# Patient Record
Sex: MALE | Race: WHITE | Employment: OTHER | URBAN - METROPOLITAN AREA
[De-identification: names, ages, dates, MRNs, and addresses within clinical notes are randomized per-mention and may not be internally consistent; named-entity substitution may affect disease eponyms.]

---

## 2017-01-04 ENCOUNTER — OFFICE VISIT (OUTPATIENT)
Dept: FAMILY MEDICINE | Facility: CLINIC | Age: 82
End: 2017-01-04
Payer: COMMERCIAL

## 2017-01-04 ENCOUNTER — TELEPHONE (OUTPATIENT)
Dept: FAMILY MEDICINE | Facility: CLINIC | Age: 82
End: 2017-01-04

## 2017-01-04 ENCOUNTER — TRANSFERRED RECORDS (OUTPATIENT)
Dept: HEALTH INFORMATION MANAGEMENT | Facility: CLINIC | Age: 82
End: 2017-01-04

## 2017-01-04 VITALS
WEIGHT: 182 LBS | HEART RATE: 37 BPM | OXYGEN SATURATION: 97 % | DIASTOLIC BLOOD PRESSURE: 60 MMHG | TEMPERATURE: 97 F | SYSTOLIC BLOOD PRESSURE: 168 MMHG | BODY MASS INDEX: 31.5 KG/M2

## 2017-01-04 DIAGNOSIS — R41.3 MEMORY LOSS: ICD-10-CM

## 2017-01-04 DIAGNOSIS — I10 HYPERTENSION GOAL BP (BLOOD PRESSURE) < 140/90: ICD-10-CM

## 2017-01-04 DIAGNOSIS — R10.12 ABDOMINAL PAIN, LEFT UPPER QUADRANT: Primary | ICD-10-CM

## 2017-01-04 DIAGNOSIS — Z86.711 HISTORY OF PULMONARY EMBOLISM: ICD-10-CM

## 2017-01-04 DIAGNOSIS — D69.6 THROMBOCYTOPENIA (H): ICD-10-CM

## 2017-01-04 PROCEDURE — 99214 OFFICE O/P EST MOD 30 MIN: CPT | Performed by: INTERNAL MEDICINE

## 2017-01-04 NOTE — TELEPHONE ENCOUNTER
Pt has been having left stomach side pain all night. Pt would like to see you if possible. If not, pt has been scheduled with Dr Dotson at 2:00pm today. Please call pt at the above # to advise.

## 2017-01-04 NOTE — MR AVS SNAPSHOT
After Visit Summary   1/4/2017    Brock Abarca    MRN: [id number]           Patient Information     Date Of Birth          3/1/1932        Visit Information        Provider Department      1/4/2017 1:00 PM Sadaf Fox MD [facility] Instructions    [facility]    If you have any questions regarding to your visit please contact your care team:     Team Pink:   Clinic Hours Telephone Number   Internal Medicine:  Dr. Sadaf Feliciano NP       7am-7pm  Monday - Thursday   7am-5pm  Fridays  [phone]  (Appointment scheduling available 24/7)    Questions about your visit?  Team Line  [phone]   Urgent Care - [place] [facility] - 11am-9pm Monday-Friday Saturday-Sunday- 9am-5pm   [place] - 5pm-9pm Monday-Friday Saturday-Sunday- 9am-5pm  [phone] - [place]   [phone] - [place]       What options do I have for visits at the clinic other than the traditional office visit?  To expand how we care for you, many of our providers are utilizing electronic visits (e-visits) and telephone visits, when medically appropriate, for interactions with their patients rather than a visit in the clinic.   We also offer nurse visits for many medical concerns. Just like any other service, we will bill your insurance company for this type of visit based on time spent on the phone with your provider. Not all insurance companies cover these visits. Please check with your medical insurance if this type of visit is covered. You will be responsible for any charges that are not paid by your insurance.      E-visits via Kiko:  generally incur a $35.00 fee.  Telephone visits:  Time spent on the phone: *charged based on time that is spent on the phone in increments of 10 minutes. Estimated cost:   5-10 mins $30.00   11-20 mins. $59.00   21-30 mins. $85.00   Use Kiko (secure email communication and access to  your chart) to send your primary care provider a message or make an appointment. Ask someone on your Team how to sign up for [employer].    For a Price Quote for your services, please call our Hupu Line at [phone].    As always, Thank you for trusting us with your health care needs!    BASSAM          Follow-ups after your visit        Who to contact     If you have questions or need follow up information about today's clinic visit or your schedule please contact [facility] directly at [phone].  Normal or non-critical lab and imaging results will be communicated to you by [employer] The[employer]hart, letter or phone within 4 business days after the clinic has received the results. If you do not hear from us within 7 days, please contact the clinic through Uniquedut or phone. If you have a critical or abnormal lab result, we will notify you by phone as soon as possible.  Submit refill requests through [employer] or call your pharmacy and they will forward the refill request to us. Please allow 3 business days for your refill to be completed.          Additional Information About Your Visit        Io [employer] Information     S[employer] gives you secure access to your electronic health record. If you see a primary care provider, you can also send messages to your care team and make appointments. If you have questions, please call your primary care clinic.  If you do not have a primary care provider, please call [phone] and they will assist you.        Care EveryWhere ID     This is your Care EveryWhere ID. This could be used by other organizations to access your [place] medical records  [id number]        Your Vitals Were     Pulse Temperature Pulse Oximetry             37 97  F (36.1  C) (Oral) 97%          Blood Pressure from Last 3 Encounters:   01/04/17 168/60   10/07/16 108/64   01/04/16 126/72    Weight from Last 3 Encounters:   01/04/17 182 lb (82.555 kg)   10/07/16 181 lb (82.101 kg)   02/16/16  188 lb (85.276 kg)              Today, you had the following     No orders found for display         Today's Medication Changes          These changes are accurate as of: 1/4/17  1:14 PM.  If you have any questions, ask your nurse or doctor.               These medicines have changed or have updated prescriptions.        Dose/Directions    triamcinolone 0.1 % cream   Commonly known as:  KENALOG   This may have changed:    - when to take this  - reasons to take this  - additional instructions   Used for:  Dermatitis        Apply sparingly to affected area three times daily for 10 days.   Quantity:  45 g   Refills:  0                Primary Care Provider Office Phone # Fax #    Sadaf Fox [phone]       [address] [address]        Thank you!     Thank you for choosing [facility]  for your care. Our goal is always to provide you with excellent care. Hearing back from our patients is one way we can continue to improve our services. Please take a few minutes to complete the written survey that you may receive in the mail after your visit with us. Thank you!             Your Updated Medication List - Protect others around you: Learn how to safely use, store and throw away your medicines at www.disposemymeds.org.          This list is accurate as of: 1/4/17  1:14 PM.  Always use your most recent med list.                   Brand Name Dispense Instructions for use    alendronate 70 MG tablet    FOSAMAX    12 tablet    Take 1 tablet (70 mg) by mouth every 7 days Take 60 minutes before am meal with 8 oz. water. Remain upright for 30 minutes.       amoxicillin 875 MG tablet    AMOXIL    20 tablet    Take 1 tablet (875 mg) by mouth 2 times daily       aspirin 81 MG tablet     90 tablet    Take 1 tablet (81 mg) by mouth daily       CENTRUM SILVER ADULT 50+ PO      Take 1 tablet by mouth daily       cholecalciferol 5000 UNITS Caps capsule     vitamin D3     Take 1 capsule by mouth daily       CLARITIN-D 24 HOUR PO      Take 1 tablet by mouth as needed       clopidogrel 75 MG tablet    PLAVIX    90 tablet    Take 1 tablet (75 mg) by mouth daily       levothyroxine 112 MCG tablet    SYNTHROID/LEVOTHROID    90 tablet    Take 1 tablet (112 mcg) by mouth daily       metoprolol 50 MG 24 hr tablet    TOPROL-XL    90 tablet    Take 1 tablet (50 mg) by mouth daily       order for DME     1 Device    Equipment being ordered: bilateral wrist splints       simvastatin 40 MG tablet    ZOCOR    90 tablet    Take 1 tablet (40 mg) by mouth daily       tamsulosin 0.4 MG capsule    FLOMAX    90 capsule    Take 1 capsule (0.4 mg) by mouth daily       triamcinolone 0.1 % cream    KENALOG    45 g    Apply sparingly to affected area three times daily for 10 days.       trospium 60 MG Cp24 24 hr capsule    SANCTURA XR     60 mg       VITAMIN B 12 PO      Take 2,500 mcg by mouth daily.

## 2017-01-04 NOTE — PROGRESS NOTES
SUBJECTIVE:                                                    Brock Abarca is a 84 year old male who presents to clinic today for the following health issues:        ABDOMINAL PAIN     Onset: x2 days     Description:   Character: Stabbing and Cramping  Location: left upper quadrant left lower quadrant left flank  Radiation: Back    Intensity: moderate    Progression of Symptoms:  same    Accompanying Signs & Symptoms:  Fever/Chills?: no   Gas/Bloating: YES  Nausea: no   Vomitting: no   Diarrhea?: no   Constipation:no   Dysuria or Hematuria: no    History:   Trauma: no   Previous similar pain: no    Previous tests done: none    Precipitating factors:   Does the pain change with:     Food: no      BM: no     Urination: no     Alleviating factors:  none    Therapies Tried and outcome: tylenol, advil, tums, peptobismol    LMP:  not applicable   Monday night he had an upset stomach, took peptobismol, and felt fine on Tuesday. Tuesday night the pain returned in the left side of his abdomen. Today he has started having dry heaves. He had not had a BM in 2 days, but this is not unusual for him. He denies any increases in chills nor any fever. He would rate the pain currently as 10/10. He describes the pain as throbbing. It is worse when he lays down.    Patient had memory testing last week - stable.     Problem list and histories reviewed & adjusted, as indicated.  Additional history: as documented    BP Readings from Last 3 Encounters:   01/04/17 168/60   10/07/16 108/64   01/04/16 126/72    Wt Readings from Last 3 Encounters:   01/04/17 182 lb (82.555 kg)   10/07/16 181 lb (82.101 kg)   02/16/16 188 lb (85.276 kg)         ROS:  C: NEGATIVE for fever, chills, change in weight  GI: POSITIVE for abdominal pain   All other systems were reviewed and were negative.      OBJECTIVE:                                                    /60 mmHg  Pulse 37  Temp(Src) 97  F (36.1  C) (Oral)  Wt 182 lb (82.555 kg)   SpO2 97%  Body mass index is 31.5 kg/(m^2).  GENERAL:un healthy, alert and moderate distress  RESP: lungs clear to auscultation - no rales, rhonchi or wheezes  CV: regular rate and rhythm, normal S1 S2, no S3 or S4, no murmur, click or rub, no peripheral edema and peripheral pulses strong  ABDOMEN: soft,  without hepatosplenomegaly or masses, significant tenderness   in LUQ. Mild tenderness in RUQ  MS: Ecchymosis on right temple and right upper abdomen.       ASSESSMENT/PLAN:                                                    Brock was seen today for abdominal pain.    Diagnoses and all orders for this visit:    Abdominal pain, left upper quadrant    History of pulmonary embolism    Thrombocytopenia (H)    Memory loss    Suspect retroperitoneal bleed given low platelet history or splenic infarct given history of pulmonary embolism.  Could also be colonic or renal in etiology.  Needs stat labs and CT.  Patient declined transfer via ambulance.  ER physician notified of transfer.  Patient was sent to [facility] by car for further assessment.    Briefly informed patient of stable memory test  Blood pressure is elevated.  Likely due to pain    This document serves as a record of the services and decisions personally performed and made by Sadaf Fox MD. It was created on her behalf by Marissa Rehman, a trained medical scribe. The creation of this document is based the provider's statements to the medical scribe.  Marissa Rehman 1:13 PM 1/4/2017    Provider:   The information in this document, created by the medical scribe for me, accurately reflects the services I personally performed and the decisions made by me. I have reviewed and approved this document for accuracy prior to leaving the patient care area.  Sadaf Fox MD 1:13 PM 1/4/2017    Sadaf Fox MD  [facility]

## 2017-01-04 NOTE — NURSING NOTE
"Chief Complaint   Patient presents with     Abdominal Pain       Initial /60 mmHg  Pulse 37  Temp(Src) 97  F (36.1  C) (Oral)  Wt 182 lb (82.555 kg)  SpO2 97% Estimated body mass index is 31.5 kg/(m^2) as calculated from the following:    Height as of 10/7/16: 5' 3.75\" (1.619 m).    Weight as of this encounter: 182 lb (82.555 kg).  BP completed using cuff size: rajiv KING CMA ([facility])      "

## 2017-01-04 NOTE — PATIENT INSTRUCTIONS
[facility]    If you have any questions regarding to your visit please contact your care team:     Team Pink:   Clinic Hours Telephone Number   Internal Medicine:  Dr. Sadaf Feliciano NP       7am-7pm  Monday - Thursday   7am-5pm  Fridays  [phone]  (Appointment scheduling available 24/7)    Questions about your visit?  Team Line  [phone]   Urgent Care - [place] and [facility] - 11am-9pm Monday-Friday Saturday-Sunday- 9am-5pm   [place] - 5pm-9pm Monday-Friday Saturday-Sunday- 9am-5pm  [phone] - [place]   [phone] - [place]       What options do I have for visits at the clinic other than the traditional office visit?  To expand how we care for you, many of our providers are utilizing electronic visits (e-visits) and telephone visits, when medically appropriate, for interactions with their patients rather than a visit in the clinic.   We also offer nurse visits for many medical concerns. Just like any other service, we will bill your insurance company for this type of visit based on time spent on the phone with your provider. Not all insurance companies cover these visits. Please check with your medical insurance if this type of visit is covered. You will be responsible for any charges that are not paid by your insurance.      E-visits via Kiosked:  generally incur a $35.00 fee.  Telephone visits:  Time spent on the phone: *charged based on time that is spent on the phone in increments of 10 minutes. Estimated cost:   5-10 mins $30.00   11-20 mins. $59.00   21-30 mins. $85.00   Use [employer] (secure email communication and access to your chart) to send your primary care provider a message or make an appointment. Ask someone on your Team how to sign up for Kiosked.    For a Price Quote for your services, please call our Consumer Price Line at [phone].    As always, Thank you for trusting us with your health care  needs!    BASSAM

## 2017-01-06 ENCOUNTER — TELEPHONE (OUTPATIENT)
Dept: FAMILY MEDICINE | Facility: CLINIC | Age: 82
End: 2017-01-06

## 2017-01-06 NOTE — TELEPHONE ENCOUNTER
Called and spoke with Linda.  Appointment scheduled with Dr. Fox for Wednesday, January 11th at 2:30 p.m. Carri Rojo,

## 2017-01-06 NOTE — TELEPHONE ENCOUNTER
Reason for Call:  Other appointment    Detailed comments: Linda the patients wife states the patient was in the hospital last night after finding a kidney stone and had stents placed at [facility]. She states the doctor there advised to see primary care provider within 5 days before having to go back into [facility] to have stents placed. They would like an earlier appt within 5 days.     Phone Number Patient can be reached at: Home number on file [phone] (home) or Cell number on file:  [phone]    Best Time: any    Can we leave a detailed message on this number? YES    Call taken on 1/6/2017 at 11:32 AM by Ronal Walker

## 2017-01-09 ENCOUNTER — TELEPHONE (OUTPATIENT)
Dept: FAMILY MEDICINE | Facility: CLINIC | Age: 82
End: 2017-01-09

## 2017-01-09 NOTE — TELEPHONE ENCOUNTER
This patient was discharged from [facility] on 1-6-17.    Discharge Diagnosis: ureteral colic    A follow-up visit has been scheduled.  Patient is scheduled to see Dr. Fox on 1-11-17.    Number of ED/ER visits in the last 12 months:       Please follow-up with patient.    Carri Rojo,

## 2017-01-10 NOTE — TELEPHONE ENCOUNTER
"ED/Discharge Protocol    \"Hi, my name is Edwin Weiner, a registered nurse, and I am calling on behalf of Dr. Fox's office at [place].  I am calling to follow up and see how things are going for you after your recent visit.\"    \"I see that you were in the (ER/UC/IP) on 1/6/17.    How are you doing now that you are home?\" better but still in pain. Has pain meds from the hospital    Is patient experiencing symptoms that may require a hospital visit?  no    Discharge Instructions    \"Let's review your discharge instructions.  What is/are the follow-up recommendations?  Pt. Response: f/u with PCP    \"Were you instructed to make a follow-up appointment?\"  Pt. Response: Yes.  Has appointment been made?   Yes      \"When you see the provider, I would recommend that you bring your discharge instructions with you.    Medications    \"How many new medications are you on since your hospitalization/ED visit?\"    0-1  \"How many of your current medicines changed (dose, timing, name, etc.) while you were in the hospital/ED visit?\"   0-1  \"Do you have questions about your medications?\"   No  \"Were you newly diagnosed with heart failure, COPD, diabetes or did you have a heart attack?\"   No  For patients on insulin: \"Did you start on insulin in the hospital or did you have your insulin dose changed?\"   No    Medication reconciliation completed? No    Was MTM referral placed (*Make sure to put transitions as reason for referral)?   No    Call Summary    \"Do you have any questions or concerns about your condition or care plan at the moment?\"    No  Triage nurse advice given: call with questions/concerns      \"If you have questions or things don't continue to improve, we encourage you contact us through the main clinic number,  [phone].  Even if the clinic is not open, triage nurses are available 24/7 to help you.     We would like you to know that our clinic has extended hours (provide information).  We also have urgent care " "(provide details on closest location and hours/contact info)\"      \"Thank you for your time and take care!\"      Edwin Weiner RN    "

## 2017-01-11 ENCOUNTER — OFFICE VISIT (OUTPATIENT)
Dept: FAMILY MEDICINE | Facility: CLINIC | Age: 82
End: 2017-01-11
Payer: COMMERCIAL

## 2017-01-11 VITALS
SYSTOLIC BLOOD PRESSURE: 136 MMHG | TEMPERATURE: 97 F | BODY MASS INDEX: 31.18 KG/M2 | HEART RATE: 87 BPM | OXYGEN SATURATION: 97 % | WEIGHT: 180.2 LBS | DIASTOLIC BLOOD PRESSURE: 60 MMHG

## 2017-01-11 DIAGNOSIS — N20.0 CALCULUS OF KIDNEY: ICD-10-CM

## 2017-01-11 DIAGNOSIS — N17.9 ACUTE RENAL FAILURE, UNSPECIFIED ACUTE RENAL FAILURE TYPE (H): ICD-10-CM

## 2017-01-11 DIAGNOSIS — R79.89 ELEVATED TSH: ICD-10-CM

## 2017-01-11 DIAGNOSIS — E83.52 HYPERCALCEMIA: Primary | ICD-10-CM

## 2017-01-11 DIAGNOSIS — K59.03 DRUG-INDUCED CONSTIPATION: ICD-10-CM

## 2017-01-11 DIAGNOSIS — N13.30 HYDRONEPHROSIS, LEFT: ICD-10-CM

## 2017-01-11 LAB
ANION GAP SERPL CALCULATED.3IONS-SCNC: 10 MMOL/L (ref 3–14)
BUN SERPL-MCNC: 13 MG/DL (ref 7–30)
CALCIUM SERPL-MCNC: 10.6 MG/DL (ref 8.5–10.1)
CHLORIDE SERPL-SCNC: 108 MMOL/L (ref 94–109)
CO2 SERPL-SCNC: 23 MMOL/L (ref 20–32)
CREAT SERPL-MCNC: 1.16 MG/DL (ref 0.66–1.25)
GFR SERPL CREATININE-BSD FRML MDRD: 60 ML/MIN/1.7M2
GLUCOSE SERPL-MCNC: 89 MG/DL (ref 70–99)
POTASSIUM SERPL-SCNC: 4.7 MMOL/L (ref 3.4–5.3)
SODIUM SERPL-SCNC: 141 MMOL/L (ref 133–144)

## 2017-01-11 PROCEDURE — 99495 TRANSJ CARE MGMT MOD F2F 14D: CPT | Performed by: INTERNAL MEDICINE

## 2017-01-11 PROCEDURE — 36415 COLL VENOUS BLD VENIPUNCTURE: CPT | Performed by: INTERNAL MEDICINE

## 2017-01-11 PROCEDURE — 82306 VITAMIN D 25 HYDROXY: CPT | Performed by: INTERNAL MEDICINE

## 2017-01-11 PROCEDURE — 80048 BASIC METABOLIC PNL TOTAL CA: CPT | Performed by: INTERNAL MEDICINE

## 2017-01-11 RX ORDER — POLYETHYLENE GLYCOL 3350 17 G/17G
1 POWDER, FOR SOLUTION ORAL DAILY
Qty: 510 G | Refills: 0 | Status: SHIPPED | OUTPATIENT
Start: 2017-01-11 | End: 2017-06-26

## 2017-01-11 RX ORDER — SENNOSIDES 8.6 MG
1 TABLET ORAL 2 TIMES DAILY
Qty: 60 TABLET | Refills: 0 | Status: SHIPPED | OUTPATIENT
Start: 2017-01-11 | End: 2019-07-09

## 2017-01-11 RX ORDER — HYDROCODONE BITARTRATE AND ACETAMINOPHEN 5; 325 MG/1; MG/1
TABLET ORAL PRN
COMMUNITY
Start: 2017-01-07 | End: 2017-02-06

## 2017-01-11 ASSESSMENT — PAIN SCALES - GENERAL: PAINLEVEL: MILD PAIN (2)

## 2017-01-11 NOTE — PATIENT INSTRUCTIONS
Consider using walker while you are on your pain medications.     Buy MiraLax and Senokot for constipation at the pharmacy downstairs.   MiraLax 1x daily with water.  Senokot 2x daily.      [facility]    If you have any questions regarding to your visit please contact your care team:     Team Pink:   Clinic Hours Telephone Number   Internal Medicine:  Dr. Sadaf Feliciano, NP       7am-7pm  Monday - Thursday   7am-5pm  Fridays  [phone]  (Appointment scheduling available 24/7)    Questions about your visit?  Team Line  [phone]   [facility] [place] [facility] 11am-9pm Monday-Friday Saturday-Sunday- 9am-5pm   [place] - 5pm-9pm Monday-Friday Saturday-Sunday- 9am-5pm  [phone] - [place]   [phone] - [place]       What options do I have for visits at the clinic other than the traditional office visit?  To expand how we care for you, many of our providers are utilizing electronic visits (e-visits) and telephone visits, when medically appropriate, for interactions with their patients rather than a visit in the clinic.   We also offer nurse visits for many medical concerns. Just like any other service, we will bill your insurance company for this type of visit based on time spent on the phone with your provider. Not all insurance companies cover these visits. Please check with your medical insurance if this type of visit is covered. You will be responsible for any charges that are not paid by your insurance.      E-visits via Hipui:  generally incur a $35.00 fee.  Telephone visits:  Time spent on the phone: *charged based on time that is spent on the phone in increments of 10 minutes. Estimated cost:   5-10 mins $30.00   11-20 mins. $59.00   21-30 mins. $85.00   Use Hipui (secure email communication and access to your chart) to send your primary care provider a message or make an appointment. Ask someone on your Team how to  sign up for [employer]x.    For a Price Quote for your services, please call our Consumer Price Line at [phone].    As always, Thank you for trusting us with your health care needs!    Discharged by June KING CMA ([facility])

## 2017-01-11 NOTE — Clinical Note
This was a hospital follow up patient but the wrong smart set note was used.  Can you delmer that up for me and fill out as much as you can?  You can just add it to the note that is already in there. Thanks, Dr Fox

## 2017-01-11 NOTE — PROGRESS NOTES
"  SUBJECTIVE:                                                    Brock Abarca is a 84 year old male who presents to clinic today for the following health issues:      Chief Complaint   Patient presents with     Back Pain     Hurts when urinating, issues with not being able to hold it. Patient states this is due to stent that was put in for kidney stones.   Patient currently has daughter at home helping him out as wife is in hospital after knee surgery.  Was hospitalized for hydronephrosis and renal stones on the left.  Had stent placed and will get removal in a week.    Amadeo says that when he urinates, he feels a sharp pain in his back. When he stands, he has a constant pain that he rates as a 2-3/10 and goes away when he sits.His current medications are making him a little off-balance, but he says it is manageable. He feels the narcotics are helpful and that he will have enough.    He reports that he is currently urinating every hour and has to go as soon as he feels it. He has \" no holding power\".   Labs in the hospital revealed hypercalcemia and renal failure, both of which resolved with hydration.  He was discontinued on his vitamin D as well.  He doesn't take calcium.  He is on fosamax.    He has been constipated since being home from the hospital.  He is not using any treatment.      Amadeo gets the stent removed on January 23 (he thinks).    Problem list and histories reviewed & adjusted, as indicated.  Additional history: as documented    BP Readings from Last 3 Encounters:   01/11/17 136/60   01/04/17 168/60   10/07/16 108/64    Wt Readings from Last 3 Encounters:   01/11/17 180 lb 3.2 oz (81.738 kg)   01/04/17 182 lb (82.555 kg)   10/07/16 181 lb (82.101 kg)          ROS:  C: NEGATIVE for fever, chills, change in weight  CV: NEGATIVE for chest pain, palpitations   GI: NEGATIVE for nausea, abdominal pain, heartburn, POSITIVE for constipation  : negative for hematuria, decreased urinary stream, " positive for dysuria and frequency   ROS: 10 point ROS neg other than the symptoms noted above in the HPI.     OBJECTIVE:                                                    /60 mmHg  Pulse 87  Temp(Src) 97  F (36.1  C) (Oral)  Wt 180 lb 3.2 oz (81.738 kg)  SpO2 97%  Body mass index is 31.18 kg/(m^2).  GENERAL: healthy, alert and no distress  RESP: lungs clear to auscultation - no rales, rhonchi or wheezes  CV: regular rate and rhythm, normal S1 S2, no S3 or S4, no murmur, click or rub,  trace peripheral edema bilaterally  ABDOMEN: soft, nontender, no hepatosplenomegaly, no masses and hyperactive bowel sounds.  No edema   Normal gait.  Psych: Alert and oriented times 3; coherent speech, normal   rate and volume, able to articulate logical thoughts, able   to abstract reason,     ASSESSMENT/PLAN:                                                        ICD-10-CM    1. Hypercalcemia E83.52 Basic metabolic panel     Vitamin D Deficiency     Parathyroid Hormone Intact   2. Calculus of kidney N20.0 Basic metabolic panel     Vitamin D Deficiency   3. Hydronephrosis, left N13.30    4. Acute renal failure, unspecified acute renal failure type (H) N17.9 Basic metabolic panel   5. Drug-induced constipation K59.03 sennosides (SENOKOT) 8.6 MG tablet     polyethylene glycol (MIRALAX) powder   Is improved.  Pain is likely from the stent.  Controlled with current medications.  Renal failure is likely from the hydronephrosis but I am concerned about the hypercalcemia.    Constipation is from the narcotics.    Patient Instructions     Consider using walker while you are on your pain medications.     Buy MiraLax and Senokot for constipation at the pharmacy downstairs.   MiraLax 1x daily with water.  Senokot 2x daily.      [facility]    If you have any questions regarding to your visit please contact your care team:     Team Pink:   Clinic Hours Telephone Number   Internal Medicine:  Dr. Sadaf Sanchez  Cirilo Feliciano NP       7am-7pm  Monday - Thursday   7am-5pm  Fridays  [phone]  (Appointment scheduling available 24/7)    Questions about your visit?  Team Line  [phone]   Urgent [facility] - [place] [facility] [place] [facility] - 11am-9pm Monday-Friday Saturday-Sunday- 9am-5pm   [place] - 5pm-9pm Monday-Friday Saturday-Sunday- 9am-5pm  [phone] - [place]   [phone] - [place]       What options do I have for visits at the clinic other than the traditional office visit?  To expand how we care for you, many of our providers are utilizing electronic visits (e-visits) and telephone visits, when medically appropriate, for interactions with their patients rather than a visit in the clinic.   We also offer nurse visits for many medical concerns. Just like any other service, we will bill your insurance company for this type of visit based on time spent on the phone with your provider. Not all insurance companies cover these visits. Please check with your medical insurance if this type of visit is covered. You will be responsible for any charges that are not paid by your insurance.      E-visits via "[employer]:  generally incur a $35.00 fee.  Telephone visits:  Time spent on the phone: *charged based on time that is spent on the phone in increments of 10 minutes. Estimated cost:   5-10 mins $30.00   11-20 mins. $59.00   21-30 mins. $85.00   Use [employer] (secure email communication and access to your chart) to send your primary care provider a message or make an appointment. Ask someone on your Team how to sign up for [employer].    For a Price Quote for your services, please call our Consumer Price Line at [phone].    As always, Thank you for trusting us with your health care needs!    Discharged by June KING CMA ([facility])        This document serves as a record of the services and decisions personally performed and made by Sadaf Fox MD. It was created on her behalf by robbie Rogers  trained medical scribe. The creation of this document is based the provider's statements to the medical scribe.  Marissa Ori 3:11 PM 1/11/2017    Provider:   The information in this document, created by the medical scribe for me, accurately reflects the services I personally performed and the decisions made by me. I have reviewed and approved this document for accuracy prior to leaving the patient care area.  Sadaf Fox MD 3:11 PM 1/11/2017    Sadaf Fox MD  [facility]    Start: 3:11 PM  End: 3:21 PM

## 2017-01-11 NOTE — MR AVS SNAPSHOT
After Visit Summary   1/11/2017    Brock Abarca    MRN: [id number]           Patient Information     Date Of Birth          3/1/1932        Visit Information        Provider Department      1/11/2017 2:30 PM Sadaf Fox MD [facility]        Today's Diagnoses     Hypercalcemia    -  1     Calculus of kidney         Hydronephrosis, left         Acute renal failure, unspecified acute renal failure type (H)         Drug-induced constipation           Care Instructions    Consider using walker while you are on your pain medications.     [facility]   MiraLax 1x daily with water.  Senokot 2x daily.      [facility]    If you have any questions regarding to your visit please contact your care team:     Team Pink:   Clinic Hours Telephone Number   Internal Medicine:  Dr. Sadaf Feliciano, NP       7am-7pm  Monday - Thursday   7am-5pm  Fridays  [phone]  (Appointment scheduling available 24/7)    Questions about your visit?  Team Line  [phone]   Urgent [facility] - [place] [facility] - 11am-9pm Monday-Friday Saturday-Sunday- 9am-5pm   [place] - 5pm-9pm Monday-Friday Saturday-Sunday- 9am-5pm  [phone] - [place]   [phone] - [place]       What options do I have for visits at the clinic other than the traditional office visit?  To expand how we care for you, many of our providers are utilizing electronic visits (e-visits) and telephone visits, when medically appropriate, for interactions with their patients rather than a visit in the clinic.   We also offer nurse visits for many medical concerns. Just like any other service, we will bill your insurance company for this type of visit based on time spent on the phone with your provider. Not all insurance companies cover these visits. Please check with your medical insurance if this type of visit is  covered. You will be responsible for any charges that are not paid by your insurance.      E-visits via [employer]:  generally incur a $35.00 fee.  Telephone visits:  Time spent on the phone: *charged based on time that is spent on the phone in increments of 10 minutes. Estimated cost:   5-10 mins $30.00   11-20 mins. $59.00   21-30 mins. $85.00   Use [employer] (secure email communication and access to your chart) to send your primary care provider a message or make an appointment. Ask someone on your Team how to sign up for [employer].    For a Price Quote for your services, please call our [employer] Line at [phone].    As always, Thank you for trusting us with your health care needs!    Discharged by June KING CMA ([facility])          Follow-ups after your visit        Who to contact     If you have questions or need follow up information about today's clinic visit or your schedule please contact [facility] directly at [phone].  Normal or non-critical lab and imaging results will be communicated to you by MyChart, letter or phone within 4 business days after the clinic has received the results. If you do not hear from us within 7 days, please contact the clinic through [employer] or phone. If you have a critical or abnormal lab result, we will notify you by phone as soon as possible.  Submit refill requests through [employer] or call your pharmacy and they will forward the refill request to us. Please allow 3 business days for your refill to be completed.          Additional Information About Your Visit        [employer] Information     [employer] gives you secure access to your electronic health record. If you see a primary care provider, you can also send messages to your care team and make appointments. If you have questions, please call your primary care clinic.  If you do not have a primary care provider, please call [phone] and they will assist you.        Care EveryWhere ID     This is your Care  EveryWhere ID. This could be used by other organizations to access your [facility] medical records  [id number]        Your Vitals Were     Pulse Temperature Pulse Oximetry             87 97  F (36.1  C) (Oral) 97%          Blood Pressure from Last 3 Encounters:   01/11/17 136/60   01/04/17 168/60   10/07/16 108/64    Weight from Last 3 Encounters:   01/11/17 180 lb 3.2 oz (81.738 kg)   01/04/17 182 lb (82.555 kg)   10/07/16 181 lb (82.101 kg)              We Performed the Following     Basic metabolic panel     Vitamin D Deficiency          Today's Medication Changes          These changes are accurate as of: 1/11/17  3:27 PM.  If you have any questions, ask your nurse or doctor.               Start taking these medicines.        Dose/Directions    polyethylene glycol powder   Commonly known as:  MIRALAX   Used for:  Drug-induced constipation   Started by:  Sadaf Fox MD        Dose:  1 capful   Take 17 g (1 capful) by mouth daily   Quantity:  510 g   Refills:  0       sennosides 8.6 MG tablet   Commonly known as:  SENOKOT   Used for:  Drug-induced constipation   Started by:  Sadaf Fox MD        Dose:  1 tablet   Take 1 tablet by mouth 2 times daily   Quantity:  60 tablet   Refills:  0            Where to get your medicines      These medications were sent to [facility] [place] - [address] [place] [address]  [address] [place] [address] [address], [facility] [address]     Phone:  [phone]    - polyethylene glycol powder  - sennosides 8.6 MG tablet             Primary Care Provider Office Phone # Fax #    Sadaf Fox [phone]       [address] [address]        Thank you!     Thank you for choosing [facility]  for your care. Our goal is always to provide you with excellent care. Hearing back from our patients is one way we can continue to improve our services. Please take a few minutes to complete the written  survey that you may receive in the mail after your visit with us. Thank you!             Your Updated Medication List - Protect others around you: Learn how to safely use, store and throw away your medicines at www.disposemymeds.org.          This list is accurate as of: 1/11/17  3:27 PM.  Always use your most recent med list.                   Brand Name Dispense Instructions for use    alendronate 70 MG tablet    FOSAMAX    12 tablet    Take 1 tablet (70 mg) by mouth every 7 days Take 60 minutes before am meal with 8 oz. water. Remain upright for 30 minutes.       aspirin 81 MG tablet     90 tablet    Take 1 tablet (81 mg) by mouth daily       CENTRUM SILVER ADULT 50+ PO      Take 1 tablet by mouth daily       cholecalciferol 5000 UNITS Caps capsule    vitamin D3     Take 1 capsule by mouth daily       CLARITIN-D 24 HOUR PO      Take 1 tablet by mouth as needed       clopidogrel 75 MG tablet    PLAVIX    90 tablet    Take 1 tablet (75 mg) by mouth daily       HYDROcodone-acetaminophen 5-325 MG per tablet    NORCO     Take by mouth as needed       levothyroxine 112 MCG tablet    SYNTHROID/LEVOTHROID    90 tablet    Take 1 tablet (112 mcg) by mouth daily       metoprolol 50 MG 24 hr tablet    TOPROL-XL    90 tablet    Take 1 tablet (50 mg) by mouth daily       order for DME     1 Device    Equipment being ordered: bilateral wrist splints       polyethylene glycol powder    MIRALAX    510 g    Take 17 g (1 capful) by mouth daily       sennosides 8.6 MG tablet    SENOKOT    60 tablet    Take 1 tablet by mouth 2 times daily       simvastatin 40 MG tablet    ZOCOR    90 tablet    Take 1 tablet (40 mg) by mouth daily       tamsulosin 0.4 MG capsule    FLOMAX    90 capsule    Take 1 capsule (0.4 mg) by mouth daily       trospium 60 MG Cp24 24 hr capsule    SANCTURA XR     60 mg       VITAMIN B 12 PO      Take 2,500 mcg by mouth daily.

## 2017-01-11 NOTE — NURSING NOTE
"Chief Complaint   Patient presents with     Back Pain     Hurts when urinating, issues with not being able to hold it. Patient states this is due to stent that was put in for kidney stones.       Initial /60 mmHg  Pulse 87  Temp(Src) 97  F (36.1  C) (Oral)  Wt 180 lb 3.2 oz (81.738 kg)  SpO2 97% Estimated body mass index is 31.18 kg/(m^2) as calculated from the following:    Height as of 10/7/16: 5' 3.75\" (1.619 m).    Weight as of this encounter: 180 lb 3.2 oz (81.738 kg).  BP completed using cuff size: rajiv Urias CMA      "

## 2017-01-12 LAB — DEPRECATED CALCIDIOL+CALCIFEROL SERPL-MC: 21 UG/L (ref 20–75)

## 2017-01-13 NOTE — PROGRESS NOTES
SUBJECTIVE:                                                    Brock Abarca is a 84 year old male who presents to clinic today for the following health issues:          Hospital Follow-up Visit:    Hospital/Nursing Home/ Rehab Facility: [facility]  Date of Admission: 1/4/2017  Date of Discharge: 1/6/2017  Reason(s) for Admission: Abdominal pain, back pain            Problems taking medications regularly:  None       Medication changes since discharge: None       Problems adhering to non-medication therapy:  None    Summary of hospitalization:  [facility] discharge summary reviewed       Coding guidelines for this visit:  Type of Medical   Decision Making Face-to-Face Visit       within 7 Days of discharge Face-to-Face Visit        within 14 days of discharge   Moderate Complexity 74203 [id number]   High Complexity 69303 [id number]          Specialist follow up - urology  Medications were reviewed with patient and updated on AVS  Summary of hospitalization below

## 2017-01-13 NOTE — PROGRESS NOTES
Quick Note:    Please call Amadeo-  His calcium level is high again. I checked his vitamin D level and it is low, meaning the high calcium has nothing to do with his vitamin D. I need him to come in for more blood and urine test to find out why this is happening. I have placed the orders.  ______

## 2017-01-17 ENCOUNTER — TRANSFERRED RECORDS (OUTPATIENT)
Dept: HEALTH INFORMATION MANAGEMENT | Facility: CLINIC | Age: 82
End: 2017-01-17

## 2017-01-17 DIAGNOSIS — E83.52 HYPERCALCEMIA: ICD-10-CM

## 2017-01-17 DIAGNOSIS — R79.89 ELEVATED TSH: ICD-10-CM

## 2017-01-17 LAB
PTH-INTACT SERPL-MCNC: 236 PG/ML (ref 12–72)
T4 FREE SERPL-MCNC: 1 NG/DL (ref 0.76–1.46)
TSH SERPL DL<=0.005 MIU/L-ACNC: 5.92 MU/L (ref 0.4–4)

## 2017-01-17 PROCEDURE — 82232 ASSAY OF BETA-2 PROTEIN: CPT | Performed by: INTERNAL MEDICINE

## 2017-01-17 PROCEDURE — 82784 ASSAY IGA/IGD/IGG/IGM EACH: CPT | Performed by: INTERNAL MEDICINE

## 2017-01-17 PROCEDURE — 83883 ASSAY NEPHELOMETRY NOT SPEC: CPT | Performed by: INTERNAL MEDICINE

## 2017-01-17 PROCEDURE — 83883 ASSAY NEPHELOMETRY NOT SPEC: CPT | Mod: 59 | Performed by: INTERNAL MEDICINE

## 2017-01-17 PROCEDURE — 36415 COLL VENOUS BLD VENIPUNCTURE: CPT | Performed by: INTERNAL MEDICINE

## 2017-01-17 PROCEDURE — 83970 ASSAY OF PARATHORMONE: CPT | Performed by: INTERNAL MEDICINE

## 2017-01-17 PROCEDURE — 86334 IMMUNOFIX E-PHORESIS SERUM: CPT | Performed by: INTERNAL MEDICINE

## 2017-01-17 PROCEDURE — 84439 ASSAY OF FREE THYROXINE: CPT | Performed by: INTERNAL MEDICINE

## 2017-01-17 PROCEDURE — 84166 PROTEIN E-PHORESIS/URINE/CSF: CPT | Performed by: INTERNAL MEDICINE

## 2017-01-17 PROCEDURE — 84443 ASSAY THYROID STIM HORMONE: CPT | Performed by: INTERNAL MEDICINE

## 2017-01-17 PROCEDURE — 86335 IMMUNFIX E-PHORSIS/URINE/CSF: CPT | Performed by: INTERNAL MEDICINE

## 2017-01-17 PROCEDURE — 84165 PROTEIN E-PHORESIS SERUM: CPT | Performed by: INTERNAL MEDICINE

## 2017-01-17 PROCEDURE — 00000402 ZZHCL STATISTIC TOTAL PROTEIN: Performed by: INTERNAL MEDICINE

## 2017-01-18 ENCOUNTER — TELEPHONE (OUTPATIENT)
Dept: FAMILY MEDICINE | Facility: CLINIC | Age: 82
End: 2017-01-18

## 2017-01-18 DIAGNOSIS — E03.9 HYPOTHYROIDISM, UNSPECIFIED TYPE: Primary | ICD-10-CM

## 2017-01-18 LAB
ALBUMIN SERPL ELPH-MCNC: 3.9 G/DL (ref 3.7–5.1)
ALPHA1 GLOB SERPL ELPH-MCNC: 0.3 G/DL (ref 0.2–0.4)
ALPHA2 GLOB SERPL ELPH-MCNC: 0.9 G/DL (ref 0.5–0.9)
B-GLOBULIN SERPL ELPH-MCNC: 0.6 G/DL (ref 0.6–1)
B2 MICROGLOB SERPL-MCNC: 2.4 MG/L
GAMMA GLOB SERPL ELPH-MCNC: 0.8 G/DL (ref 0.7–1.6)
IGA SERPL-MCNC: 84 MG/DL (ref 70–380)
IGG SERPL-MCNC: 897 MG/DL (ref 695–1620)
IGM SERPL-MCNC: 30 MG/DL (ref 60–265)
KAPPA LC UR-MCNC: 0.95 MG/DL (ref 0.33–1.94)
KAPPA LC/LAMBDA SER: 0.81 {RATIO} (ref 0.26–1.65)
LAMBDA LC SERPL-MCNC: 1.18 MG/DL (ref 0.57–2.63)
M PROTEIN SERPL ELPH-MCNC: 0 G/DL
PROT PATTERN SERPL ELPH-IMP: NORMAL
PROT PATTERN SERPL IFE-IMP: ABNORMAL

## 2017-01-18 RX ORDER — LEVOTHYROXINE SODIUM 125 UG/1
125 TABLET ORAL DAILY
Qty: 90 TABLET | Refills: 1 | Status: SHIPPED | OUTPATIENT
Start: 2017-01-18 | End: 2017-09-25

## 2017-01-18 NOTE — PROGRESS NOTES
Quick Note:    I called Linda (pt's wife). Amadeo has significant worsening of his primary hyperparathyroidism which has caused the high calcium, kidney stones and worsening bone density. He needs to see Dr. Crane (his endocrinologist) ASAP to discuss surgical/medical treatment options. Please fax his lab work from [facility] and the last month of labs from  over to Dr. Crane. I would like Amadeo seen within 1 week.     Dr. Fox    ______

## 2017-01-19 LAB
ALBUMIN MFR UR ELPH: 59.6 %
ALPHA1 GLOB MFR UR ELPH: 3.9 %
ALPHA2 GLOB MFR UR ELPH: 11.1 %
B-GLOBULIN MFR UR ELPH: 6.8 %
GAMMA GLOB MFR UR ELPH: 18.6 %
M PROTEIN MFR UR ELPH: 0 %
PROT ELPH PNL UR ELPH: NORMAL
PROT PATTERN UR ELPH-IMP: ABNORMAL

## 2017-01-24 DIAGNOSIS — E21.0 PRIMARY HYPERPARATHYROIDISM (H): Primary | ICD-10-CM

## 2017-01-25 ENCOUNTER — TELEPHONE (OUTPATIENT)
Dept: ENDOCRINOLOGY | Facility: CLINIC | Age: 82
End: 2017-01-25

## 2017-01-25 NOTE — TELEPHONE ENCOUNTER
----- Message from Analisa Sepulveda sent at 1/25/2017 11:16 AM CST -----  Regarding: RE: hyperparathyroidism urgent   He is scheduled for Friday. His wife just had knee surgery so they have to find a ride here. If they can't she will let me know so we can find a different day.   ----- Message -----     From: Clementina Palacio RN     Sent: 1/25/2017   8:41 AM       To: Clinic [id number]  Subject: FW: hyperparathyroidism urgent                   Can you call and offer him Friday with Tasma  2:20 PM  NEW Hyperparathyroid. ? Schedule if he can otherwise send back  So I can find another  Spot.   ----- Message -----     From: Yanique Whatley MD     Sent: 1/24/2017   7:11 PM       To: Grace Clements RN, #  Subject: FW: hyperparathyroidism                          Please see message below.  This patient needs to be seen in the clinic at available spot in 1-2 weeks.  Thank you.   ----- Message -----     From: Sadaf Fox MD     Sent: 1/24/2017   4:43 PM       To: Yanique Whatley MD  Subject: hyperparathyroidism                              Thank you, Dr. Whatley.  Here is the patient we spoke about that has primary hyperparathyroidism/kidney stones/osteoporosis and renal insufficiency.  I have placed the orders for endo, surgery, and the 24 hour urine.  The patient is aware you will be calling.    Please note his records are a little messy.  He was recently in [facility] for the kidney stones where he had the calcium over 11) - see care everywhere.  The records from his old endocrinologist (Dr Crane) are filled in the media section of Epic.    Let me know if you have any questions.  Dr. Fox

## 2017-01-27 ENCOUNTER — OFFICE VISIT (OUTPATIENT)
Dept: ENDOCRINOLOGY | Facility: CLINIC | Age: 82
End: 2017-01-27

## 2017-01-27 VITALS
DIASTOLIC BLOOD PRESSURE: 71 MMHG | SYSTOLIC BLOOD PRESSURE: 112 MMHG | HEIGHT: 64 IN | WEIGHT: 177.2 LBS | BODY MASS INDEX: 30.25 KG/M2 | HEART RATE: 78 BPM

## 2017-01-27 DIAGNOSIS — E21.0 PRIMARY HYPERPARATHYROIDISM (H): ICD-10-CM

## 2017-01-27 DIAGNOSIS — N20.0 KIDNEY STONE: ICD-10-CM

## 2017-01-27 LAB
ALBUMIN SERPL-MCNC: 3.6 G/DL (ref 3.4–5)
CALCIUM SERPL-MCNC: 9.6 MG/DL (ref 8.5–10.1)
CREAT SERPL-MCNC: 1.03 MG/DL (ref 0.66–1.25)
GFR SERPL CREATININE-BSD FRML MDRD: 69 ML/MIN/1.7M2
PHOSPHATE SERPL-MCNC: 1.9 MG/DL (ref 2.5–4.5)
PTH-INTACT SERPL-MCNC: 177 PG/ML (ref 12–72)

## 2017-01-27 ASSESSMENT — PAIN SCALES - GENERAL: PAINLEVEL: NO PAIN (0)

## 2017-01-27 NOTE — PROGRESS NOTES
Endocrinology Note         Brock is a 84 year old male presents today for hyperparathyroidism.    HPI  Brock Abarca is a pleasant 84 years old male with hx of stroke, hypertension, osteoporosis, hypothyroidism who is here for hypercalcemia and hyperparathyroidism.    He was noted to have large UVJ stone causing hydronephrosis and hydroureter 3 weeks ago after experiencing sharp pain in the back. He had stent placement and had it removal this week. He said this was his first episode of kidney stone but he was told that he has multiple stones. Labs in the hospital revealed hypercalcemia, Ca 11.4 and renal failure, Cr 1.4 which resolved with hydration. He was discontinued vitamin D.     He was noted to have mild hypercalcemia since 2012. His calcium ranged 10-10.7. Upon asked, he does not drink a lot of water. He has 1 glass of water, 1-2 cups of coffee, 1 glass of cranberry juice and 1 glass of milk a day. He does not drink a lot because of frequent urination. He has not had fracture since he was in adult life. He doesn't take calcium and stopped vitamin D since discharge from the hospital.  He is on Fosamax 70 mg weekly for a year for osteoporosis.  DXA in 11/2016 revealed left femoral neck T-score:  -3.0, forearm T-score:  -2.7 with significant decrease in bone density of the forearm and significant increase in bone density of the hip(s). SPEP, UPEP were normal.     He has been constipated chronically. He denied chronic abdominal pain, muscle ache/pain. He denied depression.      Past Medical History  Past Medical History   Diagnosis Date     Arthritis      Hypertension      Unspecified cerebral artery occlusion with cerebral infarction      Thyroid disease      Pulmonary embolism (H) 8/24/2012     Hyperlipidemia LDL goal <70 8/24/2012     Osteoporosis 8/24/2012     Hypothyroidism 8/24/2012     Memory loss 8/26/2012     Nonsenile cataract    1. Hypertension.   2. Hypothyroidism.   3. History of  pulmonary embolism after surgery in 2008.   4. History of cerebrovascular accident, on aspirin and Plavix.   5. Hypothyroidism.       Allergies  Allergies   Allergen Reactions     Losartan      cough     Zolpidem Other (See Comments)     Pt was found wandering halls during last hospitalization and was confused.   Pt and wife request that pt not receive Ambien.     Medications  Current Outpatient Prescriptions   Medication Sig Dispense Refill     levothyroxine (SYNTHROID/LEVOTHROID) 125 MCG tablet Take 1 tablet (125 mcg) by mouth daily New dose 90 tablet 1     HYDROcodone-acetaminophen (NORCO) 5-325 MG per tablet Take by mouth as needed       sennosides (SENOKOT) 8.6 MG tablet Take 1 tablet by mouth 2 times daily 60 tablet 0     polyethylene glycol (MIRALAX) powder Take 17 g (1 capful) by mouth daily 510 g 0     alendronate (FOSAMAX) 70 MG tablet Take 1 tablet (70 mg) by mouth every 7 days Take 60 minutes before am meal with 8 oz. water. Remain upright for 30 minutes. 12 tablet 1     simvastatin (ZOCOR) 40 MG tablet Take 1 tablet (40 mg) by mouth daily 90 tablet 3     clopidogrel (PLAVIX) 75 MG tablet Take 1 tablet (75 mg) by mouth daily 90 tablet 3     metoprolol (TOPROL-XL) 50 MG 24 hr tablet Take 1 tablet (50 mg) by mouth daily 90 tablet 3     trospium (SANCTURA XR) 60 MG CP24 60 mg       aspirin 81 MG tablet Take 1 tablet (81 mg) by mouth daily 90 tablet 3     ORDER FOR DME Equipment being ordered: bilateral wrist splints 1 Device 0     tamsulosin (FLOMAX) 0.4 MG 24 hr capsule Take 1 capsule (0.4 mg) by mouth daily 90 capsule 4     Multiple Vitamins-Minerals (CENTRUM SILVER ADULT 50+ PO) Take 1 tablet by mouth daily       cholecalciferol (VITAMIN D3) 5000 UNITS CAPS capsule Take 1 capsule by mouth daily       Loratadine-Pseudoephedrine (CLARITIN-D 24 HOUR PO) Take 1 tablet by mouth as needed       Cyanocobalamin (VITAMIN B 12 PO) Take 2,500 mcg by mouth daily.       Family History  family history includes  "CEREBROVASCULAR DISEASE in his father; HEART DISEASE in his sister.   No family hx of kidney stone    Social History  Social History   Substance Use Topics     Smoking status: Never Smoker      Smokeless tobacco: Never Used     Alcohol Use: Yes   lives with his wife    ROS  Constitutional: no weight change, good energy  Eyes: no vision change, diplopia or red eyes   Neck: no difficulty swallowing, no choking, no neck pain, no neck swelling  Cardiovascular: no chest pain, palpitations  Respiratory: no dyspnea, cough, shortness of breath or wheezing   GI: no nausea, vomiting, diarrhea , + constipation, no abdominal pain   : recent episode of kidney stone and s/p ureteral stent  Musculoskeletal: no joint or muscle pain or swelling   Integumentary: no concerning lesions   Neuro: no loss of strength or sensation, no numbness or tingling, no tremor, no dizziness, no headache   Endo: no polyuria or polydipsia, +cold intolerance   Heme/Lymph: no concerning bumps, no bleeding problems   Allergy: no environmental allergies   Psych: no depression or anxiety,does not sleep well.    Physical Exam  /71 mmHg  Pulse 78  Ht 1.626 m (5' 4\")  Wt 80.377 kg (177 lb 3.2 oz)  BMI 30.40 kg/m2  Body mass index is 30.4 kg/(m^2).  Constitutional: no distress, comfortable, pleasant   Eyes: anicteric, normal extra-ocular movements, no lid lag or retraction  Neck: no thyromegaly, no discrete nodule  Cardiovascular: regular rate and rhythm, normal S1 and S2, no murmurs  Respiratory: clear to auscultation, no wheezes or crackles, normal breath sounds   Gastrointestinal:  nontender, no hepatomegaly, no masses   Musculoskeletal: no edema   Skin: no concerning lesions, no jaundice   Neurological: cranial nerves intact, 1+ reflexes at patella, normal gait using cane, no tremor on outstretched hands bilaterally  Psychological: appropriate mood   Lymphatic: no cervical  lymphadenopathy.      RESULTS           DXA 11/2016               Lumbar " Spine (L1-L2)      T-score:  2.4, marked degenerative changes present, most marked at L3,4, so only L1,2 are evaluated.                Left Femoral Neck            T-score:  -3.0               Forearm (radius 33%)      T-score:  -2.7                 Lumbar (L1-L2) BMD: 1.486 Previous: Not available                            Left Total Hip BMD: 0.804     Previous: 0.750              Forearm (radius 33%) BMD: 0.587 Previous: 0.637    Comparison is made to another DXA performed on the same [employer]  machine on 11/7/2012.      IMPRESSION  Osteoporosis., Degenerative changes of the lumbar spine which may falsely elevate results.  There has been significant decrease in bone density of the forearm. There has been significant increase in bone density of the hip(s). The spine was not included on the previous study so comparison is not possible.         Ref. Range 1/17/2017 10:54   Albumin Fraction Latest Ref Range: 3.7-5.1 g/dL 3.9   Albumin Fraction Urine Latest Ref Range: 0 % 59.6 (H)   Alpha 1 Fraction Latest Ref Range: 0.2-0.4 g/dL 0.3   Alpha 1 Fraction Urine Latest Ref Range: 0 % 3.9 (H)   Alpha 2 Fraction Latest Ref Range: 0.5-0.9 g/dL 0.9   Alpha 2 Fraction Urine Latest Ref Range: 0 % 11.1 (H)   Beta Fraction Latest Ref Range: 0.6-1.0 g/dL 0.6   Beta Fraction Urine Latest Ref Range: 0 % 6.8 (H)   ELP Interpretation: Unknown Essentially richard...   ELP Interpretation Urine Unknown Both albumin and ...   Gamma Fraction Latest Ref Range: 0.7-1.6 g/dL 0.8   Gamma Fraction Urine Latest Ref Range: 0 % 18.6 (H)   IGA Latest Ref Range:  mg/dL 84   IGG Latest Ref Range: 695-1620 mg/dL 897   IGM Latest Ref Range:  mg/dL 30 (L)   Immunofix ELP Urine Unknown No monoclonal pro...   Immunofixation ELP Unknown No monoclonal pro...   Kappa Free Lt Chain Latest Ref Range: 0.33-1.94 mg/dL 0.95   Kappa Lambda Ratio Latest Ref Range: 0.26-1.65  0.81   Lambda Free Lt Chain Latest Ref Range: 0.57-2.63 mg/dL 1.18    Monoclonal Peak Latest Ref Range: 0.0 g/dL 0.0   Monoclonal Peak Urine Latest Ref Range: 0% % 0.0     CT Abdomen 1/4/2017  Impression:   1. 9 mm left bladder base/UVJ calculus. Mild to moderate left-sided hydronephrosis and hydroureter.  2. 6 mm probable vascular calcification adjacent to the distal right ureter/UVJ.  3. Bilateral intrarenal calculi.  4. Diverticulosis.  5. Small layering gallstones.  6. Enlarged prostate gland.    ASSESSMENT:    Brock Abarca is a pleasant 84 years old male with hx of stroke, hypertension, osteoporosis, hypothyroidism who is here for hypercalcemia and hyperparathyroidism.    1) hypercalcemia and hyperparathyroidism: likely secondary to primary hyperparathyroidism. He has recent kidney stones causing hydronephrosis and, osteoporosis.   I would check blood calcium, 25OH vitamin D, PTH, phos, Cr, Alb, 24 hours urine calcium/Cr.  Discussed criteria of surgery vs. Medical treatment. I think he should explore option of surgery given history of kidney stones and osteoporosis.   I will order for US parathyroid and refer to   He is encouraged to increase fluid intake to prevent dehydration and episode of kidney stone    2) Recent episode of kidney stone    3) Osteoporosis: DXA in 11/2016 revealed left femoral neck T-score:  -3.0, forearm T-score:  -2.7 with significant decrease in bone density of the forearm and significant increase in bone density of the hip(s)  No fracture in his adult life.  Continue Fosamax 70 mg weekly      PLAN:   - lab today, blood calcium, 25OH vitamin D, PTH, phos, Cr, Alb, 24 hours urine calcium/Cr  - US parathyroid  - Refer     Addendum  ENDO CALCIUM LABS-[facility] Latest Ref Rng 2/2/2017 1/27/2017   CALCIUM 8.5 - 10.1 mg/dL  9.6   CALCIUM IONIZED 4.4 - 5.2 mg/dL     PHOSPHOROUS 2.5 - 4.5 mg/dL  1.9 (L)   ALBUMIN 3.4 - 5.0 g/dL  3.6   BUN 7 - 30 mg/dL     CREATININE 0.66 - 1.25 mg/dL  1.03   PARATHYROID HORMONE INTACT 12 - 72 pg/mL  177  (H)   PTH, INTACT      ALKPHOS 40 - 150 U/L     VITAMIN D DEFICIENCY SCREENING 20 - 75 ug/L  23   PROTEIN, TOTAL 6.8 - 8.8 g/dL     CALCIUM URINE G/24 H 0.10 - 0.30 g/24 h 0.34 (H)    CALCIUM URINE G/G CR  0.30    CALCIUM URINE MG/DL  24.7      Likely he has primary hyperparathyroidism. Also has elevated 24 hours urine calcium  US showed possible left inferior parathyroid nodule. May need parathyroid scan.    Refer   Discussed with patient's wife by phone who will relay message to Amadeo. Amadeo has difficulty time hearing.    Sabine Miller MD     Division of Diabetes and Endocrinology  Department of Medicine  [phone]

## 2017-01-27 NOTE — NURSING NOTE
"Chief Complaint   Patient presents with     Consult     Hyperparathyroid        Initial /71 mmHg  Pulse 78  Ht 1.626 m (5' 4\")  Wt 80.377 kg (177 lb 3.2 oz)  BMI 30.40 kg/m2 Estimated body mass index is 30.4 kg/(m^2) as calculated from the following:    Height as of this encounter: 1.626 m (5' 4\").    Weight as of this encounter: 80.377 kg (177 lb 3.2 oz).  BP completed using cuff size: porfirio Koch CMA     "

## 2017-01-27 NOTE — Clinical Note
1/27/2017       RE: Brock Abarca  [address] [place] [address]     Dear Colleague,    Thank you for referring your patient, Brock Abarca, to the [facility] ENDOCRINOLOGY at [facility]. Please see a copy of my visit note below.         Endocrinology Note         Brock is a 84 year old male presents today for hyperparathyroidism.    HPI  Brock Abarca is a pleasant 84 years old male with hx of stroke, hypertension, osteoporosis, hypothyroidism who is here for hypercalcemia and hyperparathyroidism.    He was noted to have large UVJ stone causing hydronephrosis and hydroureter 3 weeks ago after experiencing sharp pain in the back. He had stent placement and had it removal this week. He said this was his first episode of kidney stone but he was told that he has multiple stones. Labs in the hospital revealed hypercalcemia, Ca 11.4 and renal failure, Cr 1.4 which resolved with hydration. He was discontinued vitamin D.     He was noted to have mild hypercalcemia since 2012. His calcium ranged 10-10.7. Upon asked, he does not drink a lot of water. He has 1 glass of water, 1-2 cups of coffee, 1 glass of cranberry juice and 1 glass of milk a day. He does not drink a lot because of frequent urination. He has not had fracture since he was in adult life. He doesn't take calcium and stopped vitamin D since discharge from the hospital.  He is on Fosamax 70 mg weekly for a year for osteoporosis.  DXA in 11/2016 revealed left femoral neck T-score:  -3.0, forearm T-score:  -2.7 with significant decrease in bone density of the forearm and significant increase in bone density of the hip(s). SPEP, UPEP were normal.     He has been constipated chronically. He denied chronic abdominal pain, muscle ache/pain. He denied depression.      Past Medical History  Past Medical History   Diagnosis Date     Arthritis      Hypertension      Unspecified cerebral artery occlusion  with cerebral infarction      Thyroid disease      Pulmonary embolism (H) 8/24/2012     Hyperlipidemia LDL goal <70 8/24/2012     Osteoporosis 8/24/2012     Hypothyroidism 8/24/2012     Memory loss 8/26/2012     Nonsenile cataract    1. Hypertension.   2. Hypothyroidism.   3. History of pulmonary embolism after surgery in 2008.   4. History of cerebrovascular accident, on aspirin and Plavix.   5. Hypothyroidism.       Allergies  Allergies   Allergen Reactions     Losartan      cough     Zolpidem Other (See Comments)     Pt was found wandering halls during last hospitalization and was confused.   Pt and wife request that pt not receive Ambien.     Medications  Current Outpatient Prescriptions   Medication Sig Dispense Refill     levothyroxine (SYNTHROID/LEVOTHROID) 125 MCG tablet Take 1 tablet (125 mcg) by mouth daily New dose 90 tablet 1     HYDROcodone-acetaminophen (NORCO) 5-325 MG per tablet Take by mouth as needed       sennosides (SENOKOT) 8.6 MG tablet Take 1 tablet by mouth 2 times daily 60 tablet 0     polyethylene glycol (MIRALAX) powder Take 17 g (1 capful) by mouth daily 510 g 0     alendronate (FOSAMAX) 70 MG tablet Take 1 tablet (70 mg) by mouth every 7 days Take 60 minutes before am meal with 8 oz. water. Remain upright for 30 minutes. 12 tablet 1     simvastatin (ZOCOR) 40 MG tablet Take 1 tablet (40 mg) by mouth daily 90 tablet 3     clopidogrel (PLAVIX) 75 MG tablet Take 1 tablet (75 mg) by mouth daily 90 tablet 3     metoprolol (TOPROL-XL) 50 MG 24 hr tablet Take 1 tablet (50 mg) by mouth daily 90 tablet 3     trospium (SANCTURA XR) 60 MG CP24 60 mg       aspirin 81 MG tablet Take 1 tablet (81 mg) by mouth daily 90 tablet 3     ORDER FOR DME Equipment being ordered: bilateral wrist splints 1 Device 0     tamsulosin (FLOMAX) 0.4 MG 24 hr capsule Take 1 capsule (0.4 mg) by mouth daily 90 capsule 4     Multiple Vitamins-Minerals (CENTRUM SILVER ADULT 50+ PO) Take 1 tablet by mouth daily        "cholecalciferol (VITAMIN D3) 5000 UNITS CAPS capsule Take 1 capsule by mouth daily       Loratadine-Pseudoephedrine (CLARITIN-D 24 HOUR PO) Take 1 tablet by mouth as needed       Cyanocobalamin (VITAMIN B 12 PO) Take 2,500 mcg by mouth daily.       Family History  family history includes CEREBROVASCULAR DISEASE in his father; HEART DISEASE in his sister.   No family hx of kidney stone    Social History  Social History   Substance Use Topics     Smoking status: Never Smoker      Smokeless tobacco: Never Used     Alcohol Use: Yes   lives with his wife    ROS  Constitutional: no weight change, good energy  Eyes: no vision change, diplopia or red eyes   Neck: no difficulty swallowing, no choking, no neck pain, no neck swelling  Cardiovascular: no chest pain, palpitations  Respiratory: no dyspnea, cough, shortness of breath or wheezing   GI: no nausea, vomiting, diarrhea , + constipation, no abdominal pain   : recent episode of kidney stone and s/p ureteral stent  Musculoskeletal: no joint or muscle pain or swelling   Integumentary: no concerning lesions   Neuro: no loss of strength or sensation, no numbness or tingling, no tremor, no dizziness, no headache   Endo: no polyuria or polydipsia, +cold intolerance   Heme/Lymph: no concerning bumps, no bleeding problems   Allergy: no environmental allergies   Psych: no depression or anxiety,does not sleep well.    Physical Exam  /71 mmHg  Pulse 78  Ht 1.626 m (5' 4\")  Wt 80.377 kg (177 lb 3.2 oz)  BMI 30.40 kg/m2  Body mass index is 30.4 kg/(m^2).  Constitutional: no distress, comfortable, pleasant   Eyes: anicteric, normal extra-ocular movements, no lid lag or retraction  Neck: no thyromegaly, no discrete nodule  Cardiovascular: regular rate and rhythm, normal S1 and S2, no murmurs  Respiratory: clear to auscultation, no wheezes or crackles, normal breath sounds   Gastrointestinal:  nontender, no hepatomegaly, no masses   Musculoskeletal: no edema   Skin: no " concerning lesions, no jaundice   Neurological: cranial nerves intact, 1+ reflexes at patella, normal gait using cane, no tremor on outstretched hands bilaterally  Psychological: appropriate mood   Lymphatic: no cervical  lymphadenopathy.      RESULTS           DXA 11/2016               Lumbar Spine (L1-L2)      T-score:  2.4, marked degenerative changes present, most marked at L3,4, so only L1,2 are evaluated.                Left Femoral Neck            T-score:  -3.0               Forearm (radius 33%)      T-score:  -2.7                 Lumbar (L1-L2) BMD: 1.486 Previous: Not available                            Left Total Hip BMD: 0.804     Previous: 0.750              Forearm (radius 33%) BMD: 0.587 Previous: 0.637    Comparison is made to another DXA performed on the same [employer]  machine on 11/7/2012.      IMPRESSION  Osteoporosis., Degenerative changes of the lumbar spine which may falsely elevate results.  There has been significant decrease in bone density of the forearm. There has been significant increase in bone density of the hip(s). The spine was not included on the previous study so comparison is not possible.         Ref. Range 1/17/2017 10:54   Albumin Fraction Latest Ref Range: 3.7-5.1 g/dL 3.9   Albumin Fraction Urine Latest Ref Range: 0 % 59.6 (H)   Alpha 1 Fraction Latest Ref Range: 0.2-0.4 g/dL 0.3   Alpha 1 Fraction Urine Latest Ref Range: 0 % 3.9 (H)   Alpha 2 Fraction Latest Ref Range: 0.5-0.9 g/dL 0.9   Alpha 2 Fraction Urine Latest Ref Range: 0 % 11.1 (H)   Beta Fraction Latest Ref Range: 0.6-1.0 g/dL 0.6   Beta Fraction Urine Latest Ref Range: 0 % 6.8 (H)   ELP Interpretation: Unknown Essentially richard...   ELP Interpretation Urine Unknown Both albumin and ...   Gamma Fraction Latest Ref Range: 0.7-1.6 g/dL 0.8   Gamma Fraction Urine Latest Ref Range: 0 % 18.6 (H)   IGA Latest Ref Range:  mg/dL 84   IGG Latest Ref Range: 695-1620 mg/dL 897   IGM Latest Ref Range:  mg/dL  30 (L)   Immunofix ELP Urine Unknown No monoclonal pro...   Immunofixation ELP Unknown No monoclonal pro...   Kappa Free Lt Chain Latest Ref Range: 0.33-1.94 mg/dL 0.95   Kappa Lambda Ratio Latest Ref Range: 0.26-1.65  0.81   Lambda Free Lt Chain Latest Ref Range: 0.57-2.63 mg/dL 1.18   Monoclonal Peak Latest Ref Range: 0.0 g/dL 0.0   Monoclonal Peak Urine Latest Ref Range: 0% % 0.0     CT Abdomen 1/4/2017  Impression:   1. 9 mm left bladder base/UVJ calculus. Mild to moderate left-sided hydronephrosis and hydroureter.  2. 6 mm probable vascular calcification adjacent to the distal right ureter/UVJ.  3. Bilateral intrarenal calculi.  4. Diverticulosis.  5. Small layering gallstones.  6. Enlarged prostate gland.    ASSESSMENT:    Brock Abarca is a pleasant 84 years old male with hx of stroke, hypertension, osteoporosis, hypothyroidism who is here for hypercalcemia and hyperparathyroidism.    1) hypercalcemia and hyperparathyroidism: likely secondary to primary hyperparathyroidism. He has recent kidney stones causing hydronephrosis and, osteoporosis.   I would check blood calcium, 25OH vitamin D, PTH, phos, Cr, Alb, 24 hours urine calcium/Cr.  Discussed criteria of surgery vs. Medical treatment. I think he should explore option of surgery given history of kidney stones and osteoporosis.   I will order for US parathyroid and refer to   He is encouraged to increase fluid intake to prevent dehydration and episode of kidney stone    2) Recent episode of kidney stone    3) Osteoporosis: DXA in 11/2016 revealed left femoral neck T-score:  -3.0, forearm T-score:  -2.7 with significant decrease in bone density of the forearm and significant increase in bone density of the hip(s)  No fracture in his adult life.  Continue Fosamax 70 mg weekly      PLAN:   - lab today, blood calcium, 25OH vitamin D, PTH, phos, Cr, Alb, 24 hours urine calcium/Cr  - US parathyroid  - Refer     Addendum  ENDO CALCIUM  LABS-[facility] Latest Ref Rng 2/2/2017 1/27/2017   CALCIUM 8.5 - 10.1 mg/dL  9.6   CALCIUM IONIZED 4.4 - 5.2 mg/dL     PHOSPHOROUS 2.5 - 4.5 mg/dL  1.9 (L)   ALBUMIN 3.4 - 5.0 g/dL  3.6   BUN 7 - 30 mg/dL     CREATININE 0.66 - 1.25 mg/dL  1.03   PARATHYROID HORMONE INTACT 12 - 72 pg/mL  177 (H)   PTH, INTACT      ALKPHOS 40 - 150 U/L     VITAMIN D DEFICIENCY SCREENING 20 - 75 ug/L  23   PROTEIN, TOTAL 6.8 - 8.8 g/dL     CALCIUM URINE G/24 H 0.10 - 0.30 g/24 h 0.34 (H)    CALCIUM URINE G/G CR  0.30    CALCIUM URINE MG/DL  24.7      Likely he has primary hyperparathyroidism. Also has elevated 24 hours urine calcium  US showed possible left inferior parathyroid nodule. May need parathyroid scan.    Refer   Discussed with patient's wife by phone who will relay message to Amadeo. Amadeo has difficulty time hearing.    Sabine Miller MD     Division of Diabetes and Endocrinology  Department of Medicine  [phone]

## 2017-01-27 NOTE — MR AVS SNAPSHOT
After Visit Summary   1/27/2017    Brock Abarca    MRN: [id number]           Patient Information     Date Of Birth          3/1/1932        Visit Information        Provider Department      1/27/2017 2:20 PM Sabine Miller MD [facility] Endocrinology        Today's Diagnoses     Primary hyperparathyroidism (H)         Kidney stone            Follow-ups after your visit        Additional Services     GENERAL SURG ADULT REFERRAL       Your provider has referred you to: Dr.Maria Dixon    Please be aware that coverage of these services is subject to the terms and limitations of your health insurance plan.  Call member services at your health plan with any benefit or coverage questions.      Please bring the following with you to your appointment:    (1) Any X-Rays, CTs or MRIs which have been performed.  Contact the facility where they were done to arrange for  prior to your scheduled appointment.   (2) List of current medications   (3) This referral request   (4) Any documents/labs given to you for this referral                  Your next 10 appointments already scheduled     Jan 27, 2017  3:30 PM   LAB with [facility] ([facility])    [address] [place] [address] [phone]           Patient must bring picture ID.  Patient should be prepared to give a urine specimen  Please do not eat 10-12 hours before your appointment if you are coming in fasting for labs on lipids, cholesterol, or glucose (sugar).  Pregnant women should follow their Care Team instructions. Water with medications is okay. Do not drink coffee or other fluids.   If you have concerns about taking  your medications, please ask at office or if scheduling via Get[employer] InfoServ[employer], send a message by clicking on Secure Messaging, Message Your Care Team.            Jan 27, 2017  4:00 PM   US HEAD NECK SOFT TISSUE with UCUS3   [facility] US ([facility]  [facility])    [address]  [address] [facility] [address] [phone]           Please bring a list of your medicines (including vitamins, minerals and over-the-counter drugs). Also, tell your doctor about any allergies you may have. Wear comfortable clothes and leave your valuables at home.  You do not need to do anything special to prepare for your exam.  Please call the Imaging Department at your exam site with any questions.              Future tests that were ordered for you today     Open Future Orders        Priority Expected Expires Ordered    US Parathyroid Routine  8/25/2017 1/27/2017    Creatinine timed urine Routine 1/28/2017 7/26/2017 1/27/2017    Calcium timed urine Routine 1/28/2017 7/26/2017 1/27/2017    Vitamin D Deficiency (D3 Only) Routine  1/27/2018 1/27/2017    Albumin level Routine  1/27/2018 1/27/2017    Calcium Routine  1/27/2018 1/27/2017    Parathyroid Hormone Intact Routine  1/27/2018 1/27/2017    Phosphorus Routine  1/27/2018 1/27/2017    Creatinine Routine  1/27/2018 1/27/2017            Who to contact     Please call your clinic at [phone] to:    Ask questions about your health    Make or cancel appointments    Discuss your medicines    Learn about your test results    Speak to your doctor   If you have compliments or concerns about an experience at your clinic, or if you wish to file a complaint, please contact [facility] Patient Relations at [phone] or email us at [email or web address]         Additional Information About Your Visit        Syedhart Information     TE2hart gives you secure access to your electronic health record. If you see a primary care provider, you can also send messages to your care team and make appointments. If you have questions, please call your primary care clinic.  If you do not have a primary care provider, please call [phone] and they will assist you.      [employer] is an  "electronic gateway that provides easy, online access to your medical records. With [employer], you can request a clinic appointment, read your test results, renew a prescription or communicate with your care team.     To access your existing account, please contact your [facility] or call [phone] for assistance.        Care EveryWhere ID     This is your Care EveryWhere ID. This could be used by other organizations to access your [place] medical records  [id number]        Your Vitals Were     Pulse Height BMI (Body Mass Index)             78 1.626 m (5' 4\") 30.40 kg/m2          Blood Pressure from Last 3 Encounters:   01/27/17 112/71   01/11/17 136/60   01/04/17 168/60    Weight from Last 3 Encounters:   01/27/17 80.377 kg (177 lb 3.2 oz)   01/11/17 81.738 kg (180 lb 3.2 oz)   01/04/17 82.555 kg (182 lb)              We Performed the Following     GENERAL SURG ADULT REFERRAL        Primary Care Provider Office Phone # Fax #    Sadaf Fox [phone]       [facility] [address] [address]        Thank you!     Thank you for choosing [facility]  for your care. Our goal is always to provide you with excellent care. Hearing back from our patients is one way we can continue to improve our services. Please take a few minutes to complete the written survey that you may receive in the mail after your visit with us. Thank you!             Your Updated Medication List - Protect others around you: Learn how to safely use, store and throw away your medicines at www.disposemymeds.org.          This list is accurate as of: 1/27/17  3:06 PM.  Always use your most recent med list.                   Brand Name Dispense Instructions for use    alendronate 70 MG tablet    FOSAMAX    12 tablet    Take 1 tablet (70 mg) by mouth every 7 days Take 60 minutes before am meal with 8 oz. water. Remain upright for 30 minutes.       aspirin 81 " MG tablet     90 tablet    Take 1 tablet (81 mg) by mouth daily       CENTRUM SILVER ADULT 50+ PO      Take 1 tablet by mouth daily       cholecalciferol 5000 UNITS Caps capsule    vitamin D3     Take 1 capsule by mouth daily       CLARITIN-D 24 HOUR PO      Take 1 tablet by mouth as needed       clopidogrel 75 MG tablet    PLAVIX    90 tablet    Take 1 tablet (75 mg) by mouth daily       HYDROcodone-acetaminophen 5-325 MG per tablet    NORCO     Take by mouth as needed       levothyroxine 125 MCG tablet    SYNTHROID/LEVOTHROID    90 tablet    Take 1 tablet (125 mcg) by mouth daily New dose       metoprolol 50 MG 24 hr tablet    TOPROL-XL    90 tablet    Take 1 tablet (50 mg) by mouth daily       order for DME     1 Device    Equipment being ordered: bilateral wrist splints       polyethylene glycol powder    MIRALAX    510 g    Take 17 g (1 capful) by mouth daily       sennosides 8.6 MG tablet    SENOKOT    60 tablet    Take 1 tablet by mouth 2 times daily       simvastatin 40 MG tablet    ZOCOR    90 tablet    Take 1 tablet (40 mg) by mouth daily       tamsulosin 0.4 MG capsule    FLOMAX    90 capsule    Take 1 capsule (0.4 mg) by mouth daily       trospium 60 MG Cp24 24 hr capsule    SANCTURA XR     60 mg       VITAMIN B 12 PO      Take 2,500 mcg by mouth daily.

## 2017-01-30 ENCOUNTER — TRANSFERRED RECORDS (OUTPATIENT)
Dept: HEALTH INFORMATION MANAGEMENT | Facility: CLINIC | Age: 82
End: 2017-01-30

## 2017-01-31 ENCOUNTER — PRE VISIT (OUTPATIENT)
Dept: SURGERY | Facility: CLINIC | Age: 82
End: 2017-01-31

## 2017-01-31 LAB — DEPRECATED CALCIDIOL+CALCIFEROL SERPL-MC: 23 UG/L (ref 20–75)

## 2017-01-31 NOTE — TELEPHONE ENCOUNTER
1.  Date/reason for appt:2/6/17, Hyperparathyroid  2.  Referring provider: JORDEN BROOKS  3.  Call to patient (Yes / No - short description): No, referred  4.  Previous care at / records requested from:   [facility]- office notes and imaging are in epic.

## 2017-02-01 ENCOUNTER — TELEPHONE (OUTPATIENT)
Dept: FAMILY MEDICINE | Facility: CLINIC | Age: 82
End: 2017-02-01

## 2017-02-02 ENCOUNTER — TELEPHONE (OUTPATIENT)
Dept: ENDOCRINOLOGY | Facility: CLINIC | Age: 82
End: 2017-02-02

## 2017-02-02 DIAGNOSIS — N20.0 KIDNEY STONE: ICD-10-CM

## 2017-02-02 DIAGNOSIS — E21.0 PRIMARY HYPERPARATHYROIDISM (H): ICD-10-CM

## 2017-02-02 LAB
CALCIUM 24H UR-MRATE: 0.34 G/24 H (ref 0.1–0.3)
CALCIUM UR-MCNC: 24.7 MG/DL
CALCIUM/CREAT UR: 0.3 G/G CR
COLLECT DURATION TIME UR: 23.5 H
CREAT 24H UR-MRATE: 1.13 G/(24.H) (ref 1–2)
CREAT UR-MCNC: 83 MG/DL
SPECIMEN VOL UR: 1340 ML

## 2017-02-02 NOTE — TELEPHONE ENCOUNTER
Reviewed lab and imaging    ENDO CALCIUM LABS[facility] Latest Ref Rng 2/2/2017 1/27/2017   CALCIUM 8.5 - 10.1 mg/dL  9.6   CALCIUM IONIZED 4.4 - 5.2 mg/dL     PHOSPHOROUS 2.5 - 4.5 mg/dL  1.9 (L)   ALBUMIN 3.4 - 5.0 g/dL  3.6   BUN 7 - 30 mg/dL     CREATININE 0.66 - 1.25 mg/dL  1.03   PARATHYROID HORMONE INTACT 12 - 72 pg/mL  177 (H)   PTH, INTACT      ALKPHOS 40 - 150 U/L     VITAMIN D DEFICIENCY SCREENING 20 - 75 ug/L  23   PROTEIN, TOTAL 6.8 - 8.8 g/dL     CALCIUM URINE G/24 H 0.10 - 0.30 g/24 h 0.34 (H)    CALCIUM URINE G/G CR  0.30    CALCIUM URINE MG/DL  24.7      US parathyroid    Findings:     Thyroid parenchyma: homogenous  The right lobe of the thyroid measures: 1.1 x 1.6 x 2.8 cm    The thyroid isthmus measures: Not visualized.    The left lobe of the thyroid measures: 0.9 x 1.0 x 3.1 cm    Posterior to the left inferior lobe of the thyroid is a 0.7 x 1.2 x 1.0 cm hypoechoic nodule without significant vascularity on Doppler evaluation.                                                                      Impression:  1.. 1.2 cm hypoechoic nodule adjacent to the posterior inferior left thyroid. , This is in the expected location of one of the parathyroid  glands, though is difficult to differentiate ultrasound from a thyroid nodule. Recommend nuclear medicine parathyroid study.  2. Atrophic thyroid.     He has primary hyperparathyroidism and increased 24 hours urine calcium. 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 At the posterior inferior thyroid gland.    Discussed with his wife. Patient has difficulty hearing. Relayed message  I think surgery is better option  He has already had appointment with .   He may need parathyroid scan.    Sabine Miller MD    Division of Diabetes and Endocrinology  Department of Medicine  [phone]

## 2017-02-06 ENCOUNTER — OFFICE VISIT (OUTPATIENT)
Dept: SURGERY | Facility: CLINIC | Age: 82
End: 2017-02-06

## 2017-02-06 ENCOUNTER — ANESTHESIA EVENT (OUTPATIENT)
Dept: SURGERY | Facility: CLINIC | Age: 82
End: 2017-02-06

## 2017-02-06 ENCOUNTER — ALLIED HEALTH/NURSE VISIT (OUTPATIENT)
Dept: SURGERY | Facility: CLINIC | Age: 82
End: 2017-02-06

## 2017-02-06 VITALS
BODY MASS INDEX: 23.84 KG/M2 | DIASTOLIC BLOOD PRESSURE: 76 MMHG | SYSTOLIC BLOOD PRESSURE: 142 MMHG | OXYGEN SATURATION: 97 % | HEIGHT: 72 IN | RESPIRATION RATE: 16 BRPM | HEART RATE: 58 BPM | TEMPERATURE: 97.5 F | WEIGHT: 176 LBS

## 2017-02-06 VITALS — WEIGHT: 176 LBS | HEIGHT: 74 IN | BODY MASS INDEX: 22.59 KG/M2

## 2017-02-06 DIAGNOSIS — I26.99 OTHER PULMONARY EMBOLISM WITHOUT ACUTE COR PULMONALE (H): ICD-10-CM

## 2017-02-06 DIAGNOSIS — E21.0 PRIMARY HYPERPARATHYROIDISM (H): Primary | ICD-10-CM

## 2017-02-06 DIAGNOSIS — I10 HYPERTENSION GOAL BP (BLOOD PRESSURE) < 140/90: ICD-10-CM

## 2017-02-06 DIAGNOSIS — D69.6 THROMBOCYTOPENIA (H): ICD-10-CM

## 2017-02-06 DIAGNOSIS — Z01.818 PREOP EXAMINATION: Primary | ICD-10-CM

## 2017-02-06 DIAGNOSIS — G93.89 BRAIN DYSFUNCTION: ICD-10-CM

## 2017-02-06 ASSESSMENT — LIFESTYLE VARIABLES: TOBACCO_USE: 0

## 2017-02-06 ASSESSMENT — PAIN SCALES - GENERAL: PAINLEVEL: NO PAIN (0)

## 2017-02-06 NOTE — MR AVS SNAPSHOT
After Visit Summary   2017    Brock Abarca    MRN: [id number]           Patient Information     Date Of Birth          3/1/1932        Visit Information        Provider Department      2017 2:30 PM Rn, [facility]        Care Instructions    Preparing for Your Surgery      Name:  Brock Abarca   MRN:  [id number]   :  3/1/1932   Today's Date:  2017     Arriving for surgery:  Surgery date:  17  Surgery time:  10:20 am  Arrival time:  8:50 am  Please come to:      [facility].  parking is available for $6 including tip outside the front of the building.  [address] [place] [id number]    -  Proceed to the 5th floor to check into the Ambulatory Surgery Center.              >> There will be patient concierges on the 1st and 5th floor, for assistance or an escort, if you would like.              >> Please call [phone] with any questions.    What can I eat or drink?  -  You may have solid food or milk products until 8 hours prior to your surgery-2:20 am  -  You may have water, or 7up/Sprite until 2 hours prior to your surgery-8:50 am    Which medicines can I take? No vitamins, or supplements for 7 days before surgery. Follow Health  providers instructions regarding Aspirin and Plavix before surgery.  -  Do NOT take these medications the day of surgery: Miralax,       -  It is OKAY to take these medications (If regularly scheduled morning medications): Senokot if needed, Hydrocodone-Acetaminophen if needed, Claritin D if needed.      How do I prepare myself?  -  Take two showers: one the night before surgery; and one the morning of surgery.         Use Scrubcare or Hibiclens to wash from neck down.  You may use your own shampoo and conditioner. No other hair products.   -  Do NOT use lotion, powder, deodorant, or antiperspirant the day of your surgery.  -  Do NOT wear any jewelry.  -Do not  bring your own medications to the hospital, except for inhalers.  -  Bring your ID and insurance card.    Questions or Concerns:  If you have questions or concerns, please call the  Preoperative Assessment Center, Monday-Friday 7AM-7PM:  [phone]    AFTER YOUR SURGERY  Breathing exercises   Breathing exercises help you recover faster. Take deep breaths and let the air out slowly. This will:     Help you wake up after surgery.    Help prevent complications like pneumonia.  Preventing complications will help you go home sooner.   We may give you a breathing device (incentive spirometer) to encourage you to breathe deeply.   Nausea and vomiting   You may feel sick to your stomach after surgery; if so, let your nurse know.    Pain control:  After surgery, you may have pain. Our goal is to help you manage your pain. Pain medicine will help you feel comfortable enough to do activities that will help you heal.  These activities may include breathing exercises, walking and physical therapy.   To help your health care team treat your pain we will ask: 1) If you have pain  2) where it is located 3) describe your pain in your words  Methods of pain control include medications given by mouth, vein or by nerve block for some surgeries.  We may give you a pain control pump that will:  1) Deliver the medicine through a tube placed in your vein  2) Control the amount of medicine you receive  3) Allow you to push a button to deliver a dose of pain medicine  Sequential Compression Device (SCD) or Pneumo Boots:  You may need to wear SCD S on your legs or feet. These are wraps connected to a machine that pumps in air and releases it. The repeated pumping helps prevent blood clots from forming.                   Follow-ups after your visit        Your next 10 appointments already scheduled     Feb 06, 2017  3:50 PM   (Arrive by 3:35 PM)   PAC Anesthesia Consult with  Pac Anesthesiologist   [facility]  ([facility])    [address]  [address] [facility] [id number] [phone]            Feb 14, 2017   Procedure with Genesis Dixon MD   [facility] ([facility])    [address]  [address] [facility] [id number] [phone]           Located in the Clinics and Surgery Center at [address] [address].   parking is very convenient and highly recommended.  is a $6 flat rate fee; no tips accepted.  Both  and self parkers should enter the main arrival plaza from [facility]; parking attendants will direct you based on your parking preference.            Mar 06, 2017  1:30 PM   (Arrive by 1:15 PM)   Return Visit with Genesis Dixon MD   [facility] ([facility])    [address] [place] [address]  4th [facility] [id number] [phone]              Who to contact     Please call your clinic at [phone] to:    Ask questions about your health    Make or cancel appointments    Discuss your medicines    Learn about your test results    Speak to your doctor   If you have compliments or concerns about an experience at your clinic, or if you wish to file a complaint, please contact [facility] Patient Relations at [phone] or email us at [email or web address]         Additional Information About Your Visit        Al Detal Information     Al [employer]al gives you secure access to your electronic health record. If you see a primary care provider, you can also send messages to your care team and make appointments. If you have questions, please call your primary care clinic.  If you do not have a primary care provider, please call [phone] and they will assist you.      Al Detal is an electronic gateway that provides easy, online access to your medical records. With [employer], you can request a clinic  appointment, read your test results, renew a prescription or communicate with your care team.     To access your existing account, please contact your [facility] or call [phone] for assistance.        Care EveryWhere ID     This is your Care EveryWhere ID. This could be used by other organizations to access your [place] medical records  [id number]         Blood Pressure from Last 3 Encounters:   02/06/17 142/76   01/27/17 112/71   01/11/17 136/60    Weight from Last 3 Encounters:   02/06/17 79.833 kg (176 lb)   02/06/17 79.833 kg (176 lb)   01/27/17 80.377 kg (177 lb 3.2 oz)              Today, you had the following     No orders found for display         Today's Medication Changes          These changes are accurate as of: 2/6/17  3:15 PM.  If you have any questions, ask your nurse or doctor.               These medicines have changed or have updated prescriptions.        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)        Dose:  75 mg   Take 1 tablet (75 mg) by mouth daily   Quantity:  90 tablet   Refills:  3       levothyroxine 125 MCG tablet   Commonly known as:  SYNTHROID/LEVOTHROID   This may have changed:    - when to take this  - additional instructions   Used for:  Hypothyroidism, unspecified type        Dose:  125 mcg   Take 1 tablet (125 mcg) by mouth daily New dose   Quantity:  90 tablet   Refills:  1       metoprolol 50 MG 24 hr tablet   Commonly known as:  TOPROL-XL   This may have changed:  when to take this   Used for:  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)        Dose:  50 mg   Take 1 tablet (50 mg) by mouth daily   Quantity:  90 tablet   Refills:  3       polyethylene glycol powder   Commonly known as:  MIRALAX   This may have changed:    - when to take this  - reasons to take this   Used for:  Drug-induced  constipation        Dose:  1 capful   Take 17 g (1 capful) by mouth daily   Quantity:  510 g   Refills:  0       sennosides 8.6 MG tablet   Commonly known as:  SENOKOT   This may have changed:    - when to take this  - reasons to take this   Used for:  Drug-induced constipation        Dose:  1 tablet   Take 1 tablet by mouth 2 times daily   Quantity:  60 tablet   Refills:  0       simvastatin 40 MG tablet   Commonly known as:  ZOCOR   This may have changed:  when to take this   Used for:  Hyperlipidemia LDL goal <70        Dose:  40 mg   Take 1 tablet (40 mg) by mouth daily   Quantity:  90 tablet   Refills:  3       tamsulosin 0.4 MG capsule   Commonly known as:  FLOMAX   This may have changed:  when to take this   Used for:  Urinary frequency        Dose:  0.4 mg   Take 1 capsule (0.4 mg) by mouth daily   Quantity:  90 capsule   Refills:  4                Primary Care Provider Office Phone # Fax #    Sadaf Fox [phone]       [address] [address]        Thank you!     Thank you for choosing [facility]  for your care. Our goal is always to provide you with excellent care. Hearing back from our patients is one way we can continue to improve our services. Please take a few minutes to complete the written survey that you may receive in the mail after your visit with us. Thank you!             Your Updated Medication List - Protect others around you: Learn how to safely use, store and throw away your medicines at www.disposemymeds.org.          This list is accurate as of: 2/6/17  3:15 PM.  Always use your most recent med list.                   Brand Name Dispense Instructions for use    alendronate 70 MG tablet    FOSAMAX    12 tablet    Take 1 tablet (70 mg) by mouth every 7 days Take 60 minutes before am meal with 8 oz. water. Remain upright for 30 minutes.       aspirin 81 MG tablet     90 tablet    Take 81 mg by  mouth every evening       CENTRUM SILVER ADULT 50+ PO      Take 1 tablet by mouth daily       cholecalciferol 5000 UNITS Caps capsule    vitamin D3     Take 1 capsule by mouth daily       CLARITIN-D 24 HOUR PO      Take 1 tablet by mouth as needed       clopidogrel 75 MG tablet    PLAVIX    90 tablet    Take 1 tablet (75 mg) by mouth daily       levothyroxine 125 MCG tablet    SYNTHROID/LEVOTHROID    90 tablet    Take 1 tablet (125 mcg) by mouth daily New dose       metoprolol 50 MG 24 hr tablet    TOPROL-XL    90 tablet    Take 1 tablet (50 mg) by mouth daily       order for DME     1 Device    Equipment being ordered: bilateral wrist splints       polyethylene glycol powder    MIRALAX    510 g    Take 17 g (1 capful) by mouth daily       sennosides 8.6 MG tablet    SENOKOT    60 tablet    Take 1 tablet by mouth 2 times daily       simvastatin 40 MG tablet    ZOCOR    90 tablet    Take 1 tablet (40 mg) by mouth daily       tamsulosin 0.4 MG capsule    FLOMAX    90 capsule    Take 1 capsule (0.4 mg) by mouth daily       trospium 60 MG Cp24 24 hr capsule    SANCTURA XR     Take 60 mg by mouth every evening       VITAMIN B 12 PO      Take 2,500 mcg by mouth daily.

## 2017-02-06 NOTE — NURSING NOTE
Relevant Diagnosis: hyperparathyroidism   Teaching Topic: parathyroidectomy   Person(s) involved in teaching: Patient and wife     Teaching Concerns Addressed:  Pre op teaching included the need for an H&P, NPO status pre op, hospital routines, expected recovery, activity  restrictions, antimicrobial scrub, s/s of infection, pain control methods and the importance of follow up appointments.  The patient voiced an understanding of all instructions and will call with questions.     Motivation Level:  Asks Questions:   Yes  Eager to Learn:   Yes  Cooperative:   Yes  Receptive (willing/able to accept information):   Yes     Patient  demonstrates understanding of the following:  Reason for the appointment, diagnosis and treatment plan:   Yes  Knowledge of proper use of medications and conditions for which they are ordered (with special attention to potential side effects or drug interactions):   Yes  Which situations necessitate calling provider and whom to contact:   Yes        Proper use and care of  (medical equip, care aids, etc.):   NA  Nutritional needs and diet plan:   Yes  Pain management techniques:   Yes  Patient instructed on hand hygiene:  Yes  How and/when to access community resources:   NA     Infection Prevention:  Patient   demonstrates understanding of the following:  Surgical procedure site care taught   Signs and symptoms of infection taught Yes  Wound care taught Yes     Instructional Materials Used/Given: Pre op booklet.

## 2017-02-06 NOTE — OR NURSING
Pt seen in PAC. Denise Denton CNP, consulted regarding pt's bloodthinners. Pt and his wife advised that the last dose of Plavix and Aspirin before his surgery should be February 9, 2017.

## 2017-02-06 NOTE — PATIENT INSTRUCTIONS
Preparing for Your Surgery      Name:  Brock Abarca   MRN:  [id number]   :  3/1/1932   Today's Date:  2017     Arriving for surgery:  Surgery date:  17  Surgery time:  10:20 am  Arrival time:  8:50 am  Please come to:      [facility].  parking is available for $6 including tip outside the front of the building.  [address] [place] [phone]    -  Proceed to the 5th floor to check into the Ambulatory Surgery Center.              >> There will be patient concierges on the 1st and 5th floor, for assistance or an escort, if you would like.              >> Please call [phone] with any questions.    What can I eat or drink?  -  You may have solid food or milk products until 8 hours prior to your surgery-2:20 am  -  You may have water, or 7up/Sprite until 2 hours prior to your surgery-8:50 am    Which medicines can I take? No vitamins, or supplements for 7 days before surgery. Last dose of Aspirin and Plavix before surgery should be 2017.   -  Do NOT take these medications the day of surgery: Miralax,       -  It is OKAY to take these medications (If regularly scheduled morning medications): Senokot if needed, Hydrocodone-Acetaminophen if needed, Claritin D if needed.      How do I prepare myself?  -  Take two showers: one the night before surgery; and one the morning of surgery.         Use Scrubcare or Hibiclens to wash from neck down.  You may use your own shampoo and conditioner. No other hair products.   -  Do NOT use lotion, powder, deodorant, or antiperspirant the day of your surgery.  -  Do NOT wear any jewelry.  -Do not bring your own medications to the hospital, except for inhalers.  -  Bring your ID and insurance card.    Questions or Concerns:  If you have questions or concerns, please call the  Preoperative Assessment Center, Monday-Friday 7AM-7PM:  [phone]    AFTER YOUR SURGERY  Breathing exercises   Breathing exercises  help you recover faster. Take deep breaths and let the air out slowly. This will:     Help you wake up after surgery.    Help prevent complications like pneumonia.  Preventing complications will help you go home sooner.   We may give you a breathing device (incentive spirometer) to encourage you to breathe deeply.   Nausea and vomiting   You may feel sick to your stomach after surgery; if so, let your nurse know.    Pain control:  After surgery, you may have pain. Our goal is to help you manage your pain. Pain medicine will help you feel comfortable enough to do activities that will help you heal.  These activities may include breathing exercises, walking and physical therapy.   To help your health care team treat your pain we will ask: 1) If you have pain  2) where it is located 3) describe your pain in your words  Methods of pain control include medications given by mouth, vein or by nerve block for some surgeries.  We may give you a pain control pump that will:  1) Deliver the medicine through a tube placed in your vein  2) Control the amount of medicine you receive  3) Allow you to push a button to deliver a dose of pain medicine  Sequential Compression Device (SCD) or Pneumo Boots:  You may need to wear SCD S on your legs or feet. These are wraps connected to a machine that pumps in air and releases it. The repeated pumping helps prevent blood clots from forming.

## 2017-02-06 NOTE — NURSING NOTE
Chief Complaint   Patient presents with     Consult     hyperparathyroid     Estefania Webb Medical Assistant

## 2017-02-06 NOTE — H&P
Pre-Operative H & P     CC:  Preoperative exam to assess for increased cardiopulmonary risk while undergoing surgery and anesthesia.    Date of Encounter: 2/6/2017  Primary Care Physician:  Sadaf Fox  Brock Abarca is a 84 year old male who presents for pre-operative H & P in preparation for parathyroidectomy with Dr. Dixon, on 2/14/17, at [facility]. History is obtained from the patient and wife.    Patient who recently underwent cystoscopies, ureteroscopy, lithotripsy and left stent placement in 1/2017 at OSH for large UVJ stone causing hydronephrosis and hydroureter. He was told at that time that he had multiple stones that he had not been aware of. His labs revealed hypercalcemia, Ca 11.4 and creatinine 1.4 which normalized with hydration. Records indicate that he has been noted to have mild hypercalcemia since 2012.     He was referred to Endocrinology on 2/2/17 and saw Dr. Sabine Miller. He underwent a thorough evaluation with concerns for primary hyperparathyroidism with US showing possible left inferior parathyroid nodule. Consult with Dr. Dixon today with above procedure planned.    Patient's history is otherwise complex with HLD, HYPERTENSION, history of CVA, on Plavix and ASA, and history of pulmonary embolism in 2008 after a surgery and hypothyroidism. He is hard of hearing. Patient is well followed by Dr. Sadaf Fox.    Past Medical History  Past Medical History   Diagnosis Date     Arthritis      Hypertension      Unspecified cerebral artery occlusion with cerebral infarction 2006     Thyroid disease      Pulmonary embolism (H) 2008     after surgery     Hyperlipidemia LDL goal <70 8/24/2012     Osteoporosis 8/24/2012     Hypothyroidism 8/24/2012     Memory loss 8/26/2012     Nonsenile cataract      Nephrolithiasis        Past Surgical History  Past Surgical History   Procedure Laterality Date     Appendectomy       Orthopedic surgery        Back surgery       Head & neck surgery       Cataract iol, rt/lt       s/p cataract surgery both eyes     Dacryocystorhinostomy bilateral  2013     right eye; left eye also?     Extracorporeal shock wave lithotripsy, cystoscopy, insert stent ureter(s), combined       Cystoscopy, left urethral stone removed, stent placement  1/5/17     Cystoscopy, left ureteroscopy, holium laser lithotripsy, stent exchange  1/23/17       Hx of Blood transfusions/reactions:  Denies.     Hx of abnormal bleeding or anti-platelet use: ASA 81 mg and Plavix 75 mg daily.    Menstrual history: No LMP for male patient.    Steroid use in the last year: Denies.     Personal or FH with difficulty with Anesthesia:  Denies.    Prior to Admission Medications  Current Outpatient Prescriptions   Medication Sig Dispense Refill     levothyroxine (SYNTHROID/LEVOTHROID) 125 MCG tablet Take 1 tablet (125 mcg) by mouth daily New dose (Patient taking differently: Take 125 mcg by mouth every evening New dose) 90 tablet 1     sennosides (SENOKOT) 8.6 MG tablet Take 1 tablet by mouth 2 times daily (Patient taking differently: Take 1 tablet by mouth as needed ) 60 tablet 0     polyethylene glycol (MIRALAX) powder Take 17 g (1 capful) by mouth daily (Patient taking differently: Take 1 capful by mouth as needed ) 510 g 0     alendronate (FOSAMAX) 70 MG tablet Take 1 tablet (70 mg) by mouth every 7 days Take 60 minutes before am meal with 8 oz. water. Remain upright for 30 minutes. 12 tablet 1     simvastatin (ZOCOR) 40 MG tablet Take 1 tablet (40 mg) by mouth daily (Patient taking differently: Take 40 mg by mouth At Bedtime ) 90 tablet 3     clopidogrel (PLAVIX) 75 MG tablet Take 1 tablet (75 mg) by mouth daily (Patient taking differently: Take 75 mg by mouth every evening ) 90 tablet 3     metoprolol (TOPROL-XL) 50 MG 24 hr tablet Take 1 tablet (50 mg) by mouth daily (Patient taking differently: Take 50 mg by mouth every evening ) 90 tablet 3     trospium  (SANCTURA XR) 60 MG CP24 Take 60 mg by mouth every evening        aspirin 81 MG tablet Take 81 mg by mouth every evening  90 tablet 3     ORDER FOR DME Equipment being ordered: bilateral wrist splints 1 Device 0     tamsulosin (FLOMAX) 0.4 MG 24 hr capsule Take 1 capsule (0.4 mg) by mouth daily (Patient taking differently: Take 0.4 mg by mouth every evening ) 90 capsule 4     Multiple Vitamins-Minerals (CENTRUM SILVER ADULT 50+ PO) Take 1 tablet by mouth daily       cholecalciferol (VITAMIN D3) 5000 UNITS CAPS capsule Take 1 capsule by mouth daily       Loratadine-Pseudoephedrine (CLARITIN-D 24 HOUR PO) Take 1 tablet by mouth as needed       Cyanocobalamin (VITAMIN B 12 PO) Take 2,500 mcg by mouth daily.         Allergies  Allergies   Allergen Reactions     Losartan      cough     Zolpidem Other (See Comments)     Pt was found wandering halls during last hospitalization and was confused.   Pt and wife request that pt not receive Ambien.       Social History  Social History     Social History     Marital Status:      Spouse Name: N/A     Number of Children: N/A     Years of Education: N/A     Occupational History     Not on file.     Social History Main Topics     Smoking status: Never Smoker      Smokeless tobacco: Never Used     Alcohol Use: Yes      Comment: occ.     Drug Use: No     Sexual Activity: Not Currently     Other Topics Concern     Not on file     Social History Narrative       Family History  Family History   Problem Relation Age of Onset     HEART DISEASE Sister      CEREBROVASCULAR DISEASE Father        Review of Systems  The complete review of systems is negative other than noted in the HPI or here.   Constitutional: Denies fever, chills, weight loss. Always feels cold.   HEENT: Wears glasses for vision. Bilateral hearing aids.   Respiratory: Denies cough or shortness of breath. Denies CHIQUIS. Able to lay flat but prefers not to.  CV: Denies chest pain or irregular HR. BP range 130-140/60s.  "Walks with cane. Unable to walk distance due to instability.  GI: Denies abdominal pain. Some constipation.  : Urinary frequency.  M/S: Pain and limited movement of bilateral hand which he attributes to arthritis.  Neuro: Past history of stroke in 2006 with no residual. History of lumbar surgery with balance issues since.   Heme: History of PE in 2008 after complicated lumbar surgery.       Temp: 97.5  F (36.4  C) Temp src: Oral BP: 142/76 mmHg Pulse: 58   Resp: 16 SpO2: 97 %         176 lbs 0 oz  6' 0\"   Body mass index is 23.86 kg/(m^2).       Physical Exam  Constitutional: Awake, alert, cooperative, no apparent distress, and appears stated age. Accompanied by wife.  Eyes: Pupils equal, round and reactive to light, extra ocular muscles intact, sclera clear, conjunctiva normal. Glasses on.  HENT: Normocephalic, oral pharynx with moist mucus membranes, good dentition. No goiter appreciated. [place].  Respiratory: Clear to auscultation bilaterally, no crackles or wheezing.  Cardiovascular: Regular rate and rhythm, normal S1 and S2, and no murmur noted. Carotids, no bruits. No edema. Palpable pulses to radial  DP and PT arteries.   GI: Normal bowel sounds, soft, non-distended, non-tender, no masses palpated, no hepatosplenomegaly.  Surgical scars: well healed.  Lymph/Hematologic: No cervical lymphadenopathy and no supraclavicular lymphadenopathy.  Genitourinary: Deferred.  Skin: Warm and dry.  No rashes at anticipated surgical site.   Musculoskeletal: Mildly limited neck extension. There is no redness, warmth, or swelling of the joints. Gross motor strength is normal.    Neurologic: Awake, alert, oriented to name, place and time. Cranial nerves II-XII are grossly intact. Gait is cautious. Walks with cane.   Neuropsychiatric: Calm, cooperative. Normal affect.     Labs: (personally reviewed)  WBC      7.0   9/30/2016  RBC     5.04   9/30/2016  HGB     15.9   9/30/2016  HCT     46.8   9/30/2016  MCV       93   " 2016  MCH     31.5   2016  MCHC     34.0   2016  RDW     12.8   2016  PLT       96   2016  PLT      128   2012    Last Basic Metabolic Panel:  NA      141   2017   POTASSIUM      4.7   2017  CHLORIDE      108   2017  JENIFER      9.6   2017  CO2       23   2017  BUN       13   2017  CR     1.03   2017  GLC       89   2017  AST       22   2016  ALT       27   2016  No results found for this basename: BiliConj   BILITOTAL      2.8   2015  ALBUMIN      3.6   2017  PROTTOTAL      6.7   2015   ALKPHOS       70   2015    EK2017 OSH   Sinus rhythm with 1st degree A-V block  Otherwise normal ECG  When compared with ECG of 03-OCT-2013 18:55,  No significant change was found    Parathyroid US 17  Impression:  1.. 1.2 cm hypoechoic nodule adjacent to the posterior inferior left  thyroid. , This is in the expected location of one of the parathyroid  glands, though is difficult to differentiate ultrasound from a thyroid  nodule. Recommend nuclear medicine parathyroid study.  2. Atrophic thyroid.     Outside records reviewed from: Care Everywhere    ASSESSMENT and PLAN  Brock Abarca is a 84 year old male scheduled to undergo parathyroidectomy, on 17, by Dr. Dixon. He has the following specific operative considerations:   - RCRI : 0.9% risk of major adverse cardiac event.   - Anesthesia considerations:  Refer to PAC assessment in anesthesia records  - Risk of PONV score = 1.  If > 2, anti-emetic intervention recommended.     --Primary hyperparathyroidism. Concern for parathyroid adenoma. Last Calcium normal 17. Above procedure planned.   --HLD. Simvastatin. HYPERTENSION. Metoprolol at HS. No other cardiac history or symptoms. Recent EKG above, unchanged from 2013. Activity is somewhat limited by balance issues. Walks with cane.    --History of CVA in . Denies residual. On ASA and Plavix with planned  5 day hold for surgery. This was approved by PCP Dr. Fox today.    --No pulmonary history, symptoms or meds. Nonsmoker. CHIQUIS RF 3/8 Intermediate risk. Able to lie flat.    --History of PE after complex lumbar surgery in 2008. Possible past history of blood transfusion as a child. History of thrombocytopenia. Range .    --Recent concern for renal insufficiency after urology procedures, improved after hydration. Cr range 1.03-1.16.    --Hypothyroidism. Synthroid at HS.   --[place] with bilateral hearing aids.   --Based on evaluation today, patient was deemed appropriate candidate for ASC.    Arrival time, NPO, shower and medication instructions provided by nursing staff today. Preparing For Your Surgery handout given.    Patient was discussed with Dr Perez.    LORI Galvez CNS  Preoperative Assessment Center  [facility]  [facility]  Phone: [phone]  Fax: [phone]

## 2017-02-06 NOTE — PATIENT INSTRUCTIONS
Nurse teaching given on parathyroidectomy and the patient expresses understanding and acceptance of instructions. Lester Eng 2/6/2017 1:57 PM

## 2017-02-06 NOTE — ANESTHESIA PREPROCEDURE EVALUATION
Anesthesia Evaluation     . Pt has had prior anesthetic. Type: General and MAC    No history of anesthetic complications     ROS/MED HX    ENT/Pulmonary:     (+)CHIQUIS risk factors hypertension, obese, , . .   (-) tobacco use   Neurologic:     (+)CVA date: 2006 without deficitsother neuro spinal stenosis, s/p lumbar surgeries, gait instability and balance issues afterward, walks with cane    Cardiovascular:     (+) Dyslipidemia, hypertension-range: 130-140/60s, ---. Taking blood thinners Pt has received instructions: Instructions Given to patient: Will hold ASA and Plavix for 5 days prior to surgery. . . :. . Previous cardiac testing date:results:date: results:ECG reviewed date:1/4/2017 results:Sinus rhythm with 1st degree A-V block  Otherwise normal ECG  When compared with ECG of 03-OCT-2013 18:55,  No significant change was found   date: results:          METS/Exercise Tolerance:  3 - Able to walk 1-2 blocks without stopping   Hematologic: Comments: PE after lumbar surgery in 2008    (+) History of blood clots pt is not anticoagulated, History of Transfusion no previous transfusion reaction Other Hematologic Disorder-thrombocytopenia range        Musculoskeletal:   (+) arthritis, , , -       GI/Hepatic:  - neg GI/hepatic ROS       Renal/Genitourinary: Comment: Cr range 1.03-1.16    (+) Nephrolithiasis , BPH,       Endo:     (+) thyroid problem hypothyroidism, Other Endocrine Disorder primary hyperparathyroidism.      Psychiatric:  - neg psychiatric ROS       Infectious Disease:  - neg infectious disease ROS       Malignancy:      - no malignancy   Other:    (+) No chance of pregnancy C-spine cleared: N/A, no H/O Chronic Pain,no other significant disability              Physical Exam  Normal systems: dental    Airway   Mallampati: II  TM distance: >3 FB  Neck ROM: limited    Dental     Cardiovascular   Rhythm and rate: regular and normal      Pulmonary    breath sounds clear to auscultation    Other findings:  For further details of assessment, testing, and physical exam please see H and P completed on same date.             PAC Discussion and Assessment    ASA Classification: 3  Case is suitable for: ASC  Anesthetic techniques and relevant risks discussed: GA  Invasive monitoring and risk discussed: No  Types:   Possibility and Risk of blood transfusion discussed: No  NPO instructions given:   Additional anesthetic preparation and risks discussed:   Needs early admission to pre-op area:   Other:     PAC Resident/NP Anesthesia Assessment:  Brock Abarca is a 84 year old male scheduled to undergo parathyroidectomy, on 2/14/17, by Dr. Dixon. He has the following specific operative considerations:   - RCRI : 0.9% risk of major adverse cardiac event.   - Risk of PONV score = 1.  If > 2, anti-emetic intervention recommended.    BMI 24. 80 kg. Last GAs for uro procedures OSH 1/2017. No history of problems with anesthesia.     --Primary hyperparathyroidism. Concern for parathyroid adenoma. Last Calcium normal 1/27/17. Above procedure planned.   --HLD. Simvastatin. HYPERTENSION. Metoprolol at HS. No other cardiac history or symptoms. Recent EKG above, unchanged from 2013. Activity is somewhat limited by balance issues. Walks with cane.    --History of CVA in 2006. Denies residual. On ASA and Plavix with planned 5 day hold for surgery. This was approved by PCP Dr. Fox today.    --No pulmonary history, symptoms or meds. Nonsmoker. CHIQUIS RF 3/8 Intermediate risk. Able to lie flat.    --History of PE after complex lumbar surgery in 2008. Possible past history of blood transfusion as a child. History of thrombocytopenia. Range .    --Recent concern for renal insufficiency after urology procedures, improved after hydration. Cr range 1.03-1.16.    --Hypothyroidism. Synthroid at HS.   --[facility] with bilateral hearing aids.              --Based on evaluation today, patient was deemed appropriate candidate for ASC.    Patient  was discussed with Dr Perez.        Reviewed and Signed by PAC Mid-Level Provider/Resident  Mid-Level Provider/Resident: LORI Ott, KRISTINA  Date: 2/6/17  Time: 3:17pm    Attending Anesthesiologist Anesthesia Assessment:  STAFF:  84 y.o. man with hyperparathyroidism disease for [id number] by Dr. Dixon  using general anesthesia.   History summarized above. No real limit to prevent surgery at the ASC.  (Ca++) = 11.5  Instructions given and questions answered.   Final plans by anesthesiology team on DOS.   ---rcp      Reviewed and Signed by PAC Anesthesiologist  Anesthesiologist: rcp  Date: 2/6  Time: 1630  Pass/Fail: Pass  Disposition:     PAC Pharmacist Assessment:        Pharmacist:   Date:   Time:                           .

## 2017-02-06 NOTE — LETTER
2/6/2017       RE: Brock Abarca  [address]  [place] [address]     Dear Colleague,    Thank you for referring your patient, Brock Abarca, to the [facility]AT at [facility]. Please see a copy of my visit note below.    REASON FOR CONSULTATION:  I was asked by Dr. Sabine Miller to see this patient for surgical options for primary hyperparathyroidism.      HISTORY OF PRESENT ILLNESS:  This is an 84-year-old gentleman who is being evaluated by a nephrologist and urologists and noted to have hydronephrosis secondary to a kidney stone.  A stent was placed and subsequently removed.  During the evaluation for the kidney stone, he was noted to be hypercalcemic.  This then led to a workup for hyperparathyroidism.  The laboratory data from the end of January include a parathyroid hormone level 203.  A 24-hour urine calcium was ordered.  Serum calcium corresponding to that elevated PTH is 10.6.  Further workup included an ultrasound that identified an area of concern on the left side.      Regarding symptoms of his hyperparathyroidism, the patient and his wife who is with him both agree it is a little bit difficult due to the longevity of this.  He has been noted to be hypercalcemic since at least 2012 per our laboratory data.  The patient does admit that he feels as if he has more fatigue than he would have expected at this time.  He has a change in memory.  Both he and his wife complain of frequent urination.  He has no history of fractures.  No family history or siblings with hypocalcemia or hyperparathyroidism.      PAST MEDICAL HISTORY, SURGICAL HISTORY, MEDICATIONS:  Have been reviewed and are available in the EMR.  Notably, the patient is hypothyroid, on thyroid hormone replacement.      REVIEW OF SYSTEMS:  A 10-point review of systems is pertinent for that noted in the HPI.      PHYSICAL EXAMINATION:  His neck is soft.  I actually  cannot feel his thyroid in that it appears significantly atrophied.  There is no cervical lymphadenopathy.      LABORATORY DATA:  Discussed above.      IMAGING:  Ultrasound of the thyroid identified a 1.2 cm hypoechoic nodule adjacent to the posteroinferior left thyroid.      ASSESSMENT:  Primary hyperparathyroidism.      PLAN:  Based on the above, I would recommend the patient undergo a neck exploration, resection of parathyroid adenoma or adenomas.  The procedure was discussed with the patient as well as intraoperative parathyroid hormone testing.  We also discussed the risks including but not limited to bleeding, infection, injury to the recurrent laryngeal nerve or nerves and potential permanent hypocalcemia.  The patient is aware of this and agreed to proceed with surgery and we will schedule him accordingly.         KADE RICE MD             D: 2017 14:33   T: 2017 09:42   MT: frank      Name:     KJ MEREDITH   MRN:      [id number]        Account:      [id number]   :      1932           Service Date: 2017      Document: [id number]

## 2017-02-06 NOTE — MR AVS SNAPSHOT
After Visit Summary   2/6/2017    Brock Abarca    MRN: [id number]           Patient Information     Date Of Birth          3/1/1932        Visit Information        Provider Department      2/6/2017 1:00 PM Genesis Dixon MD [facility]        Today's Diagnoses     Primary hyperparathyroidism (H)    -  1    Thrombocytopenia (H)        Hypertension goal BP (blood pressure) < 140/90        Other pulmonary embolism without acute cor pulmonale (H)        Brain dysfunction          Care Instructions    Nurse teaching given on parathyroidectomy and the patient expresses understanding and acceptance of instructions. Lester Eng 2/6/2017 1:57 PM        Follow-ups after your visit        Additional Services     PAC Visit Referral (For [facility] Only)       Does this visit require an Anesthesia consult?  Yes - Evaluate for medical necessity related to one of the following conditions:  Other: HTN, PE, thrombocytopenia, Brain dysfuction     H&P done by:  N/A and Other (Specify): Please do H&P as well      Please be aware that coverage of these services is subject to the terms and limitations of your health insurance plan.  Call member services at your health plan with any benefit or coverage questions.      Please bring the following to your appointment:  >>   Any x-rays, CTs or MRIs which have been performed.  Contact the facility where they were done to arrange for  prior to your scheduled appointment.  Any new CT, MRI or other procedures ordered by your specialist must be performed at a [place] facility or coordinated by your clinic's referral office.    >>   List of current medications  >>   This referral request   >>   Any documents/labs given to you for this referral                  Who to contact     Please call your clinic at [phone] to:    Ask questions about your health    Make or cancel appointments    Discuss your medicines    Learn about your test  "results    Speak to your doctor   If you have compliments or concerns about an experience at your clinic, or if you wish to file a complaint, please contact [facility] Patient Relations at [phone] or email us at [email or web address]         Additional Information About Your Visit        MyChart Information     [employer] gives you secure access to your electronic health record. If you see a primary care provider, you can also send messages to your care team and make appointments. If you have questions, please call your primary care clinic.  If you do not have a primary care provider, please call [phone] and they will assist you.      [employer] is an electronic gateway that provides easy, online access to your medical records. With [employer], you can request a clinic appointment, read your test results, renew a prescription or communicate with your care team.     To access your existing account, please contact your [facility] or call [phone] for assistance.        Care EveryWhere ID     This is your Care EveryWhere ID. This could be used by other organizations to access your [place] medical records  [id number]        Your Vitals Were     Height BMI (Body Mass Index)                1.88 m (6' 2\") 22.6 kg/m2           Blood Pressure from Last 3 Encounters:   02/14/17 120/69   02/06/17 142/76   01/27/17 112/71    Weight from Last 3 Encounters:   02/14/17 79.8 kg (176 lb)   02/06/17 79.8 kg (176 lb)   02/06/17 79.8 kg (176 lb)              We Performed the Following     PAC Visit Referral (For [facility] Only)     Yvette-Operative Worksheet (Head & Neck)          Today's Medication Changes          These changes are accurate as of: 2/6/17 11:59 PM.  If you have any questions, ask your nurse or doctor.               These medicines have changed or have updated prescriptions.        Dose/Directions    clopidogrel 75 MG tablet   Commonly known as:  " PLAVIX   This may have changed:  when to take this   Used for:  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)        Dose:  75 mg   Take 1 tablet (75 mg) by mouth daily   Quantity:  90 tablet   Refills:  3       levothyroxine 125 MCG tablet   Commonly known as:  SYNTHROID/LEVOTHROID   This may have changed:    - when to take this  - additional instructions   Used for:  Hypothyroidism, unspecified type        Dose:  125 mcg   Take 1 tablet (125 mcg) by mouth daily New dose   Quantity:  90 tablet   Refills:  1       metoprolol 50 MG 24 hr tablet   Commonly known as:  TOPROL-XL   This may have changed:  when to take this   Used for:  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)        Dose:  50 mg   Take 1 tablet (50 mg) by mouth daily   Quantity:  90 tablet   Refills:  3       polyethylene glycol powder   Commonly known as:  MIRALAX   This may have changed:    - when to take this  - reasons to take this   Used for:  Drug-induced constipation        Dose:  1 capful   Take 17 g (1 capful) by mouth daily   Quantity:  510 g   Refills:  0       sennosides 8.6 MG tablet   Commonly known as:  SENOKOT   This may have changed:    - when to take this  - reasons to take this   Used for:  Drug-induced constipation        Dose:  1 tablet   Take 1 tablet by mouth 2 times daily   Quantity:  60 tablet   Refills:  0       simvastatin 40 MG tablet   Commonly known as:  ZOCOR   This may have changed:  when to take this   Used for:  Hyperlipidemia LDL goal <70        Dose:  40 mg   Take 1 tablet (40 mg) by mouth daily   Quantity:  90 tablet   Refills:  3       tamsulosin 0.4 MG capsule   Commonly known as:  FLOMAX   This may have changed:  when to take this   Used for:  Urinary frequency        Dose:  0.4 mg   Take 1 capsule (0.4 mg) by mouth daily   Quantity:  90 capsule   Refills:  4                Primary Care Provider Office Phone # Fax #    Sadaf Fox [phone]       [facility] [address]  [facility]  [place] [address]        Thank you!     Thank you for choosing [facility]  for your care. Our goal is always to provide you with excellent care. Hearing back from our patients is one way we can continue to improve our services. Please take a few minutes to complete the written survey that you may receive in the mail after your visit with us. Thank you!             Your Updated Medication List - Protect others around you: Learn how to safely use, store and throw away your medicines at www.disposemymeds.org.          This list is accurate as of: 2/6/17 11:59 PM.  Always use your most recent med list.                   Brand Name Dispense Instructions for use    acetaminophen 325 MG tablet    TYLENOL     Take 325-650 mg by mouth       alendronate 70 MG tablet    FOSAMAX    12 tablet    Take 1 tablet (70 mg) by mouth every 7 days Take 60 minutes before am meal with 8 oz. water. Remain upright for 30 minutes.       aspirin 81 MG tablet     90 tablet    Take 81 mg by mouth every evening       calcitRIOL 0.25 MCG capsule    ROCALTROL    90 capsule    Take 1 capsule (0.25 mcg) by mouth 3 times daily       calcium carbonate 500 MG chewable tablet    TUMS    180 tablet    Take 2 tablets (1,000 mg) by mouth 3 times daily       CENTRUM SILVER ADULT 50+ PO      Take 1 tablet by mouth daily       cholecalciferol 5000 UNITS Caps capsule    vitamin D3     Take 1 capsule by mouth daily       CLARITIN-D 24 HOUR PO      Take 1 tablet by mouth as needed       clopidogrel 75 MG tablet    PLAVIX    90 tablet    Take 1 tablet (75 mg) by mouth daily       HYDROcodone-acetaminophen 5-325 MG per tablet    NORCO     Take 1-2 tablets by mouth       levothyroxine 125 MCG tablet    SYNTHROID/LEVOTHROID    90 tablet    Take 1 tablet (125 mcg) by mouth daily New dose       metoprolol 50 MG 24 hr tablet    TOPROL-XL    90 tablet    Take 1 tablet (50 mg) by mouth daily       order for DME     1 Device    Equipment  being ordered: bilateral wrist splints       oxyCODONE-acetaminophen 5-325 MG per tablet    PERCOCET     Take 1-2 tablets by mouth       polyethylene glycol powder    MIRALAX    510 g    Take 17 g (1 capful) by mouth daily       sennosides 8.6 MG tablet    SENOKOT    60 tablet    Take 1 tablet by mouth 2 times daily       simvastatin 40 MG tablet    ZOCOR    90 tablet    Take 1 tablet (40 mg) by mouth daily       tamsulosin 0.4 MG capsule    FLOMAX    90 capsule    Take 1 capsule (0.4 mg) by mouth daily       trospium 60 MG Cp24 24 hr capsule    SANCTURA XR     Take 60 mg by mouth every evening       VITAMIN B 12 PO      Take 2,500 mcg by mouth daily.

## 2017-02-08 NOTE — PROGRESS NOTES
REASON FOR CONSULTATION:  I was asked by Dr. Sabine Miller to see this patient for surgical options for primary hyperparathyroidism.      HISTORY OF PRESENT ILLNESS:  This is an 84-year-old gentleman who is being evaluated by a nephrologist and urologists and noted to have hydronephrosis secondary to a kidney stone.  A stent was placed and subsequently removed.  During the evaluation for the kidney stone, he was noted to be hypercalcemic.  This then led to a workup for hyperparathyroidism.  The laboratory data from the end of January include a parathyroid hormone level 203.  A 24-hour urine calcium was ordered.  Serum calcium corresponding to that elevated PTH is 10.6.  Further workup included an ultrasound that identified an area of concern on the left side.      Regarding symptoms of his hyperparathyroidism, the patient and his wife who is with him both agree it is a little bit difficult due to the longevity of this.  He has been noted to be hypercalcemic since at least 2012 per our laboratory data.  The patient does admit that he feels as if he has more fatigue than he would have expected at this time.  He has a change in memory.  Both he and his wife complain of frequent urination.  He has no history of fractures.  No family history or siblings with hypocalcemia or hyperparathyroidism.      PAST MEDICAL HISTORY, SURGICAL HISTORY, MEDICATIONS:  Have been reviewed and are available in the EMR.  Notably, the patient is hypothyroid, on thyroid hormone replacement.      REVIEW OF SYSTEMS:  A 10-point review of systems is pertinent for that noted in the HPI.      PHYSICAL EXAMINATION:  His neck is soft.  I actually cannot feel his thyroid in that it appears significantly atrophied.  There is no cervical lymphadenopathy.      LABORATORY DATA:  Discussed above.      IMAGING:  Ultrasound of the thyroid identified a 1.2 cm hypoechoic nodule adjacent to the posteroinferior left thyroid.      ASSESSMENT:  Primary  hyperparathyroidism.      PLAN:  Based on the above, I would recommend the patient undergo a neck exploration, resection of parathyroid adenoma or adenomas.  The procedure was discussed with the patient as well as intraoperative parathyroid hormone testing.  We also discussed the risks including but not limited to bleeding, infection, injury to the recurrent laryngeal nerve or nerves and potential permanent hypocalcemia.  The patient is aware of this and agreed to proceed with surgery and we will schedule him accordingly.         KADE RICE MD             D: 2017 14:33   T: 2017 09:42   MT: frank      Name:     KJ MEREDITH   MRN:      [id number]        Account:      [id number]   :      1932           Service Date: 2017      Document: [id number]

## 2017-02-13 ENCOUNTER — ANESTHESIA EVENT (OUTPATIENT)
Dept: SURGERY | Facility: AMBULATORY SURGERY CENTER | Age: 82
End: 2017-02-13

## 2017-02-14 ENCOUNTER — ANESTHESIA (OUTPATIENT)
Dept: SURGERY | Facility: AMBULATORY SURGERY CENTER | Age: 82
End: 2017-02-14

## 2017-02-14 ENCOUNTER — HOSPITAL ENCOUNTER (OUTPATIENT)
Facility: AMBULATORY SURGERY CENTER | Age: 82
End: 2017-02-14
Attending: SURGERY

## 2017-02-14 VITALS
WEIGHT: 176 LBS | SYSTOLIC BLOOD PRESSURE: 120 MMHG | RESPIRATION RATE: 14 BRPM | TEMPERATURE: 97.5 F | HEIGHT: 72 IN | DIASTOLIC BLOOD PRESSURE: 69 MMHG | OXYGEN SATURATION: 95 % | BODY MASS INDEX: 23.84 KG/M2

## 2017-02-14 DIAGNOSIS — Z98.890 S/P PARATHYROIDECTOMY: Primary | ICD-10-CM

## 2017-02-14 DIAGNOSIS — Z90.89 S/P PARATHYROIDECTOMY: Primary | ICD-10-CM

## 2017-02-14 LAB
CREAT SERPL-MCNC: 0.79 MG/DL (ref 0.66–1.25)
GFR SERPL CREATININE-BSD FRML MDRD: NORMAL ML/MIN/1.7M2
POTASSIUM SERPL-SCNC: 4.1 MMOL/L (ref 3.4–5.3)
PTH-INTACT SERPL-MCNC: 308 PG/ML (ref 12–72)
PTH-INTACT SERPL-MCNC: 59 PG/ML (ref 12–72)
PTH-INTACT SERPL-MCNC: 8 PG/ML (ref 12–72)

## 2017-02-14 RX ORDER — ACETAMINOPHEN 325 MG/1
975 TABLET ORAL ONCE
Status: COMPLETED | OUTPATIENT
Start: 2017-02-14 | End: 2017-02-14

## 2017-02-14 RX ORDER — ONDANSETRON 2 MG/ML
INJECTION INTRAMUSCULAR; INTRAVENOUS PRN
Status: DISCONTINUED | OUTPATIENT
Start: 2017-02-14 | End: 2017-02-14

## 2017-02-14 RX ORDER — FENTANYL CITRATE 50 UG/ML
25-50 INJECTION, SOLUTION INTRAMUSCULAR; INTRAVENOUS
Status: DISCONTINUED | OUTPATIENT
Start: 2017-02-14 | End: 2017-02-15 | Stop reason: HOSPADM

## 2017-02-14 RX ORDER — NALOXONE HYDROCHLORIDE 0.4 MG/ML
.1-.4 INJECTION, SOLUTION INTRAMUSCULAR; INTRAVENOUS; SUBCUTANEOUS
Status: DISCONTINUED | OUTPATIENT
Start: 2017-02-14 | End: 2017-02-15 | Stop reason: HOSPADM

## 2017-02-14 RX ORDER — ACETAMINOPHEN 325 MG/1
325-650 TABLET ORAL
COMMUNITY
End: 2019-02-08

## 2017-02-14 RX ORDER — DEXAMETHASONE SODIUM PHOSPHATE 10 MG/ML
10 INJECTION, SOLUTION INTRAMUSCULAR; INTRAVENOUS ONCE
Status: COMPLETED | OUTPATIENT
Start: 2017-02-14 | End: 2017-02-14

## 2017-02-14 RX ORDER — MEPERIDINE HYDROCHLORIDE 25 MG/ML
12.5 INJECTION INTRAMUSCULAR; INTRAVENOUS; SUBCUTANEOUS
Status: DISCONTINUED | OUTPATIENT
Start: 2017-02-14 | End: 2017-02-15 | Stop reason: HOSPADM

## 2017-02-14 RX ORDER — EPHEDRINE SULFATE 50 MG/ML
INJECTION, SOLUTION INTRAMUSCULAR; INTRAVENOUS; SUBCUTANEOUS PRN
Status: DISCONTINUED | OUTPATIENT
Start: 2017-02-14 | End: 2017-02-14

## 2017-02-14 RX ORDER — GLYCOPYRROLATE 0.2 MG/ML
INJECTION, SOLUTION INTRAMUSCULAR; INTRAVENOUS PRN
Status: DISCONTINUED | OUTPATIENT
Start: 2017-02-14 | End: 2017-02-14

## 2017-02-14 RX ORDER — PROPOFOL 10 MG/ML
INJECTION, EMULSION INTRAVENOUS CONTINUOUS PRN
Status: DISCONTINUED | OUTPATIENT
Start: 2017-02-14 | End: 2017-02-14

## 2017-02-14 RX ORDER — SODIUM CHLORIDE, SODIUM LACTATE, POTASSIUM CHLORIDE, CALCIUM CHLORIDE 600; 310; 30; 20 MG/100ML; MG/100ML; MG/100ML; MG/100ML
INJECTION, SOLUTION INTRAVENOUS CONTINUOUS
Status: DISCONTINUED | OUTPATIENT
Start: 2017-02-14 | End: 2017-02-15 | Stop reason: HOSPADM

## 2017-02-14 RX ORDER — GABAPENTIN 300 MG/1
300 CAPSULE ORAL ONCE
Status: COMPLETED | OUTPATIENT
Start: 2017-02-14 | End: 2017-02-14

## 2017-02-14 RX ORDER — OXYCODONE AND ACETAMINOPHEN 5; 325 MG/1; MG/1
1-2 TABLET ORAL
COMMUNITY
Start: 2017-01-23 | End: 2017-06-26

## 2017-02-14 RX ORDER — CALCIUM CARBONATE 500 MG/1
2 TABLET, CHEWABLE ORAL 3 TIMES DAILY
Qty: 180 TABLET | Refills: 2 | Status: SHIPPED | OUTPATIENT
Start: 2017-02-14 | End: 2017-03-16

## 2017-02-14 RX ORDER — ONDANSETRON 2 MG/ML
4 INJECTION INTRAMUSCULAR; INTRAVENOUS EVERY 30 MIN PRN
Status: DISCONTINUED | OUTPATIENT
Start: 2017-02-14 | End: 2017-02-15 | Stop reason: HOSPADM

## 2017-02-14 RX ORDER — BUPIVACAINE HYDROCHLORIDE 2.5 MG/ML
INJECTION, SOLUTION EPIDURAL; INFILTRATION; INTRACAUDAL PRN
Status: DISCONTINUED | OUTPATIENT
Start: 2017-02-14 | End: 2017-02-14 | Stop reason: HOSPADM

## 2017-02-14 RX ORDER — LIDOCAINE 40 MG/G
CREAM TOPICAL
Status: DISCONTINUED | OUTPATIENT
Start: 2017-02-14 | End: 2017-02-14 | Stop reason: HOSPADM

## 2017-02-14 RX ORDER — FENTANYL CITRATE 50 UG/ML
INJECTION, SOLUTION INTRAMUSCULAR; INTRAVENOUS PRN
Status: DISCONTINUED | OUTPATIENT
Start: 2017-02-14 | End: 2017-02-14

## 2017-02-14 RX ORDER — ONDANSETRON 4 MG/1
4 TABLET, ORALLY DISINTEGRATING ORAL EVERY 30 MIN PRN
Status: DISCONTINUED | OUTPATIENT
Start: 2017-02-14 | End: 2017-02-15 | Stop reason: HOSPADM

## 2017-02-14 RX ORDER — HYDROCODONE BITARTRATE AND ACETAMINOPHEN 5; 325 MG/1; MG/1
1-2 TABLET ORAL
COMMUNITY
Start: 2017-01-05 | End: 2017-06-26

## 2017-02-14 RX ORDER — LIDOCAINE HYDROCHLORIDE 20 MG/ML
INJECTION, SOLUTION INFILTRATION; PERINEURAL PRN
Status: DISCONTINUED | OUTPATIENT
Start: 2017-02-14 | End: 2017-02-14

## 2017-02-14 RX ORDER — PROPOFOL 10 MG/ML
INJECTION, EMULSION INTRAVENOUS PRN
Status: DISCONTINUED | OUTPATIENT
Start: 2017-02-14 | End: 2017-02-14

## 2017-02-14 RX ORDER — CALCITRIOL 0.25 UG/1
0.25 CAPSULE, LIQUID FILLED ORAL 3 TIMES DAILY
Qty: 90 CAPSULE | Refills: 2 | Status: SHIPPED | OUTPATIENT
Start: 2017-02-14 | End: 2018-07-11

## 2017-02-14 RX ORDER — SODIUM CHLORIDE, SODIUM LACTATE, POTASSIUM CHLORIDE, CALCIUM CHLORIDE 600; 310; 30; 20 MG/100ML; MG/100ML; MG/100ML; MG/100ML
INJECTION, SOLUTION INTRAVENOUS CONTINUOUS
Status: DISCONTINUED | OUTPATIENT
Start: 2017-02-14 | End: 2017-02-14 | Stop reason: HOSPADM

## 2017-02-14 RX ORDER — OXYCODONE AND ACETAMINOPHEN 5; 325 MG/1; MG/1
1-2 TABLET ORAL
Status: COMPLETED | OUTPATIENT
Start: 2017-02-14 | End: 2017-02-14

## 2017-02-14 RX ADMIN — OXYCODONE AND ACETAMINOPHEN 1 TABLET: 5; 325 TABLET ORAL at 12:02

## 2017-02-14 RX ADMIN — FENTANYL CITRATE 50 MCG: 50 INJECTION, SOLUTION INTRAMUSCULAR; INTRAVENOUS at 10:21

## 2017-02-14 RX ADMIN — PROPOFOL 150 MG: 10 INJECTION, EMULSION INTRAVENOUS at 10:21

## 2017-02-14 RX ADMIN — GLYCOPYRROLATE 0.2 MG: 0.2 INJECTION, SOLUTION INTRAMUSCULAR; INTRAVENOUS at 10:51

## 2017-02-14 RX ADMIN — EPHEDRINE SULFATE 5 MG: 50 INJECTION, SOLUTION INTRAMUSCULAR; INTRAVENOUS; SUBCUTANEOUS at 10:39

## 2017-02-14 RX ADMIN — Medication 200 MCG: at 10:37

## 2017-02-14 RX ADMIN — Medication 100 MCG: at 10:34

## 2017-02-14 RX ADMIN — Medication 200 MCG: at 10:51

## 2017-02-14 RX ADMIN — OXYCODONE AND ACETAMINOPHEN 1 TABLET: 5; 325 TABLET ORAL at 11:40

## 2017-02-14 RX ADMIN — LIDOCAINE HYDROCHLORIDE 80 MG: 20 INJECTION, SOLUTION INFILTRATION; PERINEURAL at 10:21

## 2017-02-14 RX ADMIN — Medication 100 MCG: at 10:48

## 2017-02-14 RX ADMIN — PROPOFOL 200 MCG/KG/MIN: 10 INJECTION, EMULSION INTRAVENOUS at 10:21

## 2017-02-14 RX ADMIN — ONDANSETRON 4 MG: 2 INJECTION INTRAMUSCULAR; INTRAVENOUS at 10:37

## 2017-02-14 RX ADMIN — DEXAMETHASONE SODIUM PHOSPHATE 10 MG: 10 INJECTION, SOLUTION INTRAMUSCULAR; INTRAVENOUS at 10:37

## 2017-02-14 RX ADMIN — Medication 200 MCG: at 11:00

## 2017-02-14 RX ADMIN — Medication 200 MCG: at 10:39

## 2017-02-14 RX ADMIN — ACETAMINOPHEN 975 MG: 325 TABLET ORAL at 10:01

## 2017-02-14 RX ADMIN — SODIUM CHLORIDE, SODIUM LACTATE, POTASSIUM CHLORIDE, CALCIUM CHLORIDE: 600; 310; 30; 20 INJECTION, SOLUTION INTRAVENOUS at 10:01

## 2017-02-14 RX ADMIN — Medication 200 MCG: at 10:45

## 2017-02-14 RX ADMIN — EPHEDRINE SULFATE 10 MG: 50 INJECTION, SOLUTION INTRAMUSCULAR; INTRAVENOUS; SUBCUTANEOUS at 10:34

## 2017-02-14 RX ADMIN — GABAPENTIN 300 MG: 300 CAPSULE ORAL at 10:01

## 2017-02-14 RX ADMIN — EPHEDRINE SULFATE 10 MG: 50 INJECTION, SOLUTION INTRAMUSCULAR; INTRAVENOUS; SUBCUTANEOUS at 10:48

## 2017-02-14 ASSESSMENT — LIFESTYLE VARIABLES: TOBACCO_USE: 0

## 2017-02-14 NOTE — ANESTHESIA POSTPROCEDURE EVALUATION
Patient: Brock Abarca    Procedure(s):  Neck Dissection, Left Superior Parathyroidectomy - Wound Class: I-Clean    Diagnosis:Hyperparathyroidism  Diagnosis Additional Information: No value filed.    Anesthesia Type:  General, ETT    Note:  Anesthesia Post Evaluation    Patient location during evaluation: Phase 2  Patient participation: Able to fully participate in evaluation  Level of consciousness: awake and alert  Pain management: adequate  Airway patency: patent  Cardiovascular status: acceptable  Respiratory status: acceptable  Hydration status: acceptable  PONV: none     Anesthetic complications: None          Last vitals:  Vitals:    02/14/17 1130 02/14/17 1145 02/14/17 1200   BP: 128/72 130/73 120/69   Resp: 14 14 14   Temp: 36.2  C (97.2  F) 36.4  C (97.5  F) 36.4  C (97.5  F)   SpO2:  96% 95%         Electronically Signed By: Srinath Reece MD  February 14, 2017

## 2017-02-14 NOTE — BRIEF OP NOTE
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 Left inferior parathyroid was normal in appearance.  Complications: None.  Implants: None.    Romulo Torres MD PGY-3  Surgery Resident

## 2017-02-14 NOTE — DISCHARGE INSTRUCTIONS
"[facility]  Home Care Following Anesthesia  For 24 hours after surgery:  1. Get plenty of rest.  A responsible adult must stay with you for at least 24 hours after you leave the surgery center.  2. Do not drive or use heavy equipment.  If you have weakness or tingling, don't drive or use heavy equipment until this feeling goes away.   3. Do not drink alcohol.   4. Avoid strenuous or risky activities.  Ask for help when climbing stairs.  5. You may feel lightheaded.  IF so, sit for a few minutes before standing.  Have someone help you get up.   6. If you have nausea (feel sick to your stomach): Drink only clear liquids such as apple juice, ginger ale, broth or 7-Up.  Rest may also help.  Be sure to drink enough fluids.  Move to a regular diet as you feel able.   7. You may have a slight fever.  Call the doctor if your fever is over 100 F (37.7 C) (taken under the tongue) or lasts longer than 24 hours.  8. You may have a dry mouth, a sore throat, muscle aches or trouble sleeping. These should go away after 24 hours.  9. Do not make important or legal decisions.        Today you received a Marcaine block to numb the nerves near your surgery site.  This is a block using local anesthetic or \"numbing\" medication injected around the nerves to anesthetize or \"numb\" the area supplied by those nerves.  This block is injected into the muscle layer near your surgical site.  The medication may numb the location where you had surgery for 6-18 hours, but may last up to 24 hours.  If your surgical site is an arm or leg you should be careful with your affected limb, since it is possible to injure your limb without being aware of it due to the numbing.  Until full feeling returns, you should guard against bumping or hitting your limb, and avoid extreme hot or cold temperatures on the skin.  As the block wears off, the feeling will return as a tingling or prickly sensation near your surgical site. "  You will experience more discomfort from your incision as the feeling returns.  You may want to take a pain pill (a narcotic or Tylenol if this was prescribed by your surgeon) when you start to experience mild pain before the pain beccomes more severe.  If your pain medications do not control your pain you should notifiy your surgeon.    Tips for taking pain medications  To get the best pain relief possible, remember these points:    Take pain medications as directed, before pain becomes severe.    Pain medication can upset your stomach: taking it with food may help.    Constipation is a common side effect of pain medication. Drink plenty of  fluids.    Eat foods high in fiber. Take a stool softener if recommended by your doctor or pharmacist.    Do not drink alcohol, drive or operate machinery while taking pain medications.    Ask about other ways to control pain, such as with heat, ice or relaxation.    Call a doctor for any of the followin. Signs of infection (fever, growing tenderness at the surgery site, a large amount of drainage or bleeding, severe pain, foul-smelling drainage, redness, swelling).  2. It has been over 8 to 10 hours since surgery and you are still not able to urinate (pass water).  3. Headache for over 24 hours.  4. Numbness, tingling or weakness the day after surgery (if you had spinal anesthesia).  Your doctor is:  Dr. Genesis Dixon, ENT Otolaryngology: [phone]                    Or dial [phone] and ask for the resident on call for:  ENT Otolaryngology  For emergency care, call the:  [facility]:  [phone] (TTY for hearing impaired: [phone])

## 2017-02-14 NOTE — IP AVS SNAPSHOT
[facility]    [address] [place] [id number]    Phone:  [phone]    Fax:  [phone]                                       After Visit Summary   2/14/2017    Brock Abarca    MRN: [id number]           After Visit Summary Signature Page     I have received my discharge instructions, and my questions have been answered. I have discussed any challenges I see with this plan with the nurse or doctor.    ..........................................................................................................................................  Patient/Patient Representative Signature      ..........................................................................................................................................  Patient Representative Print Name and Relationship to Patient    ..................................................               ................................................  Date                                            Time    ..........................................................................................................................................  Reviewed by Signature/Title    ...................................................              ..............................................  Date                                                            Time

## 2017-02-14 NOTE — ANESTHESIA CARE TRANSFER NOTE
Patient: Brock Abarca    Procedure(s):  Neck Dissection, Left Superior Parathyroidectomy - Wound Class: I-Clean    Diagnosis: Hyperparathyroidism  Diagnosis Additional Information: No value filed.    Anesthesia Type:   General, ETT     Note:  Airway :Room Air  Patient transferred to:PACU  Comments: Arrive PACU, Stable, Airway Intact  123/67, 81,16,95,36.5  All questions answered.      Vitals: (Last set prior to Anesthesia Care Transfer)    CRNA VITALS  2/14/2017 1045 - 2/14/2017 1121      2/14/2017             EKG: NSR                Electronically Signed By: LORI North CRNA  February 14, 2017  11:21 AM

## 2017-02-14 NOTE — IP AVS SNAPSHOT
MRN:[id number]                      After Visit Summary   2/14/2017    Brock Abarca    MRN: [id number]           Thank you!     Thank you for choosing [facility] for your care. Our goal is always to provide you with excellent care. Hearing back from our patients is one way we can continue to improve our services. Please take a few minutes to complete the written survey that you may receive in the mail after you visit with us. Thank you!        Patient Information     Date Of Birth          3/1/1932        About your hospital stay     You were admitted on:  February 14, 2017 [facility]    You were discharged on:  February 14, 2017       Who to Call     For medical emergencies, please call 911.  For non-urgent questions about your medical care, please call your primary care provider or clinic, [phone]  For questions related to your surgery, please call your surgery clinic        Attending Provider     Provider Genesis Noyola MD General Surgery       Primary Care Provider Office Phone # Fax #    Sadaf Keisha Fox [phone]       [address] [address]        After Care Instructions     Diet Instructions       Resume pre-procedure diet            Discharge Instructions       -Follow up appointment with Dr Dixon in about 2 weeks.   -Incision site(s): Keep dressing/incision clean/dry/intact for 24 hours. 24 hours after your operation, you may remove any dressings and allow soapy water to wash over the wounds. If steri-strips are in place, they will fall of independently; if they haven't fallen off 10 days after surgery, okay to remove. If you have surgical glue over your incision, this will wear off in time. Apply dressings as desired.   -No submersion in water (lake/pool/tub) for one month or until approved by your surgeon. There are two small tails of  suture on either end of your incision; this will be trimmed at your follow-up appointment if still present.   -Ok to take acetaminophen or ibuprofen if your pain is not severe enough for narcotic medication. (caution: Percocet contains acetaminophen, do not exceed 4000mg of acetaminophen in a day, it may cause liver toxicity at higher doses)   -No driving while taking narcotic pain medication.   -It is recommendable to take stool softeners (such as miralax, senna, and/or colace) while on narcotic pain medication to prevent constipation.   -Take Tums and Calcitriol three times daily until further instructed at your follow-up appointment.   -If you develop any fever/chills, difficulty breathing, worsening pain, redness, swelling, nausea or vomiting, tingling of your hands or around your lips, or drainage from your wound please call Dr Dixon at [phone]. If unable to reach her, call [phone] and ask to page the Surgical Oncology resident on call.   -Your post operative/ post hospital discharge Medication is important and can be confusing due to the fact that several of the drugs have similar names and uses. It is very important that you precisely follow the instructions on which drugs to take and that you not get the drugs mixed up due to their similar names and uses. When you follow up in the clinic, please carry your pill bottles in for review by the doctor, so we can be sure you are getting the medication as we planned. Here is a list of the generic and brand names of some of the medications you might have been prescribed: Calcitriol (otherwise known as Rocaltrol): This is a potent vitamin D which is usually used only temporarily after thyroid surgery. This medication is not interchangeable with other forms of vitamin D. Calcium -common generic names: calcium carbonate, Calcium citrate -common brand names: Tums, Oscal, Citracal. Labels on the calcium supplements can be confusing due to the fact that the  "number of milligrams of calcium (which we call the \"elemental calcium\") is different from the total calcium dose that might show up on the label. For example, TUMS Ultra 1000 has 1000 mg of calcium carbonate but only 400 mg of elemental calcium. It is very important that you actually use the dose (or # of tablets) of calcium that is prescribed (and not substitute with another dose or preparation). Vitamin D- This is commonly found in calcium supplements but we also often prescribe vitamin D in order to treat low vitamin D levels, a common problem. Vitamin D is sold over the counter , and might be contained in multivitamins, in doses usually between 400 IU/day and 1000 IU/day. If you are instructed to increase your vitamin D it is best to do this as JUST vitamin D (in other words, don't use a combined calcium/vitamin D supplement to increase the vitamin D since this would also increase the calcium). This vitamin D is not a substitute for calcitriol.            No Alcohol       For 24 hours post procedure            No driving or operating machinery        until the day after procedure            Shower       No shower for 24 hours post procedure. May shower Postoperative Day (POD)  1                  Your next 10 appointments already scheduled     Mar 06, 2017  1:30 PM CST   (Arrive by 1:15 PM)   Return Visit with Genesis Dixon MD   [facility] ([facility])    [address] [place] [id number] [phone]              Further instructions from your care team       [facility]  Home Care Following Anesthesia  For 24 hours after surgery:  1. Get plenty of rest.  A responsible adult must stay with you for at least 24 hours after you leave the surgery center.  2. Do not drive or use heavy equipment.  If you have weakness or tingling, don't drive or use heavy equipment until this feeling goes away.   3. Do not " "drink alcohol.   4. Avoid strenuous or risky activities.  Ask for help when climbing stairs.  5. You may feel lightheaded.  IF so, sit for a few minutes before standing.  Have someone help you get up.   6. If you have nausea (feel sick to your stomach): Drink only clear liquids such as apple juice, ginger ale, broth or 7-Up.  Rest may also help.  Be sure to drink enough fluids.  Move to a regular diet as you feel able.   7. You may have a slight fever.  Call the doctor if your fever is over 100 F (37.7 C) (taken under the tongue) or lasts longer than 24 hours.  8. You may have a dry mouth, a sore throat, muscle aches or trouble sleeping. These should go away after 24 hours.  9. Do not make important or legal decisions.        Today you received a Marcaine block to numb the nerves near your surgery site.  This is a block using local anesthetic or \"numbing\" medication injected around the nerves to anesthetize or \"numb\" the area supplied by those nerves.  This block is injected into the muscle layer near your surgical site.  The medication may numb the location where you had surgery for 6-18 hours, but may last up to 24 hours.  If your surgical site is an arm or leg you should be careful with your affected limb, since it is possible to injure your limb without being aware of it due to the numbing.  Until full feeling returns, you should guard against bumping or hitting your limb, and avoid extreme hot or cold temperatures on the skin.  As the block wears off, the feeling will return as a tingling or prickly sensation near your surgical site.  You will experience more discomfort from your incision as the feeling returns.  You may want to take a pain pill (a narcotic or Tylenol if this was prescribed by your surgeon) when you start to experience mild pain before the pain beccomes more severe.  If your pain medications do not control your pain you should notifiy your surgeon.    Tips for taking pain medications  To get " "the best pain relief possible, remember these points:    Take pain medications as directed, before pain becomes severe.    Pain medication can upset your stomach: taking it with food may help.    Constipation is a common side effect of pain medication. Drink plenty of  fluids.    Eat foods high in fiber. Take a stool softener if recommended by your doctor or pharmacist.    Do not drink alcohol, drive or operate machinery while taking pain medications.    Ask about other ways to control pain, such as with heat, ice or relaxation.    Call a doctor for any of the followin. Signs of infection (fever, growing tenderness at the surgery site, a large amount of drainage or bleeding, severe pain, foul-smelling drainage, redness, swelling).  2. It has been over 8 to 10 hours since surgery and you are still not able to urinate (pass water).  3. Headache for over 24 hours.  4. Numbness, tingling or weakness the day after surgery (if you had spinal anesthesia).  Your doctor is:  Dr. Genesis Dixon, ENT Otolaryngology: [phone]                    Or dial [phone] and ask for the resident on call for:  ENT Otolaryngology  For emergency care, call the:  [facility]:  [phone] (TTY for hearing impaired: [phone])                Pending Results     Date and Time Order Name Status Description    2017 1108 Parathyroid Hormone Intact In process     2017 1057 Surgical pathology exam In process     2017 1030 Parathyroid Hormone Intact In process             Admission Information     Date & Time Provider Department Dept. Phone    2017 Genesis Dixon MD [facility] [phone]      Your Vitals Were     Blood Pressure Temperature Respirations Height Weight Pulse Oximetry    128/72 97.2  F (36.2  C) (Temporal) 14 1.829 m (6' 0.01\") 79.8 kg (176 lb) 95%    BMI (Body Mass Index)                   23.86 kg/m2           MyChart Information     MyChart " gives you secure access to your electronic health record. If you see a primary care provider, you can also send messages to your care team and make appointments. If you have questions, please call your primary care clinic.  If you do not have a primary care provider, please call [phone] and they will assist you.      [employer] is an electronic gateway that provides easy, online access to your medical records. With [employer], you can request a clinic appointment, read your test results, renew a prescription or communicate with your care team.     To access your existing account, please contact your [facility] or call [phone] for assistance.        Care EveryWhere ID     This is your Care EveryWhere ID. This could be used by other organizations to access your [place] medical records  [id number]           Review of your medicines      START taking        Dose / Directions    calcitRIOL 0.25 MCG capsule   Commonly known as:  ROCALTROL   Used for:  S/P parathyroidectomy (H)        Dose:  0.25 mcg   Take 1 capsule (0.25 mcg) by mouth 3 times daily   Quantity:  90 capsule   Refills:  2       calcium carbonate 500 MG chewable tablet   Commonly known as:  TUMS   Used for:  S/P parathyroidectomy (H)        Dose:  2 chew tab   Take 2 tablets (1,000 mg) by mouth 3 times daily   Quantity:  180 tablet   Refills:  2         CONTINUE these medicines which may have CHANGED, or have new prescriptions. If we are uncertain of the size of tablets/capsules you have at home, strength may be listed as something that might have changed.        Dose / Directions    clopidogrel 75 MG tablet   Commonly known as:  PLAVIX   This may have changed:  when to take this   Used for:  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)        Dose:  75 mg   Take 1 tablet (75 mg) by mouth daily   Quantity:  90 tablet   Refills:  3       levothyroxine 125 MCG tablet   Commonly known as:  SYNTHROID/LEVOTHROID   This  may have changed:    - when to take this  - additional instructions   Used for:  Hypothyroidism, unspecified type        Dose:  125 mcg   Take 1 tablet (125 mcg) by mouth daily New dose   Quantity:  90 tablet   Refills:  1       metoprolol 50 MG 24 hr tablet   Commonly known as:  TOPROL-XL   This may have changed:  when to take this   Used for:  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)        Dose:  50 mg   Take 1 tablet (50 mg) by mouth daily   Quantity:  90 tablet   Refills:  3       polyethylene glycol powder   Commonly known as:  MIRALAX   This may have changed:    - when to take this  - reasons to take this   Used for:  Drug-induced constipation        Dose:  1 capful   Take 17 g (1 capful) by mouth daily   Quantity:  510 g   Refills:  0       sennosides 8.6 MG tablet   Commonly known as:  SENOKOT   This may have changed:    - when to take this  - reasons to take this   Used for:  Drug-induced constipation        Dose:  1 tablet   Take 1 tablet by mouth 2 times daily   Quantity:  60 tablet   Refills:  0       simvastatin 40 MG tablet   Commonly known as:  ZOCOR   This may have changed:  when to take this   Used for:  Hyperlipidemia LDL goal <70        Dose:  40 mg   Take 1 tablet (40 mg) by mouth daily   Quantity:  90 tablet   Refills:  3       tamsulosin 0.4 MG capsule   Commonly known as:  FLOMAX   This may have changed:  when to take this   Used for:  Urinary frequency        Dose:  0.4 mg   Take 1 capsule (0.4 mg) by mouth daily   Quantity:  90 capsule   Refills:  4         CONTINUE these medicines which have NOT CHANGED        Dose / Directions    acetaminophen 325 MG tablet   Commonly known as:  TYLENOL        Dose:  325-650 mg   Take 325-650 mg by mouth   Refills:  0       alendronate 70 MG tablet   Commonly known as:  FOSAMAX   Used for:  Osteoporosis        Dose:  70 mg   Take 1 tablet (70 mg) by mouth every 7 days Take 60 minutes before am meal with 8 oz. water. Remain upright for 30  minutes.   Quantity:  12 tablet   Refills:  1       aspirin 81 MG tablet        Dose:  81 mg   Take 81 mg by mouth every evening   Quantity:  90 tablet   Refills:  3       CENTRUM SILVER ADULT 50+ PO        Dose:  1 tablet   Take 1 tablet by mouth daily   Refills:  0       cholecalciferol 5000 UNITS Caps capsule   Commonly known as:  vitamin D3        Dose:  1 capsule   Take 1 capsule by mouth daily   Refills:  0       CLARITIN-D 24 HOUR PO        Dose:  1 tablet   Take 1 tablet by mouth as needed   Refills:  0       HYDROcodone-acetaminophen 5-325 MG per tablet   Commonly known as:  NORCO        Dose:  1-2 tablet   Take 1-2 tablets by mouth   Refills:  0       order for DME   Used for:  Hand paresthesia        Equipment being ordered: bilateral wrist splints   Quantity:  1 Device   Refills:  0       oxyCODONE-acetaminophen 5-325 MG per tablet   Commonly known as:  PERCOCET        Dose:  1-2 tablet   Take 1-2 tablets by mouth   Refills:  0       trospium 60 MG Cp24 24 hr capsule   Commonly known as:  SANCTURA XR        Dose:  60 mg   Take 60 mg by mouth every evening   Refills:  0       VITAMIN B 12 PO        Dose:  2500 mcg   Take 2,500 mcg by mouth daily.   Refills:  0            Where to get your medicines      These medications were sent to [address] [address]    Hours:  TRANSPLANT PHONE NUMBER [phone] Phone:  [phone]     calcitRIOL 0.25 MCG capsule    calcium carbonate 500 MG chewable tablet                Protect others around you: Learn how to safely use, store and throw away your medicines at www.disposemymeds.org.             Medication List: This is a list of all your medications and when to take them. Check marks below indicate your daily home schedule. Keep this list as a reference.      Medications           Morning Afternoon Evening Bedtime As Needed    acetaminophen 325 MG tablet    Commonly known as:  TYLENOL   Take 325-650 mg by mouth   Last time this was given:  975 mg on 2/14/2017 10:01 AM                                alendronate 70 MG tablet   Commonly known as:  FOSAMAX   Take 1 tablet (70 mg) by mouth every 7 days Take 60 minutes before am meal with 8 oz. water. Remain upright for 30 minutes.                                aspirin 81 MG tablet   Take 81 mg by mouth every evening                                calcitRIOL 0.25 MCG capsule   Commonly known as:  ROCALTROL   Take 1 capsule (0.25 mcg) by mouth 3 times daily                                calcium carbonate 500 MG chewable tablet   Commonly known as:  TUMS   Take 2 tablets (1,000 mg) by mouth 3 times daily                                CENTRUM SILVER ADULT 50+ PO   Take 1 tablet by mouth daily                                cholecalciferol 5000 UNITS Caps capsule   Commonly known as:  vitamin D3   Take 1 capsule by mouth daily                                CLARITIN-D 24 HOUR PO   Take 1 tablet by mouth as needed                                clopidogrel 75 MG tablet   Commonly known as:  PLAVIX   Take 1 tablet (75 mg) by mouth daily                                HYDROcodone-acetaminophen 5-325 MG per tablet   Commonly known as:  NORCO   Take 1-2 tablets by mouth                                levothyroxine 125 MCG tablet   Commonly known as:  SYNTHROID/LEVOTHROID   Take 1 tablet (125 mcg) by mouth daily New dose                                metoprolol 50 MG 24 hr tablet   Commonly known as:  TOPROL-XL   Take 1 tablet (50 mg) by mouth daily                                order for DME   Equipment being ordered: bilateral wrist splints                                oxyCODONE-acetaminophen 5-325 MG per tablet   Commonly known as:  PERCOCET   Take 1-2 tablets by mouth                                polyethylene glycol powder   Commonly known as:  MIRALAX   Take 17 g (1 capful) by mouth daily                                 sennosides 8.6 MG tablet   Commonly known as:  SENOKOT   Take 1 tablet by mouth 2 times daily                                simvastatin 40 MG tablet   Commonly known as:  ZOCOR   Take 1 tablet (40 mg) by mouth daily                                tamsulosin 0.4 MG capsule   Commonly known as:  FLOMAX   Take 1 capsule (0.4 mg) by mouth daily                                trospium 60 MG Cp24 24 hr capsule   Commonly known as:  SANCTURA XR   Take 60 mg by mouth every evening                                VITAMIN B 12 PO   Take 2,500 mcg by mouth daily.

## 2017-02-16 LAB — COPATH REPORT: NORMAL

## 2017-02-22 NOTE — OP NOTE
PREOPERATIVE DIAGNOSIS:  Primary hyperparathyroidism.      POSTOPERATIVE DIAGNOSIS:  Primary hyperparathyroidism.      SURGICAL PROCEDURE PERFORMED:   1.  Neck exploration.   2.  Resection of left superior parathyroid adenoma.   3.  Thirty minutes of intraoperative recurrent laryngeal nerve monitoring.      SURGEON:  Genesis Dixon MD      ASSISTANT:  Romulo Torres MD      ANESTHESIA:  General endotracheal with nerve monitoring endotracheal tube.      COMPLICATIONS:  None.      ESTIMATED BLOOD LOSS:  5 mL.      Preincision parathyroid hormone level 308, 10-minute post-excision parathyroid hormone level was 59, 20-minute post-excision PTH was 8.      CLINICAL INDICATIONS FOR THE PROCEDURE:  This is an 84-year-old gentleman who was noted to be hypercalcemic.  A workup ensued that confirmed primary hyperparathyroidism.  A localizing scan localized it to the left side.  Based on his symptoms and the diagnosis, it was recommended the patient undergo a surgical procedure.  The neck exploration and resection of parathyroid adenoma was discussed with the patient.  We also discussed risks including but not limited to bleeding, infection, injury to the recurrent laryngeal nerve or nerves, missed parathyroid adenoma or potential permanent hypocalcemia.  The patient is aware of this and agreed to proceed with surgery.  Consent was obtained, and the site was marked.      DETAILS OF PROCEDURE:  The patient was brought to the operating room in stable condition, placed on the operating table in supine position.  After appropriate general anesthesia was obtained, the patient was prepped and draped in sterile fashion.  Timeout was then performed.  The preincision parathyroid hormone level was drawn at this time.  A 3-cm incision was made over the anterior neck following a natural skin crease.  The platysma was then divided, and subplatysmal planes were then created.  The strap muscles were divided at the midline and retracted  laterally.  We retracted the left lobe of the thyroid gland medially.  Almost immediately, we were able to identify the left inferior parathyroid gland.  This was of normal size and normal location.  As we carried our dissection cephalad, we identified the left recurrent laryngeal nerve.  Deep to the recurrent laryngeal nerve along and just in the true tracheoesophageal groove was a very large parathyroid adenoma that was resected.  Its blood supply with bipolar cauterized.  This was sent for frozen section.  A 10-minute post-excision parathyroid hormone level was 59.  This confirmed our adequate resection of a parathyroid adenoma.  The area was irrigated and no bleeding identified.  The strap muscles were then approximated at the midline using a 3-0 chromic interrupted suture.  The platysma was approximated using a 3-0 chromic interrupted suture.  Skin incision was approximated using 5-0 Monocryl running subcuticular suture.  The wound was dressed with Dermabond.  The patient was then extubated and returned to the recovery room in stable condition.  I was present during the entire surgical procedure.         KADE RICE MD             D: 2017 12:49   T: 2017 13:40   MT: ALBERT      Name:     KJ MEREDITH   MRN:      [id number]        Account:        [id number]   :      1932           Procedure Date: 2017      Document: [id number]

## 2017-03-06 ENCOUNTER — OFFICE VISIT (OUTPATIENT)
Dept: OTOLARYNGOLOGY | Facility: CLINIC | Age: 82
End: 2017-03-06

## 2017-03-06 DIAGNOSIS — Z98.890 S/P PARATHYROIDECTOMY: ICD-10-CM

## 2017-03-06 DIAGNOSIS — Z90.89 S/P PARATHYROIDECTOMY: ICD-10-CM

## 2017-03-06 DIAGNOSIS — Z90.89 S/P PARATHYROIDECTOMY: Primary | ICD-10-CM

## 2017-03-06 DIAGNOSIS — Z98.890 S/P PARATHYROIDECTOMY: Primary | ICD-10-CM

## 2017-03-06 LAB
CALCIUM SERPL-MCNC: 9.1 MG/DL (ref 8.5–10.1)
DEPRECATED CALCIDIOL+CALCIFEROL SERPL-MC: 26 UG/L (ref 20–75)
PTH-INTACT SERPL-MCNC: 15 PG/ML (ref 12–72)

## 2017-03-06 ASSESSMENT — PAIN SCALES - GENERAL: PAINLEVEL: NO PAIN (0)

## 2017-03-06 NOTE — MR AVS SNAPSHOT
After Visit Summary   3/6/2017    Brock Abarca    MRN: [id number]           Patient Information     Date Of Birth          3/1/1932        Visit Information        Provider Department      3/6/2017 1:30 PM Genesis Dixon MD [facility]ose [facility] Th[facility]        Today's Diagnoses     S/P parathyroidectomy (H)    -  1      Care Instructions    Please have the labs drawn that the doctor ordered.  We will notify you when the results are reviewed.  For questions or concerns please call the RN care coordinator.  Lester Eng RN  [phone]            Follow-ups after your visit        Who to contact     Please call your clinic at [phone] to:    Ask questions about your health    Make or cancel appointments    Discuss your medicines    Learn about your test results    Speak to your doctor   If you have compliments or concerns about an experience at your clinic, or if you wish to file a complaint, please contact [facility] Patient Relations at [phone] or email us at [email or web address]         Additional Information About Your Visit        MyChart Information     Cellyt gives you secure access to your electronic health record. If you see a primary care provider, you can also send messages to your care team and make appointments. If you have questions, please call your primary care clinic.  If you do not have a primary care provider, please call [phone] and they will assist you.      Galenea is an electronic gateway that provides easy, online access to your medical records. With Galenea, you can request a clinic appointment, read your test results, renew a prescription or communicate with your care team.     To access your existing account, please contact your [facility] Clinic or call [phone] for assistance.        Care EveryWhere ID     This is your Care EveryWhere ID. This could be used by other  organizations to access your [place] medical records  [id number]         Blood Pressure from Last 3 Encounters:   02/14/17 120/69   02/06/17 142/76   01/27/17 112/71    Weight from Last 3 Encounters:   02/14/17 79.8 kg (176 lb)   02/06/17 79.8 kg (176 lb)   02/06/17 79.8 kg (176 lb)                 Today's Medication Changes          These changes are accurate as of: 3/6/17 11:59 PM.  If you have any questions, ask your nurse or doctor.               These medicines have changed or have updated prescriptions.        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)        Dose:  75 mg   Take 1 tablet (75 mg) by mouth daily   Quantity:  90 tablet   Refills:  3       levothyroxine 125 MCG tablet   Commonly known as:  SYNTHROID/LEVOTHROID   This may have changed:    - when to take this  - additional instructions   Used for:  Hypothyroidism, unspecified type        Dose:  125 mcg   Take 1 tablet (125 mcg) by mouth daily New dose   Quantity:  90 tablet   Refills:  1       metoprolol 50 MG 24 hr tablet   Commonly known as:  TOPROL-XL   This may have changed:  when to take this   Used for:  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)        Dose:  50 mg   Take 1 tablet (50 mg) by mouth daily   Quantity:  90 tablet   Refills:  3       polyethylene glycol powder   Commonly known as:  MIRALAX   This may have changed:    - when to take this  - reasons to take this   Used for:  Drug-induced constipation        Dose:  1 capful   Take 17 g (1 capful) by mouth daily   Quantity:  510 g   Refills:  0       sennosides 8.6 MG tablet   Commonly known as:  SENOKOT   This may have changed:    - when to take this  - reasons to take this   Used for:  Drug-induced constipation        Dose:  1 tablet   Take 1 tablet by mouth 2 times daily   Quantity:  60 tablet   Refills:  0       simvastatin 40 MG  tablet   Commonly known as:  ZOCOR   This may have changed:  when to take this   Used for:  Hyperlipidemia LDL goal <70        Dose:  40 mg   Take 1 tablet (40 mg) by mouth daily   Quantity:  90 tablet   Refills:  3       tamsulosin 0.4 MG capsule   Commonly known as:  FLOMAX   This may have changed:  when to take this   Used for:  Urinary frequency        Dose:  0.4 mg   Take 1 capsule (0.4 mg) by mouth daily   Quantity:  90 capsule   Refills:  4                Primary Care Provider Office Phone # Fax #    Sadaf Fox [phone]       [address] [address]        Thank you!     Thank you for choosing [facility] EAR NOSE AND THROAT  for your care. Our goal is always to provide you with excellent care. Hearing back from our patients is one way we can continue to improve our services. Please take a few minutes to complete the written survey that you may receive in the mail after your visit with us. Thank you!             Your Updated Medication List - Protect others around you: Learn how to safely use, store and throw away your medicines at www.disposemymeds.org.          This list is accurate as of: 3/6/17 11:59 PM.  Always use your most recent med list.                   Brand Name Dispense Instructions for use    acetaminophen 325 MG tablet    TYLENOL     Take 325-650 mg by mouth       alendronate 70 MG tablet    FOSAMAX    12 tablet    Take 1 tablet (70 mg) by mouth every 7 days Take 60 minutes before am meal with 8 oz. water. Remain upright for 30 minutes.       aspirin 81 MG tablet     90 tablet    Take 81 mg by mouth every evening       calcitRIOL 0.25 MCG capsule    ROCALTROL    90 capsule    Take 1 capsule (0.25 mcg) by mouth 3 times daily       calcium carbonate 500 MG chewable tablet    TUMS    180 tablet    Take 2 tablets (1,000 mg) by mouth 3 times daily       CENTRUM SILVER ADULT 50+ PO      Take 1 tablet by mouth daily       cholecalciferol  5000 UNITS Caps capsule    vitamin D3     Take 1 capsule by mouth daily       CLARITIN-D 24 HOUR PO      Take 1 tablet by mouth as needed       clopidogrel 75 MG tablet    PLAVIX    90 tablet    Take 1 tablet (75 mg) by mouth daily       HYDROcodone-acetaminophen 5-325 MG per tablet    NORCO     Take 1-2 tablets by mouth       levothyroxine 125 MCG tablet    SYNTHROID/LEVOTHROID    90 tablet    Take 1 tablet (125 mcg) by mouth daily New dose       metoprolol 50 MG 24 hr tablet    TOPROL-XL    90 tablet    Take 1 tablet (50 mg) by mouth daily       order for DME     1 Device    Equipment being ordered: bilateral wrist splints       oxyCODONE-acetaminophen 5-325 MG per tablet    PERCOCET     Take 1-2 tablets by mouth       polyethylene glycol powder    MIRALAX    510 g    Take 17 g (1 capful) by mouth daily       sennosides 8.6 MG tablet    SENOKOT    60 tablet    Take 1 tablet by mouth 2 times daily       simvastatin 40 MG tablet    ZOCOR    90 tablet    Take 1 tablet (40 mg) by mouth daily       tamsulosin 0.4 MG capsule    FLOMAX    90 capsule    Take 1 capsule (0.4 mg) by mouth daily       trospium 60 MG Cp24 24 hr capsule    SANCTURA XR     Take 60 mg by mouth every evening       VITAMIN B 12 PO      Take 2,500 mcg by mouth daily.

## 2017-03-06 NOTE — PATIENT INSTRUCTIONS
Please have the labs drawn that the doctor ordered.  We will notify you when the results are reviewed.  For questions or concerns please call the RN care coordinator.  Lester Eng RN  [phone]

## 2017-03-06 NOTE — LETTER
3/6/2017     RE: Kj Abarca  [address] [place] [address]     Dear Colleague,    Thank you for referring your patient, Kj Abarca, to the [facility] E[facility] TH[facility]AT at [facility]. Please see a copy of my visit note below.    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  He was discharged to home the same day.  Since the surgery, the patient did complain of some mild tingling; however, this was resolved with taking calcium.  He has not taken calcium in the past several days and denies any tingling in his fingertips.  He has had no problems with voice quality, inspiration or swallowing.      PHYSICAL EXAMINATION:  The wound is healing well.  The Dermabond has already come off and the sutures were clipped at the skin edges.  There is no evidence of hematoma, seroma or infection.      Pathology was reviewed with the patient and was consistent with a 0.85 gram hypercellular parathyroid gland.      ASSESSMENT:  Followup status post neck exploration, resection of parathyroid adenoma.      PLAN:   1.  I reviewed with the patient wound management and wound massage.   2.  We will check a calcium, vitamin D and parathyroid hormone levels today.  The patient will follow up with me on an as-needed basis.       KADE RICE MD     D: 2017 12:03   T: 2017 14:12   MT: guilherme    Name:     KJ ABARCA   MRN:      [id number]        Account:      [id number]   :      1932           Service Date: 2017    Document: [id number]

## 2017-03-20 NOTE — PROGRESS NOTES
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  He was discharged to home the same day.  Since the surgery, the patient did complain of some mild tingling; however, this was resolved with taking calcium.  He has not taken calcium in the past several days and denies any tingling in his fingertips.  He has had no problems with voice quality, inspiration or swallowing.      PHYSICAL EXAMINATION:  The wound is healing well.  The Dermabond has already come off and the sutures were clipped at the skin edges.  There is no evidence of hematoma, seroma or infection.      Pathology was reviewed with the patient and was consistent with a 0.85 gram hypercellular parathyroid gland.      ASSESSMENT:  Followup status post neck exploration, resection of parathyroid adenoma.      PLAN:   1.  I reviewed with the patient wound management and wound massage.   2.  We will check a calcium, vitamin D and parathyroid hormone levels today.  The patient will follow up with me on an as-needed basis.         KADE RICE MD             D: 2017 12:03   T: 2017 14:12   MT: guilherme      Name:     KJ MEREDITH   MRN:      [id number]        Account:      [id number]   :      1932           Service Date: 2017      Document: [id number]

## 2017-06-26 ENCOUNTER — OFFICE VISIT (OUTPATIENT)
Dept: FAMILY MEDICINE | Facility: CLINIC | Age: 82
End: 2017-06-26
Payer: COMMERCIAL

## 2017-06-26 VITALS
SYSTOLIC BLOOD PRESSURE: 118 MMHG | TEMPERATURE: 96.7 F | WEIGHT: 185 LBS | HEART RATE: 68 BPM | OXYGEN SATURATION: 97 % | BODY MASS INDEX: 25.08 KG/M2 | DIASTOLIC BLOOD PRESSURE: 74 MMHG

## 2017-06-26 DIAGNOSIS — E55.9 HYPOVITAMINOSIS D: ICD-10-CM

## 2017-06-26 DIAGNOSIS — E07.9 THYROID DISEASE: ICD-10-CM

## 2017-06-26 DIAGNOSIS — I63.20 CEREBRAL INFARCTION DUE TO OCCLUSION OF PRECEREBRAL ARTERY (H): ICD-10-CM

## 2017-06-26 DIAGNOSIS — R60.0 PERIPHERAL EDEMA: ICD-10-CM

## 2017-06-26 DIAGNOSIS — H10.45 CHRONIC ALLERGIC CONJUNCTIVITIS: Primary | ICD-10-CM

## 2017-06-26 DIAGNOSIS — R25.2 CRAMP OF LIMB: ICD-10-CM

## 2017-06-26 DIAGNOSIS — E78.5 HYPERLIPIDEMIA LDL GOAL <100: ICD-10-CM

## 2017-06-26 DIAGNOSIS — D69.6 THROMBOCYTOPENIA (H): ICD-10-CM

## 2017-06-26 DIAGNOSIS — E21.0 PRIMARY HYPERPARATHYROIDISM (H): ICD-10-CM

## 2017-06-26 DIAGNOSIS — E83.52 HYPERCALCEMIA: ICD-10-CM

## 2017-06-26 LAB
BASOPHILS # BLD AUTO: 0 10E9/L (ref 0–0.2)
BASOPHILS NFR BLD AUTO: 0.3 %
DIFFERENTIAL METHOD BLD: ABNORMAL
EOSINOPHIL # BLD AUTO: 0.1 10E9/L (ref 0–0.7)
EOSINOPHIL NFR BLD AUTO: 2 %
ERYTHROCYTE [DISTWIDTH] IN BLOOD BY AUTOMATED COUNT: 13.5 % (ref 10–15)
HCT VFR BLD AUTO: 46.6 % (ref 40–53)
HGB BLD-MCNC: 16.2 G/DL (ref 13.3–17.7)
LYMPHOCYTES # BLD AUTO: 1.4 10E9/L (ref 0.8–5.3)
LYMPHOCYTES NFR BLD AUTO: 19.5 %
MCH RBC QN AUTO: 32.1 PG (ref 26.5–33)
MCHC RBC AUTO-ENTMCNC: 34.8 G/DL (ref 31.5–36.5)
MCV RBC AUTO: 92 FL (ref 78–100)
MONOCYTES # BLD AUTO: 0.6 10E9/L (ref 0–1.3)
MONOCYTES NFR BLD AUTO: 9 %
NEUTROPHILS # BLD AUTO: 4.9 10E9/L (ref 1.6–8.3)
NEUTROPHILS NFR BLD AUTO: 69.2 %
PLATELET # BLD AUTO: 104 10E9/L (ref 150–450)
RBC # BLD AUTO: 5.05 10E12/L (ref 4.4–5.9)
WBC # BLD AUTO: 7.1 10E9/L (ref 4–11)

## 2017-06-26 PROCEDURE — 99214 OFFICE O/P EST MOD 30 MIN: CPT | Performed by: INTERNAL MEDICINE

## 2017-06-26 PROCEDURE — 82330 ASSAY OF CALCIUM: CPT | Performed by: INTERNAL MEDICINE

## 2017-06-26 PROCEDURE — 36415 COLL VENOUS BLD VENIPUNCTURE: CPT | Performed by: INTERNAL MEDICINE

## 2017-06-26 PROCEDURE — 83735 ASSAY OF MAGNESIUM: CPT | Performed by: INTERNAL MEDICINE

## 2017-06-26 PROCEDURE — 85025 COMPLETE CBC W/AUTO DIFF WBC: CPT | Performed by: INTERNAL MEDICINE

## 2017-06-26 RX ORDER — OLOPATADINE HYDROCHLORIDE 1 MG/ML
1 SOLUTION/ DROPS OPHTHALMIC 2 TIMES DAILY
Qty: 5 ML | Refills: 3 | Status: SHIPPED | OUTPATIENT
Start: 2017-06-26 | End: 2019-07-09

## 2017-06-26 RX ORDER — FEXOFENADINE HCL 180 MG/1
180 TABLET ORAL DAILY
Qty: 30 TABLET | Refills: 1 | Status: SHIPPED | OUTPATIENT
Start: 2017-06-26 | End: 2017-09-05

## 2017-06-26 ASSESSMENT — PAIN SCALES - GENERAL: PAINLEVEL: NO PAIN (0)

## 2017-06-26 NOTE — MR AVS SNAPSHOT
After Visit Summary   6/26/2017    Brock Abarca    MRN: [id number]           Patient Information     Date Of Birth          3/1/1932        Visit Information        Provider Department      6/26/2017 5:45 PM Sadaf Fox MD [facility]        Today's Diagnoses     Cerebral infarction due to occlusion of precerebral artery (H)    -  1    Hypercalcemia        Primary hyperparathyroidism (H)        Thyroid disease        Cramp of limb        Peripheral edema        Hypovitaminosis D        Hyperlipidemia LDL goal <100        Chronic allergic conjunctivitis        Thrombocytopenia (H)          Care Instructions    Schedule a fasting lab draw in September.     Stop Claritin and start Allegra. Make sure to take Allegra in the morning or it'll keep you up at night.     Start using Patanol eye drops- one drop each eye twice daily. If this doesn't help relieve your watery, itchy eyes, let me know.    Try elevating your legs during the day when you're sitting around the house. You could also wear compression stockings during the day.    Check with the pharmacy to see if your insurance covers the Singles vaccine.     [facility]    If you have any questions regarding to your visit please contact your care team:     Team Pink:   Clinic Hours Telephone Number   Internal Medicine:  Dr. Sadaf Feliciano, NP       7am-7pm  Monday - Thursday   7am-5pm  Fridays  [phone]  (Appointment scheduling available 24/7)    Questions about your visit?  Team Line  [phone]   Urgent Care - [place] - 11am-9pm Monday-Friday Saturday-Sunday- 9am-5pm   [place] - 5pm-9pm Monday-Friday Saturday-Sunday- 9am-5pm  [phone] - [place]   [phone] - [place]       What options do I have for visits at the clinic other than the traditional office visit?  To expand how we care for you, many of our providers are utilizing  electronic visits (e-visits) and telephone visits, when medically appropriate, for interactions with their patients rather than a visit in the clinic.   We also offer nurse visits for many medical concerns. Just like any other service, we will bill your insurance company for this type of visit based on time spent on the phone with your provider. Not all insurance companies cover these visits. Please check with your medical insurance if this type of visit is covered. You will be responsible for any charges that are not paid by your insurance.      E-visits via [employer]:  generally incur a $35.00 fee.  Telephone visits:  Time spent on the phone: *charged based on time that is spent on the phone in increments of 10 minutes. Estimated cost:   5-10 mins $30.00   11-20 mins. $59.00   21-30 mins. $85.00   Use [employer] (secure email communication and access to your chart) to send your primary care provider a message or make an appointment. Ask someone on your Team how to sign up for [employer].    For a Price Quote for your services, please call our expresscoin Line at [phone].    As always, Thank you for trusting us with your health care needs!    Fatmata Ponce MA            Follow-ups after your visit        Future tests that were ordered for you today     Open Future Orders        Priority Expected Expires Ordered    **Vitamin D Deficiency FUTURE anytime Routine 9/26/2017 6/26/2018 6/26/2017    Calcium ionized Routine 9/26/2017 6/26/2018 6/26/2017    Parathyroid Hormone Intact Routine 9/26/2017 6/26/2018 6/26/2017    **Lipid panel reflex to direct LDL FUTURE anytime Routine 6/26/2017 6/26/2018 6/26/2017    **Creatinine FUTURE anytime Routine 6/26/2017 6/26/2018 6/26/2017    **ALT FUTURE anytime Routine 6/26/2017 6/26/2018 6/26/2017    **TSH with free T4 reflex FUTURE anytime Routine 6/26/2017 6/26/2018 6/26/2017            Who to contact     If you have questions or need follow up information about today's clinic  visit or your schedule please contact [facility] directly at [phone].  Normal or non-critical lab and imaging results will be communicated to you by [email or web address], letter or phone within 4 business days after the clinic has received the results. If you do not hear from us within 7 days, please contact the clinic through Affectvt or phone. If you have a critical or abnormal lab result, we will notify you by phone as soon as possible.  Submit refill requests through Visualnet or call your pharmacy and they will forward the refill request to us. Please allow 3 business days for your refill to be completed.          Additional Information About Your Visit        [email or web address] Information     Visualnet gives you secure access to your electronic health record. If you see a primary care provider, you can also send messages to your care team and make appointments. If you have questions, please call your primary care clinic.  If you do not have a primary care provider, please call [phone] and they will assist you.        Care EveryWhere ID     This is your Care EveryWhere ID. This could be used by other organizations to access your [place] medical records  [id number]        Your Vitals Were     Pulse Temperature Pulse Oximetry BMI (Body Mass Index)          68 96.7  F (35.9  C) (Oral) 97% 25.08 kg/m2         Blood Pressure from Last 3 Encounters:   06/26/17 118/74   02/14/17 120/69   02/06/17 142/76    Weight from Last 3 Encounters:   06/26/17 185 lb (83.9 kg)   02/14/17 176 lb (79.8 kg)   02/06/17 176 lb (79.8 kg)              We Performed the Following     Calcium ionized     CBC with platelets differential     Magnesium          Today's Medication Changes          These changes are accurate as of: 6/26/17  6:24 PM.  If you have any questions, ask your nurse or doctor.               Start taking these medicines.        Dose/Directions    fexofenadine 180 MG tablet   Commonly known as:  ALLEGRA   Used for:   Chronic allergic conjunctivitis   Started by:  Sadaf Fox MD        Dose:  180 mg   Take 1 tablet (180 mg) by mouth daily   Quantity:  30 tablet   Refills:  1       olopatadine 0.1 % ophthalmic solution   Commonly known as:  PATANOL   Used for:  Chronic allergic conjunctivitis   Started by:  Sadaf Fox MD        Dose:  1 drop   Place 1 drop into both eyes 2 times daily   Quantity:  5 mL   Refills:  3         These medicines have changed or have updated prescriptions.        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)        Dose:  75 mg   Take 1 tablet (75 mg) by mouth daily   Quantity:  90 tablet   Refills:  3       levothyroxine 125 MCG tablet   Commonly known as:  SYNTHROID/LEVOTHROID   This may have changed:    - when to take this  - additional instructions   Used for:  Hypothyroidism, unspecified type        Dose:  125 mcg   Take 1 tablet (125 mcg) by mouth daily New dose   Quantity:  90 tablet   Refills:  1       metoprolol 50 MG 24 hr tablet   Commonly known as:  TOPROL-XL   This may have changed:  when to take this   Used for:  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)        Dose:  50 mg   Take 1 tablet (50 mg) by mouth daily   Quantity:  90 tablet   Refills:  3       sennosides 8.6 MG tablet   Commonly known as:  SENOKOT   This may have changed:    - when to take this  - reasons to take this   Used for:  Drug-induced constipation        Dose:  1 tablet   Take 1 tablet by mouth 2 times daily   Quantity:  60 tablet   Refills:  0       simvastatin 40 MG tablet   Commonly known as:  ZOCOR   This may have changed:  when to take this   Used for:  Hyperlipidemia LDL goal <70        Dose:  40 mg   Take 1 tablet (40 mg) by mouth daily   Quantity:  90 tablet   Refills:  3       tamsulosin 0.4 MG capsule   Commonly known as:  FLOMAX   This may have changed:   when to take this   Used for:  Urinary frequency        Dose:  0.4 mg   Take 1 capsule (0.4 mg) by mouth daily   Quantity:  90 capsule   Refills:  4         Stop taking these medicines if you haven't already. Please contact your care team if you have questions.     CENTRUM SILVER ADULT 50+ PO   Stopped by:  Sadaf Fox MD           CLARITIN-D 24 HOUR PO   Stopped by:  Sadaf Fox MD           HYDROcodone-acetaminophen 5-325 MG per tablet   Commonly known as:  NORCO   Stopped by:  Sadaf Fox MD           oxyCODONE-acetaminophen 5-325 MG per tablet   Commonly known as:  PERCOCET   Stopped by:  Sadaf Fox MD           polyethylene glycol powder   Commonly known as:  MIRALAX   Stopped by:  Sadaf Fox MD           trospium 60 MG Cp24 24 hr capsule   Commonly known as:  SANCTURA XR   Stopped by:  Sadaf Fox MD                Where to get your medicines      These medications were sent to [facility] [address] [place] [address], [address], [address] [address]     Phone:  [phone]     fexofenadine 180 MG tablet    olopatadine 0.1 % ophthalmic solution                Primary Care Provider Office Phone # Fax #    Sadaf Fox [phone]       [facility] [address] [place] [address]        Equal Access to Services     [facility] AH: Hadii aad ku hadasho Soomaali, waaxda luqadaha, qaybta kaalmada adeegyada, waxay ashleyin hayjaviern adejose valerio la'aan ah. So [facility] [phone].    ATENCIÓN: Si habla español, tiene a aranda disposición servicios gratuitos de asistencia lingüística. Llame al [phone].    We comply with applicable federal civil rights laws and [place] laws. We do not discriminate on the basis of race, color, national origin, age, disability sex, sexual orientation or gender identity.            Thank you!     Thank you for choosing [facility]  for your care. Our goal is always  to provide you with excellent care. Hearing back from our patients is one way we can continue to improve our services. Please take a few minutes to complete the written survey that you may receive in the mail after your visit with us. Thank you!             Your Updated Medication List - Protect others around you: Learn how to safely use, store and throw away your medicines at www.disposemymeds.org.          This list is accurate as of: 6/26/17  6:24 PM.  Always use your most recent med list.                   Brand Name Dispense Instructions for use Diagnosis    acetaminophen 325 MG tablet    TYLENOL     Take 325-650 mg by mouth        alendronate 70 MG tablet    FOSAMAX    12 tablet    Take 1 tablet (70 mg) by mouth every 7 days Take 60 minutes before am meal with 8 oz. water. Remain upright for 30 minutes.    Osteoporosis       aspirin 81 MG tablet     90 tablet    Take 81 mg by mouth every evening        calcitRIOL 0.25 MCG capsule    ROCALTROL    90 capsule    Take 1 capsule (0.25 mcg) by mouth 3 times daily    S/P parathyroidectomy (H)       cholecalciferol 5000 UNITS Caps capsule    vitamin D3     Take 1 capsule by mouth daily        clopidogrel 75 MG tablet    PLAVIX    90 tablet    Take 1 tablet (75 mg) by mouth daily    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)       fexofenadine 180 MG tablet    ALLEGRA    30 tablet    Take 1 tablet (180 mg) by mouth daily    Chronic allergic conjunctivitis       levothyroxine 125 MCG tablet    SYNTHROID/LEVOTHROID    90 tablet    Take 1 tablet (125 mcg) by mouth daily New dose    Hypothyroidism, unspecified type       metoprolol 50 MG 24 hr tablet    TOPROL-XL    90 tablet    Take 1 tablet (50 mg) by mouth daily    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)       olopatadine 0.1 % ophthalmic solution    PATANOL    5 mL    Place 1 drop into both eyes 2 times daily    Chronic allergic conjunctivitis       order for DME     1 Device    Equipment being  ordered: bilateral wrist splints    Hand paresthesia       sennosides 8.6 MG tablet    SENOKOT    60 tablet    Take 1 tablet by mouth 2 times daily    Drug-induced constipation       simvastatin 40 MG tablet    ZOCOR    90 tablet    Take 1 tablet (40 mg) by mouth daily    Hyperlipidemia LDL goal <70       tamsulosin 0.4 MG capsule    FLOMAX    90 capsule    Take 1 capsule (0.4 mg) by mouth daily    Urinary frequency       VITAMIN B 12 PO      Take 2,500 mcg by mouth daily.

## 2017-06-26 NOTE — PATIENT INSTRUCTIONS
Schedule a fasting lab draw in September.     Stop Claritin and start Allegra. Make sure to take Allegra in the morning or it'll keep you up at night.     Start using Patanol eye drops- one drop each eye twice daily. If this doesn't help relieve your watery, itchy eyes, let me know.    Try elevating your legs during the day when you're sitting around the house. You could also wear compression stockings during the day.    Check with the pharmacy to see if your insurance covers the Singles vaccine.     [facility]    If you have any questions regarding to your visit please contact your care team:     Team Pink:   Clinic Hours Telephone Number   Internal Medicine:  Dr. Sadaf Feliciano, NP       7am-7pm  Monday - Thursday   7am-5pm  Fridays  [phone]  (Appointment scheduling available 24/7)    Questions about your visit?  Team Line  [phone]   Urgent Care - [place] and [place] - 11am-9pm Monday-Friday Saturday-Sunday- 9am-5pm   [place] - 5pm-9pm Monday-Friday Saturday-Sunday- 9am-5pm  [phone] - [place]   [phone] - [place]       What options do I have for visits at the clinic other than the traditional office visit?  To expand how we care for you, many of our providers are utilizing electronic visits (e-visits) and telephone visits, when medically appropriate, for interactions with their patients rather than a visit in the clinic.   We also offer nurse visits for many medical concerns. Just like any other service, we will bill your insurance company for this type of visit based on time spent on the phone with your provider. Not all insurance companies cover these visits. Please check with your medical insurance if this type of visit is covered. You will be responsible for any charges that are not paid by your insurance.      E-visits via [employer]:  generally incur a $35.00 fee.  Telephone visits:  Time spent on the phone: *charged  based on time that is spent on the phone in increments of 10 minutes. Estimated cost:   5-10 mins $30.00   11-20 mins. $59.00   21-30 mins. $85.00   Use S[employer] (secure email communication and access to your chart) to send your primary care provider a message or make an appointment. Ask someone on your Team how to sign up for Tarisa.    For a Price Quote for your services, please call our Consumer Price Line at [phone].    As always, Thank you for trusting us with your health care needs!    Fatmata Ponce MA

## 2017-06-26 NOTE — NURSING NOTE
"Chief Complaint   Patient presents with     Eye Problem     Watery eyes for 3 weeks.  Eyes are crusty in the morning,   Has occasional burning and itching       Initial /74  Pulse 68  Temp 96.7  F (35.9  C) (Oral)  Wt 185 lb (83.9 kg)  SpO2 97%  BMI 25.08 kg/m2 Estimated body mass index is 25.08 kg/(m^2) as calculated from the following:    Height as of 2/14/17: 6' 0.01\" (1.829 m).    Weight as of this encounter: 185 lb (83.9 kg).  Medication Reconciliation: complete     Fatmata Ponce MA    "

## 2017-06-26 NOTE — PROGRESS NOTES
INTERNAL MEDICINE  SUBJECTIVE:                                                    Brock Abarca is a 85 year old male who presents to clinic today for the following health issues:    Patient presents to clinic today for eye issues. For three weeks she's had watery eyes, crusted over in the morning, with occasional burning and itching.       Watery eyes - His wife notes intermittently his voice is hoarse at times and he is coughing with yellow productive mucus. Denies fevers, chills, or night sweats. His wife notes he's always cold. Patient is taking Claritin daily without relief. Denies itchy throat. His vision has been stable or improved if anything.    Medications - He takes his pills whenever he remembers to do so. Seldomly does he miss an entire day of medications.   He is taking Vitamin D supplement.    He is no longer on oxycodone.   Flomax for bladder control.    Bowel habits - Patient gets constipated occasionally. He doesn't drink as much water as he should. Patient is hesitant to drink more water as he's already urinating every hour.     Memory - His surgery went well. They do not think they've noticed any improvement in his memory since surgery.    Leg cramps - This past week he's had calf cramps at night that took awhile to resolve; which is not normal. Patient is not bothered by the swelling in his legs.      Problem list and histories reviewed & adjusted, as indicated.  Additional history: as documented    Labs reviewed in EPIC  Reviewed and updated as needed this visit by clinical staff  Reviewed and updated as needed this visit by Provider    ROS:  C: NEGATIVE for fever, chills, change in weight  E: NEGATIVE for vision changes or irritation  E/M: NEGATIVE for ear, mouth and throat problems  R: NEGATIVE for significant cough or SOB  CV: NEGATIVE for chest pain, palpitations or peripheral edema  GI: NEGATIVE for nausea, abdominal pain, heartburn, or change in bowel habits  : NEGATIVE for  frequency, dysuria, or hematuria  M: NEGATIVE for significant arthralgias or myalgia  N: NEGATIVE for weakness, dizziness or paresthesias  P: NEGATIVE for changes in mood or affect    This document serves as a record of the services and decisions personally performed and made by Saadf Fox MD. It was created on his/her behalf by Naz Rayo, trained medical scribe. The creation of this document is based the provider's statements to the medical scribes.    Scribe Naz Rayo 6:04 PM, June 26, 2017  OBJECTIVE:   /74  Pulse 68  Temp 96.7  F (35.9  C) (Oral)  Wt 83.9 kg (185 lb)  SpO2 97%  BMI 25.08 kg/m2  Body mass index is 25.08 kg/(m^2).  GENERAL: alert and no distress  EYES: Eyes grossly normal to inspection, PERRL and sclerae normal and conjunctival injection with drainage   HENT: mouth without ulcers or lesions  NECK: no adenopathy, no asymmetry, masses, or scars and thyroid normal to palpation  RESP: lungs clear to auscultation - no rales, rhonchi or wheezes  CV: regular rate and rhythm, normal S1 S2, no S3 or S4, no murmur, click or rub, trace to 1+ pretibial edema bilaterally and peripheral pulses strong  NEURO: Normal strength and tone, mentation intact and speech normal  PSYCH: mentation appears normal, affect normal/bright    Diagnostic Test Results:  No results found for this or any previous visit (from the past 24 hour(s)).   ASSESSMENT/PLAN:   1. 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  Continues on plavix 75 mg and metoprolol XL 50 mg.   - **Lipid panel reflex to direct LDL FUTURE anytime; Future  - **Creatinine FUTURE anytime; Future  - **ALT FUTURE anytime; Future    2. Hypercalcemia  Stable. Recheck in September with recent surgery.   - Calcium ionized; Future  - Parathyroid Hormone Intact; Future  - **ALT FUTURE anytime; Future  - Calcium ionized  - Magnesium    3. Primary hyperparathyroidism (H)  Stable. Neck exploration and resection of left  superior parathyroid adenoma on 2/14/17 went well.  - Parathyroid Hormone Intact; Future  - **ALT FUTURE anytime; Future  - Calcium ionized    4. Thyroid disease  Stable. Followed by Endo. Continues on levothyroxine 125 mcg.   - **TSH with free T4 reflex FUTURE anytime; Future    5. Cramp of limb  Will check calcium and magnesium levels today.   - Calcium ionized  - Magnesium    6. Peripheral edema  Recommended he elevated his feet when sitting down at me or try wearing compression stocking during the day.     7. Hypovitaminosis D  Stable. Continues on Vitamin D supplement.  - **Vitamin D Deficiency FUTURE anytime; Future  - Calcium ionized; Future  - Parathyroid Hormone Intact; Future    8. Hyperlipidemia LDL goal <100  Stable. Fasting labs in September. Continues on simvastatin 40 mg.  - **Lipid panel reflex to direct LDL FUTURE anytime; Future  - **ALT FUTURE anytime; Future    9. Chronic allergic conjunctivitis  Pt will switch from Claritin to Allegra daily. He will start using Patanol drops; one drop each eye bid for relief.   - fexofenadine (ALLEGRA) 180 MG tablet; Take 1 tablet (180 mg) by mouth daily  Dispense: 30 tablet; Refill: 1  - olopatadine (PATANOL) 0.1 % ophthalmic solution; Place 1 drop into both eyes 2 times daily  Dispense: 5 mL; Refill: 3    10. Thrombocytopenia (H)  Stable. Pt saw rheum in the past.  - CBC with platelets differential      Patient Instructions     Schedule a fasting lab draw in September.     Stop Claritin and start Allegra. Make sure to take Allegra in the morning or it'll keep you up at night.     Start using Patanol eye drops- one drop each eye twice daily. If this doesn't help relieve your watery, itchy eyes, let me know.    Try elevating your legs during the day when you're sitting around the house. You could also wear compression stockings during the day.    Check with the pharmacy to see if your insurance covers the Singles vaccine.     [facility]    If you  have any questions regarding to your visit please contact your care team:     Team Pink:   Clinic Hours Telephone Number   Internal Medicine:  Dr. Sadaf Feliciano NP       7am-7pm  Monday - Thursday   7am-5pm  Fridays  [phone]  (Appointment scheduling available 24/7)    Questions about your visit?  Team Line  [phone]   Urgent Care - [place] and [facility] - 11am-9pm Monday-Friday Saturday-Sunday- 9am-5pm   [place] - 5pm-9pm Monday-Friday Saturday-Sunday- 9am-5pm  [phone] - [facility]  [phone] - [place]       What options do I have for visits at the clinic other than the traditional office visit?  To expand how we care for you, many of our providers are utilizing electronic visits (e-visits) and telephone visits, when medically appropriate, for interactions with their patients rather than a visit in the clinic.   We also offer nurse visits for many medical concerns. Just like any other service, we will bill your insurance company for this type of visit based on time spent on the phone with your provider. Not all insurance companies cover these visits. Please check with your medical insurance if this type of visit is covered. You will be responsible for any charges that are not paid by your insurance.      E-visits via [employer]:  generally incur a $35.00 fee.  Telephone visits:  Time spent on the phone: *charged based on time that is spent on the phone in increments of 10 minutes. Estimated cost:   5-10 mins $30.00   11-20 mins. $59.00   21-30 mins. $85.00   Use [employer] (secure email communication and access to your chart) to send your primary care provider a message or make an appointment. Ask someone on your Team how to sign up for [employer].    For a Price Quote for your services, please call our Consumer Price Line at [phone].    As always, Thank you for trusting us with your health care needs!    Fatmata Ponce MA        I spent 21  minutes of time with the patient and >50% of it was in education and counseling regarding cough.    The information in this document, created by the medical scribe for me, accurately reflects the services I personally performed and the decisions made by me. I have reviewed and approved this document for accuracy prior to leaving the patient care area.  Sadaf Fox MD  6:02 PM, 06/26/17    Sadaf Fox MD  [facility]    Start 5:58 PM  End 6:19 PM

## 2017-06-27 PROBLEM — R60.0 PERIPHERAL EDEMA: Status: ACTIVE | Noted: 2017-06-27

## 2017-06-27 PROBLEM — E78.5 HYPERLIPIDEMIA LDL GOAL <100: Status: ACTIVE | Noted: 2017-06-27

## 2017-06-27 PROBLEM — R25.2 CRAMP OF LIMB: Status: ACTIVE | Noted: 2017-06-27

## 2017-06-27 LAB
CA-I SERPL ISE-MCNC: 4.7 MG/DL (ref 4.4–5.2)
MAGNESIUM SERPL-MCNC: 2.6 MG/DL (ref 1.6–2.3)

## 2017-09-05 DIAGNOSIS — H10.45 CHRONIC ALLERGIC CONJUNCTIVITIS: ICD-10-CM

## 2017-09-05 NOTE — LETTER
[facility]  [address] [place] [address] [address]  [place] [address] [phone]          September 13, 2017    Brock Abarca                                                                                                               [address]  [place] [address]            Dear Brock,    We have tried to reach you by phone, but have been unable to do so.    We are sending you this letter to let you know that you are due for some fasting labs.    Please call [phone], to schedule this appointment with the lab.  Please be sure to come in fasting 8-10 hours.    Feel free to contact us with any questions or concerns.  Thank you.    Sincerely,         Sadaf Fox MD/gini

## 2017-09-06 RX ORDER — FEXOFENADINE HCL 180 MG/1
TABLET ORAL
Qty: 30 TABLET | Refills: 1 | Status: SHIPPED | OUTPATIENT
Start: 2017-09-06 | End: 2017-11-06

## 2017-09-06 NOTE — TELEPHONE ENCOUNTER
Prescription approved per [facility] [place].  Adenike Mclaughlin RN    Pt needs fasting labs this month- please call to schedule.

## 2017-09-11 NOTE — TELEPHONE ENCOUNTER
Left message for patient to call back in regards to approved med and needing labs this month.    Lauren Urias, CMA

## 2017-09-19 DIAGNOSIS — E21.0 PRIMARY HYPERPARATHYROIDISM (H): ICD-10-CM

## 2017-09-19 DIAGNOSIS — E07.9 THYROID DISEASE: ICD-10-CM

## 2017-09-19 DIAGNOSIS — I63.20 CEREBRAL INFARCTION DUE TO OCCLUSION OF PRECEREBRAL ARTERY (H): ICD-10-CM

## 2017-09-19 DIAGNOSIS — E78.5 HYPERLIPIDEMIA LDL GOAL <100: ICD-10-CM

## 2017-09-19 DIAGNOSIS — E55.9 HYPOVITAMINOSIS D: ICD-10-CM

## 2017-09-19 DIAGNOSIS — E83.52 HYPERCALCEMIA: ICD-10-CM

## 2017-09-19 LAB
ALT SERPL W P-5'-P-CCNC: 27 U/L (ref 0–70)
CA-I SERPL ISE-MCNC: 4.7 MG/DL (ref 4.4–5.2)
CHOLEST SERPL-MCNC: 131 MG/DL
CREAT SERPL-MCNC: 1.04 MG/DL (ref 0.66–1.25)
GFR SERPL CREATININE-BSD FRML MDRD: 68 ML/MIN/1.7M2
HDLC SERPL-MCNC: 46 MG/DL
LDLC SERPL CALC-MCNC: 55 MG/DL
NONHDLC SERPL-MCNC: 85 MG/DL
PTH-INTACT SERPL-MCNC: 65 PG/ML (ref 12–72)
TRIGL SERPL-MCNC: 148 MG/DL
TSH SERPL DL<=0.005 MIU/L-ACNC: 2.37 MU/L (ref 0.4–4)

## 2017-09-19 PROCEDURE — 83970 ASSAY OF PARATHORMONE: CPT | Performed by: INTERNAL MEDICINE

## 2017-09-19 PROCEDURE — 84443 ASSAY THYROID STIM HORMONE: CPT | Performed by: INTERNAL MEDICINE

## 2017-09-19 PROCEDURE — 82306 VITAMIN D 25 HYDROXY: CPT | Performed by: INTERNAL MEDICINE

## 2017-09-19 PROCEDURE — 36415 COLL VENOUS BLD VENIPUNCTURE: CPT | Performed by: INTERNAL MEDICINE

## 2017-09-19 PROCEDURE — 82330 ASSAY OF CALCIUM: CPT | Performed by: INTERNAL MEDICINE

## 2017-09-19 PROCEDURE — 82565 ASSAY OF CREATININE: CPT | Performed by: INTERNAL MEDICINE

## 2017-09-19 PROCEDURE — 84460 ALANINE AMINO (ALT) (SGPT): CPT | Performed by: INTERNAL MEDICINE

## 2017-09-19 PROCEDURE — 80061 LIPID PANEL: CPT | Performed by: INTERNAL MEDICINE

## 2017-09-19 NOTE — PROGRESS NOTES
Good cholesterol. Normal kidney function. Normal liver blood test. Normal thyroid. Normal parathyroid hormone. Normal calcium level. The rest of your labs are still pending.   Dr. Fox

## 2017-09-20 LAB — DEPRECATED CALCIDIOL+CALCIFEROL SERPL-MC: 25 UG/L (ref 20–75)

## 2017-09-25 DIAGNOSIS — E03.9 HYPOTHYROIDISM, UNSPECIFIED TYPE: ICD-10-CM

## 2017-09-25 NOTE — TELEPHONE ENCOUNTER
levothyroxine (SYNTHROID/LEVOTHROID) 125 MCG tablet     Last Written Prescription Date: 1/18/2017  Last Quantity: 90, # refills: 1  Last Office Visit with [facility], [facility] or [facility] prescribing provider: 6/26/2017        TSH   Date Value Ref Range Status   09/19/2017 2.37 0.40 - 4.00 mU/L Final

## 2017-09-26 RX ORDER — LEVOTHYROXINE SODIUM 125 UG/1
TABLET ORAL
Qty: 90 TABLET | Refills: 2 | Status: SHIPPED | OUTPATIENT
Start: 2017-09-26 | End: 2018-04-23

## 2017-09-26 NOTE — TELEPHONE ENCOUNTER
Prescription approved per [facility] Refill Protocol.    Signed Prescriptions:                        Disp   Refills    levothyroxine (SYNTHROID/LEVOTHROID) 125 M*90 tab*2        Sig: TAKE ONE TABLET BY MOUTH ONCE DAILY  Authorizing Provider: YUSUF NICE  Ordering User: MARCIA WASHINGTON RN

## 2017-11-06 DIAGNOSIS — H10.45 CHRONIC ALLERGIC CONJUNCTIVITIS: ICD-10-CM

## 2017-11-08 RX ORDER — FEXOFENADINE HCL 180 MG/1
TABLET ORAL
Qty: 90 TABLET | Refills: 1 | Status: SHIPPED | OUTPATIENT
Start: 2017-11-08 | End: 2018-07-11

## 2017-11-08 NOTE — TELEPHONE ENCOUNTER
Signed Prescriptions:                        Disp   Refills    fexofenadine (ALLEGRA) 180 MG tablet       90 tab*1        Sig: TAKE ONE TABLET BY MOUTH ONCE DAILY  Authorizing Provider: YUSUF NICE  Ordering User: MARY JO FLOREZ RN refilled medication per [facility] Refill Protocol.     JS Mayorga RN ............   11/8/2017...2:44 PM

## 2017-11-15 ENCOUNTER — ALLIED HEALTH/NURSE VISIT (OUTPATIENT)
Dept: NURSING | Facility: CLINIC | Age: 82
End: 2017-11-15

## 2017-11-15 DIAGNOSIS — H61.23 IMPACTED CERUMEN OF BOTH EARS: Primary | ICD-10-CM

## 2017-11-15 NOTE — NURSING NOTE
ONSET:  When did the symptoms begin? ***    DURATION:  Describe the duration of the symptoms: ***    LOCATION:  {EAR PAIN LOCATION:450599}    PAIN SEVERITY:  {PAIN SCALE:159762}    ADDITIONAL SYMPTOMS:  {OTITIS SYMPTOMS:327}    AFFECTS ON ADLs: ***    HISTORY:  {HISTORY OF EAR PROBLEM:400462}    INTERVENTIONS TAKEN:  {INTERVENTIONS TRIED FOR EAR PAIN:965116}    MEASURES WHICH RELIEVE SYMPTOMS: ***    AGE: 85 year old  WEIGHT: ***  TEMP: ***  HYDRATION: ***

## 2017-11-15 NOTE — PROGRESS NOTES
ONSET:  When did the symptoms begin? No symptoms    DURATION:  Describe the duration of the symptoms: NA    LOCATION:  NA    PAIN SEVERITY:  0/10    ADDITIONAL SYMPTOMS:  none    AFFECTS ON ADLs: NA    HISTORY:  NONE    INTERVENTIONS TAKEN:  PREMA    MEASURES WHICH RELIEVE SYMPTOMS: PREMA    AGE: 85 year old  WEIGHT: 185  TEMP: 97.3  HYDRATION: PREMA [facility]

## 2017-11-15 NOTE — MR AVS SNAPSHOT
After Visit Summary   11/15/2017    Brock Abarca    MRN: [id number]           Patient Information     Date Of Birth          3/1/1932        Visit Information        Provider Department      11/15/2017 10:30 AM [facility]CILL[facility] [place]        Today's Diagnoses     Hypertension goal BP (blood pressure) < 140/90    -  1       Follow-ups after your visit        Who to contact     If you have questions or need follow up information about today's clinic visit or your schedule please contact [facility] [place] directly at [phone].  Normal or non-critical lab and imaging results will be communicated to you by [employer], letter or phone within 4 business days after the clinic has received the results. If you do not hear from us within 7 days, please contact the clinic through [employer] or phone. If you have a critical or abnormal lab result, we will notify you by phone as soon as possible.  Submit refill requests through [employer] or call your pharmacy and they will forward the refill request to us. Please allow 3 business days for your refill to be completed.          Additional Information About Your Visit        MyChart Information     [employer] gives you secure access to your electronic health record. If you see a primary care provider, you can also send messages to your care team and make appointments. If you have questions, please call your primary care clinic.  If you do not have a primary care provider, please call [phone] and they will assist you.        Care EveryWhere ID     This is your Care EveryWhere ID. This could be used by other organizations to access your [place] medical records  [id number]        Your Vitals Were     Pulse Pulse Oximetry                78 98%           Blood Pressure from Last 3 Encounters:   11/15/17 118/80   06/26/17 118/74   02/14/17 120/69    Weight from Last 3 Encounters:   06/26/17 185 lb (83.9 kg)   02/14/17 176 lb (79.8 kg)   02/06/17  176 lb (79.8 kg)              Today, you had the following     No orders found for display         Today's Medication Changes          These changes are accurate as of: 11/15/17 10:35 AM.  If you have any questions, ask your nurse or doctor.               These medicines have changed or have updated prescriptions.        Dose/Directions    metoprolol 50 MG 24 hr tablet   Commonly known as:  TOPROL-XL   This may have changed:  when to take this   Used for:  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)        Dose:  50 mg   Take 1 tablet (50 mg) by mouth daily   Quantity:  90 tablet   Refills:  3       sennosides 8.6 MG tablet   Commonly known as:  SENOKOT   This may have changed:    - when to take this  - reasons to take this   Used for:  Drug-induced constipation        Dose:  1 tablet   Take 1 tablet by mouth 2 times daily   Quantity:  60 tablet   Refills:  0       tamsulosin 0.4 MG capsule   Commonly known as:  FLOMAX   This may have changed:  when to take this   Used for:  Urinary frequency        Dose:  0.4 mg   Take 1 capsule (0.4 mg) by mouth daily   Quantity:  90 capsule   Refills:  4                Primary Care Provider Office Phone # Fax #    Sadaf Keisha Fox [phone]       [address] [facility] [facility] [address]        Equal Access to Services     [facility] STORY AH: Hadii emmanuelle rosa hadasho Soomaali, waaxda luqadaha, qaybta kaalmada adeegyada, tamela adams. So [facility] [phone].    ATENCIÓN: Si habla español, tiene a aranda disposición servicios gratuitos de asistencia lingüística. Llame al [phone].    We comply with applicable federal civil rights laws and [place] laws. We do not discriminate on the basis of race, color, national origin, age, disability, sex, sexual orientation, or gender identity.            Thank you!     Thank you for choosing [facility]  for your care. Our goal is always to provide you with excellent care. Hearing back  from our patients is one way we can continue to improve our services. Please take a few minutes to complete the written survey that you may receive in the mail after your visit with us. Thank you!             Your Updated Medication List - Protect others around you: Learn how to safely use, store and throw away your medicines at www.disposemymeds.org.          This list is accurate as of: 11/15/17 10:35 AM.  Always use your most recent med list.                   Brand Name Dispense Instructions for use Diagnosis    acetaminophen 325 MG tablet    TYLENOL     Take 325-650 mg by mouth        alendronate 70 MG tablet    FOSAMAX    12 tablet    Take 1 tablet (70 mg) by mouth every 7 days Take 60 minutes before am meal with 8 oz. water. Remain upright for 30 minutes.    Osteoporosis       aspirin 81 MG tablet     90 tablet    Take 81 mg by mouth every evening        calcitRIOL 0.25 MCG capsule    ROCALTROL    90 capsule    Take 1 capsule (0.25 mcg) by mouth 3 times daily    S/P parathyroidectomy (H)       cholecalciferol 5000 UNITS Caps capsule    vitamin D3     Take 1 capsule by mouth daily        clopidogrel 75 MG tablet    PLAVIX    90 tablet    TAKE ONE TABLET BY MOUTH ONCE DAILY    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)       fexofenadine 180 MG tablet    ALLEGRA    90 tablet    TAKE ONE TABLET BY MOUTH ONCE DAILY    Chronic allergic conjunctivitis       levothyroxine 125 MCG tablet    SYNTHROID/LEVOTHROID    90 tablet    TAKE ONE TABLET BY MOUTH ONCE DAILY    Hypothyroidism, unspecified type       metoprolol 50 MG 24 hr tablet    TOPROL-XL    90 tablet    Take 1 tablet (50 mg) by mouth daily    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)       olopatadine 0.1 % ophthalmic solution    PATANOL    5 mL    Place 1 drop into both eyes 2 times daily    Chronic allergic conjunctivitis       order for DME     1 Device    Equipment being ordered: bilateral wrist splints    Hand paresthesia        sennosides 8.6 MG tablet    SENOKOT    60 tablet    Take 1 tablet by mouth 2 times daily    Drug-induced constipation       simvastatin 40 MG tablet    ZOCOR    90 tablet    TAKE ONE TABLET BY MOUTH ONCE DAILY    Hyperlipidemia LDL goal <70       tamsulosin 0.4 MG capsule    FLOMAX    90 capsule    Take 1 capsule (0.4 mg) by mouth daily    Urinary frequency       VITAMIN B 12 PO      Take 2,500 mcg by mouth daily.

## 2017-12-04 ENCOUNTER — TRANSFERRED RECORDS (OUTPATIENT)
Dept: HEALTH INFORMATION MANAGEMENT | Facility: CLINIC | Age: 82
End: 2017-12-04

## 2017-12-27 NOTE — ANESTHESIA PREPROCEDURE EVALUATION
Anesthesia Evaluation     . Pt has had prior anesthetic. Type: General and MAC    No history of anesthetic complications     ROS/MED HX    ENT/Pulmonary:     (+)CHIQUIS risk factors hypertension, obese, , . .   (-) tobacco use   Neurologic:     (+)CVA date: 2006 without deficitsother neuro spinal stenosis, s/p lumbar surgeries, gait instability and balance issues afterward, walks with cane    Cardiovascular:     (+) Dyslipidemia, hypertension-range: 130-140/60s, ---. Taking blood thinners Pt has received instructions: Instructions Given to patient: Will hold ASA and Plavix for 5 days prior to surgery. . . :. . Previous cardiac testing date:results:date: results:ECG reviewed date:1/4/2017 results:Sinus rhythm with 1st degree A-V block  Otherwise normal ECG  When compared with ECG of 03-OCT-2013 18:55,  No significant change was found   date: results:          METS/Exercise Tolerance:  3 - Able to walk 1-2 blocks without stopping   Hematologic: Comments: PE after lumbar surgery in 2008    (+) History of blood clots pt is not anticoagulated, History of Transfusion no previous transfusion reaction Other Hematologic Disorder-thrombocytopenia range        Musculoskeletal:   (+) arthritis, , , -       GI/Hepatic:  - neg GI/hepatic ROS       Renal/Genitourinary: Comment: Cr range 1.03-1.16    (+) Nephrolithiasis , BPH,       Endo:     (+) thyroid problem hypothyroidism, Other Endocrine Disorder primary hyperparathyroidism.      Psychiatric:  - neg psychiatric ROS       Infectious Disease:  - neg infectious disease ROS       Malignancy:      - no malignancy   Other:    (+) No chance of pregnancy C-spine cleared: N/A, no H/O Chronic Pain,no other significant disability              Physical Exam  Normal systems: dental    Airway   Mallampati: II  TM distance: >3 FB  Neck ROM: limited    Dental     Cardiovascular   Rhythm and rate: regular and normal      Pulmonary    breath sounds clear to auscultation    Other findings:  For further details of assessment, testing, and physical exam please see H and P completed on same date.           PAC Discussion and Assessment    ASA Classification: 3  Case is suitable for: ASC  Anesthetic techniques and relevant risks discussed: GA  Invasive monitoring and risk discussed: No  Types:   Possibility and Risk of blood transfusion discussed: No  NPO instructions given:   Additional anesthetic preparation and risks discussed:   Needs early admission to pre-op area:   Other:     PAC Resident/NP Anesthesia Assessment:  Brock Abarca is a 84 year old male scheduled to undergo parathyroidectomy, on 2/14/17, by Dr. Dixon. He has the following specific operative considerations:   - RCRI : 0.9% risk of major adverse cardiac event.   - Risk of PONV score = 1.  If > 2, anti-emetic intervention recommended.    BMI 24. 80 kg. Last GAs for uro procedures OSH 1/2017. No history of problems with anesthesia.     --Primary hyperparathyroidism. Concern for parathyroid adenoma. Last Calcium normal 1/27/17. Above procedure planned.   --HLD. Simvastatin. HYPERTENSION. Metoprolol at HS. No other cardiac history or symptoms. Recent EKG above, unchanged from 2013. Activity is somewhat limited by balance issues. Walks with cane.    --History of CVA in 2006. Denies residual. On ASA and Plavix with planned 5 day hold for surgery. This was approved by PCP Dr. Fox today.    --No pulmonary history, symptoms or meds. Nonsmoker. CHIQUIS RF 3/8 Intermediate risk. Able to lie flat.    --History of PE after complex lumbar surgery in 2008. Possible past history of blood transfusion as a child. History of thrombocytopenia. Range .    --Recent concern for renal insufficiency after urology procedures, improved after hydration. Cr range 1.03-1.16.    --Hypothyroidism. Synthroid at HS.   --[facility] with bilateral hearing aids.              --Based on evaluation today, patient was deemed appropriate candidate for ASC.    Patient was  discussed with Dr Perez.        Reviewed and Signed by PAC Mid-Level Provider/Resident  Mid-Level Provider/Resident: LORI Ott, KRISTINA  Date: 2/6/17  Time: 3:17pm    Attending Anesthesiologist Anesthesia Assessment:  STAFF:  84 y.o. man with hyperparathyroidism disease for [id number] by Dr. Dixon  using general anesthesia.   History summarized above. No real limit to prevent surgery at the ASC.  (Ca++) = 11.5  Instructions given and questions answered.   Final plans by anesthesiology team on DOS.   ---rcp      Reviewed and Signed by PAC Anesthesiologist  Anesthesiologist: rcp  Date: 2/6  Time: 1630  Pass/Fail: Pass  Disposition:     PAC Pharmacist Assessment:        Pharmacist:   Date:   Time:      Anesthesia Plan      History & Physical Review  History and physical reviewed and following examination; no interval change.    ASA Status:  3 .    NPO Status:  > 6 hours    Plan for General and ETT with Intravenous and Propofol induction. Maintenance will be Balanced.    PONV prophylaxis:  Ondansetron (or other 5HT-3) and Dexamethasone or Solumedrol  Additional equipment: Videolaryngoscope      Postoperative Care  Postoperative pain management:  IV analgesics, Oral pain medications and Multi-modal analgesia.      Consents  Anesthetic plan, risks, benefits and alternatives discussed with:  Patient..            ANESTHESIA PREOP EVALUATION    PROCEDURE: Procedure(s):  Parathyroidectomy - Wound Class: I-Clean    HPI: Brock Abarca is a 84 year old male who presents for above procedure.    PAST MEDICAL HISTORY:    Past Medical History   Diagnosis Date     Arthritis      Hyperlipidemia LDL goal <70 8/24/2012     Hypertension      Hypothyroidism 8/24/2012     Memory loss 8/26/2012     Nephrolithiasis      Nonsenile cataract      Osteoporosis 8/24/2012     Pulmonary embolism (H) 2008     after surgery     Thyroid disease      Unspecified cerebral artery occlusion with cerebral infarction 2006       PAST  SURGICAL HISTORY:    Past Surgical History   Procedure Laterality Date     Appendectomy       Orthopedic surgery       Back surgery       Head & neck surgery       Cataract iol, rt/lt       s/p cataract surgery both eyes     Dacryocystorhinostomy bilateral  2013     right eye; left eye also?     Extracorporeal shock wave lithotripsy, cystoscopy, insert stent ureter(s), combined       Cystoscopy, left urethral stone removed, stent placement  1/5/17     Cystoscopy, left ureteroscopy, holium laser lithotripsy, stent exchange  1/23/17       PAST ANESTHESIA HISTORY:     No personal or family h/o anesthesia problems    SOCIAL HISTORY:       Social History   Substance Use Topics     Smoking status: Never Smoker     Smokeless tobacco: Never Used     Alcohol use Yes      Comment: occ.       ALLERGIES:     Allergies   Allergen Reactions     Zolpidem Other (See Comments)     Pt was found wandering halls during last hospitalization and was confused.   Pt and wife request that pt not receive Ambien.       MEDICATIONS:       (Not in a hospital admission)    Current Outpatient Prescriptions   Medication Sig Dispense Refill     oxyCODONE-acetaminophen (PERCOCET) 5-325 MG per tablet Take 1-2 tablets by mouth       HYDROcodone-acetaminophen (NORCO) 5-325 MG per tablet Take 1-2 tablets by mouth       acetaminophen (TYLENOL) 325 MG tablet Take 325-650 mg by mouth       levothyroxine (SYNTHROID/LEVOTHROID) 125 MCG tablet Take 1 tablet (125 mcg) by mouth daily New dose (Patient taking differently: Take 125 mcg by mouth every evening New dose) 90 tablet 1     sennosides (SENOKOT) 8.6 MG tablet Take 1 tablet by mouth 2 times daily (Patient taking differently: Take 1 tablet by mouth as needed ) 60 tablet 0     polyethylene glycol (MIRALAX) powder Take 17 g (1 capful) by mouth daily (Patient taking differently: Take 1 capful by mouth as needed ) 510 g 0     alendronate (FOSAMAX) 70 MG tablet Take 1 tablet (70 mg) by mouth every 7 days Take  60 minutes before am meal with 8 oz. water. Remain upright for 30 minutes. 12 tablet 1     simvastatin (ZOCOR) 40 MG tablet Take 1 tablet (40 mg) by mouth daily (Patient taking differently: Take 40 mg by mouth At Bedtime ) 90 tablet 3     clopidogrel (PLAVIX) 75 MG tablet Take 1 tablet (75 mg) by mouth daily (Patient taking differently: Take 75 mg by mouth every evening ) 90 tablet 3     metoprolol (TOPROL-XL) 50 MG 24 hr tablet Take 1 tablet (50 mg) by mouth daily (Patient taking differently: Take 50 mg by mouth every evening ) 90 tablet 3     trospium (SANCTURA XR) 60 MG CP24 Take 60 mg by mouth every evening        aspirin 81 MG tablet Take 81 mg by mouth every evening  90 tablet 3     ORDER FOR DME Equipment being ordered: bilateral wrist splints 1 Device 0     tamsulosin (FLOMAX) 0.4 MG 24 hr capsule Take 1 capsule (0.4 mg) by mouth daily (Patient taking differently: Take 0.4 mg by mouth every evening ) 90 capsule 4     Multiple Vitamins-Minerals (CENTRUM SILVER ADULT 50+ PO) Take 1 tablet by mouth daily       cholecalciferol (VITAMIN D3) 5000 UNITS CAPS capsule Take 1 capsule by mouth daily       Cyanocobalamin (VITAMIN B 12 PO) Take 2,500 mcg by mouth daily.       Loratadine-Pseudoephedrine (CLARITIN-D 24 HOUR PO) Take 1 tablet by mouth as needed         Current Outpatient Prescriptions Ordered in [facility]   Medication Sig Dispense Refill     oxyCODONE-acetaminophen (PERCOCET) 5-325 MG per tablet Take 1-2 tablets by mouth       HYDROcodone-acetaminophen (NORCO) 5-325 MG per tablet Take 1-2 tablets by mouth       acetaminophen (TYLENOL) 325 MG tablet Take 325-650 mg by mouth       levothyroxine (SYNTHROID/LEVOTHROID) 125 MCG tablet Take 1 tablet (125 mcg) by mouth daily New dose (Patient taking differently: Take 125 mcg by mouth every evening New dose) 90 tablet 1     sennosides (SENOKOT) 8.6 MG tablet Take 1 tablet by mouth 2 times daily (Patient taking differently: Take 1 tablet by mouth as needed ) 60 tablet 0      polyethylene glycol (MIRALAX) powder Take 17 g (1 capful) by mouth daily (Patient taking differently: Take 1 capful by mouth as needed ) 510 g 0     alendronate (FOSAMAX) 70 MG tablet Take 1 tablet (70 mg) by mouth every 7 days Take 60 minutes before am meal with 8 oz. water. Remain upright for 30 minutes. 12 tablet 1     simvastatin (ZOCOR) 40 MG tablet Take 1 tablet (40 mg) by mouth daily (Patient taking differently: Take 40 mg by mouth At Bedtime ) 90 tablet 3     clopidogrel (PLAVIX) 75 MG tablet Take 1 tablet (75 mg) by mouth daily (Patient taking differently: Take 75 mg by mouth every evening ) 90 tablet 3     metoprolol (TOPROL-XL) 50 MG 24 hr tablet Take 1 tablet (50 mg) by mouth daily (Patient taking differently: Take 50 mg by mouth every evening ) 90 tablet 3     trospium (SANCTURA XR) 60 MG CP24 Take 60 mg by mouth every evening        aspirin 81 MG tablet Take 81 mg by mouth every evening  90 tablet 3     ORDER FOR DME Equipment being ordered: bilateral wrist splints 1 Device 0     tamsulosin (FLOMAX) 0.4 MG 24 hr capsule Take 1 capsule (0.4 mg) by mouth daily (Patient taking differently: Take 0.4 mg by mouth every evening ) 90 capsule 4     Multiple Vitamins-Minerals (CENTRUM SILVER ADULT 50+ PO) Take 1 tablet by mouth daily       cholecalciferol (VITAMIN D3) 5000 UNITS CAPS capsule Take 1 capsule by mouth daily       Cyanocobalamin (VITAMIN B 12 PO) Take 2,500 mcg by mouth daily.       Loratadine-Pseudoephedrine (CLARITIN-D 24 HOUR PO) Take 1 tablet by mouth as needed       Current Facility-Administered Medications Ordered in Epic   Medication Dose Route Frequency Provider Last Rate Last Dose     dexamethasone (DECADRON) injection 10 mg  10 mg Intravenous Once Genesis Dixon MD         No Pre Procedure Antibiotic Needed  1 each As instructed Continuous Genesis Dixon MD         gabapentin (NEURONTIN) capsule 300 mg  300 mg Oral Once Clint Marie MD         acetaminophen  (TYLENOL) tablet 975 mg  975 mg Oral Once Clint Marie MD         lidocaine 1 % 1 mL  1 mL Other Q1H PRN Srinath Reece MD         lidocaine (LMX4) kit   Topical Q1H PRN Srinath Reece MD         sodium chloride (PF) 0.9% PF flush 3 mL  3 mL Intracatheter Q1H PRN Srinath Reece MD         lactated ringers infusion   Intravenous Continuous Srinath Reece MD           PHYSICAL EXAM:    Vitals: T 98.1, P Data Unavailable, /77, R 16, SpO2 94%, Weight   Wt Readings from Last 2 Encounters:   17 79.8 kg (176 lb)   17 79.8 kg (176 lb)       See doc flowsheet    Temp: 36.7  C (98.1  F) Temp  Min: 36.7  C (98.1  F)  Max: 36.7  C (98.1  F)  Resp: 16 Resp  Min: 16  Max: 16  SpO2: 94 % SpO2  Min: 94 %  Max: 94 %    No Data Recorded    No Data Recorded  BP: 119/77 Systolic (24hrs), Av , Min:119 , Max:119   Diastolic (24hrs), Av, Min:77, Max:77      NPO STATUS: see doc flowsheet    LABS:    BMP:  Recent Labs   Lab Test  17   1606  17   1529   NA   --   141   POTASSIUM   --   4.7   CHLORIDE   --   108   CO2   --   23   BUN   --   13   CR  1.03  1.16   GLC   --   89   JENIFER  9.6  10.6*       LFTs:   Recent Labs   Lab Test  17   1606  16   1007  09/24/15   1018   PROTTOTAL   --    --   6.7*   ALBUMIN  3.6   --   3.6   BILITOTAL   --    --   2.8*   ALKPHOS   --    --   70   AST   --   22  17   ALT   --   27  20       CBC:   Recent Labs   Lab Test  16   1007   WBC  7.0   RBC  5.04   HGB  15.9   HCT  46.8   MCV  93   MCH  31.5   MCHC  34.0   RDW  12.8   PLT  96*       Coags:  Recent Labs   Lab Test 12   INR  2.7*       Imaging:  No orders to display       Srinath Reece MD  Anesthesiology Staff  Pager [phone]    2017  9:52 AM                  .   Normal vision: sees adequately in most situations; can see medication labels, newsprint

## 2018-04-27 NOTE — PROGRESS NOTES
SUBJECTIVE:   Brock Abarca is a 86 year old male who presents to clinic today for the following health issues:    Chief Complaint   Patient presents with     Ear Problem     Health Maintenance     Fall risk,ADP, Influenza         ENT Symptoms             Symptoms: cc Present Absent Comment   Fever/Chills   x    Fatigue   x    Muscle Aches   x    Eye Irritation  x     Sneezing  x     Nasal Yohannes/Drg  x  Yellow drainage   Sinus Pressure/Pain   x    Loss of smell   x    Dental pain   x    Sore Throat   x    Swollen Glands   x    Ear Pain/Fullness   x    Cough  x  Yellow sputum   Wheeze  x     Chest Pain   x    Shortness of breath   x    Rash   x    Other  x  diarrhea     Symptom duration: Coughing was 2 weeks ago and congestion a week ago   Symptom severity:  moderate   Treatments tried:  musinex   Contacts:  wife         Problem list and histories reviewed & adjusted, as indicated.  Additional history: as documented    Patient Active Problem List   Diagnosis     Arthritis     Thyroid disease     Pulmonary embolism (H)     BPH (benign prostatic hyperplasia)     Urinary frequency     Hypertension goal BP (blood pressure) < 140/90     Hyperlipidemia LDL goal <70     Osteoporosis, senile     Hypothyroidism     Left foot drop     Lip tremor     Hand paresthesia     Thrombocytopenia (H)     Fatigue     Memory loss     Hypercalcemia     Advanced directives, counseling/discussion     Reticulocytosis     Hypovitaminosis D     Hyperparathyroidism (H)     Epiphora     Cerebral infarction (H)     History of dacryocystorhinostomy, od; os?     Brain dysfunction     Dermatitis     Primary hyperparathyroidism (H)     Hyperlipidemia LDL goal <100     Cramp of limb     Peripheral edema     Past Surgical History:   Procedure Laterality Date     APPENDECTOMY       BACK SURGERY       CATARACT IOL, RT/LT      s/p cataract surgery both eyes     Cystoscopy, left ureteroscopy, holium laser lithotripsy, stent exchange  1/23/17      Cystoscopy, left urethral stone removed, stent placement  1/5/17     DACRYOCYSTORHINOSTOMY BILATERAL  2013    right eye; left eye also?     EXTRACORPOREAL SHOCK WAVE LITHOTRIPSY, CYSTOSCOPY, INSERT STENT URETER(S), COMBINED       HEAD & NECK SURGERY       ORTHOPEDIC SURGERY       PARATHYROIDECTOMY N/A 2/14/2017    Procedure: PARATHYROIDECTOMY;  Surgeon: Genesis Dixon MD;  Location:  OR       Social History   Substance Use Topics     Smoking status: Never Smoker     Smokeless tobacco: Never Used     Alcohol use Yes      Comment: occ.     Family History   Problem Relation Age of Onset     CEREBROVASCULAR DISEASE Father      HEART DISEASE Sister          Current Outpatient Prescriptions   Medication Sig Dispense Refill     acetaminophen (TYLENOL) 325 MG tablet Take 325-650 mg by mouth       alendronate (FOSAMAX) 70 MG tablet Take 1 tablet (70 mg) by mouth every 7 days Take 60 minutes before am meal with 8 oz. water. Remain upright for 30 minutes. 12 tablet 1     amoxicillin-clavulanate (AUGMENTIN) 875-125 MG per tablet Take 1 tablet by mouth 2 times daily for 7 days 14 tablet 0     aspirin 81 MG tablet Take 81 mg by mouth every evening  90 tablet 3     benzonatate (TESSALON) 100 MG capsule Take 1 capsule (100 mg) by mouth 3 times daily as needed 30 capsule 0     cholecalciferol (VITAMIN D3) 5000 UNITS CAPS capsule Take 1 capsule by mouth daily       clopidogrel (PLAVIX) 75 MG tablet TAKE ONE TABLET BY MOUTH ONCE DAILY 90 tablet 2     Cyanocobalamin (VITAMIN B 12 PO) Take 2,500 mcg by mouth daily.       fexofenadine (ALLEGRA) 180 MG tablet TAKE ONE TABLET BY MOUTH ONCE DAILY 90 tablet 1     levothyroxine (SYNTHROID/LEVOTHROID) 125 MCG tablet TAKE 1 TABLET BY MOUTH EVERY DAY 90 tablet 0     metoprolol (TOPROL-XL) 50 MG 24 hr tablet TAKE ONE TABLET BY MOUTH ONCE DAILY 90 tablet 1     olopatadine (PATANOL) 0.1 % ophthalmic solution Place 1 drop into both eyes 2 times daily 5 mL 3     ORDER FOR DME Equipment being  "ordered: bilateral wrist splints 1 Device 0     sennosides (SENOKOT) 8.6 MG tablet Take 1 tablet by mouth 2 times daily (Patient taking differently: Take 1 tablet by mouth as needed ) 60 tablet 0     simvastatin (ZOCOR) 40 MG tablet TAKE ONE TABLET BY MOUTH ONCE DAILY 90 tablet 2     tamsulosin (FLOMAX) 0.4 MG 24 hr capsule Take 1 capsule (0.4 mg) by mouth daily (Patient taking differently: Take 0.4 mg by mouth every evening ) 90 capsule 4     calcitRIOL (ROCALTROL) 0.25 MCG capsule Take 1 capsule (0.25 mcg) by mouth 3 times daily 90 capsule 2     Allergies   Allergen Reactions     Zolpidem Other (See Comments)     Pt was found wandering halls during last hospitalization and was confused.   Pt and wife request that pt not receive Ambien.     BP Readings from Last 3 Encounters:   04/30/18 100/60   06/26/17 118/74   02/14/17 120/69    Wt Readings from Last 3 Encounters:   04/30/18 182 lb 12.8 oz (82.9 kg)   06/26/17 185 lb (83.9 kg)   02/14/17 176 lb (79.8 kg)                  Labs reviewed in EPIC    Reviewed and updated as needed this visit by clinical staff       Reviewed and updated as needed this visit by Provider         ROS:  Constitutional, HEENT, cardiovascular, pulmonary, gi and gu systems are negative, except as otherwise noted.    OBJECTIVE:     /60  Pulse 135  Temp 96.7  F (35.9  C) (Oral)  Resp 14  Ht 5' 4.37\" (1.635 m)  Wt 182 lb 12.8 oz (82.9 kg)  SpO2 98%  BMI 31.02 kg/m2  Body mass index is 31.02 kg/(m^2).  GENERAL: healthy, alert and no distress  EYES: Eyes grossly normal to inspection, PERRL and conjunctivae and sclerae normal  HENT: normal cephalic/atraumatic, both ears: occluded with wax, nose and mouth without ulcers or lesions, nasal mucosa edematous , oropharynx clear and oral mucous membranes moist  NECK: no adenopathy, no asymmetry, masses, or scars and thyroid normal to palpation  RESP: lungs clear to auscultation - no rales, rhonchi or wheezes  CV: regular rate and rhythm, " normal S1 S2, no S3 or S4, no murmur, click or rub, no peripheral edema and peripheral pulses strong  MS: no gross musculoskeletal defects noted, no edema    Diagnostic Test Results:  none     ASSESSMENT/PLAN:     1. Acute bronchitis with symptoms > 10 days    - benzonatate (TESSALON) 100 MG capsule; Take 1 capsule (100 mg) by mouth 3 times daily as needed  Dispense: 30 capsule; Refill: 0  - amoxicillin-clavulanate (AUGMENTIN) 875-125 MG per tablet; Take 1 tablet by mouth 2 times daily for 7 days  Dispense: 14 tablet; Refill: 0    2. Acute sinusitis with symptoms > 10 days    - amoxicillin-clavulanate (AUGMENTIN) 875-125 MG per tablet; Take 1 tablet by mouth 2 times daily for 7 days  Dispense: 14 tablet; Refill: 0    3. Bilateral impacted cerumen  Ear wash performed by staff.      FUTURE APPOINTMENTS:       - Follow-up for annual visit or as needed    LORI Leach CNP  [facility]

## 2018-04-30 ENCOUNTER — OFFICE VISIT (OUTPATIENT)
Dept: FAMILY MEDICINE | Facility: CLINIC | Age: 83
End: 2018-04-30
Payer: COMMERCIAL

## 2018-04-30 VITALS
HEART RATE: 135 BPM | BODY MASS INDEX: 31.21 KG/M2 | TEMPERATURE: 96.7 F | OXYGEN SATURATION: 98 % | RESPIRATION RATE: 14 BRPM | HEIGHT: 64 IN | WEIGHT: 182.8 LBS | SYSTOLIC BLOOD PRESSURE: 100 MMHG | DIASTOLIC BLOOD PRESSURE: 60 MMHG

## 2018-04-30 DIAGNOSIS — J01.90 ACUTE SINUSITIS WITH SYMPTOMS > 10 DAYS: ICD-10-CM

## 2018-04-30 DIAGNOSIS — H61.23 BILATERAL IMPACTED CERUMEN: ICD-10-CM

## 2018-04-30 DIAGNOSIS — J20.9 ACUTE BRONCHITIS WITH SYMPTOMS > 10 DAYS: Primary | ICD-10-CM

## 2018-04-30 PROCEDURE — 99213 OFFICE O/P EST LOW 20 MIN: CPT | Performed by: NURSE PRACTITIONER

## 2018-04-30 RX ORDER — BENZONATATE 100 MG/1
100 CAPSULE ORAL 3 TIMES DAILY PRN
Qty: 30 CAPSULE | Refills: 0 | Status: SHIPPED | OUTPATIENT
Start: 2018-04-30 | End: 2018-07-03

## 2018-04-30 NOTE — MR AVS SNAPSHOT
After Visit Summary   4/30/2018    Brock Abarca    MRN: [id number]           Patient Information     Date Of Birth          3/1/1932        Visit Information        Provider Department      4/30/2018 2:20 PM Nella Feliciano APRN [facility]        Today's Diagnoses     Acute bronchitis with symptoms > 10 days    -  1    Acute sinusitis with symptoms > 10 days        Bilateral impacted cerumen          Care Instructions    [facility]    If you have any questions regarding to your visit please contact your care team:     Team Pink:   Clinic Hours Telephone Number   Internal Medicine:  Dr. Sadaf Feliciano, NP       7am-7pm  Monday - Thursday   7am-5pm  Fridays  [phone]  (Appointment scheduling available 24/7)    Questions about your recent visit?  Team Line  [phone]   Urgent Care - [place] and [facility] - 11am-9pm Monday-Friday Saturday-Sunday- 9am-5pm   [place] - 5pm-9pm Monday-Friday Saturday-Sunday- 9am-5pm  [phone] - [place]  [phone] - [place]       What options do I have for a visit other than an office visit? We offer electronic visits (e-visits) and telephone visits, when medically appropriate.  Please check with your medical insurance to see if these types of visits are covered, as you will be responsible for any charges that are not paid by your insurance.      You can use [employer] (secure electronic communication) to access to your chart, send your primary care provider a message, or make an appointment. Ask a team member how to get started.     For a price quote for your services, please call our Consumer Price Line at [phone] or our Imaging Cost estimation line at [phone] (for imaging tests).'    Guillermina Brown CMA             Follow-ups after your visit        Who to contact     If you have questions or need follow up information about today's clinic  "visit or your schedule please contact [facility] directly at [phone].  Normal or non-critical lab and imaging results will be communicated to you by [employer], letter or phone within 4 business days after the clinic has received the results. If you do not hear from us within 7 days, please contact the clinic through [employer] or phone. If you have a critical or abnormal lab result, we will notify you by phone as soon as possible.  Submit refill requests through [employer] or call your pharmacy and they will forward the refill request to us. Please allow 3 business days for your refill to be completed.          Additional Information About Your Visit        [employer] Information     [employer] gives you secure access to your electronic health record. If you see a primary care provider, you can also send messages to your care team and make appointments. If you have questions, please call your primary care clinic.  If you do not have a primary care provider, please call [phone] and they will assist you.        Care EveryWhere ID     This is your Care EveryWhere ID. This could be used by other organizations to access your [facility] medical records  [id number]        Your Vitals Were     Pulse Temperature Respirations Height Pulse Oximetry BMI (Body Mass Index)    135 96.7  F (35.9  C) (Oral) 14 5' 4.37\" (1.635 m) 98% 31.02 kg/m2       Blood Pressure from Last 3 Encounters:   04/30/18 100/60   06/26/17 118/74   02/14/17 120/69    Weight from Last 3 Encounters:   04/30/18 182 lb 12.8 oz (82.9 kg)   06/26/17 185 lb (83.9 kg)   02/14/17 176 lb (79.8 kg)              Today, you had the following     No orders found for display         Today's Medication Changes          These changes are accurate as of 4/30/18  3:21 PM.  If you have any questions, ask your nurse or doctor.               Start taking these medicines.        Dose/Directions    amoxicillin-clavulanate 875-125 MG per tablet   Commonly known as:  " AUGMENTIN   Used for:  Acute bronchitis with symptoms > 10 days, Acute sinusitis with symptoms > 10 days   Started by:  Nella Feliciano APRN CNP        Dose:  1 tablet   Take 1 tablet by mouth 2 times daily for 7 days   Quantity:  14 tablet   Refills:  0       benzonatate 100 MG capsule   Commonly known as:  TESSALON   Used for:  Acute bronchitis with symptoms > 10 days   Started by:  Nella Feliciano APRN CNP        Dose:  100 mg   Take 1 capsule (100 mg) by mouth 3 times daily as needed   Quantity:  30 capsule   Refills:  0         These medicines have changed or have updated prescriptions.        Dose/Directions    sennosides 8.6 MG tablet   Commonly known as:  SENOKOT   This may have changed:    - when to take this  - reasons to take this   Used for:  Drug-induced constipation        Dose:  1 tablet   Take 1 tablet by mouth 2 times daily   Quantity:  60 tablet   Refills:  0       tamsulosin 0.4 MG capsule   Commonly known as:  FLOMAX   This may have changed:  when to take this   Used for:  Urinary frequency        Dose:  0.4 mg   Take 1 capsule (0.4 mg) by mouth daily   Quantity:  90 capsule   Refills:  4            Where to get your medicines      These medications were sent to [place] [address] [facility] [place] - [address]  [address] [place] [address]     Phone:  [phone]     amoxicillin-clavulanate 875-125 MG per tablet    benzonatate 100 MG capsule                Primary Care Provider Office Phone # Fax #    Sadaf Fox [phone]       [address] [place] [address] [address]  [place] [address]        Equal Access to Services     [facility]: Hadii emmanuelle rosa hadasho Sosiva, waaxda luqadaha, qaybta kaalmada adeegyada, tamela adams. So [facility] [phone].    ATENCIÓN: Si habla español, tiene a aranda disposición servicios gratuitos de asistencia lingüística. Daysi al [phone].    We comply with applicable federal civil rights laws and  [place] laws. We do not discriminate on the basis of race, color, national origin, age, disability, sex, sexual orientation, or gender identity.            Thank you!     Thank you for choosing [facility]  for your care. Our goal is always to provide you with excellent care. Hearing back from our patients is one way we can continue to improve our services. Please take a few minutes to complete the written survey that you may receive in the mail after your visit with us. Thank you!             Your Updated Medication List - Protect others around you: Learn how to safely use, store and throw away your medicines at www.disposemymeds.org.          This list is accurate as of 4/30/18  3:21 PM.  Always use your most recent med list.                   Brand Name Dispense Instructions for use Diagnosis    acetaminophen 325 MG tablet    TYLENOL     Take 325-650 mg by mouth        alendronate 70 MG tablet    FOSAMAX    12 tablet    Take 1 tablet (70 mg) by mouth every 7 days Take 60 minutes before am meal with 8 oz. water. Remain upright for 30 minutes.    Osteoporosis       amoxicillin-clavulanate 875-125 MG per tablet    AUGMENTIN    14 tablet    Take 1 tablet by mouth 2 times daily for 7 days    Acute bronchitis with symptoms > 10 days, Acute sinusitis with symptoms > 10 days       aspirin 81 MG tablet     90 tablet    Take 81 mg by mouth every evening        benzonatate 100 MG capsule    TESSALON    30 capsule    Take 1 capsule (100 mg) by mouth 3 times daily as needed    Acute bronchitis with symptoms > 10 days       calcitRIOL 0.25 MCG capsule    ROCALTROL    90 capsule    Take 1 capsule (0.25 mcg) by mouth 3 times daily    S/P parathyroidectomy (H)       cholecalciferol 5000 units Caps capsule    vitamin D3     Take 1 capsule by mouth daily        clopidogrel 75 MG tablet    PLAVIX    90 tablet    TAKE ONE TABLET BY MOUTH ONCE DAILY    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)        fexofenadine 180 MG tablet    ALLEGRA    90 tablet    TAKE ONE TABLET BY MOUTH ONCE DAILY    Chronic allergic conjunctivitis       levothyroxine 125 MCG tablet    SYNTHROID/LEVOTHROID    90 tablet    TAKE 1 TABLET BY MOUTH EVERY DAY    Hypothyroidism, unspecified type       metoprolol succinate 50 MG 24 hr tablet    TOPROL-XL    90 tablet    TAKE ONE TABLET BY MOUTH ONCE DAILY    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)       olopatadine 0.1 % ophthalmic solution    PATANOL    5 mL    Place 1 drop into both eyes 2 times daily    Chronic allergic conjunctivitis       order for DME     1 Device    Equipment being ordered: bilateral wrist splints    Hand paresthesia       sennosides 8.6 MG tablet    SENOKOT    60 tablet    Take 1 tablet by mouth 2 times daily    Drug-induced constipation       simvastatin 40 MG tablet    ZOCOR    90 tablet    TAKE ONE TABLET BY MOUTH ONCE DAILY    Hyperlipidemia LDL goal <70       tamsulosin 0.4 MG capsule    FLOMAX    90 capsule    Take 1 capsule (0.4 mg) by mouth daily    Urinary frequency       VITAMIN B 12 PO      Take 2,500 mcg by mouth daily.

## 2018-04-30 NOTE — PATIENT INSTRUCTIONS
[facility]    If you have any questions regarding to your visit please contact your care team:     Team Pink:   Clinic Hours Telephone Number   Internal Medicine:  Dr. Sadaf Feliciano NP       7am-7pm  Monday - Thursday   7am-5pm  Fridays  [phone]  (Appointment scheduling available 24/7)    Questions about your recent visit?  Team Line  [phone]   Urgent Care - [place] - 11am-9pm Monday-Friday Saturday-Sunday- 9am-5pm   [place] - 5pm-9pm Monday-Friday Saturday-Sunday- 9am-5pm  [phone] - [place]  [phone] - [place]       What options do I have for a visit other than an office visit? We offer electronic visits (e-visits) and telephone visits, when medically appropriate.  Please check with your medical insurance to see if these types of visits are covered, as you will be responsible for any charges that are not paid by your insurance.      You can use [employer] (secure electronic communication) to access to your chart, send your primary care provider a message, or make an appointment. Ask a team member how to get started.     For a price quote for your services, please call our Consumer Price Line at [phone] or our Imaging Cost estimation line at [phone] (for imaging tests).'    Guillermina Brown CMA

## 2018-05-03 ENCOUNTER — TELEPHONE (OUTPATIENT)
Dept: INTERNAL MEDICINE | Facility: CLINIC | Age: 83
End: 2018-05-03

## 2018-05-03 NOTE — TELEPHONE ENCOUNTER
"Per wife, patient took the abx for 1.5 days then quit d/t diarrhea.  They purchase over the counter cough syrup because \"he is old school and thinks he needs syrup instead of a pill (bensonatate)\"  Cough has not improved much  She stated that diarrhea was not severe and was manageable   Explained to her that all abx have the possibile s/e of diarrhea and as long as patient tolerates the diarrhea and stays hydrated orally, we recommend he complete the full course   Advised wife that the abx was also treatment for the cough     She will have patient continue the full course and call back with any persistent or worsening of symptoms, or if diarrhea becomes severe or intolerable  Stressed the importance of hydration and she verbalized understanding    Edwin Weiner RN    "

## 2018-05-03 NOTE — TELEPHONE ENCOUNTER
Reason for Call:  Other call back    Detailed comments: Patient was seen 04/30/18 for his cough. Patient's wife states cough still very bad. Wonders if he should have a chest x-ray or what they should do. Please call    Phone Number Patient can be reached at: Home number on file [phone] (home)    Best Time: ASAP    Can we leave a detailed message on this number? YES    Call taken on 5/3/2018 at 3:37 PM by Eri Matos

## 2018-05-30 DIAGNOSIS — E78.5 HYPERLIPIDEMIA LDL GOAL <70: ICD-10-CM

## 2018-05-30 RX ORDER — SIMVASTATIN 40 MG
40 TABLET ORAL DAILY
Qty: 90 TABLET | Refills: 0 | Status: SHIPPED | OUTPATIENT
Start: 2018-05-30 | End: 2018-08-26

## 2018-06-05 ENCOUNTER — TRANSFERRED RECORDS (OUTPATIENT)
Dept: HEALTH INFORMATION MANAGEMENT | Facility: CLINIC | Age: 83
End: 2018-06-05

## 2018-06-13 ENCOUNTER — TRANSFERRED RECORDS (OUTPATIENT)
Dept: HEALTH INFORMATION MANAGEMENT | Facility: CLINIC | Age: 83
End: 2018-06-13

## 2018-07-02 ENCOUNTER — TELEPHONE (OUTPATIENT)
Dept: INTERNAL MEDICINE | Facility: CLINIC | Age: 83
End: 2018-07-02

## 2018-07-02 NOTE — TELEPHONE ENCOUNTER
Reason for Call:  Other appointment    Detailed comments: Patient is asking for an appointment with Dr. Fox. He was just in the ED and they advised follow up with primary so they would like to see Dr. Fox this week.     (They are scheduled with Nella on Thursday)    Phone Number Patient can be reached at: Home number on file [phone] (home) or Other phone number:  [phone]    Best Time: any    Can we leave a detailed message on this number? YES    Call taken on 7/2/2018 at 1:37 PM by Ronal Walker

## 2018-07-03 ENCOUNTER — OFFICE VISIT (OUTPATIENT)
Dept: INTERNAL MEDICINE | Facility: CLINIC | Age: 83
End: 2018-07-03
Payer: COMMERCIAL

## 2018-07-03 VITALS
TEMPERATURE: 97.6 F | WEIGHT: 187 LBS | HEIGHT: 64 IN | DIASTOLIC BLOOD PRESSURE: 50 MMHG | SYSTOLIC BLOOD PRESSURE: 112 MMHG | HEART RATE: 95 BPM | OXYGEN SATURATION: 100 % | BODY MASS INDEX: 31.92 KG/M2 | RESPIRATION RATE: 24 BRPM

## 2018-07-03 DIAGNOSIS — K57.91 GASTROINTESTINAL HEMORRHAGE ASSOCIATED WITH INTESTINAL DIVERTICULOSIS: Primary | ICD-10-CM

## 2018-07-03 DIAGNOSIS — I63.20 CEREBRAL INFARCTION DUE TO OCCLUSION OF PRECEREBRAL ARTERY (H): ICD-10-CM

## 2018-07-03 DIAGNOSIS — R41.3 MEMORY LOSS: ICD-10-CM

## 2018-07-03 DIAGNOSIS — D69.6 THROMBOCYTOPENIA (H): ICD-10-CM

## 2018-07-03 DIAGNOSIS — E87.5 HYPERKALEMIA: ICD-10-CM

## 2018-07-03 DIAGNOSIS — I27.82 OTHER CHRONIC PULMONARY EMBOLISM WITHOUT ACUTE COR PULMONALE (H): ICD-10-CM

## 2018-07-03 PROBLEM — F32.0 MAJOR DEPRESSIVE DISORDER, SINGLE EPISODE, MILD (H): Status: ACTIVE | Noted: 2018-07-03

## 2018-07-03 LAB
ANION GAP SERPL CALCULATED.3IONS-SCNC: 9 MMOL/L (ref 3–14)
BUN SERPL-MCNC: 15 MG/DL (ref 7–30)
CALCIUM SERPL-MCNC: 8.4 MG/DL (ref 8.5–10.1)
CHLORIDE SERPL-SCNC: 109 MMOL/L (ref 94–109)
CO2 SERPL-SCNC: 25 MMOL/L (ref 20–32)
CREAT SERPL-MCNC: 1 MG/DL (ref 0.66–1.25)
ERYTHROCYTE [DISTWIDTH] IN BLOOD BY AUTOMATED COUNT: 14.7 % (ref 10–15)
GFR SERPL CREATININE-BSD FRML MDRD: 71 ML/MIN/1.7M2
GLUCOSE SERPL-MCNC: 104 MG/DL (ref 70–99)
HCT VFR BLD AUTO: 24.9 % (ref 40–53)
HGB BLD-MCNC: 8.2 G/DL (ref 13.3–17.7)
MCH RBC QN AUTO: 32.4 PG (ref 26.5–33)
MCHC RBC AUTO-ENTMCNC: 32.9 G/DL (ref 31.5–36.5)
MCV RBC AUTO: 98 FL (ref 78–100)
PLATELET # BLD AUTO: 113 10E9/L (ref 150–450)
POTASSIUM SERPL-SCNC: 4 MMOL/L (ref 3.4–5.3)
RBC # BLD AUTO: 2.53 10E12/L (ref 4.4–5.9)
SODIUM SERPL-SCNC: 143 MMOL/L (ref 133–144)
WBC # BLD AUTO: 5.8 10E9/L (ref 4–11)

## 2018-07-03 PROCEDURE — 36415 COLL VENOUS BLD VENIPUNCTURE: CPT | Performed by: INTERNAL MEDICINE

## 2018-07-03 PROCEDURE — 80048 BASIC METABOLIC PNL TOTAL CA: CPT | Performed by: INTERNAL MEDICINE

## 2018-07-03 PROCEDURE — 85027 COMPLETE CBC AUTOMATED: CPT | Performed by: INTERNAL MEDICINE

## 2018-07-03 PROCEDURE — 99214 OFFICE O/P EST MOD 30 MIN: CPT | Performed by: INTERNAL MEDICINE

## 2018-07-03 NOTE — PATIENT INSTRUCTIONS
Start Citrucel for bowel movements.   Stay off your aspirin.      [facility]    If you have any questions regarding to your visit please contact your care team:     Team Pink:   Clinic Hours Telephone Number   Internal Medicine:  Dr. Sadaf Feliciano, NP       7am-7pm  Monday - Thursday   7am-5pm  Fridays  [phone]  (Appointment scheduling available 24/7)    Questions about your recent visit?  Team Line  [phone]   Urgent Care - [place] and [facility] - 11am-9pm Monday-Friday Saturday-Sunday- 9am-5pm   [place] - 5pm-9pm Monday-Friday Saturday-Sunday- 9am-5pm  [phone] - [place]  [phone] - [place]       What options do I have for a visit other than an office visit? We offer electronic visits (e-visits) and telephone visits, when medically appropriate.  Please check with your medical insurance to see if these types of visits are covered, as you will be responsible for any charges that are not paid by your insurance.      You can use [email or web address] (secure electronic communication) to access to your chart, send your primary care provider a message, or make an appointment. Ask a team member how to get started.     For a price quote for your services, please call our Consumer Price Line at [phone] or our Imaging Cost estimation line at [phone] (for imaging tests).      Lauren Urias, CMA

## 2018-07-03 NOTE — PROGRESS NOTES
"INTERNAL MEDICINE  ASSESSMENT/PLAN:   1. Thrombocytopenia (H)  Stable.  Has had heme workup     2. 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 Cerebral infarction due to occlusion of precerebral artery (H)  Would need to stop plavix for stroke prevention if he has another bleed     4. Memory loss  Worsened since stay in hospital which is common    5. Gastrointestinal hemorrhage associated with intestinal diverticulosis  Bowel movements are normal.  Advised to return to ER if has black stools.  Off ASA.   - CBC with platelets    6. Hyperkalemia    - Basic metabolic panel      Patient Instructions     Start Citrucel for bowel movements.   Stay off your aspirin.    SUBJECTIVE:   Brock Abarca is a 86 year old male who presents to clinic today with his wife for the following health issues:    Patient presents to clinic today for hospital follow up.    GI bleed - He and his wife were meeting friends at a restaurant. His friend helped him to the bathroom where he had GI bleed. The firemen came and told them he needed to be taken to the hospital. At the hospital, he had a blood transfusion. He still feels weak and has problems walking. He is sleeping a lot because of lack of sleep in the hospital. Also has constipation and patient does not think he has had a bowel movement since being discharged. Discontinued baby aspirin but continues on Plavix. His wife thinks his memory has been impacted. Patient does not remember being at the restaurant or hospital. Denies abdominal pain but does have some stomach upset. He will be getting home care to help him with walking.     Hospital on 6/29-7/2  \"Colonoscopy Dr. Prater 7/1/2018  Findings:  Non-bleeding internal hemorrhoids were found during retroflexion. The   hemorrhoids were moderate.  Multiple small-mouthed diverticula were found in the sigmoid colon and descending colon.  The exam was otherwise without " "abnormality. No blood was seen.   Brown-yellowish stool was seen throughout the colon.    Impressions/Post-Op Diagnosis:  - Suspect patient had self-limited diverticular bleed. No active bleeding at the time.    Recommendation:  - Advance diet as tolerated today.  - No further GI work-up is recommended. Please call GI back shall   patient experiences evidence of recurrent GI bleed.  - I recognize HGB is down to 7.5. Suspect dilutional since no blood was   seen in the GI tract. Discontinue IV fluids. Re-Check HGB later today.   If stable okay to discharge patient from GI standpoints.\"      Problem list and histories reviewed & adjusted, as indicated.  Additional history: as documented    Labs reviewed in EPIC    Reviewed and updated as needed this visit by clinical staff  Tobacco  Allergies  Meds  Med Hx  Surg Hx  Fam Hx  Soc Hx      Reviewed and updated as needed this visit by Provider         ROS:  CONSTITUTIONAL: NEGATIVE for fever, chills, change in weight, POSITIVE for fatigue  GI: NEGATIVE for nausea, abdominal pain, heartburn, POSITIVE for constipation  MUSCULOSKELETAL: NEGATIVE for significant arthralgias or myalgia  NEURO: NEGATIVE for dizziness or paresthesias, POSITIVE for weakness  HEME: POSITIVE for GI bleed  PSYCHIATRIC: NEGATIVE for changes in mood or affect    This document serves as a record of the services and decisions personally performed and made by Sadaf Fox MD. It was created on his/her behalf by Ramiro Chamorro, trained medical scribe. The creation of this document is based the provider's statements to the medical scribes.    Jonathan Chamorro 1:43 PM, July 3, 2018  OBJECTIVE:   /50  Pulse 95  Temp 97.6  F (36.4  C) (Oral)  Resp 24  Ht 1.626 m (5' 4\")  Wt 84.8 kg (187 lb)  SpO2 100%  BMI 32.1 kg/m2  Body mass index is 32.1 kg/(m^2).  GENERAL: healthy, alert and no distress  RESP: lungs clear to auscultation - no rales, rhonchi or wheezes  CV: regular rate and rhythm, normal S1 " S2, no S3 or S4, no murmur, click or rub, 1+ edema to midcalf bialterally and peripheral pulses strong  NEURO: Normal strength and tone, mentation intact and speech normal  PSYCH: mentation appears normal, affect normal/bright    Diagnostic Test Results:  Results for orders placed or performed in visit on 07/03/18   Basic metabolic panel   Result Value Ref Range    Sodium 143 133 - 144 mmol/L    Potassium 4.0 3.4 - 5.3 mmol/L    Chloride 109 94 - 109 mmol/L    Carbon Dioxide 25 20 - 32 mmol/L    Anion Gap 9 3 - 14 mmol/L    Glucose 104 (H) 70 - 99 mg/dL    Urea Nitrogen 15 7 - 30 mg/dL    Creatinine 1.00 0.66 - 1.25 mg/dL    GFR Estimate 71 >60 mL/min/1.7m2    GFR Estimate If Black 86 >60 mL/min/1.7m2    Calcium 8.4 (L) 8.5 - 10.1 mg/dL   CBC with platelets   Result Value Ref Range    WBC 5.8 4.0 - 11.0 10e9/L    RBC Count 2.53 (L) 4.4 - 5.9 10e12/L    Hemoglobin 8.2 (L) 13.3 - 17.7 g/dL    Hematocrit 24.9 (L) 40.0 - 53.0 %    MCV 98 78 - 100 fl    MCH 32.4 26.5 - 33.0 pg    MCHC 32.9 31.5 - 36.5 g/dL    RDW 14.7 10.0 - 15.0 %    Platelet Count 113 (L) 150 - 450 10e9/L         I spent 19 minutes of time with the patient and >50% of it was in education and counseling regarding hospital follow up for GI bleed.    The information in this document, created by the medical scribe, Ramiro Chamorro, for me, accurately reflects the services I personally performed and the decisions made by me. I have reviewed and approved this document for accuracy prior to leaving the patient care area.    Sadaf Fox MD  [facility]    Start 1:43 PM  End 2:02 PM

## 2018-07-03 NOTE — MR AVS SNAPSHOT
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   Stay off your aspirin.      [facility]    If you have any questions regarding to your visit please contact your care team:     Team Pink:   Clinic Hours Telephone Number   Internal Medicine:  Dr. Sadaf Feliciano, NP       7am-7pm  Monday - Thursday   7am-5pm  Fridays  [phone]  (Appointment scheduling available 24/7)    Questions about your recent visit?  Team Line  [phone]   Urgent Care - [place] - 11am-9pm Monday-Friday Saturday-Sunday- 9am-5pm   [place] - 5pm-9pm Monday-Friday Saturday-Sunday- 9am-5pm  [phone] - [place]  [phone] - [place]       What options do I have for a visit other than an office visit? We offer electronic visits (e-visits) and telephone visits, when medically appropriate.  Please check with your medical insurance to see if these types of visits are covered, as you will be responsible for any charges that are not paid by your insurance.      You can use Offerum (secure electronic communication) to access to your chart, send your primary care provider a message, or make an appointment. Ask a team member how to get started.     For a price quote for your services, please call our Consumer Price Line at [phone] or our Imaging Cost estimation  "line at [phone] (for imaging tests).      Lauren Urias, ELIZABETH          Follow-ups after your visit        Who to contact     If you have questions or need follow up information about today's clinic visit or your schedule please contact [facility] directly at [phone].  Normal or non-critical lab and imaging results will be communicated to you by MyChart, letter or phone within 4 business days after the clinic has received the results. If you do not hear from us within 7 days, please contact the clinic through Vanatechart or phone. If you have a critical or abnormal lab result, we will notify you by phone as soon as possible.  Submit refill requests through Skimo TV or call your pharmacy and they will forward the refill request to us. Please allow 3 business days for your refill to be completed.          Additional Information About Your Visit        MyChart Information     Skimo TV gives you secure access to your electronic health record. If you see a primary care provider, you can also send messages to your care team and make appointments. If you have questions, please call your primary care clinic.  If you do not have a primary care provider, please call [phone] and they will assist you.        Care EveryWhere ID     This is your Care EveryWhere ID. This could be used by other organizations to access your [facility] medical records  [id number]        Your Vitals Were     Pulse Temperature Respirations Height Pulse Oximetry BMI (Body Mass Index)    95 97.6  F (36.4  C) (Oral) 24 5' 4\" (1.626 m) 100% 32.1 kg/m2       Blood Pressure from Last 3 Encounters:   07/03/18 112/50   04/30/18 100/60   06/26/17 118/74    Weight from Last 3 Encounters:   07/03/18 187 lb (84.8 kg)   04/30/18 182 lb 12.8 oz (82.9 kg)   06/26/17 185 lb (83.9 kg)              We Performed the Following     Basic metabolic panel     CBC with platelets          Today's Medication Changes          These changes are accurate as of " 7/3/18  2:03 PM.  If you have any questions, ask your nurse or doctor.               These medicines have changed or have updated prescriptions.        Dose/Directions    sennosides 8.6 MG tablet   Commonly known as:  SENOKOT   This may have changed:    - when to take this  - reasons to take this   Used for:  Drug-induced constipation        Dose:  1 tablet   Take 1 tablet by mouth 2 times daily   Quantity:  60 tablet   Refills:  0       tamsulosin 0.4 MG capsule   Commonly known as:  FLOMAX   This may have changed:  when to take this   Used for:  Urinary frequency        Dose:  0.4 mg   Take 1 capsule (0.4 mg) by mouth daily   Quantity:  90 capsule   Refills:  4                Primary Care Provider Office Phone # Fax #    Sadaf Fox [phone] [address] [facility] [address]        Equal Access to Services     [facility] : Hadii emmanuelle rosa hadasho Sosiva, waaxda luqadaha, qaybta kaalmada adecheli, tamela hartman . So [facility] [phone].    ATENCIÓN: Si habla español, tiene a aranda disposición servicios gratuitos de asistencia lingüística. Daysi al [phone].    We comply with applicable federal civil rights laws and [place] laws. We do not discriminate on the basis of race, color, national origin, age, disability, sex, sexual orientation, or gender identity.            Thank you!     Thank you for choosing [facility]  for your care. Our goal is always to provide you with excellent care. Hearing back from our patients is one way we can continue to improve our services. Please take a few minutes to complete the written survey that you may receive in the mail after your visit with us. Thank you!             Your Updated Medication List - Protect others around you: Learn how to safely use, store and throw away your medicines at www.disposemymeds.org.          This list is accurate as of 7/3/18  2:03 PM.  Always use your most recent med  list.                   Brand Name Dispense Instructions for use Diagnosis    acetaminophen 325 MG tablet    TYLENOL     Take 325-650 mg by mouth        alendronate 70 MG tablet    FOSAMAX    12 tablet    Take 1 tablet (70 mg) by mouth every 7 days Take 60 minutes before am meal with 8 oz. water. Remain upright for 30 minutes.    Osteoporosis       calcitRIOL 0.25 MCG capsule    ROCALTROL    90 capsule    Take 1 capsule (0.25 mcg) by mouth 3 times daily    S/P parathyroidectomy (H)       cholecalciferol 5000 units Caps capsule    vitamin D3     Take 1 capsule by mouth daily        clopidogrel 75 MG tablet    PLAVIX    90 tablet    TAKE ONE TABLET BY MOUTH ONCE DAILY    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)       fexofenadine 180 MG tablet    ALLEGRA    90 tablet    TAKE ONE TABLET BY MOUTH ONCE DAILY    Chronic allergic conjunctivitis       levothyroxine 125 MCG tablet    SYNTHROID/LEVOTHROID    90 tablet    TAKE 1 TABLET BY MOUTH EVERY DAY    Hypothyroidism, unspecified type       metoprolol succinate 50 MG 24 hr tablet    TOPROL-XL    90 tablet    TAKE 1 TABLET BY MOUTH EVERY DAY    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)       olopatadine 0.1 % ophthalmic solution    PATANOL    5 mL    Place 1 drop into both eyes 2 times daily    Chronic allergic conjunctivitis       order for DME     1 Device    Equipment being ordered: bilateral wrist splints    Hand paresthesia       sennosides 8.6 MG tablet    SENOKOT    60 tablet    Take 1 tablet by mouth 2 times daily    Drug-induced constipation       simvastatin 40 MG tablet    ZOCOR    90 tablet    Take 1 tablet (40 mg) by mouth daily    Hyperlipidemia LDL goal <70       tamsulosin 0.4 MG capsule    FLOMAX    90 capsule    Take 1 capsule (0.4 mg) by mouth daily    Urinary frequency       VITAMIN B 12 PO      Take 2,500 mcg by mouth daily.

## 2018-07-03 NOTE — PROGRESS NOTES
Hemoglobin and platelets are stable from the hospital.  The rest of your labs are still pending.   Dr. Fox

## 2018-07-04 NOTE — PROGRESS NOTES
Dear Brock,    Your recent test results are attached.      Normal kidney function and electrolytes.      If you have any questions please feel free to contact [phone] or myself via Looklett.    Sincerely,  Nella Feliciano, CNP

## 2018-07-06 ENCOUNTER — TELEPHONE (OUTPATIENT)
Dept: INTERNAL MEDICINE | Facility: CLINIC | Age: 83
End: 2018-07-06

## 2018-07-06 NOTE — TELEPHONE ENCOUNTER
Reason for Call:  Home Health Care orders      Orders are needed for this patient.     PT: 1 time a week to eval and treat    OT: susan    Skilled Nursin times a week for 2 weeks then 1 time a week for 3 weeks with 3 PRN visits    Pt Provider: Ruddy    Phone Number Homecare Nurse can be reached at: [phone]    Can we leave a detailed message on this number? YES    Phone number patient can be reached at: na    Best Time: any    Call taken on 2018 at 3:08 PM by Shweta Bearden

## 2018-07-06 NOTE — TELEPHONE ENCOUNTER
Spoke with pt. He gave verbal ok to speak with Wife. Results reviewed.    She mentioned he has had no BM since colonoscopy. Has had prunes. Is eating. No abdominal pain. Passing gas. Will try Senna to see if any improvement.     Candida Rojas RN  [facility] [place]

## 2018-07-06 NOTE — TELEPHONE ENCOUNTER
Reason for Call:  Request for results:    Name of test or procedure: labs    Date of test of procedure:  7-3-18    Location of the test or procedure:  [place]    OK to leave the result message on voice mail or with a family member? YES    Phone number Patient can be reached at:  Home number on file [phone] (home)    Additional comments: na    Call taken on 7/6/2018 at 10:27 AM by Alannah Weeks

## 2018-07-10 ENCOUNTER — TELEPHONE (OUTPATIENT)
Dept: INTERNAL MEDICINE | Facility: CLINIC | Age: 83
End: 2018-07-10

## 2018-07-10 DIAGNOSIS — D64.9 LOW HEMOGLOBIN: Primary | ICD-10-CM

## 2018-07-10 NOTE — TELEPHONE ENCOUNTER
Okay for med changes noted below.  Please have patient return in 2 weeks for repeat Hgb.    Nella Feliciano, CNP

## 2018-07-10 NOTE — TELEPHONE ENCOUNTER
Spoke with home care nurse regarding medications.  She states patient is NOT taking Fosamax, Calcitrol, and Allegra.  Patient IS taking the following medications that are not on med list: Claritin 10mg daily, Trospium chloride 60mg, and Clearlax as needed for constipation.  Is it okay to update med list with changes?  Wife also noted that Hgb was low last time this was checked, when should patient have Hgb rechecked?  Hemoglobin   Date Value Ref Range Status   07/03/2018 8.2 (L) 13.3 - 17.7 g/dL Final     Comment:     Results confirmed by repeat test   06/26/2017 16.2 13.3 - 17.7 g/dL Final     Tiffany Eng RN

## 2018-07-10 NOTE — TELEPHONE ENCOUNTER
Reason for Call:  Other prescription    Detailed comments:  Leigha calling to verify the medication list. Please call her.     Phone Number Patient can be reached at: Other phone number:  [phone]    Best Time:  Any     Can we leave a detailed message on this number? YES    Call taken on 7/10/2018 at 3:30 PM by Alannah Weeks

## 2018-07-11 RX ORDER — POLYETHYLENE GLYCOL 3350 17 G/17G
1 POWDER, FOR SOLUTION ORAL DAILY PRN
COMMUNITY
Start: 2018-07-11 | End: 2019-09-10

## 2018-07-11 RX ORDER — LORATADINE 10 MG/1
10 TABLET ORAL DAILY
COMMUNITY
Start: 2018-07-11 | End: 2020-01-01

## 2018-07-11 RX ORDER — TROSPIUM CHLORIDE ER 60 MG/1
60 CAPSULE ORAL DAILY
COMMUNITY
Start: 2018-07-11 | End: 2019-02-08

## 2018-07-11 NOTE — TELEPHONE ENCOUNTER
Med list updated.  Lab order placed.    Detailed VM left on HC nurse's VM with Nella Feliciano NP's message below    Edwin Weiner RN

## 2018-07-13 ENCOUNTER — TRANSFERRED RECORDS (OUTPATIENT)
Dept: HEALTH INFORMATION MANAGEMENT | Facility: CLINIC | Age: 83
End: 2018-07-13

## 2018-07-16 ENCOUNTER — TELEPHONE (OUTPATIENT)
Dept: INTERNAL MEDICINE | Facility: CLINIC | Age: 83
End: 2018-07-16

## 2018-07-16 NOTE — TELEPHONE ENCOUNTER
I called [facility] (Dr. Luque) and requested copy of visit.  This was faxed to [phone], and given to Dr. Fox to review and advise.  Carri Rojo,

## 2018-07-16 NOTE — TELEPHONE ENCOUNTER
Reason for call:  Patient reporting a symptom    Symptom or request: Can't lay down and sleep thiago of breathing     Duration (how long have symptoms been present): 4 days     Have you been treated for this before? No    Additional comments: Possible cardiologist recommendation     Phone Number patient can be reached at:  Home number on file [phone] (home)    Best Time:  Any     Can we leave a detailed message on this number:  YES    Call taken on 7/16/2018 at 1:00 PM by Waqas Weinstein

## 2018-07-16 NOTE — TELEPHONE ENCOUNTER
Patient's wife, Linda, left voice mail message on  hotline.    She stated that Brock (Amadeo) saw Dr. Luque, urology, last week.  He referred patient to see a cardiologist.  Linda is wondering if they can see their own cardiologist, or do they need a referral?    Please call her back at [phone], to discuss further.  Carri Rojo,

## 2018-07-17 NOTE — TELEPHONE ENCOUNTER
Spoke with pt. States he is doing ok. Went to the ER yesterday because of his trouble breathing. They prescribed a new medication and Linda is picking it up now. Pt went to ER yesterday for these symptoms. He had a hard time hearing writer and said that he will have his wife call back.     Candida Rojas RN  [facility]

## 2018-07-17 NOTE — TELEPHONE ENCOUNTER
Patient was seen yesterday at [facility] ER and note [facility] Carri Rojo,

## 2018-07-17 NOTE — TELEPHONE ENCOUNTER
Patients wife called MA hotline stating that she was suppose to call a nurse back and stated does patient need to see Dr. Fox or what to do next.     June KING CMA ([facility])

## 2018-07-17 NOTE — TELEPHONE ENCOUNTER
They can see their own cardiologist or come in to see primary care ( Nella Feliciano, RNL or myself.  Dr. Fox

## 2018-07-17 NOTE — TELEPHONE ENCOUNTER
He is scheduled to see a cardiologist at [facility] on Thursday. Will have an US and continue to elevate his legs. BP is good.    Should he also f/u with Dr. Fox after he sees cardiology?    Candida Rojas RN  [facility]

## 2018-07-17 NOTE — TELEPHONE ENCOUNTER
Huddled with Dr. Fox. See what cardiologist recommends as far as f/u with PCP.      Candida Rojas RN  [facility]

## 2018-07-19 ENCOUNTER — TRANSFERRED RECORDS (OUTPATIENT)
Dept: HEALTH INFORMATION MANAGEMENT | Facility: CLINIC | Age: 83
End: 2018-07-19

## 2018-07-22 DIAGNOSIS — I63.20: ICD-10-CM

## 2018-07-23 DIAGNOSIS — Z53.9 DIAGNOSIS NOT YET DEFINED: Primary | ICD-10-CM

## 2018-07-23 PROCEDURE — G0180 MD CERTIFICATION HHA PATIENT: HCPCS | Performed by: INTERNAL MEDICINE

## 2018-07-23 RX ORDER — CLOPIDOGREL BISULFATE 75 MG/1
TABLET ORAL
Qty: 90 TABLET | Refills: 3 | Status: SHIPPED | OUTPATIENT
Start: 2018-07-23 | End: 2019-07-23

## 2018-07-23 NOTE — TELEPHONE ENCOUNTER
"Pending Prescriptions:                       Disp   Refills    clopidogrel (PLAVIX) 75 MG tablet [Pharma*90 tab*0            Sig: TAKE 1 TABLET BY MOUTH EVERY DAY    Routing refill request to provider for review/approval because:  Labs out of range:  Hemoglobin, platelets    Requested Prescriptions   Pending Prescriptions Disp Refills     clopidogrel (PLAVIX) 75 MG tablet [Pharmacy Med Name: CLOPIDOGREL 75 MG TABLET] 90 tablet 0     Sig: TAKE 1 TABLET BY MOUTH EVERY DAY    Plavix Failed    7/23/2018  8:51 AM       Failed - Normal HGB on file in past 12 months    Recent Labs   Lab Test  07/03/18   1407   HGB  8.2*              Failed - Normal Platelets on file in past 12 months    Recent Labs   Lab Test  07/03/18   1407   PLT  113*              Passed - No active PPI on record unless is Protonix       Passed - Recent (12 mo) or future (30 days) visit within the authorizing provider's specialty    Patient had office visit in the last 12 months or has a visit in the next 30 days with authorizing provider or within the authorizing provider's specialty.  See \"Patient Info\" tab in inbasket, or \"Choose Columns\" in Meds & Orders section of the refill encounter.           Passed - Patient is age 18 or older          Shirley Adamson RN on 7/23/2018 at 2:14 PM        "

## 2018-07-30 ENCOUNTER — TELEPHONE (OUTPATIENT)
Dept: INTERNAL MEDICINE | Facility: CLINIC | Age: 83
End: 2018-07-30

## 2018-07-30 NOTE — TELEPHONE ENCOUNTER
Called patient and patients wife answered and made appointment for 0/8/17/2018 at 12 but patient's wife stated he is still having shortness of breath and doesn't know what to do next, or where to go next. Please advise.     June KING CMA ([facility])

## 2018-07-30 NOTE — TELEPHONE ENCOUNTER
Reason for Call:  Other call back    Detailed comments: Wife states patient would like a follow up appointment after being hospitalized. Homecare suggested follow up with Dr. Fox and wife is requesting appt sooner than 9/4.     Phone Number Patient can be reached at: Home number on file [phone] (home) or Cell number on file:    Telephone Information:   Mobile [phone]       Best Time: any    Can we leave a detailed message on this number? YES    Call taken on 7/30/2018 at 10:52 AM by Ronal Walker

## 2018-07-30 NOTE — TELEPHONE ENCOUNTER
Left message on answering machine for patient's Wife to call back to the RN hotline at [phone].    Candida Rojas RN  [facility]

## 2018-08-01 ENCOUNTER — TELEPHONE (OUTPATIENT)
Dept: INTERNAL MEDICINE | Facility: CLINIC | Age: 83
End: 2018-08-01

## 2018-08-01 ENCOUNTER — OFFICE VISIT (OUTPATIENT)
Dept: INTERNAL MEDICINE | Facility: CLINIC | Age: 83
End: 2018-08-01
Payer: COMMERCIAL

## 2018-08-01 VITALS
TEMPERATURE: 97.8 F | HEART RATE: 66 BPM | BODY MASS INDEX: 31.55 KG/M2 | DIASTOLIC BLOOD PRESSURE: 69 MMHG | OXYGEN SATURATION: 98 % | SYSTOLIC BLOOD PRESSURE: 119 MMHG | RESPIRATION RATE: 24 BRPM | WEIGHT: 184.8 LBS | HEIGHT: 64 IN

## 2018-08-01 DIAGNOSIS — I50.31 ACUTE DIASTOLIC HEART FAILURE (H): ICD-10-CM

## 2018-08-01 DIAGNOSIS — R06.02 SOB (SHORTNESS OF BREATH): Primary | ICD-10-CM

## 2018-08-01 DIAGNOSIS — R91.8 ABNORMAL COMPUTED TOMOGRAPHY OF LUNG: ICD-10-CM

## 2018-08-01 DIAGNOSIS — D62 ANEMIA DUE TO BLOOD LOSS, ACUTE: ICD-10-CM

## 2018-08-01 DIAGNOSIS — L89.312 DECUBITUS ULCER OF RIGHT BUTTOCK, STAGE 2 (H): ICD-10-CM

## 2018-08-01 PROCEDURE — 99214 OFFICE O/P EST MOD 30 MIN: CPT | Performed by: INTERNAL MEDICINE

## 2018-08-01 ASSESSMENT — PAIN SCALES - GENERAL: PAINLEVEL: NO PAIN (0)

## 2018-08-01 NOTE — TELEPHONE ENCOUNTER
Please call Leigha, his home care nurse-  1.  Would Amadeo benefit from renting a hospital bed?  He has CHF and a pressure ulcer.  I think he would benefit if it would work in his home and be affordable.  2.  What is she using on his buttock pressure ulcer?      Dr. Fox

## 2018-08-01 NOTE — TELEPHONE ENCOUNTER
Agree with OT order.  Let's change to a Mepilex bandage on the wound to add a greater barrier that is still breathable and see if that helps.    Dr. Fox

## 2018-08-01 NOTE — TELEPHONE ENCOUNTER
Called Leigha and gave her provider's message as written. States she will put in an order for OT to eval and treat and they will come out to evaluate for hospital bed and discuss with pt and Wife.    She is currently using calmoseptine skin barrier cream and pressure ulcer is still the same. She instructed Wife to use daily. She will check on wound again on Friday. Wound is a stage 2.    Candida Rojas RN  [facility]

## 2018-08-01 NOTE — PATIENT INSTRUCTIONS
"No dairy with the iron tablet.  Take the iron (Vitron C) 1 tab every other day.  Take Miralax with a full 8 oz of liquid daily.    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    Compression stockings daily.  Ok to take off at bedtime.  10-20 mm HG compression to start with as these are easier to get on.    Make an appointment for a pulmonary function test, 6 minute walk, and pulmonary medicine referral at [facility].    No more eating out.  Low salt diet needed.    I will wait to hear from Leigha about the buttock sore medicine and a possible hospital bed rental.    Use a donut pillow when sitting.          Discharge Instructions: Eating a Low-Salt Diet  Your healthcare provider has prescribed a low-salt diet for you. Most people with heart problems need to eat less salt, which is full of sodium. Too much sodium is linked to high blood pressure, which is linked to a greater risk of heart disease, stroke, blindness, and kidney problems.  Home care    Learn ways to cut back on salt (sodium):    Eat less frozen, canned, dried, packaged, and fast foods. These often contain high amounts of sodium.    Season foods with herbs instead of salt when you cook.    Season with flavorings such as pepper, lemon, garlic, and onion.    Don t add salt to your food at the table.    Sprinkle salt-free herbal blends on meats and vegetables.  Learn to read food labels carefully:    Look for the total amount of sodium per serving.    Look for foods labeled low sodium, reduced sodium, or no added salt.    Beware. Salt goes by many names. Cut down on foods with these words (all forms of salt) listed as ingredients:  ? Salt  ? Sodium  ? Soy sauce  ? Baking soda  ? Baking powder  ? MSG (monosodium glutamate)  ? Monosodium  ? Na (the chemical symbol for sodium)  Other ideas:    Use more fresh " food. Buy more fruits and vegetables.    Select lean meats, fish, and poultry.    Find a cookbook with low-salt recipes. You ll find ideas for tasty meals that are healthy for your heart.    When eating out, ask questions about the menu. Tell the  you're on a low-salt diet.  ? If you order fish, chicken, beef, or pork, ask to have it broiled, baked, poached, or grilled without salt, butter, or breading.  ? Choose plain steamed rice, boiled noodles, and baked or boiled potatoes. Top potatoes with chives and a little sour cream instead of butter.    Avoid antacids that are high in salt. Check the label before you buy.  Follow-up  Make a follow-up appointment with your healthcare provider, or as advised. Your provider may refer you to a dietitian.   Date Last Reviewed: 6/1/2017 2000-2017 [employer]. [address] [place] [address]. All rights reserved. This information is not intended as a substitute for professional medical care. Always follow your healthcare professional's instructions.        Low-Salt Diet  This diet removes foods that are high in salt. It also limits the amount of salt you use when cooking. It is most often used for people with high blood pressure, edema (fluid retention), and kidney, liver, or heart disease.  Table salt contains the mineral sodium. Your body needs sodium to work normally. But too much sodium can make your health problems worse. Your healthcare provider is recommending a low-salt (also called low-sodium) diet for you. Your total daily allowance of salt is 1,500 to 2,300 milligrams (mg). It is less than 1 teaspoon of table salt. This means you can have only about 500 to 700 mg of sodium at each meal. People with certain health problems should limit salt intake to the lower end of the recommended range.    When you cook, don t add much salt. If you can cook without using salt, even better. Don t add salt to your food at the table.  When shopping,  read food labels. Salt is often called sodium on the label. Choose foods that are salt-free, low salt, or very low salt. Note that foods with reduced salt may not lower your salt intake enough.    Beans, potatoes, and pasta  Ok: Dry beans, split peas, lentils, potatoes, rice, macaroni, pasta, spaghetti without added salt  Avoid: Potato chips, tortilla chips, and similar products  Breads and cereals  Ok: Low-sodium breads, rolls, cereals, and cakes; low-salt crackers, matzo crackers  Avoid: Salted crackers, pretzels, popcorn, [place]er toast, pancakes, muffins  Dairy  Ok: Milk, chocolate milk, hot chocolate mix, low-salt cheeses, and yogurt  Avoid: Processed cheese and cheese spreads; Roquefort, Camembert, and cottage cheese; buttermilk, instant breakfast drink  Desserts  Ok: Ice cream, frozen yogurt, juice bars, gelatin, cookies and pies, sugar, honey, jelly, hard candy  Avoid: Most pies, cakes and cookies prepared or processed with salt; instant pudding  Drinks  Ok: Tea, coffee, fizzy (carbonated) drinks, juices  Avoid: Flavored coffees, electrolyte replacement drinks, sports drinks  Meats  Ok: All fresh meat, fish, poultry, low-salt tuna, eggs, egg substitute  Avoid: Smoked, pickled, brine-cured, or salted meats and fish. This includes mobley, chipped beef, corned beef, hot dogs, deli meats, ham, kosher meats, salt pork, sausage, canned tuna, salted codfish, smoked salmon, herring, sardines, or anchovies.  Seasonings and spices  Ok: Most seasonings are okay. Good substitutes for salt include: fresh herb blends, hot sauce, lemon, garlic, wilkerson, vinegar, dry mustard, parsley, cilantro, horseradish, tomato paste, regular margarine, mayonnaise, unsalted butter, cream cheese, vegetable oil, cream, low-salt salad dressing and gravy.  Avoid: Regular ketchup, relishes, pickles, soy sauce, teriyaki sauce, Worcestershire sauce, BBQ sauce, tartar sauce, meat tenderizer, chili sauce, regular gravy, regular salad dressing, salted  butter  Soups  Ok: Low-salt soups and broths made with allowed foods  Avoid: Bouillon cubes, soups with smoked or salted meats, regular soup and broth  Vegetables  Ok: Most vegetables are okay; also low-salt tomato and vegetable juices  Avoid: Sauerkraut and other brine-soaked vegetables; pickles and other pickled vegetables; tomato juice, olives  Date Last Reviewed: 8/1/2016 2000-2017 [employer] [address] [place] [address]. All rights reserved. This information is not intended as a substitute for professional medical care. Always follow your healthcare professional's instructions.        [facility]    If you have any questions regarding to your visit please contact your care team:     Team Pink:   Clinic Hours Telephone Number   Internal Medicine:  Dr. Sadaf Feliciano, NP       7am-7pm  Monday - Thursday   7am-5pm  Fridays  [phone]  (Appointment scheduling available 24/7)    Questions about your recent visit?  Team Line  [phone]   Urgent Care - [place] [facility] - 11am-9pm Monday-Friday Saturday-Sunday- 9am-5pm   [place] - 5pm-9pm Monday-Friday Saturday-Sunday- 9am-5pm  [phone] - [place]  [phone] - [place]       What options do I have for a visit other than an office visit? We offer electronic visits (e-visits) and telephone visits, when medically appropriate.  Please check with your medical insurance to see if these types of visits are covered, as you will be responsible for any charges that are not paid by your insurance.      You can use [facility] (secure electronic communication) to access to your chart, send your primary care provider a message, or make an appointment. Ask a team member how to get started.     For a price quote for your services, please call our Consumer Price Line at [phone] or our Imaging Cost estimation line at [phone] (for imaging tests).

## 2018-08-01 NOTE — MR AVS SNAPSHOT
"              After Visit Summary   8/1/2018    Brock Abarca    MRN: [id number]           Patient Information     Date Of Birth          3/1/1932        Visit Information        Provider Department      8/1/2018 10:30 AM Sadaf Fox MD [facility]        Today's Diagnoses     SOB (shortness of breath)    -  1    Abnormal computed tomography of lung        Anemia due to blood loss, acute        Acute diastolic heart failure (H)        Decubitus ulcer of right buttock, stage 2          Care Instructions    No dairy with the iron tablet.  Take the iron (Vitron C) 1 tab every other day.  Take Miralax with a full 8 oz of liquid daily.    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    Compression stockings daily.  Ok to take off at bedtime.  10-20 mm HG compression to start with as these are easier to get on.    Make an appointment for a pulmonary function test, 6 minute walk, and pulmonary medicine referral at [facility].    No more eating out.  Low salt diet needed.    I will wait to hear from Leigha about the buttock sore medicine and a possible hospital bed rental.    Use a donut pillow when sitting.          Discharge Instructions: Eating a Low-Salt Diet  Your healthcare provider has prescribed a low-salt diet for you. Most people with heart problems need to eat less salt, which is full of sodium. Too much sodium is linked to high blood pressure, which is linked to a greater risk of heart disease, stroke, blindness, and kidney problems.  Home care    Learn ways to cut back on salt (sodium):    Eat less frozen, canned, dried, packaged, and fast foods. These often contain high amounts of sodium.    Season foods with herbs instead of salt when you cook.    Season with flavorings such as pepper, lemon, garlic, and onion.    Don t add salt to your " food at the table.    Sprinkle salt-free herbal blends on meats and vegetables.  Learn to read food labels carefully:    Look for the total amount of sodium per serving.    Look for foods labeled low sodium, reduced sodium, or no added salt.    Beware. Salt goes by many names. Cut down on foods with these words (all forms of salt) listed as ingredients:  ? Salt  ? Sodium  ? Soy sauce  ? Baking soda  ? Baking powder  ? MSG (monosodium glutamate)  ? Monosodium  ? Na (the chemical symbol for sodium)  Other ideas:    Use more fresh food. Buy more fruits and vegetables.    Select lean meats, fish, and poultry.    Find a cookbook with low-salt recipes. You ll find ideas for tasty meals that are healthy for your heart.    When eating out, ask questions about the menu. Tell the  you're on a low-salt diet.  ? If you order fish, chicken, beef, or pork, ask to have it broiled, baked, poached, or grilled without salt, butter, or breading.  ? Choose plain steamed rice, boiled noodles, and baked or boiled potatoes. Top potatoes with chives and a little sour cream instead of butter.    Avoid antacids that are high in salt. Check the label before you buy.  Follow-up  Make a follow-up appointment with your healthcare provider, or as advised. Your provider may refer you to a dietitian.   Date Last Reviewed: 6/1/2017 2000-2017 [employer]. [address] [place] [address]. All rights reserved. This information is not intended as a substitute for professional medical care. Always follow your healthcare professional's instructions.        Low-Salt Diet  This diet removes foods that are high in salt. It also limits the amount of salt you use when cooking. It is most often used for people with high blood pressure, edema (fluid retention), and kidney, liver, or heart disease.  Table salt contains the mineral sodium. Your body needs sodium to work normally. But too much sodium can make your health problems  worse. Your healthcare provider is recommending a low-salt (also called low-sodium) diet for you. Your total daily allowance of salt is 1,500 to 2,300 milligrams (mg). It is less than 1 teaspoon of table salt. This means you can have only about 500 to 700 mg of sodium at each meal. People with certain health problems should limit salt intake to the lower end of the recommended range.    When you cook, don t add much salt. If you can cook without using salt, even better. Don t add salt to your food at the table.  When shopping, read food labels. Salt is often called sodium on the label. Choose foods that are salt-free, low salt, or very low salt. Note that foods with reduced salt may not lower your salt intake enough.    Beans, potatoes, and pasta  Ok: Dry beans, split peas, lentils, potatoes, rice, macaroni, pasta, spaghetti without added salt  Avoid: Potato chips, tortilla chips, and similar products  Breads and cereals  Ok: Low-sodium breads, rolls, cereals, and cakes; low-salt crackers, matzo crackers  Avoid: Salted crackers, pretzels, popcorn, Bhutanese toast, pancakes, muffins  Dairy  Ok: Milk, chocolate milk, hot chocolate mix, low-salt cheeses, and yogurt  Avoid: Processed cheese and cheese spreads; Roquefort, Camembert, and cottage cheese; buttermilk, instant breakfast drink  Desserts  Ok: Ice cream, frozen yogurt, juice bars, gelatin, cookies and pies, sugar, honey, jelly, hard candy  Avoid: Most pies, cakes and cookies prepared or processed with salt; instant pudding  Drinks  Ok: Tea, coffee, fizzy (carbonated) drinks, juices  Avoid: Flavored coffees, electrolyte replacement drinks, sports drinks  Meats  Ok: All fresh meat, fish, poultry, low-salt tuna, eggs, egg substitute  Avoid: Smoked, pickled, brine-cured, or salted meats and fish. This includes mobley, chipped beef, corned beef, hot dogs, deli meats, ham, kosher meats, salt pork, sausage, canned tuna, salted codfish, smoked salmon, herring, sardines, or  anchovies.  Seasonings and spices  Ok: Most seasonings are okay. Good substitutes for salt include: fresh herb blends, hot sauce, lemon, garlic, wilkerson, vinegar, dry mustard, parsley, cilantro, horseradish, tomato paste, regular margarine, mayonnaise, unsalted butter, cream cheese, vegetable oil, cream, low-salt salad dressing and gravy.  Avoid: Regular ketchup, relishes, pickles, soy sauce, teriyaki sauce, Worcestershire sauce, BBQ sauce, tartar sauce, meat tenderizer, chili sauce, regular gravy, regular salad dressing, salted butter  Soups  Ok: Low-salt soups and broths made with allowed foods  Avoid: Bouillon cubes, soups with smoked or salted meats, regular soup and broth  Vegetables  Ok: Most vegetables are okay; also low-salt tomato and vegetable juices  Avoid: Sauerkraut and other brine-soaked vegetables; pickles and other pickled vegetables; tomato juice, olives  Date Last Reviewed: 8/1/2016 2000-2017 [employer]. [address] [place] [address]. All rights reserved. This information is not intended as a substitute for professional medical care. Always follow your healthcare professional's instructions.        [facility]    If you have any questions regarding to your visit please contact your care team:     Team Pink:   Clinic Hours Telephone Number   Internal Medicine:  Dr. Sadaf Feliciano NP       7am-7pm  Monday - Thursday   7am-5pm  Fridays  [phone]  (Appointment scheduling available 24/7)    Questions about your recent visit?  Team Line  [phone]   Urgent Care - [place] - 11am-9pm Monday-Friday Saturday-Sunday- 9am-5pm   [place] - 5pm-9pm Monday-Friday Saturday-Sunday- 9am-5pm  [phone] - Anila [place]  [phone] - [place]       What options do I have for a visit other than an office visit? We offer electronic visits (e-visits) and telephone visits, when medically appropriate.   Please check with your medical insurance to see if these types of visits are covered, as you will be responsible for any charges that are not paid by your insurance.      You can use [employer] (secure electronic communication) to access to your chart, send your primary care provider a message, or make an appointment. Ask a team member how to get started.     For a price quote for your services, please call our Consumer Price Line at [phone] or our Imaging Cost estimation line at [phone] (for imaging tests).            Follow-ups after your visit        Additional Services     PULMONARY MEDICINE REFERRAL       Your provider has referred you to: [facility]: [facility] (Adults & Pediatric[facility]) - [place] [phone]   [email or web address]    Please be aware that coverage of these services is subject to the terms and limitations of your health insurance plan.  Call member services at your health plan with any benefit or coverage questions.      Please bring the following with you to your appointment:    (1) Any X-Rays, CTs or MRIs which have been performed.  Contact the facility where they were done to arrange for  prior to your scheduled appointment.    (2) List of current medications   (3) This referral request   (4) Any documents/labs given to you for this referral                  Follow-up notes from your care team     Return in about 4 weeks (around 8/29/2018).      Your next 10 appointments already scheduled     Aug 17, 2018 12:00 PM CDT   Office Visit with Sadaf Fox MD   [facility] ([facility])    [address]  [place] [address] [phone]           Bring a current list of meds and any records pertaining to this visit. For Physicals, please bring immunization records and any forms needing to be filled out. Please arrive 10 minutes early to complete paperwork.              Future  "tests that were ordered for you today     Open Future Orders        Priority Expected Expires Ordered    General PFT Lab (Please always keep checked) Routine  8/1/2019 8/1/2018    Pulmonary Function Test Routine  8/1/2019 8/1/2018    6 minute walk test Routine  8/1/2019 8/1/2018            Who to contact     If you have questions or need follow up information about today's clinic visit or your schedule please contact [facility] directly at [phone].  Normal or non-critical lab and imaging results will be communicated to you by L[employer]ron[employer], letter or phone within 4 business days after the clinic has received the results. If you do not hear from us within 7 days, please contact the clinic through [employer] or phone. If you have a critical or abnormal lab result, we will notify you by phone as soon as possible.  Submit refill requests through [employer] Direct or call your pharmacy and they will forward the refill request to us. Please allow 3 business days for your refill to be completed.          Additional Information About Your Visit        Health Benefits Direct Information     Health Benefits Direct gives you secure access to your electronic health record. If you see a primary care provider, you can also send messages to your care team and make appointments. If you have questions, please call your primary care clinic.  If you do not have a primary care provider, please call [phone] and they will assist you.        Care EveryWhere ID     This is your Care EveryWhere ID. This could be used by other organizations to access your [place] medical records  [id number]        Your Vitals Were     Pulse Temperature Respirations Height Pulse Oximetry BMI (Body Mass Index)    66 97.8  F (36.6  C) (Oral) 24 5' 4\" (1.626 m) 98% 31.72 kg/m2       Blood Pressure from Last 3 Encounters:   08/01/18 119/69   07/03/18 112/50   04/30/18 100/60    Weight from Last 3 Encounters:   08/01/18 184 lb 12.8 oz (83.8 kg)   07/03/18 187 lb (84.8 kg)   04/30/18 " 182 lb 12.8 oz (82.9 kg)              We Performed the Following     PULMONARY MEDICINE REFERRAL          Today's Medication Changes          These changes are accurate as of 8/1/18 11:25 AM.  If you have any questions, ask your nurse or doctor.               Start taking these medicines.        Dose/Directions    ferrous fumarate 65 mg (Huslia. FE)-Vitamin C 125 mg  MG Tabs tablet   Commonly known as:  VITRON C   Used for:  Anemia due to blood loss, acute   Started by:  Sadaf Fox MD        Dose:  1 tablet   Take 1 tablet by mouth every other day   Quantity:  45 tablet   Refills:  1       order for DME   Used for:  Decubitus ulcer of right buttock, stage 2   Started by:  Sadaf Fox MD        Equipment being ordered: donut pillow   Quantity:  1 Device   Refills:  0       order for DME   Used for:  Acute diastolic heart failure (H)   Started by:  Sadaf Fox MD        Equipment being ordered: 10-20 mm HG knee high compression stockings   Quantity:  1 Device   Refills:  0         These medicines have changed or have updated prescriptions.        Dose/Directions    sennosides 8.6 MG tablet   Commonly known as:  SENOKOT   This may have changed:    - when to take this  - reasons to take this   Used for:  Drug-induced constipation        Dose:  1 tablet   Take 1 tablet by mouth 2 times daily   Quantity:  60 tablet   Refills:  0       tamsulosin 0.4 MG capsule   Commonly known as:  FLOMAX   This may have changed:  when to take this   Used for:  Urinary frequency        Dose:  0.4 mg   Take 1 capsule (0.4 mg) by mouth daily   Quantity:  90 capsule   Refills:  4            Where to get your medicines      These medications were sent to [place] [address] [facility] [place]  [address]  [address] [place] [address]     Phone:  [phone]     ferrous fumarate 65 mg (Huslia. FE)-Vitamin C 125 mg  MG Tabs tablet         Some of these will need a paper prescription and others can be bought  over the counter.  Ask your nurse if you have questions.     Bring a paper prescription for each of these medications     order for DME    order for DME                Primary Care Provider Office Phone # Fax #    Sadaf Fox [phone] [address] [address]        Equal Access to Services     [facility] : Hadii aad ku hadasho Soomaali, waaxda luqadaha, qaybta kaalmada adeegyada, waxay ashleyin hayaan adejose khblancash laamish . So [facility] [phone].    ATENCIÓN: Si habla español, tiene a aranda disposición servicios gratuitos de asistencia lingüística. Llame al [phone].    We comply with applicable federal civil rights laws and [place] laws. We do not discriminate on the basis of race, color, national origin, age, disability, sex, sexual orientation, or gender identity.            Thank you!     Thank you for choosing [facility]  for your care. Our goal is always to provide you with excellent care. Hearing back from our patients is one way we can continue to improve our services. Please take a few minutes to complete the written survey that you may receive in the mail after your visit with us. Thank you!             Your Updated Medication List - Protect others around you: Learn how to safely use, store and throw away your medicines at www.disposemymeds.org.          This list is accurate as of 8/1/18 11:25 AM.  Always use your most recent med list.                   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 FE)-Vitamin C 125 mg  MG Tabs tablet    VITRON C    45 tablet    Take 1 tablet by mouth every other day    Anemia due to blood loss, acute       levothyroxine 125 MCG tablet    SYNTHROID/LEVOTHROID    90 tablet    TAKE 1 TABLET BY MOUTH EVERY DAY    Hypothyroidism, unspecified type       metoprolol succinate 50 MG 24 hr tablet    TOPROL-XL    90 tablet    TAKE 1 TABLET BY MOUTH EVERY DAY    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)       olopatadine 0.1 % ophthalmic solution    PATANOL    5 mL    Place 1 drop into both eyes 2 times daily    Chronic allergic conjunctivitis       order for DME     1 Device    Equipment being ordered: bilateral wrist splints    Hand paresthesia       order for DME     1 Device    Equipment being ordered: donut pillow    Decubitus ulcer of right buttock, stage 2       order for DME     1 Device    Equipment being ordered: 10-20 mm HG knee high compression stockings    Acute diastolic heart failure (H)       sennosides 8.6 MG tablet    SENOKOT    60 tablet    Take 1 tablet by mouth 2 times daily    Drug-induced constipation       simvastatin 40 MG tablet    ZOCOR    90 tablet    Take 1 tablet (40 mg) by mouth daily    Hyperlipidemia LDL goal <70       tamsulosin 0.4 MG capsule    FLOMAX    90 capsule    Take 1 capsule (0.4 mg) by mouth daily    Urinary frequency       trospium 60 MG Cp24 24 hr capsule    SANCTURA XR     Take 1 capsule (60 mg) by mouth daily    Low hemoglobin       VITAMIN B 12 PO      Take 2,500 mcg by mouth daily.

## 2018-08-01 NOTE — TELEPHONE ENCOUNTER
Looks like patient had a f/u with [facility] on 7/19/18 and also contacted them on 7/30/18  Lasix was increased and patient was advised by cardiology to f/u with PCP and to request referral to pulmonary from PCP    Called and spoke with patient's wife.  She stated that patient's SOB since being discharged from the hospital is somewhat better but still persist. No worsening  She verified that she did receive the same info from cardiology as mentioned above and was inquiring about a referral to pulmonary    There is an opening on DR Fox's schedule today for 1030.  Scheduled patient for this time.   They will discuss this further with DR. Fox at [facility] today      Edwin Weiner RN

## 2018-08-01 NOTE — PROGRESS NOTES
"  SUBJECTIVE:   Brock Abarca is a 86 year old male who presents to clinic today for the following health issues:      ED/UC Followup:    Facility:  [facility]   Date of visit: 7/16/18  Reason for visit: Acute systolic congestive heart failure  Current Status: Patient reports he can't sleep due to breathing and leg, but he has been doing ok     Hasn't been able to sleep in bed for 3 weeks.  Doesn't elevate his legs and therefore they are swollen.  When he lays down in bed, even propped up on pillows, then he is short of breath. Oximetry has been fine.  Weights have been stable as well.  Lasix doesn't seem to help his symptoms at all.      Echo 7/24/18  Final Conclusion   Normal left ventricular ejection fraction estimated at 55-60%.   No obvious regional wall motion abnormalities.   Mild concentric left ventricular hypertrophy.   Mild left atrial enlargement.   No significant valvular heart disease noted.   Technically difficult study - contrast was used to enhance endocardial definition due to   suboptimal image quality.   Estimated EF: 55-60%   FINDINGS   Left Ventricle Normal left ventricular size. Mild concentric left ventricular hypertrophy. No   obvious regional wall motion   abnormalities. Normal left ventricular ejection fraction estimated at 55-60%.   Diastolic Function Stage II diastolic dysfunction. \"Pseudonormal\" left ventricular filling   pattern.   Right Ventricle Right ventricle not well visualized.   Left Atrium Mild left atrial enlargement.   Right Atrium Right atrium not well visualized.   Aortic Valve Trileaflet aortic valve. Trace aortic regurgitation. Aortic valve sclerosis     Isn't on iron as he is worried about constipation.  Doesn't have a bowel movements every day but instead every third day.  He takes citrucel as well.  Hemoglobin is slowly rising.    Home nurse and home physical therapy are coming as well.  This is helping him as well.    CT of the chest 7/16/18  Impression: "   1.  Negative for acute pulmonary embolus.  2.  Lungs demonstrate a mosaic pattern of attenuation. Similar findings were also seen on prior exam 09/25/2009. Findings may reflect changes of small vessel versus small airway disease. Consider pulmonary consult if clinically indicated.  3.  Atherosclerotic vascular disease including severe coronary artery calcifications.  4.  Lobulated hypodense 4.6 x 2.5 cm structure adjacent to the main pulmonary artery, which appears to arise from the left atrium and likely represents the left atrial appendage. Evaluation is limited due to contrast bolus timing. Consider echocardiogram if clinically indicated.  5.  Right lower lobe calcified granuloma.    He continues to have a pressure sore on his bottom.  Home care is doing barrier cream with no improvement.  Patient does a lot of sitting.          Problem list and histories reviewed & adjusted, as indicated.  Additional history: as documented    Patient Active Problem List   Diagnosis     Arthritis     Thyroid disease     Pulmonary embolism (H)     BPH (benign prostatic hyperplasia)     Urinary frequency     Hypertension goal BP (blood pressure) < 140/90     Hyperlipidemia LDL goal <70     Osteoporosis, senile     Hypothyroidism     Left foot drop     Lip tremor     Hand paresthesia     Thrombocytopenia (H)     Fatigue     Memory loss     Hypercalcemia     Advanced directives, counseling/discussion     Reticulocytosis     Hypovitaminosis D     Hyperparathyroidism (H)     Epiphora     Cerebral infarction (H)     History of dacryocystorhinostomy, od; os?     Brain dysfunction     Dermatitis     Primary hyperparathyroidism (H)     Hyperlipidemia LDL goal <100     Cramp of limb     Peripheral edema     Major depressive disorder, single episode, mild (H)     Past Surgical History:   Procedure Laterality Date     APPENDECTOMY       BACK SURGERY       CATARACT IOL, RT/LT      s/p cataract surgery both eyes     Cystoscopy, left  ureteroscopy, holium laser lithotripsy, stent exchange  1/23/17     Cystoscopy, left urethral stone removed, stent placement  1/5/17     DACRYOCYSTORHINOSTOMY BILATERAL  2013    right eye; left eye also?     EXTRACORPOREAL SHOCK WAVE LITHOTRIPSY, CYSTOSCOPY, INSERT STENT URETER(S), COMBINED       HEAD & NECK SURGERY       ORTHOPEDIC SURGERY       PARATHYROIDECTOMY N/A 2/14/2017    Procedure: PARATHYROIDECTOMY;  Surgeon: Genesis Dixon MD;  Location:  OR       Social History   Substance Use Topics     Smoking status: Never Smoker     Smokeless tobacco: Never Used     Alcohol use Yes      Comment: occ.     Family History   Problem Relation Age of Onset     Cerebrovascular Disease Father      HEART DISEASE Sister          Current Outpatient Prescriptions   Medication Sig Dispense Refill     acetaminophen (TYLENOL) 325 MG tablet Take 325-650 mg by mouth       cholecalciferol (VITAMIN D3) 5000 UNITS CAPS capsule Take 1 capsule by mouth daily       clopidogrel (PLAVIX) 75 MG tablet TAKE 1 TABLET BY MOUTH EVERY DAY 90 tablet 3     Cyanocobalamin (VITAMIN B 12 PO) Take 2,500 mcg by mouth daily.       ferrous fumarate 65 mg, [place]. FE,-Vitamin C 125 mg (VITRON C)  MG TABS tablet Take 1 tablet by mouth every other day 45 tablet 1     levothyroxine (SYNTHROID/LEVOTHROID) 125 MCG tablet TAKE 1 TABLET BY MOUTH EVERY DAY 90 tablet 0     loratadine (CLARITIN) 10 MG tablet Take 1 tablet (10 mg) by mouth daily       metoprolol succinate (TOPROL-XL) 50 MG 24 hr tablet TAKE 1 TABLET BY MOUTH EVERY DAY 90 tablet 1     olopatadine (PATANOL) 0.1 % ophthalmic solution Place 1 drop into both eyes 2 times daily 5 mL 3     order for DME Equipment being ordered: donut pillow 1 Device 0     order for DME Equipment being ordered: 10-20 mm HG knee high compression stockings 1 Device 0     ORDER FOR DME Equipment being ordered: bilateral wrist splints 1 Device 0     polyethylene glycol (CLEARLAX) Packet Take 17 g by mouth daily as  "needed for constipation       sennosides (SENOKOT) 8.6 MG tablet Take 1 tablet by mouth 2 times daily (Patient taking differently: Take 1 tablet by mouth as needed ) 60 tablet 0     simvastatin (ZOCOR) 40 MG tablet Take 1 tablet (40 mg) by mouth daily 90 tablet 0     tamsulosin (FLOMAX) 0.4 MG 24 hr capsule Take 1 capsule (0.4 mg) by mouth daily (Patient taking differently: Take 0.4 mg by mouth every evening ) 90 capsule 4     trospium (SANCTURA XR) 60 MG CP24 24 hr capsule Take 1 capsule (60 mg) by mouth daily       Allergies   Allergen Reactions     Zolpidem Other (See Comments)     Pt was found wandering halls during last hospitalization and was confused.   Pt and wife request that pt not receive Ambien.     BP Readings from Last 3 Encounters:   08/01/18 119/69   07/03/18 112/50   04/30/18 100/60    Wt Readings from Last 3 Encounters:   08/01/18 184 lb 12.8 oz (83.8 kg)   07/03/18 187 lb (84.8 kg)   04/30/18 182 lb 12.8 oz (82.9 kg)                  Labs reviewed in EPIC    Reviewed and updated as needed this visit by clinical staff       Reviewed and updated as needed this visit by Provider         ROS:   ROS: 10 point ROS neg other than the symptoms noted above in the HPI.     OBJECTIVE:     /69  Pulse 66  Temp 97.8  F (36.6  C) (Oral)  Resp 24  Ht 5' 4\" (1.626 m)  Wt 184 lb 12.8 oz (83.8 kg)  SpO2 98%  BMI 31.72 kg/m2  Body mass index is 31.72 kg/(m^2).  GENERAL APPEARANCE: unhealthy, alert and no distress  NECK: no adenopathy, no asymmetry, masses, or scars  RESP: lungs clear to auscultation - no rales, rhonchi or wheezes  CV: regular rates and rhythm and normal S1 S2, no S3 or S4  ABDOMEN:  soft, nontender, no HSM or masses and bowel sounds normal  SKIN: no suspicious lesions or rashes  PSYCH: mentation appears abnormal. and affect normal/bright- unclear how much he is understanding.   No orthopnea at 45 degrees.  No JVD  1+ edema bilateral to the mid calf  Right buttock with 2 small " subcentimeter ulcers, good granulation tissue, tender to the touch, no erythema or drainage.       Diagnostic Test Results:  Results for orders placed or performed in visit on 07/03/18   Basic metabolic panel   Result Value Ref Range    Sodium 143 133 - 144 mmol/L    Potassium 4.0 3.4 - 5.3 mmol/L    Chloride 109 94 - 109 mmol/L    Carbon Dioxide 25 20 - 32 mmol/L    Anion Gap 9 3 - 14 mmol/L    Glucose 104 (H) 70 - 99 mg/dL    Urea Nitrogen 15 7 - 30 mg/dL    Creatinine 1.00 0.66 - 1.25 mg/dL    GFR Estimate 71 >60 mL/min/1.7m2    GFR Estimate If Black 86 >60 mL/min/1.7m2    Calcium 8.4 (L) 8.5 - 10.1 mg/dL   CBC with platelets   Result Value Ref Range    WBC 5.8 4.0 - 11.0 10e9/L    RBC Count 2.53 (L) 4.4 - 5.9 10e12/L    Hemoglobin 8.2 (L) 13.3 - 17.7 g/dL    Hematocrit 24.9 (L) 40.0 - 53.0 %    MCV 98 78 - 100 fl    MCH 32.4 26.5 - 33.0 pg    MCHC 32.9 31.5 - 36.5 g/dL    RDW 14.7 10.0 - 15.0 %    Platelet Count 113 (L) 150 - 450 10e9/L       ASSESSMENT/PLAN:             1. Abnormal computed tomography of lung  The lobulated hypodense structure on CT was suggested to be the left atrial appendage but I don't see confirmation of this in his echo and cardiology consultation.  I will reach out to the cardiologist to confirm.    - PULMONARY MEDICINE REFERRAL    2. SOB (shortness of breath)  Unclear etiology.  Doesn't improve with lasix.  Has some diastolic dysfunction and is still eating a very high salt diet so that might be why there is no improvement.  Has some parenchymal changes on CT chest that needs further eval with pft and pulmonary consultation.    - PULMONARY MEDICINE REFERRAL  - General PFT Lab (Please always keep checked); Future  - Pulmonary Function Test; Future  - 6 minute walk test; Future    3. Anemia due to blood loss, acute  Gradually responding.  Per patient instructions.   - ferrous fumarate 65 mg, [place]. FE,-Vitamin C 125 mg (VITRON C)  MG TABS tablet; Take 1 tablet by mouth every other  "day  Dispense: 45 tablet; Refill: 1    4. Acute diastolic heart failure (H)    - order for DME; Equipment being ordered: 10-20 mm HG knee high compression stockings  Dispense: 1 Device; Refill: 0    5. Decubitus ulcer of right buttock, stage 2  Not improving.  Will work with home care nurse on wound care.    - order for DME; Equipment being ordered: donut pillow  Dispense: 1 Device; Refill: 0    Patient Instructions     No dairy with the iron tablet.  Take the iron (Vitron C) 1 tab every other day.  Take Miralax with a full 8 oz of liquid daily.    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    Compression stockings daily.  Ok to take off at bedtime.  10-20 mm HG compression to start with as these are easier to get on.    Make an appointment for a pulmonary function test, 6 minute walk, and pulmonary medicine referral at [facility].    No more eating out.  Low salt diet needed.    I will wait to hear from Leigha about the buttock sore medicine and a possible hospital bed rental.    Use a donut pillow when sitting.          Discharge Instructions: Eating a Low-Salt Diet  Your healthcare provider has prescribed a low-salt diet for you. Most people with heart problems need to eat less salt, which is full of sodium. Too much sodium is linked to high blood pressure, which is linked to a greater risk of heart disease, stroke, blindness, and kidney problems.  Home care    Learn ways to cut back on salt (sodium):    Eat less frozen, canned, dried, packaged, and fast foods. These often contain high amounts of sodium.    Season foods with herbs instead of salt when you cook.    Season with flavorings such as pepper, lemon, garlic, and onion.    Don t add salt to your food at the table.    Sprinkle salt-free herbal blends on meats and vegetables.  Learn to read food labels " carefully:    Look for the total amount of sodium per serving.    Look for foods labeled low sodium, reduced sodium, or no added salt.    Beware. Salt goes by many names. Cut down on foods with these words (all forms of salt) listed as ingredients:  ? Salt  ? Sodium  ? Soy sauce  ? Baking soda  ? Baking powder  ? MSG (monosodium glutamate)  ? Monosodium  ? Na (the chemical symbol for sodium)  Other ideas:    Use more fresh food. Buy more fruits and vegetables.    Select lean meats, fish, and poultry.    Find a cookbook with low-salt recipes. You ll find ideas for tasty meals that are healthy for your heart.    When eating out, ask questions about the menu. Tell the  you're on a low-salt diet.  ? If you order fish, chicken, beef, or pork, ask to have it broiled, baked, poached, or grilled without salt, butter, or breading.  ? Choose plain steamed rice, boiled noodles, and baked or boiled potatoes. Top potatoes with chives and a little sour cream instead of butter.    Avoid antacids that are high in salt. Check the label before you buy.  Follow-up  Make a follow-up appointment with your healthcare provider, or as advised. Your provider may refer you to a dietitian.   Date Last Reviewed: 6/1/2017 2000-2017 [employer]. [address] [place], [place] [address]. All rights reserved. This information is not intended as a substitute for professional medical care. Always follow your healthcare professional's instructions.        Low-Salt Diet  This diet removes foods that are high in salt. It also limits the amount of salt you use when cooking. It is most often used for people with high blood pressure, edema (fluid retention), and kidney, liver, or heart disease.  Table salt contains the mineral sodium. Your body needs sodium to work normally. But too much sodium can make your health problems worse. Your healthcare provider is recommending a low-salt (also called low-sodium) diet for you. Your total  daily allowance of salt is 1,500 to 2,300 milligrams (mg). It is less than 1 teaspoon of table salt. This means you can have only about 500 to 700 mg of sodium at each meal. People with certain health problems should limit salt intake to the lower end of the recommended range.    When you cook, don t add much salt. If you can cook without using salt, even better. Don t add salt to your food at the table.  When shopping, read food labels. Salt is often called sodium on the label. Choose foods that are salt-free, low salt, or very low salt. Note that foods with reduced salt may not lower your salt intake enough.    Beans, potatoes, and pasta  Ok: Dry beans, split peas, lentils, potatoes, rice, macaroni, pasta, spaghetti without added salt  Avoid: Potato chips, tortilla chips, and similar products  Breads and cereals  Ok: Low-sodium breads, rolls, cereals, and cakes; low-salt crackers, matzo crackers  Avoid: Salted crackers, pretzels, popcorn, [place] toast, pancakes, muffins  Dairy  Ok: Milk, chocolate milk, hot chocolate mix, low-salt cheeses, and yogurt  Avoid: Processed cheese and cheese spreads; Roquefort, Camembert, and cottage cheese; buttermilk, instant breakfast drink  Desserts  Ok: Ice cream, frozen yogurt, juice bars, gelatin, cookies and pies, sugar, honey, jelly, hard candy  Avoid: Most pies, cakes and cookies prepared or processed with salt; instant pudding  Drinks  Ok: Tea, coffee, fizzy (carbonated) drinks, juices  Avoid: Flavored coffees, electrolyte replacement drinks, sports drinks  Meats  Ok: All fresh meat, fish, poultry, low-salt tuna, eggs, egg substitute  Avoid: Smoked, pickled, brine-cured, or salted meats and fish. This includes mobley, chipped beef, corned beef, hot dogs, deli meats, ham, kosher meats, salt pork, sausage, canned tuna, salted codfish, smoked salmon, herring, sardines, or anchovies.  Seasonings and spices  Ok: Most seasonings are okay. Good substitutes for salt include: fresh  herb blends, hot sauce, lemon, garlic, wilkerson, vinegar, dry mustard, parsley, cilantro, horseradish, tomato paste, regular margarine, mayonnaise, unsalted butter, cream cheese, vegetable oil, cream, low-salt salad dressing and gravy.  Avoid: Regular ketchup, relishes, pickles, soy sauce, teriyaki sauce, Worcestershire sauce, BBQ sauce, tartar sauce, meat tenderizer, chili sauce, regular gravy, regular salad dressing, salted butter  Soups  Ok: Low-salt soups and broths made with allowed foods  Avoid: Bouillon cubes, soups with smoked or salted meats, regular soup and broth  Vegetables  Ok: Most vegetables are okay; also low-salt tomato and vegetable juices  Avoid: Sauerkraut and other brine-soaked vegetables; pickles and other pickled vegetables; tomato juice, olives  Date Last Reviewed: 8/1/2016 2000-2017 [employer]. [address] [place] [address]. All rights reserved. This information is not intended as a substitute for professional medical care. Always follow your healthcare professional's instructions.        [facility]    If you have any questions regarding to your visit please contact your care team:     Team Pink:   Clinic Hours Telephone Number   Internal Medicine:  Dr. Sadaf Feliciano, NP       7am-7pm  Monday - Thursday   7am-5pm  Fridays  [phone]  (Appointment scheduling available 24/7)    Questions about your recent visit?  Team Line  [phone]   Urgent [facility] - [place] [facility] - 11am-9pm Monday-Friday Saturday-Sunday- 9am-5pm   [place] - 5pm-9pm Monday-Friday Saturday-Sunday- 9am-5pm  [phone] - Anila Rowlye  [phone] - [place]       What options do I have for a visit other than an office visit? We offer electronic visits (e-visits) and telephone visits, when medically appropriate.  Please check with your medical insurance to see if these types of visits are covered, as you will be  responsible for any charges that are not paid by your insurance.      You can use [employer] (secure electronic communication) to access to your chart, send your primary care provider a message, or make an appointment. Ask a team member how to get started.     For a price quote for your services, please call our Consumer Price Line at [phone] or our Imaging Cost estimation line at [phone] (for imaging tests).        Sadaf Fox MD  [facility]

## 2018-08-01 NOTE — TELEPHONE ENCOUNTER
Leigha updated with Dr. Fox's orders below  She stated that the pressure ulcer is not draining.  They will change the mepilex every 2-3 days    Edwin Weiner RN

## 2018-08-02 NOTE — TELEPHONE ENCOUNTER
"Please call cardiologist-  From recent CT while in hospital- \"Lobulated hypodense 4.6 x 2.5 cm structure adjacent to the main pulmonary artery, which appears to arise from the left atrium and likely represents the left atrial appendage. Evaluation is limited due to contrast bolus timing. Consider echocardiogram if clinically indicated.\"    I see that he saw cardiology (Dr Wood) and had the echo but there was no notation about whether it was felt that the lobulated hypodense structure was the left atrial appendage or not.  Can Dr. Wood clarify that for me?    Thanks,  Dr. Fox    "

## 2018-08-02 NOTE — TELEPHONE ENCOUNTER
Called [facility] and was transferred to Jonna's  (DR. Wood's nurse)  LM on her VM with Dr. Fox's note below  RN hotline number [phone] given for her to call back with updates    Edwin Weiner RN

## 2018-08-07 DIAGNOSIS — R06.02 SOB (SHORTNESS OF BREATH): ICD-10-CM

## 2018-08-07 LAB
6 MIN WALK (FT): 640 FT
6 MIN WALK (M): 195 M

## 2018-08-07 NOTE — LETTER
[address] [place] [address]    August 7, 2018    Brock Abarca  [address]  [place] [address]      Dear Dr Sadaf Gutiérrez will be back from vacation next week and for further discussion of these test results please contact her. Meanwhile I am seeing a within normal 6 minutes walking test.     Enclosed is a copy of your results.   Results for orders placed or performed in visit on 08/07/18   6 minute walk test   Result Value Ref Range    6 min walk (FT) 640 ft    6 Min Walk (M) 195 m   General PFT Lab (Please always keep checked)   Result Value Ref Range    FVC-Pred 3.03 L    FVC-Pre 2.88 L    FVC-%Pred-Pre 95 %    FEV1-Pre 2.49 L    FEV1-%Pred-Pre 110 %    FEV1FVC-Pred 70 %    FEV1FVC-Pre 86 %    FEFMax-Pred 5.18 L/sec    FEFMax-Pre 6.30 L/sec    FEFMax-%Pred-Pre 121 %    FEF2575-Pred 1.58 L/sec    FEF2575-Pre 3.15 L/sec    SOD9383-%Pred-Pre 199 %    ExpTime-Pre 7.50 sec    FIFMax-Pre 4.55 L/sec    VC-Pred 3.42 L    VC-Pre 2.98 L    VC-%Pred-Pre 87 %    IC-Pred 3.18 L    IC-Pre 2.53 L    IC-%Pred-Pre 79 %    ERV-Pred 0.24 L    ERV-Pre 0.45 L    ERV-%Pred-Pre 188 %    FEV1FEV6-Pred 75 %    FEV1FEV6-Pre 86 %    FRCPleth-Pred 3.52 L    FRCPleth-Pre 2.84 L    FRCPleth-%Pred-Pre 80 %    RVPleth-Pred 2.82 L    RVPleth-Pre 2.39 L    RVPleth-%Pred-Pre 84 %    TLCPleth-Pred 6.01 L    TLCPleth-Pre 5.37 L    TLCPleth-%Pred-Pre 89 %    DLCOunc-Pred 20.08 ml/min/mmHg    DLCOunc-Pre 18.50 ml/min/mmHg    DLCOunc-%Pred-Pre 92 %    VA-Pre 4.53 L    VA-%Pred-Pre 78 %    FEV1SVC-Pred 66 %    FEV1SVC-Pre 83 %       If you have any questions or concerns, please call myself or my nurse at [phone].    Sincerely,    Daniel Kerr MD/dragan

## 2018-08-08 ENCOUNTER — DOCUMENTATION ONLY (OUTPATIENT)
Dept: CARE COORDINATION | Facility: CLINIC | Age: 83
End: 2018-08-08

## 2018-08-08 ENCOUNTER — DOCUMENTATION ONLY (OUTPATIENT)
Dept: LAB | Facility: CLINIC | Age: 83
End: 2018-08-08

## 2018-08-08 DIAGNOSIS — R70.1 RETICULOCYTOSIS: ICD-10-CM

## 2018-08-08 DIAGNOSIS — D69.6 THROMBOCYTOPENIA (H): ICD-10-CM

## 2018-08-08 DIAGNOSIS — E78.5 HYPERLIPIDEMIA LDL GOAL <100: ICD-10-CM

## 2018-08-08 DIAGNOSIS — E03.9 HYPOTHYROIDISM, UNSPECIFIED TYPE: Primary | ICD-10-CM

## 2018-08-08 PROBLEM — R25.2 CRAMP OF LIMB: Status: RESOLVED | Noted: 2017-06-27 | Resolved: 2018-08-08

## 2018-08-08 NOTE — TELEPHONE ENCOUNTER
HAYLIE on Dr. Wood's nurse's VM again requesting a call back to RN hotline with updates. See below    Edwin Weiner RN

## 2018-08-09 NOTE — PROGRESS NOTES
[facility] now requests orders and shares plan of care/discharge summaries for some patients through [employer].  Please REPLY TO THIS MESSAGE OR ROUTE BACK TO THE AUTHOR in order to give authorization for orders when needed.  This is considered a verbal order, you will still receive a faxed copy of orders for signature.  Thank you for your assistance in improving collaboration for our patients.    OT requesting to  treat for instruction in home safety, energy conservation and compensatory techniques and ADLs. 1wk1, 2wk1  Thank you,  Anita Tejeda, OTR/L

## 2018-08-09 NOTE — TELEPHONE ENCOUNTER
Dr. Wood with [facility] called back  She stated that the CT scan completed was PE focused and contrast was mainly on the right side.  An Echo would not be the best way to view this lobulated hypodense structure on the left side.   It is hard to tell what the is.   However, Dr. Wood is not convinced that it was the left atrial appendage   She recommends a CT with focus on the left side (same CT order used to view the coronary arteries) or a Cardiac MRI    After further discussion, Dr. Wood decided that she will order the Cardiac MRI and have her nurse reach out to the patient to get him set up for this  Dr. Fox can call Dr. Wood back with any questions    Edwin Weiner RN

## 2018-08-13 DIAGNOSIS — E03.9 HYPOTHYROIDISM, UNSPECIFIED TYPE: ICD-10-CM

## 2018-08-13 DIAGNOSIS — R70.1 RETICULOCYTOSIS: ICD-10-CM

## 2018-08-13 DIAGNOSIS — D69.6 THROMBOCYTOPENIA (H): ICD-10-CM

## 2018-08-13 DIAGNOSIS — E78.5 HYPERLIPIDEMIA LDL GOAL <100: ICD-10-CM

## 2018-08-13 LAB
BASOPHILS # BLD AUTO: 0 10E9/L (ref 0–0.2)
BASOPHILS NFR BLD AUTO: 0.7 %
CHOLEST SERPL-MCNC: 121 MG/DL
DIFFERENTIAL METHOD BLD: ABNORMAL
EOSINOPHIL # BLD AUTO: 0.2 10E9/L (ref 0–0.7)
EOSINOPHIL NFR BLD AUTO: 3.5 %
ERYTHROCYTE [DISTWIDTH] IN BLOOD BY AUTOMATED COUNT: 16.5 % (ref 10–15)
HCT VFR BLD AUTO: 36.3 % (ref 40–53)
HDLC SERPL-MCNC: 51 MG/DL
HGB BLD-MCNC: 11.6 G/DL (ref 13.3–17.7)
LDLC SERPL CALC-MCNC: 46 MG/DL
LYMPHOCYTES # BLD AUTO: 1.1 10E9/L (ref 0.8–5.3)
LYMPHOCYTES NFR BLD AUTO: 18.6 %
MCH RBC QN AUTO: 27.9 PG (ref 26.5–33)
MCHC RBC AUTO-ENTMCNC: 32 G/DL (ref 31.5–36.5)
MCV RBC AUTO: 87 FL (ref 78–100)
MONOCYTES # BLD AUTO: 0.7 10E9/L (ref 0–1.3)
MONOCYTES NFR BLD AUTO: 10.9 %
NEUTROPHILS # BLD AUTO: 4 10E9/L (ref 1.6–8.3)
NEUTROPHILS NFR BLD AUTO: 66.3 %
NONHDLC SERPL-MCNC: 70 MG/DL
PLATELET # BLD AUTO: 129 10E9/L (ref 150–450)
RBC # BLD AUTO: 4.16 10E12/L (ref 4.4–5.9)
T4 FREE SERPL-MCNC: 1.27 NG/DL (ref 0.76–1.46)
TRIGL SERPL-MCNC: 118 MG/DL
TSH SERPL DL<=0.005 MIU/L-ACNC: 0.3 MU/L (ref 0.4–4)
WBC # BLD AUTO: 6.1 10E9/L (ref 4–11)

## 2018-08-13 PROCEDURE — 36415 COLL VENOUS BLD VENIPUNCTURE: CPT | Performed by: INTERNAL MEDICINE

## 2018-08-13 PROCEDURE — 84439 ASSAY OF FREE THYROXINE: CPT | Performed by: INTERNAL MEDICINE

## 2018-08-13 PROCEDURE — 84443 ASSAY THYROID STIM HORMONE: CPT | Performed by: INTERNAL MEDICINE

## 2018-08-13 PROCEDURE — 85025 COMPLETE CBC W/AUTO DIFF WBC: CPT | Performed by: INTERNAL MEDICINE

## 2018-08-13 PROCEDURE — 80061 LIPID PANEL: CPT | Performed by: INTERNAL MEDICINE

## 2018-08-14 NOTE — PROGRESS NOTES
Good cholesterol.    Thyroid is overactive.  Decrease Levothyroxine dose to 112 mcg daily.  Repeat your thyroid bloodwork (TSH with reflex T4) at the lab in 8 weeks.    1 month supply with 3 refills.

## 2018-08-15 ENCOUNTER — TELEPHONE (OUTPATIENT)
Dept: INTERNAL MEDICINE | Facility: CLINIC | Age: 83
End: 2018-08-15

## 2018-08-15 DIAGNOSIS — E03.9 HYPOTHYROIDISM, UNSPECIFIED TYPE: ICD-10-CM

## 2018-08-15 RX ORDER — LEVOTHYROXINE SODIUM 112 UG/1
112 TABLET ORAL DAILY
Qty: 30 TABLET | Refills: 3 | Status: SHIPPED | OUTPATIENT
Start: 2018-08-15 | End: 2018-11-12

## 2018-08-15 NOTE — TELEPHONE ENCOUNTER
Patient's wife notified of providers message as written.   Patient's wife verbalized understanding and has no further questions or concerns.      Sadaf Fox MD  P [facility] Rn Triage Pool                   Good cholesterol.    Thyroid is overactive.  Decrease Levothyroxine dose to 112 mcg daily.  Repeat your thyroid bloodwork (TSH with reflex T4) at the lab in 8 weeks.     1 month supply with 3 refills.       Merlene Crouch RN - BC

## 2018-08-16 NOTE — PROGRESS NOTES
"INTERNAL MEDICINE   SUBJECTIVE:   Brock Abarca is a 86 year old male who presents to clinic today with his wife for the following health issues:    Follow-up from hospital visit:   Patient had an MRI of the heart done yesterday done at [facility]. He took a benzodiazepine medication and, after the MRI, notice symptoms of poor and difficulty breathing, lethargy, and cramping in the feet. He feels it is possibly due to medication. He was very sleepy afterwards. Wife reports he does not drink enough water. \"He was so out of it,\" wife reports.    Wound:   Patient has 2 coccyx wounds which are both being managed by [facility] nurse.     Edema:  Previously prescribed compression stockings for increased edema in legs. He reports he can't get the stockings on. Both he and his wife eat out many times a week, likely adding to the additional sodium. He reports he does not add extra salt to home cooking.       BP Readings from Last 2 Encounters:   08/17/18 116/58   08/01/18 119/69     Hemoglobin A1C (%)   Date Value   03/14/2014 5.2   08/24/2012 5.2     LDL Cholesterol Calculated (mg/dL)   Date Value   08/13/2018 46   09/19/2017 55       Problem list and histories reviewed & adjusted, as indicated.  Additional history: as documented    Recent Labs   Lab Test  08/13/18   1017  07/03/18   1407  09/19/17   0858  02/14/17   0950   09/30/16   1007   09/24/15   1018   03/14/14   1245   08/24/12   1257 05/09/12   A1C   --    --    --    --    --    --    --    --    --   5.2   --   5.2  5.0   LDL  46   --   55   --    --   63   --   48   < >  62   < >  62  87   HDL  51   --   46   --    --   41   --   39*   < >  37*   < >  48  45   TRIG  118   --   148   --    --   146   --   136   < >  172*   < >  132  132   ALT   --    --   27   --    --   27   --   20   < >  35   < >  21   --    CR   --   1.00  1.04  0.79   < >  1.07   --   1.10   < >  1.05   < >  1.15  1.3   GFRESTIMATED   --   71  68  >90  Non  GFR Calc     < " ">  66   --   64   < >  68   < >  61   --    GFRESTBLACK   --   86  82  >90   GFR Calc     < >  80   --   77   < >  82   < >  74   --    POTASSIUM   --   4.0   --   4.1   < >  4.5   --   4.6   < >  4.8   < >  4.8  4.7   TSH  0.30*   --   2.37   --    < >  5.71*   < >  4.06*   < >  1.80   < >  10.00*  76    < > = values in this interval not displayed.      BP Readings from Last 3 Encounters:   08/17/18 116/58   08/01/18 119/69   07/03/18 112/50    Wt Readings from Last 3 Encounters:   08/17/18 84.4 kg (186 lb)   08/01/18 83.8 kg (184 lb 12.8 oz)   07/03/18 84.8 kg (187 lb)                  Labs reviewed in EPIC    Reviewed and updated as needed this visit by clinical staff  Tobacco  Allergies  Meds  Med Hx  Surg Hx  Fam Hx  Soc Hx      Reviewed and updated as needed this visit by Provider         ROS:  Constitutional, HEENT, cardiovascular, pulmonary, GI, , musculoskeletal, neuro, skin, endocrine and psych systems are negative, except as otherwise noted.    This document serves as a record of the services and decisions personally performed and made by Sadaf Fox MD. It was created on her behalf by Amy Mistry, a trained medical scribe. The creation of this document is based the provider's statements to the medical scribe.    Scribe Amy Mistry 12:49 PM 8/17/2018      OBJECTIVE:     /58  Pulse 71  Temp 97.4  F (36.3  C) (Oral)  Resp 18  Ht 1.626 m (5' 4\")  Wt 84.4 kg (186 lb)  SpO2 99%  BMI 31.93 kg/m2  Body mass index is 31.93 kg/(m^2).  GENERAL: healthy, alert and no distress  NECK: no adenopathy, no asymmetry, masses, or scars and thyroid normal to palpation  RESP: lungs clear to auscultation - no rales, rhonchi or wheezes  CV: regular rate and rhythm, normal S1 S2, no S3 or S4, no murmur, click or rub, no peripheral edema and peripheral pulses strong  ABDOMEN: soft, nontender, no hepatosplenomegaly, no masses and bowel sounds normal  MS: no gross musculoskeletal defects noted, " 1+ edema to knees.   SKIN: 2 ulcers on the left buttock next to the gluteal cleft, healing well, good granulation tissue, 2-3 mm in width. Tenderness to touch. Surrounding pink skin. Not erythematous.   NEURO: Normal strength and tone, mentation intact and speech normal  PSYCH: mentation appears normal, affect normal/bright    Diagnostic Test Results:  No results found for this or any previous visit (from the past 24 hour(s)).    ASSESSMENT/PLAN:   1. Decubitus ulcer of left buttock, stage 2  Donut pillow, Per patient instructions.     2. Hypertension goal BP (blood pressure) < 140/90  The current medical regimen is effective;  continue present plan and medications.     3. Peripheral edema  Poor control.  Will try local therapies    4. SOB (shortness of breath)  Unclear etiology.  Awaiting official read on PFTs     5. Anemia, unspecified type  Stable to improving    If MRI from [facility] and pulmonary fucntion test from [facility] are normal, the next test would be an ultrasound of the abdomen complete for further work up, consider venus obstruction causing lower extremity edema     Patient Instructions     After Visit Summary    * JuxtaLite velcro compression stockings to help with the lower leg swelling.  * Asked Leigha if she is able to bring these out to you.    * Try not to eat out as much, as restaurants tend to oversalt food. This does not help with your lower leg swelling.    * Try a donut pillow.    *  Use Vaseline on the wound.    * Return to clinic in 6 weeks.      [facility]    If you have any questions regarding to your visit please contact your care team:     Team Pink:   Clinic Hours Telephone Number   Internal Medicine:  Dr. Sadaf Feliciano NP       7am-7pm  Monday - Thursday   7am-5pm  Fridays  [phone]  (Appointment scheduling available 24/7)    Questions about your recent visit?  Team Line  [phone]   Urgent Care - [facility]  [place] - 11am-9pm Monday-Friday Saturday-Sunday- 9am-5pm   [place] - 5pm-9pm Monday-Friday Saturday-Sunday- 9am-5pm  [phone] - [place]  [phone] - [place]       What options do I have for a visit other than an office visit? We offer electronic visits (e-visits) and telephone visits, when medically appropriate.  Please check with your medical insurance to see if these types of visits are covered, as you will be responsible for any charges that are not paid by your insurance.      You can use [employer] (secure electronic communication) to access to your chart, send your primary care provider a message, or make an appointment. Ask a team member how to get started.     For a price quote for your services, please call our Consumer Price Line at [phone] or our Imaging Cost estimation line at [phone] (for imaging tests).  June KING CMA ([facility])       The information in this document, created by a scribe for me, accurately reflects the services I personally performed and the decisions made by me. I have reviewed and approved this document for accuracy.      Sadaf Fox MD  [facility]    I spent 30 minutes of time with the patient and >50% of it was in education and counseling regarding hospital follow up, lower extremity edema, and coccyx wounds.     Start:12:30 PM  End: 1:00 PM

## 2018-08-17 ENCOUNTER — OFFICE VISIT (OUTPATIENT)
Dept: INTERNAL MEDICINE | Facility: CLINIC | Age: 83
End: 2018-08-17
Payer: COMMERCIAL

## 2018-08-17 ENCOUNTER — TELEPHONE (OUTPATIENT)
Dept: INTERNAL MEDICINE | Facility: CLINIC | Age: 83
End: 2018-08-17

## 2018-08-17 VITALS
BODY MASS INDEX: 31.76 KG/M2 | HEIGHT: 64 IN | RESPIRATION RATE: 18 BRPM | WEIGHT: 186 LBS | TEMPERATURE: 97.4 F | SYSTOLIC BLOOD PRESSURE: 116 MMHG | DIASTOLIC BLOOD PRESSURE: 58 MMHG | OXYGEN SATURATION: 99 % | HEART RATE: 71 BPM

## 2018-08-17 DIAGNOSIS — D64.9 ANEMIA, UNSPECIFIED TYPE: ICD-10-CM

## 2018-08-17 DIAGNOSIS — I10 HYPERTENSION GOAL BP (BLOOD PRESSURE) < 140/90: ICD-10-CM

## 2018-08-17 DIAGNOSIS — R60.0 PERIPHERAL EDEMA: ICD-10-CM

## 2018-08-17 DIAGNOSIS — R06.02 SOB (SHORTNESS OF BREATH): ICD-10-CM

## 2018-08-17 DIAGNOSIS — L89.322 DECUBITUS ULCER OF LEFT BUTTOCK, STAGE 2 (H): Primary | ICD-10-CM

## 2018-08-17 LAB — EJECTION FRACTION: 65 %

## 2018-08-17 PROCEDURE — 99214 OFFICE O/P EST MOD 30 MIN: CPT | Performed by: INTERNAL MEDICINE

## 2018-08-17 NOTE — TELEPHONE ENCOUNTER
Call home care-  Can kiley put on a compression sleeve on his legs bilateral and then use Juxtalite compression stockings on top of this?  Otherwise he will need a lymphedema therapist.

## 2018-08-17 NOTE — TELEPHONE ENCOUNTER
Called Leigha. She recommended to have lymphedema eval and treat pt first to make recommendations. Verbal order given for lymphedema therapy. AMERICO.    Candida Rojas RN  [facility]

## 2018-08-17 NOTE — PATIENT INSTRUCTIONS
After Visit Summary    * JuxtaLite velcro compression stockings to help with the lower leg swelling.  * Asked Leigha if she is able to bring these out to you.    * Try not to eat out as much, as restaurants tend to oversalt food. This does not help with your lower leg swelling.    * Try a donut pillow.    * Use Vaseline on the wound.    * Return to clinic in 6 weeks.      [facility]    If you have any questions regarding to your visit please contact your care team:     Team Pink:   Clinic Hours Telephone Number   Internal Medicine:  Dr. Sadaf Feliciano, NP       7am-7pm  Monday - Thursday   7am-5pm  Fridays  [phone]  (Appointment scheduling available 24/7)    Questions about your recent visit?  Team Line  [phone]   Urgent Care - [place] - 11am-9pm Monday-Friday Saturday-Sunday- 9am-5pm   [place] - 5pm-9pm Monday-Friday Saturday-Sunday- 9am-5pm  [phone] - [place]  [phone] - [place]       What options do I have for a visit other than an office visit? We offer electronic visits (e-visits) and telephone visits, when medically appropriate.  Please check with your medical insurance to see if these types of visits are covered, as you will be responsible for any charges that are not paid by your insurance.      You can use [employer] (secure electronic communication) to access to your chart, send your primary care provider a message, or make an appointment. Ask a team member how to get started.     For a price quote for your services, please call our Consumer Price Line at [phone] or our Imaging Cost estimation line at [phone] (for imaging tests).  June KING CMA ([facility])

## 2018-08-17 NOTE — TELEPHONE ENCOUNTER
Reason for Call: Request for an order or referral:    Order or referral being requested: home care. 1) 4 plus edema of lower extremities.  Order for a lymphema tech to evaluate.     2) skin break on his butt. Would like an order to put treatment on.     Date needed: as soon as possible    Has the patient been seen by the PCP for this problem? YES    Additional comments: na    Phone number Patient can be reached at:  Other phone number:  [phone]    Best Time:  any    Can we leave a detailed message on this number?  YES    Call taken on 8/17/2018 at 1:10 PM by Alannah Weeks

## 2018-08-17 NOTE — TELEPHONE ENCOUNTER
Called Jayashree WEINSTEIN with verbal ok for lymphedema to evaluate.  Apply vaseline to wound that is almost healed on buttocks.    Candida Rojas RN  [facility]

## 2018-08-17 NOTE — MR AVS SNAPSHOT
After Visit Summary   8/17/2018    Brock Abarca    MRN: [id number]           Patient Information     Date Of Birth          3/1/1932        Visit Information        Provider Department      8/17/2018 12:00 PM Sadaf Fox MD [facility]        Today's Diagnoses     Decubitus ulcer of left buttock, stage 2    -  1    Hypertension goal BP (blood pressure) < 140/90        Peripheral edema        SOB (shortness of breath)        Anemia, unspecified type          Care Instructions    After Visit Summary    * JuxtaLite velcro compression stockings to help with the lower leg swelling.  * Asked Leigha if she is able to bring these out to you.    * Try not to eat out as much, as restaurants tend to oversalt food. This does not help with your lower leg swelling.    * Try a donut pillow.    *  Use Vaseline on the wound.    * Return to clinic in 6 weeks.      [facility]    If you have any questions regarding to your visit please contact your care team:     Team Pink:   Clinic Hours Telephone Number   Internal Medicine:  Dr. Sadaf Feliciano, NP       7am-7pm  Monday - Thursday   7am-5pm  Fridays  [phone]  (Appointment scheduling available 24/7)    Questions about your recent visit?  Team Line  [phone]   Urgent Care - [place] [facility] - 11am-9pm Monday-Friday Saturday-Sunday- 9am-5pm   [place] - 5pm-9pm Monday-Friday Saturday-Sunday- 9am-5pm  [phone] - [place]  [phone] - [place]       What options do I have for a visit other than an office visit? We offer electronic visits (e-visits) and telephone visits, when medically appropriate.  Please check with your medical insurance to see if these types of visits are covered, as you will be responsible for any charges that are not paid by your insurance.      You can use [employer] (secure electronic communication) to access to your chart, send your  primary care provider a message, or make an appointment. Ask a team member how to get started.     For a price quote for your services, please call our Consumer Price Line at [phone] or our Imaging Cost estimation line at [phone] (for imaging tests).  June KING CMA ([facility])            Follow-ups after your visit        Your next 10 appointments already scheduled     Oct 08, 2018 11:00 AM CDT   LAB with FZ LAB   [facility] [place] ([facility])    [address] [place] [address]  [place] [address] [phone]           Please do not eat 10-12 hours before your appointment if you are coming in fasting for labs on lipids, cholesterol, or glucose (sugar). This does not apply to pregnant women. Water, hot tea and black coffee (with nothing added) are okay. Do not drink other fluids, diet soda or chew gum.              Who to contact     If you have questions or need follow up information about today's clinic visit or your schedule please contact [facility] directly at [phone].  Normal or non-critical lab and imaging results will be communicated to you by [employer], letter or phone within 4 business days after the clinic has received the results. If you do not hear from us within 7 days, please contact the clinic through [employer] or phone. If you have a critical or abnormal lab result, we will notify you by phone as soon as possible.  Submit refill requests through [employer] or call your pharmacy and they will forward the refill request to us. Please allow 3 business days for your refill to be completed.          Additional Information About Your Visit        [employer] Information     [employer] gives you secure access to your electronic health record. If you see a primary care provider, you can also send messages to your care team and make appointments. If you have questions, please call your primary care clinic.  If you do not have a primary care provider, please call [phone] and  "they will assist you.        Care EveryWhere ID     This is your Care EveryWhere ID. This could be used by other organizations to access your [facility] medical records  [id number]        Your Vitals Were     Pulse Temperature Respirations Height Pulse Oximetry BMI (Body Mass Index)    71 97.4  F (36.3  C) (Oral) 18 5' 4\" (1.626 m) 99% 31.93 kg/m2       Blood Pressure from Last 3 Encounters:   08/17/18 116/58   08/01/18 119/69   07/03/18 112/50    Weight from Last 3 Encounters:   08/17/18 186 lb (84.4 kg)   08/01/18 184 lb 12.8 oz (83.8 kg)   07/03/18 187 lb (84.8 kg)              Today, you had the following     No orders found for display         Today's Medication Changes          These changes are accurate as of 8/17/18 12:58 PM.  If you have any questions, ask your nurse or doctor.               These medicines have changed or have updated prescriptions.        Dose/Directions    sennosides 8.6 MG tablet   Commonly known as:  SENOKOT   This may have changed:    - when to take this  - reasons to take this   Used for:  Drug-induced constipation        Dose:  1 tablet   Take 1 tablet by mouth 2 times daily   Quantity:  60 tablet   Refills:  0       tamsulosin 0.4 MG capsule   Commonly known as:  FLOMAX   This may have changed:  when to take this   Used for:  Urinary frequency        Dose:  0.4 mg   Take 1 capsule (0.4 mg) by mouth daily   Quantity:  90 capsule   Refills:  4                Primary Care Provider Office Phone # Fax #    Sadaf Keisha Fox [phone]       [address] [facility] [address]        Equal Access to Services     [facility]: Jeremias Mckinney, david persaud, tamela cortez. So [facility] [phone].    ATENCIÓN: Si habla español, tiene a aranda disposición servicios gratuitos de asistencia lingüística. Llame al [phone].    We comply with applicable federal civil rights laws and [place] laws. We do " not discriminate on the basis of race, color, national origin, age, disability, sex, sexual orientation, or gender identity.            Thank you!     Thank you for choosing [facility]  for your care. Our goal is always to provide you with excellent care. Hearing back from our patients is one way we can continue to improve our services. Please take a few minutes to complete the written survey that you may receive in the mail after your visit with us. Thank you!             Your Updated Medication List - Protect others around you: Learn how to safely use, store and throw away your medicines at www.disposemymeds.org.          This list is accurate as of 8/17/18 12:58 PM.  Always use your most recent med list.                   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)       olopatadine 0.1 % ophthalmic solution    PATANOL    5 mL    Place 1 drop into both eyes  2 times daily    Chronic allergic conjunctivitis       order for DME     1 Device    Equipment being ordered: bilateral wrist splints    Hand paresthesia       order for DME     1 Device    Equipment being ordered: donut pillow    Decubitus ulcer of right buttock, stage 2       order for DME     1 Device    Equipment being ordered: 10-20 mm HG knee high compression stockings    Acute diastolic heart failure (H)       sennosides 8.6 MG tablet    SENOKOT    60 tablet    Take 1 tablet by mouth 2 times daily    Drug-induced constipation       simvastatin 40 MG tablet    ZOCOR    90 tablet    Take 1 tablet (40 mg) by mouth daily    Hyperlipidemia LDL goal <70       tamsulosin 0.4 MG capsule    FLOMAX    90 capsule    Take 1 capsule (0.4 mg) by mouth daily    Urinary frequency       trospium 60 MG Cp24 24 hr capsule    SANCTURA XR     Take 1 capsule (60 mg) by mouth daily    Low hemoglobin       VITAMIN B 12 PO      Take 2,500 mcg by mouth daily.

## 2018-08-23 ENCOUNTER — TELEPHONE (OUTPATIENT)
Dept: PULMONOLOGY | Facility: CLINIC | Age: 83
End: 2018-08-23

## 2018-08-23 DIAGNOSIS — R60.0 LOWER EXTREMITY EDEMA: Primary | ICD-10-CM

## 2018-08-23 DIAGNOSIS — R14.0 ABDOMINAL DISTENSION: ICD-10-CM

## 2018-08-23 NOTE — TELEPHONE ENCOUNTER
Call received  from pts Primary Care office aSdaf Fox MD.   Requested that a Pulmonologist at [facility] read pts recent PFT Result and comment.    Routed this message to Dr. Perlman.    Shirin Roman LPN

## 2018-08-23 NOTE — TELEPHONE ENCOUNTER
Dr. Perlman reviewed Mr. Abarca's pulmonary function testing. Dr. Perlman read this testing as normal.    Routing to Dr. Fox.    Zora Martin RN

## 2018-08-24 NOTE — TELEPHONE ENCOUNTER
Notify patient that pfts are normal.  Next step is ultrasound of the abdomen complete, indication lower extremity edema, abdomen distention

## 2018-08-24 NOTE — TELEPHONE ENCOUNTER
US order placed.  Please notify patient of Dr. Fox's note below and give number to imaging center    Edwin Weiner RN

## 2018-08-26 DIAGNOSIS — E78.5 HYPERLIPIDEMIA LDL GOAL <70: ICD-10-CM

## 2018-08-26 DIAGNOSIS — E03.9 HYPOTHYROIDISM, UNSPECIFIED TYPE: ICD-10-CM

## 2018-08-27 NOTE — TELEPHONE ENCOUNTER
Called patient and talked to patients wife gloria, and informed of Dr. Banuelos message and gave number to schedule appointment.     June KING CMA ([facility])

## 2018-08-27 NOTE — TELEPHONE ENCOUNTER
Spoke to pharmacy and confirmed duplicate request for Levothyroxine.  Candida ADRIAN CMA ([facility])

## 2018-08-27 NOTE — TELEPHONE ENCOUNTER
levothyroxine (SYNTHROID/LEVOTHROID) 112 MCG tablet 30 tablet 3 8/15/2018  No      Sig - Route: Take 1 tablet (112 mcg) by mouth daily - Oral     Class: E-Prescribe     Order: [id number]     E-Prescribing Status: Receipt confirmed by pharmacy (8/15/2018  2:44 PM CDT)          Call pharmacy after 9 to confirm if duplicate.  Thank you  Pilar

## 2018-08-28 ENCOUNTER — TELEPHONE (OUTPATIENT)
Dept: INTERNAL MEDICINE | Facility: CLINIC | Age: 83
End: 2018-08-28

## 2018-08-28 RX ORDER — LEVOTHYROXINE SODIUM 125 UG/1
TABLET ORAL
Qty: 90 TABLET | Refills: 0
Start: 2018-08-28

## 2018-08-28 RX ORDER — SIMVASTATIN 40 MG
TABLET ORAL
Qty: 90 TABLET | Refills: 1 | Status: SHIPPED | OUTPATIENT
Start: 2018-08-28 | End: 2018-11-19

## 2018-08-28 NOTE — TELEPHONE ENCOUNTER
Called kiley back and gave verbal okay for requested home care orders, she verbalized understanding.    Tiffany Eng RN

## 2018-08-28 NOTE — TELEPHONE ENCOUNTER
Reason for Call:  Orders    Detailed comments: Caller needs order for re certification that was completed today. Nurse to see patient once a week for the next 6 weeks with 2 as needed for teaching, symptoms management, lymphedema therapy to work with patient for compression garments due to swelling legs.    Phone Number Patient can be reached at: Other phone number:  [phone]*    Best Time: any    Can we leave a detailed message on this number? YES    Call taken on 8/28/2018 at 2:24 PM by Shweta Bearden

## 2018-08-29 ENCOUNTER — RADIANT APPOINTMENT (OUTPATIENT)
Dept: ULTRASOUND IMAGING | Facility: CLINIC | Age: 83
End: 2018-08-29
Attending: INTERNAL MEDICINE
Payer: COMMERCIAL

## 2018-08-29 DIAGNOSIS — R60.0 LOWER EXTREMITY EDEMA: ICD-10-CM

## 2018-08-29 DIAGNOSIS — R14.0 ABDOMINAL DISTENSION: ICD-10-CM

## 2018-08-29 PROCEDURE — 76700 US EXAM ABDOM COMPLETE: CPT

## 2018-08-29 NOTE — PROGRESS NOTES
I called. 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  No cause for his edema at this time.  Patient's wife agrees.

## 2018-09-07 ENCOUNTER — TELEPHONE (OUTPATIENT)
Dept: FAMILY MEDICINE | Facility: CLINIC | Age: 83
End: 2018-09-07

## 2018-09-07 LAB
ANION GAP SERPL CALCULATED.3IONS-SCNC: 8 MMOL/L (ref 3–14)
BUN SERPL-MCNC: 16 MG/DL (ref 7–30)
CALCIUM SERPL-MCNC: 8.6 MG/DL (ref 8.5–10.1)
CHLORIDE SERPL-SCNC: 107 MMOL/L (ref 94–109)
CO2 SERPL-SCNC: 25 MMOL/L (ref 20–32)
CREAT SERPL-MCNC: 1.01 MG/DL (ref 0.66–1.25)
GFR SERPL CREATININE-BSD FRML MDRD: 70 ML/MIN/1.7M2
GLUCOSE SERPL-MCNC: 88 MG/DL (ref 70–99)
POTASSIUM SERPL-SCNC: 4 MMOL/L (ref 3.4–5.3)
SODIUM SERPL-SCNC: 140 MMOL/L (ref 133–144)

## 2018-09-07 PROCEDURE — 80048 BASIC METABOLIC PNL TOTAL CA: CPT | Performed by: INTERNAL MEDICINE

## 2018-09-07 NOTE — TELEPHONE ENCOUNTER
Leigha RN with [facility] called.  States lymphedema therapy is seeing him for his swollen legs and applying compression.   States pt has a blister on his leg. Is going to apply bacitracin and gauze daily. Will increase nurse visits to 2 times per week.     FYI to PCP.    Candida Rojas RN  [facility]

## 2018-09-11 ENCOUNTER — TELEPHONE (OUTPATIENT)
Dept: INTERNAL MEDICINE | Facility: CLINIC | Age: 83
End: 2018-09-11

## 2018-09-11 NOTE — TELEPHONE ENCOUNTER
Reason for Call:  Home Health Care    Leigha with [facility] called regarding (reason for call): order for wound care    Orders are needed for this patient.     PT: NA    OT: NA    Skilled Nursing: Patient has psoriasis and has open wounds on both legs. They are requesting an order for wound care. Needs dressing with gauze with adaptic covered with curlex and ace wrap. Verbal order ok    Pt Provider: Ruddy    Phone Number Homecare Nurse can be reached at: [phone]    Can we leave a detailed message on this number? YES    Phone number patient can be reached at: Home number on file [phone] (home)    Best Time: ASAP    Call taken on 9/11/2018 at 2:04 PM by Eri Matos

## 2018-09-11 NOTE — TELEPHONE ENCOUNTER
Leigha RN called back and left message on RN hotline stating she had called on 2 different patients and their messages were mixed up. This is the information regarding this pt:  Eri Matos       9/11/18 2:07 PM   Note      Reason for Call:  Home Health Care     Leigha with [facility] called regarding (reason for call): Medication request     Orders are needed for this patient. Patient is having back pain at night, and is unable to elevate feet. He is taking tylenol but it is not working. Home care states patient requesting stronger medication so he can rest at night. Please advise     PT: NA     OT: NA     Skilled Nursing: NA     Pt Provider: Srinath     Phone Number Homecare Nurse can be reached at: [phone]     Can we leave a detailed message on this number? YES     Phone number patient can be reached at: Home number on file [phone] (home)     Best Time: ASAP     Call taken on 9/11/2018 at 2:08 PM by Eri Matos

## 2018-09-11 NOTE — TELEPHONE ENCOUNTER
Called and left detailed message for kiley advising okay for requested home care sound orders. Advised to call back RN hotline with questions.   Tiffany Eng RN

## 2018-09-11 NOTE — TELEPHONE ENCOUNTER
Left message for Leigha RN from [facility] to call back to RN hotline at [phone] to further discuss his sx. May need to be seen to discuss back pain and treatment options.    Candida Rojas RN  [facility]

## 2018-09-12 LAB
DLCOUNC-%PRED-PRE: 92 %
DLCOUNC-PRE: 18.5 ML/MIN/MMHG
DLCOUNC-PRED: 20.08 ML/MIN/MMHG
ERV-%PRED-PRE: 188 %
ERV-PRE: 0.45 L
ERV-PRED: 0.24 L
EXPTIME-PRE: 7.5 SEC
FEF2575-%PRED-PRE: 199 %
FEF2575-PRE: 3.15 L/SEC
FEF2575-PRED: 1.58 L/SEC
FEFMAX-%PRED-PRE: 121 %
FEFMAX-PRE: 6.3 L/SEC
FEFMAX-PRED: 5.18 L/SEC
FEV1-%PRED-PRE: 110 %
FEV1-PRE: 2.49 L
FEV1FEV6-PRE: 86 %
FEV1FEV6-PRED: 75 %
FEV1FVC-PRE: 86 %
FEV1FVC-PRED: 70 %
FEV1SVC-PRE: 83 %
FEV1SVC-PRED: 66 %
FIFMAX-PRE: 4.55 L/SEC
FRCPLETH-%PRED-PRE: 80 %
FRCPLETH-PRE: 2.84 L
FRCPLETH-PRED: 3.52 L
FVC-%PRED-PRE: 95 %
FVC-PRE: 2.88 L
FVC-PRED: 3.03 L
IC-%PRED-PRE: 79 %
IC-PRE: 2.53 L
IC-PRED: 3.18 L
RVPLETH-%PRED-PRE: 84 %
RVPLETH-PRE: 2.39 L
RVPLETH-PRED: 2.82 L
TLCPLETH-%PRED-PRE: 89 %
TLCPLETH-PRE: 5.37 L
TLCPLETH-PRED: 6.01 L
VA-%PRED-PRE: 78 %
VA-PRE: 4.53 L
VC-%PRED-PRE: 87 %
VC-PRE: 2.98 L
VC-PRED: 3.42 L

## 2018-09-13 NOTE — TELEPHONE ENCOUNTER
Per Leigha, patient advised to elevate legs at night but after a couple of hours, he complains of back pain and cannot tolerate elevating legs.  She stated that back pain is not new but OTC tylenol is not helping so daughter requested prescription meds or something stronger  Leigha will advise patient to be seen    Edwin Weiner RN

## 2018-09-25 DIAGNOSIS — Z53.9 DIAGNOSIS NOT YET DEFINED: Primary | ICD-10-CM

## 2018-09-25 PROCEDURE — G0179 MD RECERTIFICATION HHA PT: HCPCS | Performed by: INTERNAL MEDICINE

## 2018-10-02 ENCOUNTER — OFFICE VISIT (OUTPATIENT)
Dept: INTERNAL MEDICINE | Facility: CLINIC | Age: 83
End: 2018-10-02
Payer: COMMERCIAL

## 2018-10-02 VITALS
TEMPERATURE: 95.6 F | RESPIRATION RATE: 18 BRPM | DIASTOLIC BLOOD PRESSURE: 60 MMHG | BODY MASS INDEX: 32.72 KG/M2 | SYSTOLIC BLOOD PRESSURE: 112 MMHG | WEIGHT: 190.6 LBS | HEART RATE: 70 BPM | OXYGEN SATURATION: 99 %

## 2018-10-02 DIAGNOSIS — R06.01 ORTHOPNEA: Primary | ICD-10-CM

## 2018-10-02 DIAGNOSIS — R60.0 PERIPHERAL EDEMA: ICD-10-CM

## 2018-10-02 PROCEDURE — 99213 OFFICE O/P EST LOW 20 MIN: CPT | Performed by: INTERNAL MEDICINE

## 2018-10-02 RX ORDER — FUROSEMIDE 20 MG
20 TABLET ORAL DAILY
COMMUNITY
Start: 2018-07-24 | End: 2019-02-08

## 2018-10-02 ASSESSMENT — PAIN SCALES - GENERAL: PAINLEVEL: NO PAIN (0)

## 2018-10-02 NOTE — PATIENT INSTRUCTIONS
Record your sodium intake for 3 days and call/MyChart me with your totals.  Consider Juxtalite compression stockings.    [facility]    If you have any questions regarding to your visit please contact your care team:     Team Pink:   Clinic Hours Telephone Number   Internal Medicine:  Dr. Sadaf Feliciano, NP 7am-7pm  Monday - Thursday   7am-5pm  Fridays  [phone]  (Appointment scheduling available 24/7)   Urgent Care - [place] - 11am-9pm Monday-Friday Saturday-Sunday- 9am-5pm   [place] - 5pm-9pm Monday-Friday Saturday-Sunday- 9am-5pm  [phone] - [place]  [phone] - [place]       What options do I have for a visit other than an office visit? We offer electronic visits (e-visits) and telephone visits, when medically appropriate.  Please check with your medical insurance to see if these types of visits are covered, as you will be responsible for any charges that are not paid by your insurance.      You can use GreenPal (secure electronic communication) to access to your chart, send your primary care provider a message, or make an appointment. Ask a team member how to get started.     For a price quote for your services, please call our Consumer Price Line at [phone] or our Imaging Cost estimation line at [phone] (for imaging tests).  Katlin Azul MA

## 2018-10-02 NOTE — PROGRESS NOTES
SUBJECTIVE:   Brock Abarca is a 86 year old male who presents to clinic today with Spouse because of:    Chief Complaint   Patient presents with     Shortness of Breath     bilateral leg edema.     Wound Check     6 week follow-up-Left lower leg.     Health Maintenance        HPI    Respiratory  Patient reports for shortness of breath every night. He is not short of breath during the day. Patient would like to sleep in a bed versus sleeping sitting up.     Skin  Patient has had a sore on his left leg for 6 weeks. He has had an OT, PT, and Nurse come to visit him. He is wearing compression socks. He has been told not to eat salt. Patient is not following a low sodium diet.       PROBLEM LIST  Patient Active Problem List    Diagnosis Date Noted     Major depressive disorder, single episode, mild (H) 07/03/2018     Priority: Medium     Hyperlipidemia LDL goal <100 06/27/2017     Priority: Medium     Peripheral edema 06/27/2017     Priority: Medium     Primary hyperparathyroidism (H) 01/24/2017     Priority: Medium     Brain dysfunction 01/05/2016     Priority: Medium     Repeat neuropsych testing needed in 11/2016       History of dacryocystorhinostomy, od; os? 06/20/2015     Priority: Medium     Cerebral infarction (H) 03/14/2014     Priority: Medium     Epiphora 02/09/2013     Priority: Medium     Problem list name updated by automated process. Provider to review and confirm  Imo Update utility       Hypovitaminosis D 11/16/2012     Priority: Medium     Hyperparathyroidism (H) 11/16/2012     Priority: Medium     Imo Update utility       Reticulocytosis 09/14/2012     Priority: Medium     Hypercalcemia 08/31/2012     Priority: Medium     Sees Ben Crane with Endocrinology  Diagnosis updated by automated process. Provider to review and confirm.       Hand paresthesia 08/26/2012     Priority: Medium     [facility] orthopaedics follows him for this.  states he has bilateral hand tingling and was told it may be  "from his neck.  No emg has been done.  Was recommended to have surgery.         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  \"I feel it is ok to continue Plavix for platelet count>85K although this is an arbitrary number\"- per onc.          Fatigue 08/26/2012     Priority: Medium     Memory loss 08/26/2012     Priority: Medium     MCI       Pulmonary embolism (H) 08/24/2012     Priority: Medium     Per oncology 2012 \"history of surgery provoked PE on 5/6/2008 at which time he had a platelet count of 95K prior to being started on warfarin/heparin. He has been adequately anticoagulated for surgery provoked PE and I have no objections to him stopping warfarin. He had been on Plavix prior to the diagnosis of PE, however, for a Hx of TIA, and I have discussed his situation with Dr. Carmona and he recommends that the patient go back on Plavix after d/c of warfarin.\"           BPH (benign prostatic hyperplasia) 08/24/2012     Priority: Medium     Urinary frequency 08/24/2012     Priority: Medium     Hypertension goal BP (blood pressure) < 140/90 08/24/2012     Priority: Medium     Osteoporosis, senile 08/24/2012     Priority: Medium     Started fosamax around 2008 and stopped in 2014.  Restarted in 11/2016       Hypothyroidism 08/24/2012     Priority: Medium     Left foot drop 08/24/2012     Priority: Medium     Since his back surgery.  Has seen neurology for this.  Patient is not aware of final diagnosis.  Referred for orthotics 8/12.       Lip tremor 08/24/2012     Priority: Medium     States has been mentioned to Neurology.  Patient is not aware of diagnosis.       Arthritis      Priority: Medium     Advanced directives, counseling/discussion 09/14/2012     Priority: Low     Discussed advance care planning with patient; information given to patient to review. 9/14/2012           MEDICATIONS  Current Outpatient Prescriptions   Medication Sig Dispense Refill     acetaminophen " (TYLENOL) 325 MG tablet Take 325-650 mg by mouth       cholecalciferol (VITAMIN D3) 5000 UNITS CAPS capsule Take 1 capsule by mouth daily       clopidogrel (PLAVIX) 75 MG tablet TAKE 1 TABLET BY MOUTH EVERY DAY 90 tablet 3     ferrous fumarate 65 mg, [place]. FE,-Vitamin C 125 mg (VITRON C)  MG TABS tablet Take 1 tablet by mouth every other day 45 tablet 1     furosemide (LASIX) 20 MG tablet Take 20 mg by mouth daily       levothyroxine (SYNTHROID/LEVOTHROID) 112 MCG tablet Take 1 tablet (112 mcg) by mouth daily 30 tablet 3     metoprolol succinate (TOPROL-XL) 50 MG 24 hr tablet TAKE 1 TABLET BY MOUTH EVERY DAY 90 tablet 1     order for DME Equipment being ordered: 10-20 mm HG knee high compression stockings 1 Device 0     polyethylene glycol (CLEARLAX) Packet Take 17 g by mouth daily as needed for constipation       sennosides (SENOKOT) 8.6 MG tablet Take 1 tablet by mouth 2 times daily (Patient taking differently: Take 1 tablet by mouth as needed ) 60 tablet 0     simvastatin (ZOCOR) 40 MG tablet TAKE 1 TABLET BY MOUTH EVERY DAY 90 tablet 1     tamsulosin (FLOMAX) 0.4 MG 24 hr capsule Take 1 capsule (0.4 mg) by mouth daily (Patient taking differently: Take 0.4 mg by mouth every evening ) 90 capsule 4     trospium (SANCTURA XR) 60 MG CP24 24 hr capsule Take 1 capsule (60 mg) by mouth daily       Cyanocobalamin (VITAMIN B 12 PO) Take 2,500 mcg by mouth daily.       loratadine (CLARITIN) 10 MG tablet Take 1 tablet (10 mg) by mouth daily       olopatadine (PATANOL) 0.1 % ophthalmic solution Place 1 drop into both eyes 2 times daily (Patient not taking: Reported on 10/2/2018) 5 mL 3     order for DME Equipment being ordered: donut pillow (Patient not taking: Reported on 10/2/2018) 1 Device 0     ORDER FOR DME Equipment being ordered: bilateral wrist splints (Patient not taking: Reported on 10/2/2018) 1 Device 0      ALLERGIES  Allergies   Allergen Reactions     Zolpidem Other (See Comments)     Pt was found  wandering halls during last hospitalization and was confused.   Pt and wife request that pt not receive Ambien.       Reviewed and updated as needed this visit by clinical staff  Tobacco  Allergies  Meds         Reviewed and updated as needed this visit by Provider    JOY  GENERAL:  NEGATIVE for fever, poor appetite, and sleep disruption.  SKIN:  NEGATIVE for rash, hives, and eczema. See HPI above.   EYE:  NEGATIVE for pain, discharge, redness, itching and vision problems.  ENT:  NEGATIVE for ear pain, runny nose, congestion and sore throat.  RESP:  NEGATIVE for cough, wheezing. POSITIVE difficulty breathing. See HPI above.   CARDIAC:  NEGATIVE for chest pain and cyanosis.   GI:  NEGATIVE for vomiting, diarrhea, abdominal pain and constipation.  :  NEGATIVE for urinary problems.  NEURO:  NEGATIVE for headache and weakness. See HPI above.   ALLERGY:  As in Allergy History  MSK:  NEGATIVE for muscle problems and joint problems.    This document serves as a record of the services and decisions personally performed and made by Sadaf Fox MD. It was created on her behalf by Zaida Singh, a trained medical scribe. The creation of this document is based on the provider's statements to the medical scribe.  Zaida Singh 12:34 PM October 2, 2018       OBJECTIVE:     /60  Pulse 70  Temp 95.6  F (35.3  C) (Oral)  Resp 18  Wt 86.5 kg (190 lb 9.6 oz)  SpO2 99%  BMI 32.72 kg/m2  Normalized stature-for-age data not available for patients older than 20 years.  Normalized weight-for-age data not available for patients older than 20 years.  Normalized BMI data available only for age 0 to 20 years.  Normalized stature-for-age data not available for patients older than 20 years.    EXAM:  Constitutional: healthy, alert and no distress   Cardiovascular: negative, . No lifts, heaves, or thrills. RRR. No murmurs, clicks gallops or rub  Respiratory: negative, Percussion normal. Good diaphragmatic excursion. Lungs  clear  Psychiatric: mentation appears normal and affect normal/bright  SKIN: venous stasis changes worse in left than right, 1+ edema bilaterally, otherwise no suspicious lesions or rashes  JOINT/EXTREMITIES: extremities normal- no gross deformities noted, gait normal and normal muscle tone      DIAGNOSTICS: No results found for this or any previous visit (from the past 24 hour(s)).    ASSESSMENT/PLAN:   1. Orthopnea  Discussed Orthopnea symptoms. Will continue current treatment. Offered Nutrition referral. Low salt diet is imperative.  Has had a negative cardiology workup other than LVH.  Diuretics being adjusted.  Negative pulmonary workup as well.     2. Peripheral edema  Evaluated edema. Recommend velcro compression stockings. Directed Patient to record sodium intake and MyChart the record to me. Offered Patient Nutritionist referral, deferred by Patient.     I spent 13 minutes of time with the patient and >50% of it was in education and counseling regarding preventative health.    FOLLOW UP:   Patient Instructions     Record your sodium intake for 3 days and call/MyChart me with your totals.  Consider Juxtalite compression stockings.    [facility]    If you have any questions regarding to your visit please contact your care team:     Team Pink:   Clinic Hours Telephone Number   Internal Medicine:  Dr. Sadaf Feliciano, NP 7am-7pm  Monday - Thursday   7am-5pm  Fridays  [phone]  (Appointment scheduling available 24/7)   Urgent Care - [place] - 11am-9pm Monday-Friday Saturday-Sunday- 9am-5pm   [place] - 5pm-9pm Monday-Friday Saturday-Sunday- 9am-5pm  [phone] - [place]  [phone] - [place]       What options do I have for a visit other than an office visit? We offer electronic visits (e-visits) and telephone visits, when medically appropriate.  Please check with your medical insurance to see if these types of visits  are covered, as you will be responsible for any charges that are not paid by your insurance.      You can use Vitrue (secure electronic communication) to access to your chart, send your primary care provider a message, or make an appointment. Ask a team member how to get started.     For a price quote for your services, please call our Consumer Price Line at [phone] or our Imaging Cost estimation line at [phone] (for imaging tests).  Katlin Azul MA        The information in this document, created by the medical scribe for me, accurately reflects the services I personally performed and the decisions made by me. I have reviewed and approved this document for accuracy prior to leaving the patient care area.  October 2, 2018 12:47 PM     Sadaf Fox MD

## 2018-10-02 NOTE — MR AVS SNAPSHOT
After Visit Summary   10/2/2018    Brock Abarca    MRN: [id number]           Patient Information     Date Of Birth          3/1/1932        Visit Information        Provider Department      10/2/2018 12:00 PM Sadaf Fox MD [facility]        Today's Diagnoses     Orthopnea    -  1    Peripheral edema          Care Instructions    Record your sodium intake for 3 days and call/MyChart me with your totals.  Consider Juxtalite compression stockings.    [facility]    If you have any questions regarding to your visit please contact your care team:     Team Pink:   Clinic Hours Telephone Number   Internal Medicine:  Dr. Sadaf Feliciano NP 7am-7pm  Monday - Thursday   7am-5pm  Fridays  [phone]  (Appointment scheduling available 24/7)   Urgent Care - [place] - 11am-9pm Monday-Friday Saturday-Sunday- 9am-5pm   [place] - 5pm-9pm Monday-Friday Saturday-Sunday- 9am-5pm  [phone] - [place]  [phone] - [place]       What options do I have for a visit other than an office visit? We offer electronic visits (e-visits) and telephone visits, when medically appropriate.  Please check with your medical insurance to see if these types of visits are covered, as you will be responsible for any charges that are not paid by your insurance.      You can use InGaugeIt (secure electronic communication) to access to your chart, send your primary care provider a message, or make an appointment. Ask a team member how to get started.     For a price quote for your services, please call our Consumer Price Line at [phone] or our Imaging Cost estimation line at [phone] (for imaging tests).  Katlin Azul MA            Follow-ups after your visit        Your next 10 appointments already scheduled     Oct 08, 2018 11:00 AM CDT   LAB with [facility]   [facility] ([facility]) [id number]  [place] [id number] [phone]           Please do not eat 10-12 hours before your appointment if you are coming in fasting for labs on lipids, cholesterol, or glucose (sugar). This does not apply to pregnant women. Water, hot tea and black coffee (with nothing added) are okay. Do not drink other fluids, diet soda or chew gum.              Who to contact     If you have questions or need follow up information about today's clinic visit or your schedule please contact [facility] directly at [phone].  Normal or non-critical lab and imaging results will be communicated to you by Little Birdhart, letter or phone within 4 business days after the clinic has received the results. If you do not hear from us within 7 days, please contact the clinic through BiOWiSHt or phone. If you have a critical or abnormal lab result, we will notify you by phone as soon as possible.  Submit refill requests through [facility] or call your pharmacy and they will forward the refill request to us. Please allow 3 business days for your refill to be completed.          Additional Information About Your Visit        MyChart Information     [facility] gives you secure access to your electronic health record. If you see a primary care provider, you can also send messages to your care team and make appointments. If you have questions, please call your primary care clinic.  If you do not have a primary care provider, please call [phone] and they will assist you.        Care EveryWhere ID     This is your Care EveryWhere ID. This could be used by other organizations to access your [facility] medical records  [id number]        Your Vitals Were     Pulse Temperature Respirations Pulse Oximetry BMI (Body Mass Index)       70 95.6  F (35.3  C) (Oral) 18 99% 32.72 kg/m2        Blood Pressure from Last 3 Encounters:   10/02/18 112/60   08/17/18 116/58   08/01/18 119/69    Weight from Last 3 Encounters:   10/02/18 190 lb 9.6  oz (86.5 kg)   08/17/18 186 lb (84.4 kg)   08/01/18 184 lb 12.8 oz (83.8 kg)              Today, you had the following     No orders found for display         Today's Medication Changes          These changes are accurate as of 10/2/18 12:48 PM.  If you have any questions, ask your nurse or doctor.               These medicines have changed or have updated prescriptions.        Dose/Directions    sennosides 8.6 MG tablet   Commonly known as:  SENOKOT   This may have changed:    - when to take this  - reasons to take this   Used for:  Drug-induced constipation        Dose:  1 tablet   Take 1 tablet by mouth 2 times daily   Quantity:  60 tablet   Refills:  0       tamsulosin 0.4 MG capsule   Commonly known as:  FLOMAX   This may have changed:  when to take this   Used for:  Urinary frequency        Dose:  0.4 mg   Take 1 capsule (0.4 mg) by mouth daily   Quantity:  90 capsule   Refills:  4                Primary Care Provider Office Phone # Fax #    Sadaf Fox [phone]       [address] [facility] [address] [address]        Equal Access to Services     [facility]: Hadii emmanuelle ku hadasho Sosiva, waaxda luqadaha, qaybta kaalmada adecheli, tamela hartman . So [facility] [phone].    ATENCIÓN: Si habla español, tiene a aranda disposición servicios gratuitos de asistencia lingüística. [facility] [phone].    We comply with applicable federal civil rights laws and [place] laws. We do not discriminate on the basis of race, color, national origin, age, disability, sex, sexual orientation, or gender identity.            Thank you!     Thank you for choosing [facility]  for your care. Our goal is always to provide you with excellent care. Hearing back from our patients is one way we can continue to improve our services. Please take a few minutes to complete the written survey that you may receive in the mail after your visit with us. Thank you!             Your  Updated Medication List - Protect others around you: Learn how to safely use, store and throw away your medicines at www.disposemymeds.org.          This list is accurate as of 10/2/18 12:48 PM.  Always use your most recent med list.                   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)       olopatadine 0.1 % ophthalmic solution    PATANOL    5 mL    Place 1 drop into both eyes 2 times daily    Chronic allergic conjunctivitis       order for DME     1 Device    Equipment being ordered: bilateral wrist splints    Hand paresthesia       order for DME     1 Device    Equipment being ordered: donut pillow    Decubitus ulcer of right buttock, stage 2       order for DME     1 Device    Equipment being ordered: 10-20 mm HG knee high compression stockings    Acute diastolic heart failure (H)        sennosides 8.6 MG tablet    SENOKOT    60 tablet    Take 1 tablet by mouth 2 times daily    Drug-induced constipation       simvastatin 40 MG tablet    ZOCOR    90 tablet    TAKE 1 TABLET BY MOUTH EVERY DAY    Hyperlipidemia LDL goal <70       tamsulosin 0.4 MG capsule    FLOMAX    90 capsule    Take 1 capsule (0.4 mg) by mouth daily    Urinary frequency       trospium 60 MG Cp24 24 hr capsule    SANCTURA XR     Take 1 capsule (60 mg) by mouth daily    Low hemoglobin       VITAMIN B 12 PO      Take 2,500 mcg by mouth daily.

## 2018-10-03 ASSESSMENT — PATIENT HEALTH QUESTIONNAIRE - PHQ9: SUM OF ALL RESPONSES TO PHQ QUESTIONS 1-9: 3

## 2018-10-08 DIAGNOSIS — E03.9 HYPOTHYROIDISM, UNSPECIFIED TYPE: ICD-10-CM

## 2018-10-08 LAB — TSH SERPL DL<=0.005 MIU/L-ACNC: 2.79 MU/L (ref 0.4–4)

## 2018-10-08 PROCEDURE — 36415 COLL VENOUS BLD VENIPUNCTURE: CPT | Performed by: INTERNAL MEDICINE

## 2018-10-08 PROCEDURE — 84443 ASSAY THYROID STIM HORMONE: CPT | Performed by: INTERNAL MEDICINE

## 2018-10-09 ENCOUNTER — TELEPHONE (OUTPATIENT)
Dept: INTERNAL MEDICINE | Facility: CLINIC | Age: 83
End: 2018-10-09

## 2018-10-09 NOTE — TELEPHONE ENCOUNTER
Reason for Call:  Home Health Care    Leigha with [facility] called regarding (reason for call): Skilled nursing order    Orders are needed for this patient. Skilled nursing    PT: NA    OT: NA    Skilled Nursing: Extend skilled nursing weekly visits until 10/31/18 for assessment and treatment    Pt Provider: Ruddy    Phone Number Homecare Nurse can be reached at: [phone]    Can we leave a detailed message on this number? YES    Phone number patient can be reached at: Home number on file [phone] (home)    Best Time: ASAP    Call taken on 10/9/2018 at 2:51 PM by Eri Matos

## 2018-10-09 NOTE — TELEPHONE ENCOUNTER
Called Leigha RN with verbal ok for orders requested per RN protocol.    Candida Rojas RN  [facility] [place]

## 2018-10-30 ENCOUNTER — TELEPHONE (OUTPATIENT)
Dept: INTERNAL MEDICINE | Facility: CLINIC | Age: 83
End: 2018-10-30

## 2018-10-30 NOTE — TELEPHONE ENCOUNTER
Reason for Call:  Home Health Care    Leigha with [facility] called regarding (reason for call): orders    Orders are needed for this patient. Skilled Nursing    PT: PREMA    OT: NA    Skilled Nursin week 1; 2 week 1; 1 week 4; 3 as needed for assessment, teaching and wound care to continue with wound care     Pt Provider: Dr. Fox    Phone Number Homecare Nurse can be reached at: [phone]    Can we leave a detailed message on this number? YES    Phone number patient can be reached at: Other phone number:  [phone]*    Best Time: Recertification done today     Call taken on 10/30/2018 at 12:43 PM by Trina Alexandra

## 2018-11-12 DIAGNOSIS — E03.9 HYPOTHYROIDISM, UNSPECIFIED TYPE: ICD-10-CM

## 2018-11-12 RX ORDER — LEVOTHYROXINE SODIUM 112 UG/1
112 TABLET ORAL DAILY
Qty: 30 TABLET | Refills: 5 | Status: SHIPPED | OUTPATIENT
Start: 2018-11-12 | End: 2019-03-04

## 2018-11-12 NOTE — TELEPHONE ENCOUNTER
Requested Prescriptions   Pending Prescriptions Disp Refills     levothyroxine (SYNTHROID/LEVOTHROID) 112 MCG tablet 30 tablet 3     Sig: Take 1 tablet (112 mcg) by mouth daily    There is no refill protocol information for this order        Last Written Prescription Date:8/15/2018  Last Fill Quantity: 30,  # refills: 3   Last office visit: 4/30/2018 with prescribing provider:  Uri An Office Visit:    Requesting 90 day supply

## 2018-11-19 DIAGNOSIS — E78.5 HYPERLIPIDEMIA LDL GOAL <70: ICD-10-CM

## 2018-11-19 DIAGNOSIS — Z53.9 DIAGNOSIS NOT YET DEFINED: Primary | ICD-10-CM

## 2018-11-19 PROCEDURE — G0179 MD RECERTIFICATION HHA PT: HCPCS | Performed by: INTERNAL MEDICINE

## 2018-11-19 RX ORDER — SIMVASTATIN 40 MG
40 TABLET ORAL DAILY
Qty: 90 TABLET | Refills: 3 | Status: SHIPPED | OUTPATIENT
Start: 2018-11-19 | End: 2019-09-10

## 2018-12-14 ENCOUNTER — TELEPHONE (OUTPATIENT)
Dept: FAMILY MEDICINE | Facility: CLINIC | Age: 83
End: 2018-12-14

## 2018-12-14 NOTE — TELEPHONE ENCOUNTER
Spoke to patient and scheduled appointment for 12/21/2018.  Candida ADRIAN CMA ([facility])

## 2018-12-14 NOTE — TELEPHONE ENCOUNTER
Reason for call:  Other   Patient called regarding (reason for call): appointment  Additional comments: Wife is calling. Would like to schedule an ear wash visit. Please call.    Phone number to reach patient:  Home number on file [phone] (home)    Best Time:  any    Can we leave a detailed message on this number?  YES

## 2018-12-21 ENCOUNTER — OFFICE VISIT (OUTPATIENT)
Dept: FAMILY MEDICINE | Facility: CLINIC | Age: 83
End: 2018-12-21
Payer: COMMERCIAL

## 2018-12-21 VITALS
HEIGHT: 64 IN | TEMPERATURE: 96.6 F | RESPIRATION RATE: 20 BRPM | OXYGEN SATURATION: 100 % | DIASTOLIC BLOOD PRESSURE: 64 MMHG | WEIGHT: 200.2 LBS | HEART RATE: 76 BPM | SYSTOLIC BLOOD PRESSURE: 112 MMHG | BODY MASS INDEX: 34.18 KG/M2

## 2018-12-21 DIAGNOSIS — H61.23 BILATERAL IMPACTED CERUMEN: Primary | ICD-10-CM

## 2018-12-21 PROCEDURE — 69209 REMOVE IMPACTED EAR WAX UNI: CPT | Mod: 50 | Performed by: NURSE PRACTITIONER

## 2018-12-21 ASSESSMENT — MIFFLIN-ST. JEOR: SCORE: 1499.1

## 2018-12-21 NOTE — PROGRESS NOTES
SUBJECTIVE:   Brock Abarca is a 86 year old male who presents to clinic today for the following health issues:    Patient presents with:  Ear Fullness: requesting ear wash  Health Maintenance: Due for DAP and advanced care planning    Patient was seen by audiology and was noted to have bilateral cerumen impaction.  He would like his ears cleaned to improve hearing.  He denies additional HEENT symptoms.    Problem list and histories reviewed & adjusted, as indicated.  Additional history: as documented    Patient Active Problem List   Diagnosis     Arthritis     Pulmonary embolism (H)     BPH (benign prostatic hyperplasia)     Urinary frequency     Hypertension goal BP (blood pressure) < 140/90     Osteoporosis, senile     Hypothyroidism     Left foot drop     Lip tremor     Hand paresthesia     Thrombocytopenia (H)     Fatigue     Memory loss     Hypercalcemia     Advanced directives, counseling/discussion     Reticulocytosis     Hypovitaminosis D     Hyperparathyroidism (H)     Epiphora     Cerebral infarction (H)     History of dacryocystorhinostomy, od; os?     Brain dysfunction     Primary hyperparathyroidism (H)     Hyperlipidemia LDL goal <100     Peripheral edema     Major depressive disorder, single episode, mild (H)     Past Surgical History:   Procedure Laterality Date     APPENDECTOMY       BACK SURGERY       CATARACT IOL, RT/LT      s/p cataract surgery both eyes     Cystoscopy, left ureteroscopy, holium laser lithotripsy, stent exchange  1/23/17     Cystoscopy, left urethral stone removed, stent placement  1/5/17     DACRYOCYSTORHINOSTOMY BILATERAL  2013    right eye; left eye also?     EXTRACORPOREAL SHOCK WAVE LITHOTRIPSY, CYSTOSCOPY, INSERT STENT URETER(S), COMBINED       HEAD & NECK SURGERY       ORTHOPEDIC SURGERY       PARATHYROIDECTOMY N/A 2/14/2017    Procedure: PARATHYROIDECTOMY;  Surgeon: Genesis Dixon MD;  Location:  OR       Social History     Tobacco Use     Smoking  status: Never Smoker     Smokeless tobacco: Never Used   Substance Use Topics     Alcohol use: Yes     Comment: occ.     Family History   Problem Relation Age of Onset     Cerebrovascular Disease Father      Heart Disease Sister          Current Outpatient Medications   Medication Sig Dispense Refill     acetaminophen (TYLENOL) 325 MG tablet Take 325-650 mg by mouth       cholecalciferol (VITAMIN D3) 5000 UNITS CAPS capsule Take 1 capsule by mouth daily       clopidogrel (PLAVIX) 75 MG tablet TAKE 1 TABLET BY MOUTH EVERY DAY 90 tablet 3     Cyanocobalamin (VITAMIN B 12 PO) Take 2,500 mcg by mouth daily.       ferrous fumarate 65 mg, [place]. FE,-Vitamin C 125 mg (VITRON C)  MG TABS tablet Take 1 tablet by mouth every other day 45 tablet 1     furosemide (LASIX) 20 MG tablet Take 20 mg by mouth daily       levothyroxine (SYNTHROID/LEVOTHROID) 112 MCG tablet Take 1 tablet (112 mcg) by mouth daily 30 tablet 5     loratadine (CLARITIN) 10 MG tablet Take 1 tablet (10 mg) by mouth daily       metoprolol succinate (TOPROL-XL) 50 MG 24 hr tablet TAKE 1 TABLET BY MOUTH EVERY DAY 90 tablet 1     olopatadine (PATANOL) 0.1 % ophthalmic solution Place 1 drop into both eyes 2 times daily 5 mL 3     order for DME Equipment being ordered: donut pillow 1 Device 0     order for DME Equipment being ordered: 10-20 mm HG knee high compression stockings 1 Device 0     ORDER FOR DME Equipment being ordered: bilateral wrist splints 1 Device 0     polyethylene glycol (CLEARLAX) Packet Take 17 g by mouth daily as needed for constipation       sennosides (SENOKOT) 8.6 MG tablet Take 1 tablet by mouth 2 times daily (Patient taking differently: Take 1 tablet by mouth as needed ) 60 tablet 0     simvastatin (ZOCOR) 40 MG tablet Take 1 tablet (40 mg) by mouth daily 90 tablet 3     tamsulosin (FLOMAX) 0.4 MG 24 hr capsule Take 1 capsule (0.4 mg) by mouth daily (Patient taking differently: Take 0.4 mg by mouth every evening ) 90 capsule 4      "trospium (SANCTURA XR) 60 MG CP24 24 hr capsule Take 1 capsule (60 mg) by mouth daily       Allergies   Allergen Reactions     Zolpidem Other (See Comments)     Pt was found wandering halls during last hospitalization and was confused.   Pt and wife request that pt not receive Ambien.     BP Readings from Last 3 Encounters:   12/21/18 112/64   10/02/18 112/60   08/17/18 116/58    Wt Readings from Last 3 Encounters:   12/21/18 90.8 kg (200 lb 3.2 oz)   10/02/18 86.5 kg (190 lb 9.6 oz)   08/17/18 84.4 kg (186 lb)                  Labs reviewed in EPIC    Reviewed and updated as needed this visit by clinical staff       Reviewed and updated as needed this visit by Provider         ROS:  Constitutional, HEENT, cardiovascular, pulmonary, gi and gu systems are negative, except as otherwise noted.    OBJECTIVE:     /64   Pulse 76   Temp 96.6  F (35.9  C) (Oral)   Resp 20   Ht 1.626 m (5' 4\")   Wt 90.8 kg (200 lb 3.2 oz)   SpO2 100%   BMI 34.36 kg/m    Body mass index is 34.36 kg/m .  GENERAL: healthy, alert and no distress  HENT: normal cephalic/atraumatic and both ears: occluded with wax, normal TM's bilaterally after ear wash completed.  MS: no gross musculoskeletal defects noted, no edema    Diagnostic Test Results:  none     ASSESSMENT/PLAN:     1. Bilateral impacted cerumen  Cerumenosis is noted.  Wax is removed by syringing and manual debridement. Instructions for home care to prevent wax buildup are given.    - REMOVE IMPACTED CERUMEN    FUTURE APPOINTMENTS:       - Follow-up for annual visit or as needed    LORI Leach CNP  [facility]    "

## 2018-12-31 DIAGNOSIS — I63.20: ICD-10-CM

## 2018-12-31 NOTE — TELEPHONE ENCOUNTER
Requested Prescriptions   Pending Prescriptions Disp Refills     metoprolol succinate ER (TOPROL-XL) 50 MG 24 hr tablet 90 tablet 1     Sig: Take 1 tablet (50 mg) by mouth daily    There is no refill protocol information for this order        Last Written Prescription Date:  6/20/2018  Last Fill Quantity: 90,  # refills: 1   Last office visit: 12/21/2018 with prescribing provider:  Uri An Office Visit:

## 2019-01-01 ENCOUNTER — HEALTH MAINTENANCE LETTER (OUTPATIENT)
Age: 84
End: 2019-01-01

## 2019-01-01 ENCOUNTER — TRANSFERRED RECORDS (OUTPATIENT)
Dept: HEALTH INFORMATION MANAGEMENT | Facility: CLINIC | Age: 84
End: 2019-01-01

## 2019-01-01 ENCOUNTER — PATIENT OUTREACH (OUTPATIENT)
Dept: CARE COORDINATION | Facility: CLINIC | Age: 84
End: 2019-01-01

## 2019-01-01 ENCOUNTER — TELEPHONE (OUTPATIENT)
Dept: FAMILY MEDICINE | Facility: CLINIC | Age: 84
End: 2019-01-01

## 2019-01-01 ENCOUNTER — OFFICE VISIT (OUTPATIENT)
Dept: INTERNAL MEDICINE | Facility: CLINIC | Age: 84
End: 2019-01-01
Payer: COMMERCIAL

## 2019-01-01 VITALS
HEART RATE: 83 BPM | RESPIRATION RATE: 16 BRPM | WEIGHT: 197.2 LBS | BODY MASS INDEX: 33.85 KG/M2 | SYSTOLIC BLOOD PRESSURE: 108 MMHG | DIASTOLIC BLOOD PRESSURE: 68 MMHG | TEMPERATURE: 96.8 F | OXYGEN SATURATION: 96 %

## 2019-01-01 DIAGNOSIS — N18.30 CKD (CHRONIC KIDNEY DISEASE) STAGE 3, GFR 30-59 ML/MIN (H): ICD-10-CM

## 2019-01-01 DIAGNOSIS — G31.84 MILD COGNITIVE IMPAIRMENT WITH MEMORY LOSS: Primary | ICD-10-CM

## 2019-01-01 DIAGNOSIS — F41.9 ANXIETY: ICD-10-CM

## 2019-01-01 DIAGNOSIS — G47.00 INSOMNIA, UNSPECIFIED TYPE: ICD-10-CM

## 2019-01-01 DIAGNOSIS — R68.2 DRY MOUTH: ICD-10-CM

## 2019-01-01 DIAGNOSIS — R42 DIZZINESS: ICD-10-CM

## 2019-01-01 DIAGNOSIS — I50.32 CHRONIC HEART FAILURE WITH PRESERVED EJECTION FRACTION (H): ICD-10-CM

## 2019-01-01 DIAGNOSIS — F51.04 PSYCHOPHYSIOLOGICAL INSOMNIA: ICD-10-CM

## 2019-01-01 DIAGNOSIS — G47.00 INSOMNIA, UNSPECIFIED TYPE: Primary | ICD-10-CM

## 2019-01-01 DIAGNOSIS — E78.5 HYPERLIPIDEMIA LDL GOAL <70: ICD-10-CM

## 2019-01-01 LAB
ALT SERPL W P-5'-P-CCNC: 20 U/L (ref 0–70)
ANION GAP SERPL CALCULATED.3IONS-SCNC: 6 MMOL/L (ref 3–14)
BUN SERPL-MCNC: 28 MG/DL (ref 7–30)
CALCIUM SERPL-MCNC: 9.6 MG/DL (ref 8.5–10.1)
CHLORIDE SERPL-SCNC: 104 MMOL/L (ref 94–109)
CHOLEST SERPL-MCNC: 147 MG/DL
CO2 SERPL-SCNC: 33 MMOL/L (ref 20–32)
CREAT SERPL-MCNC: 1.33 MG/DL (ref 0.66–1.25)
EJECTION FRACTION: 68 %
GFR SERPL CREATININE-BSD FRML MDRD: 47 ML/MIN/{1.73_M2}
GLUCOSE SERPL-MCNC: 107 MG/DL (ref 70–99)
HDLC SERPL-MCNC: 43 MG/DL
LDLC SERPL CALC-MCNC: 55 MG/DL
NONHDLC SERPL-MCNC: 104 MG/DL
POTASSIUM SERPL-SCNC: 3.8 MMOL/L (ref 3.4–5.3)
SODIUM SERPL-SCNC: 143 MMOL/L (ref 133–144)
TRIGL SERPL-MCNC: 247 MG/DL
TSH SERPL DL<=0.005 MIU/L-ACNC: 1.5 MU/L (ref 0.4–4)

## 2019-01-01 PROCEDURE — 80061 LIPID PANEL: CPT | Performed by: INTERNAL MEDICINE

## 2019-01-01 PROCEDURE — 84460 ALANINE AMINO (ALT) (SGPT): CPT | Performed by: INTERNAL MEDICINE

## 2019-01-01 PROCEDURE — 99214 OFFICE O/P EST MOD 30 MIN: CPT | Performed by: INTERNAL MEDICINE

## 2019-01-01 PROCEDURE — 80048 BASIC METABOLIC PNL TOTAL CA: CPT | Performed by: INTERNAL MEDICINE

## 2019-01-01 PROCEDURE — 84443 ASSAY THYROID STIM HORMONE: CPT | Performed by: INTERNAL MEDICINE

## 2019-01-01 PROCEDURE — 36415 COLL VENOUS BLD VENIPUNCTURE: CPT | Performed by: INTERNAL MEDICINE

## 2019-01-01 RX ORDER — MIRTAZAPINE 7.5 MG/1
7.5 TABLET, FILM COATED ORAL AT BEDTIME
Qty: 90 TABLET | Refills: 1 | Status: SHIPPED | OUTPATIENT
Start: 2019-01-01 | End: 2020-01-01

## 2019-01-01 RX ORDER — MIRTAZAPINE 7.5 MG/1
TABLET, FILM COATED ORAL
Qty: 30 TABLET | Refills: 1 | COMMUNITY
Start: 2019-01-01 | End: 2020-01-01

## 2019-01-01 RX ORDER — MIRTAZAPINE 15 MG/1
15 TABLET, FILM COATED ORAL AT BEDTIME
Qty: 30 TABLET | Refills: 1 | Status: CANCELLED | OUTPATIENT
Start: 2019-01-01

## 2019-01-01 ASSESSMENT — ANXIETY QUESTIONNAIRES
2. NOT BEING ABLE TO STOP OR CONTROL WORRYING: NOT AT ALL
6. BECOMING EASILY ANNOYED OR IRRITABLE: NOT AT ALL
5. BEING SO RESTLESS THAT IT IS HARD TO SIT STILL: NOT AT ALL
7. FEELING AFRAID AS IF SOMETHING AWFUL MIGHT HAPPEN: NOT AT ALL
1. FEELING NERVOUS, ANXIOUS, OR ON EDGE: NOT AT ALL
IF YOU CHECKED OFF ANY PROBLEMS ON THIS QUESTIONNAIRE, HOW DIFFICULT HAVE THESE PROBLEMS MADE IT FOR YOU TO DO YOUR WORK, TAKE CARE OF THINGS AT HOME, OR GET ALONG WITH OTHER PEOPLE: SOMEWHAT DIFFICULT
GAD7 TOTAL SCORE: 1
3. WORRYING TOO MUCH ABOUT DIFFERENT THINGS: NOT AT ALL
GAD7 TOTAL SCORE: 1

## 2019-01-01 ASSESSMENT — PATIENT HEALTH QUESTIONNAIRE - PHQ9
5. POOR APPETITE OR OVEREATING: SEVERAL DAYS
SUM OF ALL RESPONSES TO PHQ QUESTIONS 1-9: 16
SUM OF ALL RESPONSES TO PHQ QUESTIONS 1-9: 10

## 2019-01-02 RX ORDER — METOPROLOL SUCCINATE 50 MG/1
50 TABLET, EXTENDED RELEASE ORAL DAILY
Qty: 90 TABLET | Refills: 1 | Status: SHIPPED | OUTPATIENT
Start: 2019-01-02 | End: 2019-06-28

## 2019-01-03 ENCOUNTER — TRANSFERRED RECORDS (OUTPATIENT)
Dept: HEALTH INFORMATION MANAGEMENT | Facility: CLINIC | Age: 84
End: 2019-01-03

## 2019-02-08 ENCOUNTER — OFFICE VISIT (OUTPATIENT)
Dept: INTERNAL MEDICINE | Facility: CLINIC | Age: 84
End: 2019-02-08
Payer: COMMERCIAL

## 2019-02-08 VITALS
HEART RATE: 87 BPM | HEIGHT: 64 IN | TEMPERATURE: 97 F | OXYGEN SATURATION: 93 % | SYSTOLIC BLOOD PRESSURE: 104 MMHG | BODY MASS INDEX: 33.19 KG/M2 | WEIGHT: 194.4 LBS | RESPIRATION RATE: 18 BRPM | DIASTOLIC BLOOD PRESSURE: 60 MMHG

## 2019-02-08 DIAGNOSIS — J06.9 UPPER RESPIRATORY TRACT INFECTION, UNSPECIFIED TYPE: Primary | ICD-10-CM

## 2019-02-08 DIAGNOSIS — H60.543 ECZEMA OF BOTH EXTERNAL EARS: ICD-10-CM

## 2019-02-08 DIAGNOSIS — I27.82 OTHER CHRONIC PULMONARY EMBOLISM WITHOUT ACUTE COR PULMONALE (H): ICD-10-CM

## 2019-02-08 DIAGNOSIS — I50.30 HEART FAILURE WITH PRESERVED EJECTION FRACTION (H): ICD-10-CM

## 2019-02-08 DIAGNOSIS — E61.1 IRON DEFICIENCY: ICD-10-CM

## 2019-02-08 DIAGNOSIS — D69.6 THROMBOCYTOPENIA (H): ICD-10-CM

## 2019-02-08 PROBLEM — Z86.73 H/O: CVA (CEREBROVASCULAR ACCIDENT): Status: ACTIVE | Noted: 2017-01-04

## 2019-02-08 LAB
BASOPHILS # BLD AUTO: 0 10E9/L (ref 0–0.2)
BASOPHILS NFR BLD AUTO: 0.4 %
DIFFERENTIAL METHOD BLD: ABNORMAL
EOSINOPHIL # BLD AUTO: 0.2 10E9/L (ref 0–0.7)
EOSINOPHIL NFR BLD AUTO: 2.9 %
ERYTHROCYTE [DISTWIDTH] IN BLOOD BY AUTOMATED COUNT: 13.9 % (ref 10–15)
FERRITIN SERPL-MCNC: 103 NG/ML (ref 26–388)
HCT VFR BLD AUTO: 46.3 % (ref 40–53)
HGB BLD-MCNC: 16 G/DL (ref 13.3–17.7)
IRON SATN MFR SERPL: 27 % (ref 15–46)
IRON SERPL-MCNC: 80 UG/DL (ref 35–180)
LYMPHOCYTES # BLD AUTO: 1.1 10E9/L (ref 0.8–5.3)
LYMPHOCYTES NFR BLD AUTO: 14 %
MCH RBC QN AUTO: 32.3 PG (ref 26.5–33)
MCHC RBC AUTO-ENTMCNC: 34.6 G/DL (ref 31.5–36.5)
MCV RBC AUTO: 93 FL (ref 78–100)
MONOCYTES # BLD AUTO: 0.7 10E9/L (ref 0–1.3)
MONOCYTES NFR BLD AUTO: 9.5 %
NEUTROPHILS # BLD AUTO: 5.7 10E9/L (ref 1.6–8.3)
NEUTROPHILS NFR BLD AUTO: 73.2 %
PLATELET # BLD AUTO: 101 10E9/L (ref 150–450)
RBC # BLD AUTO: 4.96 10E12/L (ref 4.4–5.9)
TIBC SERPL-MCNC: 294 UG/DL (ref 240–430)
WBC # BLD AUTO: 7.8 10E9/L (ref 4–11)

## 2019-02-08 PROCEDURE — 99214 OFFICE O/P EST MOD 30 MIN: CPT | Performed by: INTERNAL MEDICINE

## 2019-02-08 PROCEDURE — 82728 ASSAY OF FERRITIN: CPT | Performed by: INTERNAL MEDICINE

## 2019-02-08 PROCEDURE — 36415 COLL VENOUS BLD VENIPUNCTURE: CPT | Performed by: INTERNAL MEDICINE

## 2019-02-08 PROCEDURE — 83550 IRON BINDING TEST: CPT | Performed by: INTERNAL MEDICINE

## 2019-02-08 PROCEDURE — 85025 COMPLETE CBC W/AUTO DIFF WBC: CPT | Performed by: INTERNAL MEDICINE

## 2019-02-08 PROCEDURE — 83540 ASSAY OF IRON: CPT | Performed by: INTERNAL MEDICINE

## 2019-02-08 RX ORDER — TRIAMCINOLONE ACETONIDE 1 MG/G
CREAM TOPICAL 2 TIMES DAILY
Qty: 30 G | Refills: 0 | Status: SHIPPED | OUTPATIENT
Start: 2019-02-08 | End: 2019-04-30

## 2019-02-08 RX ORDER — BENZONATATE 100 MG/1
100 CAPSULE ORAL 3 TIMES DAILY PRN
Qty: 30 CAPSULE | Refills: 1 | Status: SHIPPED | OUTPATIENT
Start: 2019-02-08 | End: 2019-04-30

## 2019-02-08 RX ORDER — BUMETANIDE 1 MG/1
1 TABLET ORAL DAILY
COMMUNITY
Start: 2019-01-29 | End: 2019-05-08

## 2019-02-08 RX ORDER — AZITHROMYCIN 250 MG/1
TABLET, FILM COATED ORAL
Qty: 6 TABLET | Refills: 0 | Status: SHIPPED | OUTPATIENT
Start: 2019-02-08 | End: 2019-02-15

## 2019-02-08 ASSESSMENT — MIFFLIN-ST. JEOR: SCORE: 1472.79

## 2019-02-08 ASSESSMENT — PAIN SCALES - GENERAL: PAINLEVEL: NO PAIN (0)

## 2019-02-08 NOTE — PATIENT INSTRUCTIONS
Zpak antibiotic per package directions.  Tessalon Pearles for the cough suppressant.  Triamcinolone cream twice daily behind the ears for 1 week.  If no improvement or worsening, you will need seen back or go to the ER.    [facility]    If you have any questions regarding to your visit please contact your care team:     Team Pink:   Clinic Hours Telephone Number   Internal Medicine:  Dr. Sadaf Feliciano, NP 7am-7pm  Monday - Thursday   7am-5pm  Fridays  [phone]  (Appointment scheduling available 24/7)   Urgent Care - [place] and [facility] - 11am-9pm Monday-Friday Saturday-Sunday- 9am-5pm   [place] - 5pm-9pm Monday-Friday Saturday-Sunday- 9am-5pm  [phone] - [place]  [phone] - [place]       What options do I have for a visit other than an office visit? We offer electronic visits (e-visits) and telephone visits, when medically appropriate.  Please check with your medical insurance to see if these types of visits are covered, as you will be responsible for any charges that are not paid by your insurance.      You can use [employer] (secure electronic communication) to access to your chart, send your primary care provider a message, or make an appointment. Ask a team member how to get started.     For a price quote for your services, please call our Consumer Price Line at [phone] or our Imaging Cost estimation line at [phone] (for imaging tests).    Lauren Urias, CMA

## 2019-02-08 NOTE — PROGRESS NOTES
INTERNAL MEDICINE  SUBJECTIVE:   Brock Abarca is a 86 year old male who presents to clinic today for the following health issues:  He is accompanied by his wife.    Cough  He has had a similiar cough in past years. Has taken robitussin a few times, but it has only helped a little bit. He has some sinus congestion, but not a lot. Feels more like this throat is full of mucous that he is trying to cough up. Denies ear pain, but he does have dry skin behind his ears. Short of breath sometimes but this is largely been unchanged for years.No chest pain, fevers, or chills. No hemoptysis. It has been going on for the last 2 weeks and is likely getting worse.    '  Memory remains impaired.  He is taking his water pill.      He recently stopped his iron tablet when he saw another provider    Problem list and histories reviewed & adjusted, as indicated.  Additional history: as documented    Labs reviewed in EPIC    Reviewed and updated as needed this visit by clinical staff  Tobacco  Allergies  Meds  Problems  Med Hx  Surg Hx  Fam Hx       Reviewed and updated as needed this visit by Provider  Tobacco  Allergies  Problems  Med Hx  Surg Hx  Fam Hx       ROS:  CONSTITUTIONAL: NEGATIVE for fever, chills, change in weight  INTEGUMENTARY/SKIN: NEGATIVE for worrisome rashes, moles or lesions  EYES: NEGATIVE for vision changes or irritation  ENT/MOUTH: NEGATIVE for ear, mouth and throat problems  RESP: NEGATIVE for significant cough or SOB  CV: NEGATIVE for chest pain, palpitations or peripheral edema  GI: NEGATIVE for nausea, abdominal pain, heartburn, or change in bowel habits  : NEGATIVE for frequency, dysuria, or hematuria  MUSCULOSKELETAL: NEGATIVE for significant arthralgias or myalgia  NEURO: NEGATIVE for weakness, dizziness or paresthesias  ENDOCRINE: NEGATIVE for temperature intolerance, skin/hair changes  HEME: NEGATIVE for bleeding problems  PSYCHIATRIC: NEGATIVE for changes in mood or affect    This  "document serves as a record of the services and decisions personally performed and made by Sadaf Fox MD. It was created on her behalf by Lori Whittaker, a trained medical scribe. The creation of this document is based the provider's statements to the medical scribe.  Lori Whittaker, February 8, 2019 10:09 AM     OBJECTIVE:     /60   Pulse 87   Temp 97  F (36.1  C) (Oral)   Resp 18   Ht 1.626 m (5' 4\")   Wt 88.2 kg (194 lb 6.4 oz)   SpO2 93%   BMI 33.37 kg/m    Body mass index is 33.37 kg/m .  GENERAL: healthy, alert and no distress  EYES: Eyes grossly normal to inspection, PERRL and conjunctivae and sclerae normal  HENT: ear canals and TM's normal, nose and mouth without ulcers or lesions  NECK: mild anterior cervical adenopathy on the left, no asymmetry, masses, or scars and thyroid normal to palpation  RESP: lungs clear to auscultation - no rales, rhonchi or wheezes  CV: regular rate and rhythm, normal S1 S2, no S3 or S4, no murmur, click or rub. Trace edema on left and 1+ on right.   MS: no gross musculoskeletal defects noted  SKIN: dry, flaking skin behind bilateral pinna  NEURO: Normal strength and tone, mentation intact and speech normal  PSYCH: mentation appears normal, affect normal/bright    Diagnostic Test Results:  No results found for this or any previous visit (from the past 24 hour(s)).    ASSESSMENT/PLAN:         2. Heart failure with preserved ejection fraction (H)  Stable. Continues on diuretic with mild edema in both ankles. Continue diuretic.     3. Thrombocytopenia (H)  Recheck CBC today. Last check in August, platelet count low at that time.   - CBC with platelets differential    4. 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 Clinical picture today is not that of a pulmonary embolism.     5. Eczema of both external ears  Rash is consistent with eczema. Will prescribe a steroid cream to treat.   - triamcinolone (KENALOG) 0.1 % external cream; Apply topically " 2 times daily for 7 days  Dispense: 30 g; Refill: 0    6. Iron deficiency  Recheck CBC today and other anemia labs. Last labs were in August. He is off iron for unclear period of time.  - CBC with platelets differential  - Ferritin  - Iron and iron binding capacity    7. Upper respiratory tract infection, unspecified type  Possibly bacterial, more likely viral, but possible to turn into a bacterial  infection and he has increased risk of invasive infection due to his age and comordities. Will start him on an antibiotic at this time and give Tessalon for cough. No fever/chills or new short of breath and generally looks well (plus normal exam) so no chest xray at this time.  - azithromycin (ZITHROMAX) 250 MG tablet; Take 2 tablets (500 mg) by mouth daily for 1 day, THEN 1 tablet (250 mg) daily for 4 days.  Dispense: 6 tablet; Refill: 0  - benzonatate (TESSALON) 100 MG capsule; Take 1 capsule (100 mg) by mouth 3 times daily as needed for cough  Dispense: 30 capsule; Refill: 1    Patient Instructions     Zpak antibiotic per package directions.  Tessalon Pearles for the cough suppressant.  Triamcinolone cream twice daily behind the ears for 1 week.  If no improvement or worsening, you will need seen back or go to the ER.    [facility]    If you have any questions regarding to your visit please contact your care team:     Team Pink:   Clinic Hours Telephone Number   Internal Medicine:  Dr. Sadaf Feliciano, NP 7am-7pm  Monday - Thursday   7am-5pm  Fridays  [phone]  (Appointment scheduling available 24/7)   Urgent Care - [place] [facility] - 11am-9pm Monday-Friday Saturday-Sunday- 9am-5pm   [place] - 5pm-9pm Monday-Friday Saturday-Sunday- 9am-5pm  [phone] - [place]  [phone] - [place]       What options do I have for a visit other than an office visit? We offer electronic visits (e-visits) and telephone visits, when medically  appropriate.  Please check with your medical insurance to see if these types of visits are covered, as you will be responsible for any charges that are not paid by your insurance.      You can use [employer] (secure electronic communication) to access to your chart, send your primary care provider a message, or make an appointment. Ask a team member how to get started.     For a price quote for your services, please call our Consumer Price Line at [phone] or our Imaging Cost estimation line at [phone] (for imaging tests).    Lauren Urias CMA      The information in this document, created by the medical scribe, Lori Whittaker, for me, accurately reflects the services I personally performed and the decisions made by me. I have reviewed and approved this document for accuracy.     Sadaf Fox MD  [facility]

## 2019-02-11 ENCOUNTER — TELEPHONE (OUTPATIENT)
Dept: FAMILY MEDICINE | Facility: CLINIC | Age: 84
End: 2019-02-11

## 2019-02-11 ENCOUNTER — OFFICE VISIT (OUTPATIENT)
Dept: INTERNAL MEDICINE | Facility: CLINIC | Age: 84
End: 2019-02-11
Payer: COMMERCIAL

## 2019-02-11 ENCOUNTER — ANCILLARY PROCEDURE (OUTPATIENT)
Dept: GENERAL RADIOLOGY | Facility: CLINIC | Age: 84
End: 2019-02-11
Attending: INTERNAL MEDICINE
Payer: COMMERCIAL

## 2019-02-11 VITALS
HEIGHT: 64 IN | WEIGHT: 192.4 LBS | TEMPERATURE: 97.5 F | RESPIRATION RATE: 24 BRPM | SYSTOLIC BLOOD PRESSURE: 104 MMHG | BODY MASS INDEX: 32.85 KG/M2 | HEART RATE: 83 BPM | OXYGEN SATURATION: 94 % | DIASTOLIC BLOOD PRESSURE: 58 MMHG

## 2019-02-11 DIAGNOSIS — R05.9 COUGH: ICD-10-CM

## 2019-02-11 DIAGNOSIS — R05.9 COUGH: Primary | ICD-10-CM

## 2019-02-11 PROCEDURE — 71046 X-RAY EXAM CHEST 2 VIEWS: CPT

## 2019-02-11 PROCEDURE — 99213 OFFICE O/P EST LOW 20 MIN: CPT | Performed by: INTERNAL MEDICINE

## 2019-02-11 RX ORDER — CODEINE PHOSPHATE AND GUAIFENESIN 10; 100 MG/5ML; MG/5ML
1-2 SOLUTION ORAL EVERY 6 HOURS PRN
Qty: 236 ML | Refills: 0 | Status: SHIPPED | OUTPATIENT
Start: 2019-02-11 | End: 2019-02-25 | Stop reason: ALTCHOICE

## 2019-02-11 ASSESSMENT — PAIN SCALES - GENERAL: PAINLEVEL: NO PAIN (0)

## 2019-02-11 ASSESSMENT — MIFFLIN-ST. JEOR: SCORE: 1463.72

## 2019-02-11 NOTE — TELEPHONE ENCOUNTER
Per Dr. Fox: ok for CXR today and add on at 11:30AM    Patient's wife updated.  She will bring patient in for a CXR now and then see DR. Fox at 1130    Appointment scheduled    Edwin Weiner RN

## 2019-02-11 NOTE — PATIENT INSTRUCTIONS
No evidence of pneumonia on chest xray. Likely a viral cold causing the cough.   Will change to a different cough medicine for night time only.   Cough could take up to 6 weeks for this to resolve.     If you start to develop fevers, chills, significant worsening  of symptoms, then you should be seen again.     You can try over the counter remedies as well, hot steam, Srinivasan's, hot tea or soup, honey.    [facility]    If you have any questions regarding to your visit please contact your care team:     Team Pink:   Clinic Hours Telephone Number   Internal Medicine:  Dr. Sadaf Feliciano, NP 7am-7pm  Monday - Thursday   7am-5pm  Fridays  [phone]  (Appointment scheduling available 24/7)   Urgent Care - [place] - 11am-9pm Monday-Friday Saturday-Sunday- 9am-5pm   [place] - 5pm-9pm Monday-Friday Saturday-Sunday- 9am-5pm  [phone] - [place]  [phone] - [place]       What options do I have for a visit other than an office visit? We offer electronic visits (e-visits) and telephone visits, when medically appropriate.  Please check with your medical insurance to see if these types of visits are covered, as you will be responsible for any charges that are not paid by your insurance.      You can use [employer] (secure electronic communication) to access to your chart, send your primary care provider a message, or make an appointment. Ask a team member how to get started.     For a price quote for your services, please call our Consumer Price Line at [phone] or our Imaging Cost estimation line at [phone] (for imaging tests).    Lauren Urias, CMA

## 2019-02-11 NOTE — TELEPHONE ENCOUNTER
Per patient's wife, patient's cough has worsened.  Patient has a frequent productive cough with whitish sputum.  Denies fevers/chills, SOB, or chest pains  Has been taking abx and tessalon caps without relief    Please advise    Edwin Weiner RN

## 2019-02-11 NOTE — TELEPHONE ENCOUNTER
Reason for call:  Other   Patient called regarding (reason for call): call back  Additional comments: Patients wife is calling because the patient was seen on Friday and he still has a cough that is not getting better, please call back    Phone number to reach patient:  Home number on file [phone] (home)    Best Time:  any    Can we leave a detailed message on this number?  YES

## 2019-02-11 NOTE — PROGRESS NOTES
"INTERNAL MEDICINE  SUBJECTIVE:   Brock Abarca is a 86 year old male who presents to clinic today for the following health issues:    Cough Recheck  He was doing better and going to follow up Friday, but he had a very bad day last night, constantly coughing. He has one dose of antibiotic left. No dry mouth, no hemoptysis. The cough makes him unable to sleep. He does not have any fevers, chills, body aches, or shortness of breath. Does note a poor appetite. He is taking his Zpak and tessalon.        Problem list and histories reviewed & adjusted, as indicated.  Additional history: as documented    Labs reviewed in EPIC    Reviewed and updated as needed this visit by clinical staff  Tobacco  Allergies  Meds  Problems  Med Hx  Surg Hx  Fam Hx  Soc Hx        Reviewed and updated as needed this visit by Provider  Tobacco  Allergies  Meds  Problems  Med Hx  Surg Hx  Fam Hx       ROS:  CONSTITUTIONAL: NEGATIVE for fever, chills, change in weight  ENT/MOUTH: NEGATIVE for ear, mouth and throat problems  RESP:POSITIVE for cough-non productive  CV: NEGATIVE for chest pain, palpitations or peripheral edema  GI: NEGATIVE for nausea, abdominal pain, heartburn, or change in bowel habits  ROS otherwise negative    This document serves as a record of the services and decisions personally performed and made by Sadaf Fox MD. It was created on her behalf by Lori Whittaker, a trained medical scribe. The creation of this document is based the provider's statements to the medical scribe.  Lori Whittaker, February 11, 2019 12:22 PM     OBJECTIVE:     /58   Pulse 83   Temp 97.5  F (36.4  C) (Oral)   Resp 24   Ht 1.626 m (5' 4\")   Wt 87.3 kg (192 lb 6.4 oz)   SpO2 94%   BMI 33.03 kg/m    Body mass index is 33.03 kg/m .  GENERAL: healthy, alert and no distress  EYES: Eyes grossly normal to inspection, PERRL and conjunctivae and sclerae normal  HENT: ear canals and TM's normal, nose and mouth without ulcers or " lesions  NECK: no adenopathy, no asymmetry, masses, or scars and thyroid normal to palpation  RESP: lungs clear to auscultation - no rales, rhonchi or wheezes  CV: regular rate and rhythm, normal S1 S2, no S3 or S4, no murmur, click or rub, no peripheral edema and peripheral pulses strong  MS: no gross musculoskeletal defects noted, no edema  SKIN: no suspicious lesions or rashes  PSYCH: mentation appears normal, affect normal/bright    Diagnostic Test Results:  Chest X-ray: No signs of pneumonia. Unremarkable.     ASSESSMENT/PLAN:     1. Cough  Likely viral illness. See patient instructions. He otherwise feels well other than a cough which rules against any significant infection.  - guaiFENesin-codeine (ROBITUSSIN AC) 100-10 MG/5ML solution; Take 5-10 mLs by mouth every 6 hours as needed for cough  Dispense: 236 mL; Refill: 0    Patient Instructions   No evidence of pneumonia on chest xray. Likely a viral cold causing the cough.   Will change to a different cough medicine for night time only.   Could take up to 6 weeks for this to resolve.     If you start to develop fevers, chills, significant worsening  of symptoms, then you should be seen again.       IN: 12:22 PM  OUT: 12:33 PM    The information in this document, created by the medical scribe, Lori Whittaker, for me, accurately reflects the services I personally performed and the decisions made by me. I have reviewed and approved this document for accuracy.     Sadaf Fox MD  [facility]

## 2019-02-15 ENCOUNTER — OFFICE VISIT (OUTPATIENT)
Dept: FAMILY MEDICINE | Facility: CLINIC | Age: 84
End: 2019-02-15
Payer: COMMERCIAL

## 2019-02-15 VITALS
HEART RATE: 89 BPM | WEIGHT: 194.2 LBS | OXYGEN SATURATION: 95 % | TEMPERATURE: 97 F | DIASTOLIC BLOOD PRESSURE: 60 MMHG | BODY MASS INDEX: 33.16 KG/M2 | SYSTOLIC BLOOD PRESSURE: 110 MMHG | RESPIRATION RATE: 24 BRPM | HEIGHT: 64 IN

## 2019-02-15 DIAGNOSIS — J20.9 ACUTE BRONCHITIS, UNSPECIFIED ORGANISM: Primary | ICD-10-CM

## 2019-02-15 PROCEDURE — 99213 OFFICE O/P EST LOW 20 MIN: CPT | Performed by: NURSE PRACTITIONER

## 2019-02-15 RX ORDER — PREDNISONE 20 MG/1
20 TABLET ORAL DAILY
Qty: 5 TABLET | Refills: 0 | Status: SHIPPED | OUTPATIENT
Start: 2019-02-15 | End: 2019-04-30

## 2019-02-15 ASSESSMENT — MIFFLIN-ST. JEOR: SCORE: 1471.89

## 2019-02-15 ASSESSMENT — PAIN SCALES - GENERAL: PAINLEVEL: NO PAIN (0)

## 2019-02-15 NOTE — PATIENT INSTRUCTIONS
[facility]    If you have any questions regarding to your visit please contact your care team:     Team Pink:   Clinic Hours Telephone Number   Internal Medicine:  Dr. Sadaf Feliciano NP 7am-7pm  Monday - Thursday   7am-5pm  Fridays  [phone]  (Appointment scheduling available 24/7)   Urgent Care - [place] and [facility] - 11am-9pm Monday-Friday Saturday-Sunday- 9am-5pm   [place] - 5pm-9pm Monday-Friday Saturday-Sunday- 9am-5pm  [phone] - [place]  [phone] - [place]       What options do I have for a visit other than an office visit? We offer electronic visits (e-visits) and telephone visits, when medically appropriate.  Please check with your medical insurance to see if these types of visits are covered, as you will be responsible for any charges that are not paid by your insurance.      You can use [employer] (secure electronic communication) to access to your chart, send your primary care provider a message, or make an appointment. Ask a team member how to get started.     For a price quote for your services, please call our Consumer Price Line at [phone] or our Imaging Cost estimation line at [phone] (for imaging tests).  Guillermina HEBERT CMA

## 2019-02-15 NOTE — PROGRESS NOTES
SUBJECTIVE:   Brock Abarca is a 86 year old male who presents to clinic today for the following health issues:      Chief Complaint   Patient presents with     Cough     follow up- not feeling well yet     Patient was seen on 2/8/19 for cough and was given azithromycin, tessalon perles.  He was seen again on 2/11/19 and was given robitussin AC for at bedtime to help with cough.  He has not completed azithromycin.   He notes that cough continues.  Cough is productive.  He had a normal CXR on 2/11/19.  He denies fever, shortness of breath, nasal congestion.  He notes poor appetite.      Problem list and histories reviewed & adjusted, as indicated.  Additional history: as documented    Patient Active Problem List   Diagnosis     Arthritis     Pulmonary embolism (H)     BPH (benign prostatic hyperplasia)     Urinary frequency     Essential hypertension     Osteoporosis, senile     Hypothyroidism     Left foot drop     Lip tremor     Hand paresthesia     Thrombocytopenia (H)     Fatigue     Memory loss     Hypercalcemia     Advanced directives, counseling/discussion     Reticulocytosis     Hypovitaminosis D     Hyperparathyroidism (H)     Epiphora     Cerebral infarction (H)     History of dacryocystorhinostomy, od; os?     Brain dysfunction     Primary hyperparathyroidism (H)     Hyperlipidemia LDL goal <100     Peripheral edema     Major depressive disorder, single episode, mild (H)     Diverticulosis of colon     H/O: CVA (cerebrovascular accident)     Hemorrhoids     Spinal stenosis, unspecified region other than cervical     Heart failure with preserved ejection fraction (H)     Past Surgical History:   Procedure Laterality Date     APPENDECTOMY       BACK SURGERY       CATARACT IOL, RT/LT      s/p cataract surgery both eyes     Cystoscopy, left ureteroscopy, holium laser lithotripsy, stent exchange  1/23/17     Cystoscopy, left urethral stone removed, stent placement  1/5/17     DACRYOCYSTORHINOSTOMY  BILATERAL  2013    right eye; left eye also?     EXTRACORPOREAL SHOCK WAVE LITHOTRIPSY, CYSTOSCOPY, INSERT STENT URETER(S), COMBINED       HEAD & NECK SURGERY       ORTHOPEDIC SURGERY       PARATHYROIDECTOMY N/A 2/14/2017    Procedure: PARATHYROIDECTOMY;  Surgeon: Genesis Dixon MD;  Location:  OR       Social History     Tobacco Use     Smoking status: Never Smoker     Smokeless tobacco: Never Used   Substance Use Topics     Alcohol use: Yes     Comment: occ.     Family History   Problem Relation Age of Onset     Cerebrovascular Disease Father      Heart Disease Sister          Current Outpatient Medications   Medication Sig Dispense Refill     benzonatate (TESSALON) 100 MG capsule Take 1 capsule (100 mg) by mouth 3 times daily as needed for cough 30 capsule 1     bumetanide (BUMEX) 1 MG tablet Take 1 mg by mouth daily       cholecalciferol (VITAMIN D3) 5000 UNITS CAPS capsule Take 1 capsule by mouth daily       clopidogrel (PLAVIX) 75 MG tablet TAKE 1 TABLET BY MOUTH EVERY DAY 90 tablet 3     Cyanocobalamin (VITAMIN B 12 PO) Take 2,500 mcg by mouth daily.       guaiFENesin-codeine (ROBITUSSIN AC) 100-10 MG/5ML solution Take 5-10 mLs by mouth every 6 hours as needed for cough 236 mL 0     levothyroxine (SYNTHROID/LEVOTHROID) 112 MCG tablet Take 1 tablet (112 mcg) by mouth daily 30 tablet 5     loratadine (CLARITIN) 10 MG tablet Take 1 tablet (10 mg) by mouth daily       metoprolol succinate ER (TOPROL-XL) 50 MG 24 hr tablet Take 1 tablet (50 mg) by mouth daily 90 tablet 1     olopatadine (PATANOL) 0.1 % ophthalmic solution Place 1 drop into both eyes 2 times daily 5 mL 3     order for DME Equipment being ordered: 10-20 mm HG knee high compression stockings 1 Device 0     polyethylene glycol (CLEARLAX) Packet Take 17 g by mouth daily as needed for constipation       predniSONE (DELTASONE) 20 MG tablet Take 20 mg by mouth daily for 5 days. 5 tablet 0     sennosides (SENOKOT) 8.6 MG tablet Take 1 tablet by  "mouth 2 times daily (Patient taking differently: Take 1 tablet by mouth as needed ) 60 tablet 0     simvastatin (ZOCOR) 40 MG tablet Take 1 tablet (40 mg) by mouth daily 90 tablet 3     tamsulosin (FLOMAX) 0.4 MG 24 hr capsule Take 1 capsule (0.4 mg) by mouth daily (Patient taking differently: Take 0.4 mg by mouth every evening ) 90 capsule 4     triamcinolone (KENALOG) 0.1 % external cream Apply topically 2 times daily for 7 days 30 g 0     Allergies   Allergen Reactions     Zolpidem Other (See Comments)     Pt was found wandering halls during last hospitalization and was confused.   Pt and wife request that pt not receive Ambien.  Pt was found wandering halls during last hospitalization and was confused.   Pt and wife request that pt not receive Ambien.     BP Readings from Last 3 Encounters:   02/15/19 110/60   02/11/19 104/58   02/08/19 104/60    Wt Readings from Last 3 Encounters:   02/15/19 88.1 kg (194 lb 3.2 oz)   02/11/19 87.3 kg (192 lb 6.4 oz)   02/08/19 88.2 kg (194 lb 6.4 oz)                  Labs reviewed in EPIC    Reviewed and updated as needed this visit by clinical staff       Reviewed and updated as needed this visit by Provider         ROS:  Constitutional, HEENT, cardiovascular, pulmonary, gi and gu systems are negative, except as otherwise noted.    OBJECTIVE:     /60   Pulse 89   Temp 97  F (36.1  C) (Pulmonary Artery)   Resp 24   Ht 1.626 m (5' 4\")   Wt 88.1 kg (194 lb 3.2 oz)   SpO2 95%   BMI 33.33 kg/m    Body mass index is 33.33 kg/m .  GENERAL: healthy, alert and no distress  EYES: Eyes grossly normal to inspection, PERRL and conjunctivae and sclerae normal  HENT: normal cephalic/atraumatic, nose and mouth without ulcers or lesions, nasal mucosa edematous , oropharynx clear and oral mucous membranes moist  NECK: no adenopathy, no asymmetry, masses, or scars and thyroid normal to palpation  RESP: lungs clear to auscultation - no rales, rhonchi or wheezes  CV: regular rate and " rhythm, normal S1 S2, no S3 or S4, no murmur, click or rub, no peripheral edema and peripheral pulses strong  MS: no gross musculoskeletal defects noted, no edema    Diagnostic Test Results:  none     ASSESSMENT/PLAN:     1. Acute bronchitis, unspecified organism  Patient likely has a viral infection.  I explained that cough may persist for several weeks before going away completely.  Patient to try low dose prednisone to help with cough.  Patient to seek care for increased shortness of breath, fever.  - predniSONE (DELTASONE) 20 MG tablet; Take 20 mg by mouth daily for 5 days.  Dispense: 5 tablet; Refill: 0    FUTURE APPOINTMENTS:       - Follow-up for annual visit or as needed    LORI Leach CNP  [facility]

## 2019-02-25 ENCOUNTER — OFFICE VISIT (OUTPATIENT)
Dept: FAMILY MEDICINE | Facility: CLINIC | Age: 84
End: 2019-02-25
Payer: COMMERCIAL

## 2019-02-25 ENCOUNTER — TELEPHONE (OUTPATIENT)
Dept: FAMILY MEDICINE | Facility: CLINIC | Age: 84
End: 2019-02-25

## 2019-02-25 VITALS
SYSTOLIC BLOOD PRESSURE: 119 MMHG | BODY MASS INDEX: 33.36 KG/M2 | RESPIRATION RATE: 16 BRPM | DIASTOLIC BLOOD PRESSURE: 73 MMHG | TEMPERATURE: 97.6 F | WEIGHT: 195.4 LBS | OXYGEN SATURATION: 95 % | HEIGHT: 64 IN | HEART RATE: 81 BPM

## 2019-02-25 DIAGNOSIS — R05.9 COUGH: Primary | ICD-10-CM

## 2019-02-25 LAB
DEPRECATED S PYO AG THROAT QL EIA: NORMAL
SPECIMEN SOURCE: NORMAL

## 2019-02-25 PROCEDURE — 87880 STREP A ASSAY W/OPTIC: CPT | Performed by: PHYSICIAN ASSISTANT

## 2019-02-25 PROCEDURE — 94640 AIRWAY INHALATION TREATMENT: CPT | Performed by: PHYSICIAN ASSISTANT

## 2019-02-25 PROCEDURE — 87081 CULTURE SCREEN ONLY: CPT | Performed by: PHYSICIAN ASSISTANT

## 2019-02-25 PROCEDURE — 99214 OFFICE O/P EST MOD 30 MIN: CPT | Mod: 25 | Performed by: PHYSICIAN ASSISTANT

## 2019-02-25 RX ORDER — IPRATROPIUM BROMIDE AND ALBUTEROL SULFATE 2.5; .5 MG/3ML; MG/3ML
3 SOLUTION RESPIRATORY (INHALATION) ONCE
Status: DISCONTINUED | OUTPATIENT
Start: 2019-02-25 | End: 2020-01-01

## 2019-02-25 RX ORDER — METHYLPREDNISOLONE 4 MG
TABLET, DOSE PACK ORAL
Qty: 21 TABLET | Refills: 0 | Status: SHIPPED | OUTPATIENT
Start: 2019-02-25 | End: 2019-04-30

## 2019-02-25 RX ORDER — AZITHROMYCIN 500 MG/1
500 TABLET, FILM COATED ORAL DAILY
Qty: 3 TABLET | Refills: 0 | Status: SHIPPED | OUTPATIENT
Start: 2019-02-25 | End: 2019-04-30

## 2019-02-25 ASSESSMENT — PAIN SCALES - GENERAL: PAINLEVEL: NO PAIN (0)

## 2019-02-25 ASSESSMENT — MIFFLIN-ST. JEOR: SCORE: 1477.33

## 2019-02-25 NOTE — PROGRESS NOTES
"  SUBJECTIVE:   Brock Abarca is a 86 year old male who presents to clinic today for the following health issues:    Follow up on URI  Was seen by Dr. Fox and terell Carmen but URI is still the same.   Coughing no stop and having SOB  Sleeping all the time    Problem list and histories reviewed & adjusted, as indicated.  Additional history: Patient presents with his wife who notes that sputum production has improved from brown to yellow but no noticeable cough reduction yet.        ROS:  Constitutional, HEENT, cardiovascular, pulmonary, gi and gu systems are negative, except as otherwise noted.    OBJECTIVE:     /73   Pulse 81   Temp 97.6  F (36.4  C) (Oral)   Resp 16   Ht 1.626 m (5' 4\")   Wt 88.6 kg (195 lb 6.4 oz)   SpO2 95%   BMI 33.54 kg/m    Body mass index is 33.54 kg/m .  GENERAL: healthy, alert and no distress  NECK: no adenopathy, no asymmetry, masses, or scars and thyroid normal to palpation  RESP: lungs clear to auscultation - no rales, rhonchi or wheezes. DuoNeb administered in clinic to good effect.  No coughing after but no improvement in sats noted.   CV: regular rate and rhythm, normal S1 S2, no S3 or S4, no murmur, click or rub, no peripheral edema and peripheral pulses strong  SKIN: no suspicious lesions or rashes    Diagnostic Test Results:  none     ASSESSMENT/PLAN:   1. Cough  - Strep, Rapid Screen  - ipratropium - albuterol 0.5 mg/2.5 mg/3 mL (DUONEB) neb solution 3 mL  - Beta strep group A culture; Standing  - Beta strep group A culture  - azithromycin (ZITHROMAX) 500 MG tablet; Take 1 tablet (500 mg) by mouth daily for 3 days  Dispense: 3 tablet; Refill: 0  - methylPREDNISolone (MEDROL DOSEPAK) 4 MG tablet therapy pack; Follow Package Directions  Dispense: 21 tablet; Refill: 0    Use medication as directed.  Follow up in 1 week  Bring all medication bottles so Med rec can be performed.  Patient amenable to this follow up plan.     JUAN J Cotter " [facility]

## 2019-02-25 NOTE — TELEPHONE ENCOUNTER
Nella Feliciano NP did advise to f/u in 7 days if not better per last OV notes  Please keep appointment for further eval    Edwin Weiner RN

## 2019-02-25 NOTE — TELEPHONE ENCOUNTER
Reason for call:  Patient reporting a symptom    Symptom or request:  Uri not improving. Finished the medication. Appointment made with another provider. Please advise if this should be done.     Duration (how long have symptoms been present):  7 plus days     Have you been treated for this before? Yes    Additional comments:  Na     Phone Number patient can be reached at:  Home number on file [phone] (home)    Best Time:   Any     Can we leave a detailed message on this number:  YES    Call taken on 2/25/2019 at 10:15 AM by Alannah Weeks

## 2019-02-25 NOTE — TELEPHONE ENCOUNTER
Spoke to Linda and informed her of below message. Patient is coming in at 1 today.  Candida ADRIAN CMA ([facility])

## 2019-02-25 NOTE — NURSING NOTE
The following nebulizer treatment was given:     MEDICATION: Albuterol Sulfate 2.5 mg  : Xpliant  LOT #: [id number]  EXPIRATION DATE:  2/20/2020  NDC # 7662-3834-16     Nebulizer Start Time:  1:22PM  Nebulizer Stop Time:  1:32PM  See Vital Signs Flowsheet

## 2019-02-26 LAB
BACTERIA SPEC CULT: NORMAL
SPECIMEN SOURCE: NORMAL

## 2019-03-04 DIAGNOSIS — E03.9 HYPOTHYROIDISM, UNSPECIFIED TYPE: ICD-10-CM

## 2019-03-04 RX ORDER — LEVOTHYROXINE SODIUM 112 UG/1
TABLET ORAL
Qty: 30 TABLET | Refills: 5 | Status: SHIPPED | OUTPATIENT
Start: 2019-03-04 | End: 2019-04-30

## 2019-04-20 ENCOUNTER — TRANSFERRED RECORDS (OUTPATIENT)
Dept: HEALTH INFORMATION MANAGEMENT | Facility: CLINIC | Age: 84
End: 2019-04-20

## 2019-04-30 ENCOUNTER — OFFICE VISIT (OUTPATIENT)
Dept: INTERNAL MEDICINE | Facility: CLINIC | Age: 84
End: 2019-04-30
Payer: COMMERCIAL

## 2019-04-30 VITALS
RESPIRATION RATE: 18 BRPM | WEIGHT: 204 LBS | BODY MASS INDEX: 34.83 KG/M2 | HEIGHT: 64 IN | OXYGEN SATURATION: 97 % | SYSTOLIC BLOOD PRESSURE: 126 MMHG | DIASTOLIC BLOOD PRESSURE: 64 MMHG | HEART RATE: 70 BPM

## 2019-04-30 DIAGNOSIS — R35.0 URINARY FREQUENCY: ICD-10-CM

## 2019-04-30 DIAGNOSIS — E66.01 MORBID OBESITY (H): ICD-10-CM

## 2019-04-30 DIAGNOSIS — I50.30 HEART FAILURE WITH PRESERVED EJECTION FRACTION (H): Primary | ICD-10-CM

## 2019-04-30 DIAGNOSIS — E03.9 HYPOTHYROIDISM, UNSPECIFIED TYPE: ICD-10-CM

## 2019-04-30 DIAGNOSIS — Z91.89 DRIVING SAFETY ISSUE: ICD-10-CM

## 2019-04-30 DIAGNOSIS — Z51.81 ENCOUNTER FOR THERAPEUTIC DRUG MONITORING: ICD-10-CM

## 2019-04-30 DIAGNOSIS — R41.3 MEMORY LOSS: ICD-10-CM

## 2019-04-30 PROBLEM — E21.0 PRIMARY HYPERPARATHYROIDISM (H): Status: RESOLVED | Noted: 2017-01-24 | Resolved: 2019-04-30

## 2019-04-30 PROCEDURE — 99214 OFFICE O/P EST MOD 30 MIN: CPT | Performed by: INTERNAL MEDICINE

## 2019-04-30 RX ORDER — POTASSIUM CHLORIDE 750 MG/1
CAPSULE, EXTENDED RELEASE ORAL
COMMUNITY
Start: 2019-04-24 | End: 2019-07-09

## 2019-04-30 RX ORDER — LEVOTHYROXINE SODIUM 112 UG/1
112 TABLET ORAL DAILY
Qty: 90 TABLET | Refills: 5 | Status: SHIPPED | OUTPATIENT
Start: 2019-04-30 | End: 2020-01-01

## 2019-04-30 RX ORDER — TROSPIUM CHLORIDE ER 60 MG/1
60 CAPSULE ORAL EVERY MORNING
COMMUNITY
Start: 2019-04-30 | End: 2019-06-11

## 2019-04-30 ASSESSMENT — MIFFLIN-ST. JEOR: SCORE: 1511.34

## 2019-04-30 NOTE — PROGRESS NOTES
SUBJECTIVE:   Brock Abarca is a 87 year old male who presents to clinic today for the following   health issues:        ED/UC Followup:    Facility:  [facility]   Date of visit: 04/28/2019  Reason for visit: SOB  Current Status: has been better   He was determined to be in mild CHF and was given a shot of Bumex. Short of breath is better.  He does have cardiology follow-up and in fact is a phone call in the cardiology today.  Has been taking Bumex at 3 mg daily since his ER visit.  Is wondering if that is something he should continue to do.  He does have a CHF action plan and it stated that if he has greater than 3 pound weight gain in 1 day or 5 pounds in 1 week that he should go to the emergency room.  However once in the emergency room he was treated but he did not want to stay for treatment of his heart failure.    He is sleeping more.  He isn't taking levothyroxine and he doesn't know why.  Legs are swollen and a little improved.  He is wearing his compression stockings    There is concern about his driving.  He gets lost and has poor reflexs.     His memory continues to be impaired.  His wife feels that it is getting worse.  The patient thinks it is too.  He did have neuropsych testing done a couple of years ago but he is not interested in redoing this.  He is not interested in medications for his memory.  Next    He does have urinary frequency and sees [facility] urology for this.  He is questions about his medications.    He also has hypothyroidism but for whatever reason has not been taking his levothyroxine.  He does report that he has fatigue.          Additional history: as documented    Reviewed  and updated as needed this visit by clinical staff  Tobacco  Allergies  Meds  Problems  Med Hx  Surg Hx  Fam Hx  Soc Hx          Reviewed and updated as needed this visit by Provider  Tobacco  Allergies  Meds  Problems  Med Hx  Surg Hx  Fam Hx         Patient Active Problem List    Diagnosis     Arthritis     Pulmonary embolism (H)     BPH (benign prostatic hyperplasia)     Urinary frequency     Essential hypertension     Osteoporosis, senile     Hypothyroidism     Left foot drop     Lip tremor     Hand paresthesia     Thrombocytopenia (H)     Fatigue     Memory loss     Hypercalcemia     Advanced directives, counseling/discussion     Reticulocytosis     Hypovitaminosis D     Epiphora     Cerebral infarction (H)     History of dacryocystorhinostomy, od; os?     Brain dysfunction     Hyperlipidemia LDL goal <100     Peripheral edema     Major depressive disorder, single episode, mild (H)     Diverticulosis of colon     H/O: CVA (cerebrovascular accident)     Hemorrhoids     Spinal stenosis, unspecified region other than cervical     Heart failure with preserved ejection fraction (H)     Obesity (BMI 35.0-39.9) with comorbidity (H)     Driving safety issue     Past Surgical History:   Procedure Laterality Date     APPENDECTOMY       BACK SURGERY       CATARACT IOL, RT/LT      s/p cataract surgery both eyes     Cystoscopy, left ureteroscopy, holium laser lithotripsy, stent exchange  1/23/17     Cystoscopy, left urethral stone removed, stent placement  1/5/17     DACRYOCYSTORHINOSTOMY BILATERAL  2013    right eye; left eye also?     EXTRACORPOREAL SHOCK WAVE LITHOTRIPSY, CYSTOSCOPY, INSERT STENT URETER(S), COMBINED       HEAD & NECK SURGERY       ORTHOPEDIC SURGERY       PARATHYROIDECTOMY N/A 2/14/2017    Procedure: PARATHYROIDECTOMY;  Surgeon: Genesis Dixon MD;  Location:  OR       Social History     Tobacco Use     Smoking status: Never Smoker     Smokeless tobacco: Never Used   Substance Use Topics     Alcohol use: Yes     Comment: occ.     Family History   Problem Relation Age of Onset     Cerebrovascular Disease Father      Heart Disease Sister          Current Outpatient Medications   Medication Sig Dispense Refill     bumetanide (BUMEX) 1 MG tablet Take 1 mg by mouth daily        cholecalciferol (VITAMIN D3) 5000 UNITS CAPS capsule Take 1 capsule by mouth daily       clopidogrel (PLAVIX) 75 MG tablet TAKE 1 TABLET BY MOUTH EVERY DAY 90 tablet 3     Cyanocobalamin (VITAMIN B 12 PO) Take 2,500 mcg by mouth daily.       levothyroxine (SYNTHROID/LEVOTHROID) 112 MCG tablet Take 1 tablet (112 mcg) by mouth daily 90 tablet 5     loratadine (CLARITIN) 10 MG tablet Take 1 tablet (10 mg) by mouth daily       metoprolol succinate ER (TOPROL-XL) 50 MG 24 hr tablet Take 1 tablet (50 mg) by mouth daily 90 tablet 1     olopatadine (PATANOL) 0.1 % ophthalmic solution Place 1 drop into both eyes 2 times daily 5 mL 3     order for DME Equipment being ordered: 10-20 mm HG knee high compression stockings 1 Device 0     polyethylene glycol (CLEARLAX) Packet Take 17 g by mouth daily as needed for constipation       potassium chloride ER (MICRO-K) 10 MEQ CR capsule Take 20 meq(2 tablets) daily and take an additional 1 meq(1 tablet) if patient has leg swelling or weight is 3 lbs above baseline.       sennosides (SENOKOT) 8.6 MG tablet Take 1 tablet by mouth 2 times daily (Patient taking differently: Take 1 tablet by mouth as needed ) 60 tablet 0     simvastatin (ZOCOR) 40 MG tablet Take 1 tablet (40 mg) by mouth daily 90 tablet 3     tamsulosin (FLOMAX) 0.4 MG 24 hr capsule Take 1 capsule (0.4 mg) by mouth daily (Patient taking differently: Take 0.4 mg by mouth every evening ) 90 capsule 4     trospium (SANCTURA XR) 60 MG CP24 24 hr capsule Take 1 capsule (60 mg) by mouth every morning       Allergies   Allergen Reactions     Zolpidem Other (See Comments)     Pt was found wandering halls during last hospitalization and was confused.   Pt and wife request that pt not receive Ambien.  Pt was found wandering halls during last hospitalization and was confused.   Pt and wife request that pt not receive Ambien.       ROS:   ROS: 10 point ROS neg other than the symptoms noted above in the HPI.     OBJECTIVE:     /64  "  Pulse 70   Resp 18   Ht 1.626 m (5' 4\")   Wt 92.5 kg (204 lb)   SpO2 97%   BMI 35.02 kg/m    Body mass index is 35.02 kg/m .  GENERAL APPEARANCE: healthy, alert and no distress  RESP: lungs clear to auscultation - no rales, rhonchi or wheezes  CV: regular rates and rhythm and normal S1 S2, no S3 or S4  PSYCH: mentation appears normal. and affect normal/bright  1+ edema to above the ankles bilaterally    Diagnostic Test Results:  Results for orders placed or performed in visit on 02/25/19   Strep, Rapid Screen   Result Value Ref Range    Specimen Description Throat     Rapid Strep A Screen       NEGATIVE: No Group A streptococcal antigen detected by immunoassay, await culture report.   Beta strep group A culture   Result Value Ref Range    Specimen Description Throat     Culture Micro No beta hemolytic Streptococcus Group A isolated        ASSESSMENT/PLAN:             1. Heart failure with preserved ejection fraction (H)  He appears to be slightly volume overloaded today.  Unfortunately he has had problems with his creatinine when he takes a higher dose Bumex.  We will keep him on his current dose until he is able to see cardiology.  He does need a basic metabolic panel within 1 week but I suspect he will be getting this done with cardiology.  I also think he probably needs an update to his CHF action plan as I do not think that he needs to go to the emergency room when he has weight gain but rather this could be handled with change in his diuretics.  I will leave this up to his cardiologist however.  - **Basic metabolic panel FUTURE anytime; Future    2. Hypothyroidism, unspecified type  Restart levothyroxine.  Discussed administration directions.  - levothyroxine (SYNTHROID/LEVOTHROID) 112 MCG tablet; Take 1 tablet (112 mcg) by mouth daily  Dispense: 90 tablet; Refill: 5  - **TSH with free T4 reflex FUTURE anytime; Future    3. Morbid obesity (H)  Not certain he will be able to do any kind of exercise or " diet plan.  He is on a low-salt diet.    4. Driving safety issue  I have advised him not to drive any longer.    5. Urinary frequency  I discussed his medications and he is on trospium and Flomax.    6. Memory loss  He has declined any neuropsychiatric testing.  He will no longer be driving.    7. Encounter for therapeutic drug monitoring    - **Basic metabolic panel FUTURE anytime; Future    Patient Instructions   Consider neuropsychiatric testing for your memory.  Start taking Levothyroxine.  Stop driving.   Keep Bumex at 3 mg daily until you hear back from Cardiology.  He will need a kidney blood draw in 1 week either with cardiology or with our clinic.  Thyroid lab in 6 weeks.  Let's schedule your annual wellness visit for this date as well.     Sadaf Fox MD  [facility]

## 2019-04-30 NOTE — PATIENT INSTRUCTIONS
Consider neuropsychiatric testing for your memory.  Start taking Levothyroxine.  Stop driving.   Keep Bumex at 3 mg daily until you hear back from Cardiology.  He will need a kidney blood draw in 1 week either with cardiology or with our clinic.  Thyroid lab in 6 weeks.  Let's schedule your annual wellness visit for this date as well.

## 2019-05-01 ENCOUNTER — PATIENT OUTREACH (OUTPATIENT)
Dept: CARE COORDINATION | Facility: CLINIC | Age: 84
End: 2019-05-01

## 2019-05-01 DIAGNOSIS — Z76.89 HEALTH CARE HOME: Primary | ICD-10-CM

## 2019-05-01 NOTE — PROGRESS NOTES
Clinic Care Coordination Contact    Situation: Patient chart reviewed by care coordinator.    Background: 87 year old male seen in [facility] ED for CHF.  Given IV Bumex and sent home with increased Bumex dose.     Assessment: Patient was seen by PCP on 4/29/19. Medications were adjusted. Patient has cardiology appointment scheduled for follow up.     Plan/Recommendations: Patient will return in 6 weeks for wellness visit with PCP.     Will not open to clinic care coordination at this time.      Dayana Farris RN, [facility] RN Coordinator  [facility]   5/1/2019    1:26 PM  [phone]

## 2019-05-07 ENCOUNTER — TELEPHONE (OUTPATIENT)
Dept: FAMILY MEDICINE | Facility: CLINIC | Age: 84
End: 2019-05-07

## 2019-05-07 DIAGNOSIS — Z51.81 ENCOUNTER FOR THERAPEUTIC DRUG MONITORING: ICD-10-CM

## 2019-05-07 DIAGNOSIS — I50.30 HEART FAILURE WITH PRESERVED EJECTION FRACTION (H): ICD-10-CM

## 2019-05-07 LAB
ANION GAP SERPL CALCULATED.3IONS-SCNC: 9 MMOL/L (ref 3–14)
BUN SERPL-MCNC: 25 MG/DL (ref 7–30)
CALCIUM SERPL-MCNC: 9.3 MG/DL (ref 8.5–10.1)
CHLORIDE SERPL-SCNC: 103 MMOL/L (ref 94–109)
CO2 SERPL-SCNC: 28 MMOL/L (ref 20–32)
CREAT SERPL-MCNC: 1.37 MG/DL (ref 0.66–1.25)
GFR SERPL CREATININE-BSD FRML MDRD: 46 ML/MIN/{1.73_M2}
GLUCOSE SERPL-MCNC: 92 MG/DL (ref 70–99)
POTASSIUM SERPL-SCNC: 3.8 MMOL/L (ref 3.4–5.3)
SODIUM SERPL-SCNC: 140 MMOL/L (ref 133–144)

## 2019-05-07 PROCEDURE — 36415 COLL VENOUS BLD VENIPUNCTURE: CPT | Performed by: INTERNAL MEDICINE

## 2019-05-07 PROCEDURE — 80048 BASIC METABOLIC PNL TOTAL CA: CPT | Performed by: INTERNAL MEDICINE

## 2019-05-07 NOTE — LETTER
[address] [place] [address]    May 9, 2019    Brock Abarca  [address] [place] [address]          Dear Brock,  Kidney function has worsened with the current dose of Bumex.  Please drop Bumex down to 2 mg daily.  Follow up with myself or cardiology in 7-10 days.  He does need daily weight done at home and should bring those in to his appointment.  Results for orders placed or performed in visit on 05/07/19   **Basic metabolic panel FUTURE anytime   Result Value Ref Range    Sodium 140 133 - 144 mmol/L    Potassium 3.8 3.4 - 5.3 mmol/L    Chloride 103 94 - 109 mmol/L    Carbon Dioxide 28 20 - 32 mmol/L    Anion Gap 9 3 - 14 mmol/L    Glucose 92 70 - 99 mg/dL    Urea Nitrogen 25 7 - 30 mg/dL    Creatinine 1.37 (H) 0.66 - 1.25 mg/dL    GFR Estimate 46 (L) >60 mL/min/[1.73_m2]    GFR Estimate If Black 53 (L) >60 mL/min/[1.73_m2]    Calcium 9.3 8.5 - 10.1 mg/dL       If you have any questions or concerns, please me or my clinic team at [phone].      Sincerely,      Sadaf Fox MD/kameron

## 2019-05-07 NOTE — TELEPHONE ENCOUNTER
Reason for Call:  Other call back    Detailed comments: Patients lab screening from today needs to be sent to [place] for patients cardiologist to review. Please send when results are completed.     Phone Number Patient can be reached at: Home number on file [phone] (home)    Best Time: any     Can we leave a detailed message on this number? YES    Call taken on 5/7/2019 at 11:40 AM by Waqas Weinstein

## 2019-05-08 RX ORDER — BUMETANIDE 1 MG/1
2 TABLET ORAL DAILY
COMMUNITY
Start: 2019-05-08 | End: 2019-06-11

## 2019-05-08 NOTE — RESULT ENCOUNTER NOTE
Kidney function has worsened with the current dose of Bumex.  Please drop Bumex down to 2 mg daily.  Follow up with myself or cardiology in 7-10 days.  He does need daily weight done at home and should bring those in to his appointment. .   Dr. Fox    Send copy to [address].

## 2019-05-08 NOTE — TELEPHONE ENCOUNTER
Copy of labs from 5-7-19, printed and faxed to Adeline Smith PA-C ([facility]) to fax number [phone].  Carri Rojo,

## 2019-05-21 LAB — EJECTION FRACTION: NORMAL %

## 2019-06-03 NOTE — PROGRESS NOTES
Subjective     Brock Abarca is a 87 year old male who presents to clinic today for the following health issues:    HPI   Chief Complaint   Patient presents with     Musculoskeletal Problem     sore/swollen legs X3 weeks; Right leg worse than left ; Left leg has blisters ; Both legs swelling, redness and warm to the touch     Health Maintenance     PHQ9     Derm Problem     sores on behind unknown length of time     Patient presents with lower extremity edema, redness, warmth, with ulceration of skin over right calf.  No bleeding, some clear/yellow-shira fluid sometimes, not sure when he first noticed, not wearing compression socks, does not elevate legs often.  He has a history of stroke, PE, HFpEF, HTN.    Sore on buttock for past few months, sedentary, little bit of fluid at times, no treatments tried, not getting worse or better.  Patient does have a home health nurse that sees him frequently.    BP Readings from Last 3 Encounters:   06/11/19 112/58   06/07/19 135/77   06/04/19 114/62    Wt Readings from Last 3 Encounters:   06/11/19 93 kg (205 lb)   06/07/19 89.4 kg (197 lb)   06/04/19 91.6 kg (202 lb)         Reviewed and updated as needed this visit by Provider  Tobacco  Allergies  Meds  Problems  Med Hx  Surg Hx  Fam Hx         Review of Systems   ROS COMP: Constitutional, HEENT, cardiovascular, pulmonary, gi and gu systems are negative, except as otherwise noted.      Objective    /62   Pulse 73   Temp 96.9  F (36.1  C) (Oral)   Wt 91.6 kg (202 lb)   SpO2 96%   BMI 34.67 kg/m    Body mass index is 34.67 kg/m .  Physical Exam   GENERAL: healthy, alert and no distress  EYES: Eyes grossly normal to inspection, PERRL and conjunctivae and sclerae normal  NECK: no adenopathy, no asymmetry, masses, or scars and thyroid normal to palpation  RESP: lungs clear to auscultation - no rales, rhonchi or wheezes  CV: regular rate and rhythm, normal S1 S2, no S3 or S4, no murmur, click or rub, no  "peripheral edema and peripheral pulses strong  SKIN: stage 1 decubitus pressure sure on proximal buttock area, bilateral lower extremity 2+ pitting edema, tender, erythema on right, some warmth, with superficial statis ulcer right calf.  PSYCH: mentation appears normal, affect normal/bright            Assessment & Plan       ICD-10-CM    1. Lower extremity ulceration, right, limited to breakdown of skin (H) L97.911 CARE COORDINATION REFERRAL     WOUND CARE REFERRAL     UNNA BOOT APPLICATION     DISCONTINUED: cephALEXin (KEFLEX) 500 MG capsule     DISCONTINUED: zinc-white petrolatum (ILEX) 58.3 % external paste   2. Pressure injury of buttock, stage 1, unspecified laterality L89.301 CARE COORDINATION REFERRAL     WOUND CARE REFERRAL     DISCONTINUED: zinc-white petrolatum (ILEX) 58.3 % external paste   3. Chronic congestive heart failure, unspecified heart failure type (H) I50.9 CARE COORDINATION REFERRAL   4. Cellulitis of right lower extremity L03.115 DISCONTINUED: cephALEXin (KEFLEX) 500 MG capsule     Will refer to care coordination for complex home care  Will have patient see wound clinic as well  Prescribe zinc oxide cream for lesion on buttock, recommend getting donut pillow, mobilization schedule, home health to discuss as well  Unna boot placed today, follow-up in 3 days for re-assessment of this.     BMI:   Estimated body mass index is 34.67 kg/m  as calculated from the following:    Height as of 4/30/19: 1.626 m (5' 4\").    Weight as of this encounter: 91.6 kg (202 lb).               Return in about 3 days (around 6/7/2019) for lower extremity ulcer, unna boot.    Fab Patricio MD  [facility]      "

## 2019-06-04 ENCOUNTER — OFFICE VISIT (OUTPATIENT)
Dept: FAMILY MEDICINE | Facility: CLINIC | Age: 84
End: 2019-06-04
Payer: COMMERCIAL

## 2019-06-04 VITALS
OXYGEN SATURATION: 96 % | SYSTOLIC BLOOD PRESSURE: 114 MMHG | BODY MASS INDEX: 34.67 KG/M2 | WEIGHT: 202 LBS | TEMPERATURE: 96.9 F | HEART RATE: 73 BPM | DIASTOLIC BLOOD PRESSURE: 62 MMHG

## 2019-06-04 DIAGNOSIS — L03.115 CELLULITIS OF RIGHT LOWER EXTREMITY: ICD-10-CM

## 2019-06-04 DIAGNOSIS — I50.9 CHRONIC CONGESTIVE HEART FAILURE, UNSPECIFIED HEART FAILURE TYPE (H): ICD-10-CM

## 2019-06-04 DIAGNOSIS — L97.911 LOWER EXTREMITY ULCERATION, RIGHT, LIMITED TO BREAKDOWN OF SKIN (H): Primary | ICD-10-CM

## 2019-06-04 DIAGNOSIS — L89.301 PRESSURE INJURY OF BUTTOCK, STAGE 1, UNSPECIFIED LATERALITY: ICD-10-CM

## 2019-06-04 PROCEDURE — 29580 STRAPPING UNNA BOOT: CPT | Mod: RT | Performed by: FAMILY MEDICINE

## 2019-06-04 PROCEDURE — 99214 OFFICE O/P EST MOD 30 MIN: CPT | Mod: 25 | Performed by: FAMILY MEDICINE

## 2019-06-04 RX ORDER — CEPHALEXIN 500 MG/1
500 CAPSULE ORAL 2 TIMES DAILY
Qty: 14 CAPSULE | Refills: 0 | Status: SHIPPED | OUTPATIENT
Start: 2019-06-04 | End: 2019-06-11

## 2019-06-04 RX ORDER — ZINC/PETROLATUM,WHITE
1 PASTE (GRAM) TOPICAL
Qty: 1 TUBE | Refills: 3 | Status: SHIPPED | OUTPATIENT
Start: 2019-06-04 | End: 2019-06-11

## 2019-06-04 ASSESSMENT — PATIENT HEALTH QUESTIONNAIRE - PHQ9: SUM OF ALL RESPONSES TO PHQ QUESTIONS 1-9: 14

## 2019-06-04 NOTE — PATIENT INSTRUCTIONS
Patient Education   Help Us Protect Your Skin  What is a pressure injury?  A pressure injury is a serious problem in the hospital and at home. They are also called bedsores or pressure ulcers. Pressure injuries can slow recovery, cause pain and lead to other problems. They may begin as red areas on the skin, but they can cause serious damage to skin and muscles if you do not correct them.   Pressure sores often appear on:     Buttocks, hips, heels, elbows and shoulders.    Under medical devices, tubing, masks, casts or other medical equipment.  What causes a pressure injury?  Pressure injuries occur on body parts that you sit or lie on for too long. They can also occur under a device that presses on the skin, even your glasses Pressure squeezes tiny blood vessels that supply the skin and other tissues with nutrients and oxygen. When skin goes without nutrients and oxygen for too long, an injury forms.  Actions like sliding down in a bed stretches blood vessels and may lead to a pressure injury. Even slight rubbing on the skin may damage the skin or make a minor pressure injury worse.  Wounds and injuries are more likely to develop or worsen if you:    Can't change positions.    Tend to slide down in your bed or chair.    Have problems feeling pressure or pain.    Have problems thinking clearly.    Lose control of your bowel or bladder (incontinence).    Don't get enough nutrients or fluids.  Your health care team can help  It's important to work with your health care team to reduce your risk of getting a pressure injury. Talk to your doctor or other care provider about your goals to prevent or limit pressure injuries.  Be active in your health care:    Ask questions and help plan your care    Explain your needs, wants and concerns    Learn what is best for you    Know what is being done and why  How can I avoid common injuries?  Limit pressure    Change your position in bed at least every two hours. If you are  unable to move yourself in bed, ask someone to help you.    If you are in a chair, shift your position at least every 15 minutes. If you are unable to shift your own weight, your caregiver will help you change your position at least every hour.  If you are in a bed for long periods of time    Talk to your care provider about getting a special mattress.    Try to keep the head of your bed as low as possible (unless other medical conditions do not permit it). This prevents sliding down in bed which can cause skin injury. If you need to raise the head of the bed, raise it to the lowest point possible for as short a time as possible. Try raising your feet before your head to prevent sliding.    Avoid lying directly on your hip bone when lying on your side.    Keep your heels off the bed by placing pillows under your legs from mid-calf to ankle. Never place pillows under the knee.    To keep your knees or ankles from touching each other, place pillows or foam wedges between your legs.  If you are in a chair or wheelchair:  Talk to your care provider about getting a chair cushion to reduce pressure while sitting.  Reduce rubbing    When moving in bed, don't pull or drag yourself across the sheets. Don't push or pull with your heels.    Avoid repetitive movements. For example, don't scratch your foot by rubbing it on the sheets.    Avoid doughnut-shaped cushions. These can hurt the tissues under your skin.  Reduce wetness    Tell your care provider if you have a problem with leaking urine or stool.    If leaking urine or stool is a problem, use absorbent pads while in bed and briefs that pull wetness away from your body while out of bed.    Apply a special cream or ointment to protect your skin from urine and stool (bowel movements). You may ask your care team for suggestions.  Skin care    Expect someone to check your skin at least twice every day.    If you notice reddened, purple, painful or sore areas, tell your care  team.    Prevent dry skin by using creams or oils.    Do not rub or massage skin over reddened, purple or sore parts of your body.  Be active in your healthcare. Reduce your risk for pressure injury.  For informational purposes only. Not to replace the advice of your health care provider.   Copyright   2006 [facility]. All rights reserved. [employer] [id number]   REV 07/18.       Patient Education     Discharge Instructions: Unna Boot  You will be going home with an Unna boot in place. An Unna boot is a dressing and wrap combination that is applied from your foot to your knee. An Unna boot has a special medicine in the gauze that will help heal burns or skin sores and protect new skin. There are 2 kinds of bandages. A white bandage is changed every 1 to 3 days. In most cases, the pink one is changed once a week. You will need to visit your doctor to have the Unna boot changed. Here's what you need to know about home care.  Home care    Remember, it is normal to have some drainage from the Unna boot dressing. Don t be alarmed if the drainage smells bad. The dressing pulls drainage from the wound into the dressing. The odor you smell is from the dressing, not the wound.    Don t get your Unna boot wet.  ? Take a sponge bath to avoid getting your Unna boot wet, unless your healthcare provider tells you otherwise.  ? Ask your provider when can you take a shower or bathe.  ? Ask your provider about the best way to keep your Unna boot dry when bathing or showering.    Keep the rest of your skin clean.    Every day, wash any other burns or sores not covered by a dressing.    Avoid standing or sitting in the same position for more than 30 minutes at a time.    Keep your legs elevated as much as possible.  Follow-up  Make a follow-up appointment with your healthcare provider, or as advised.      When to call your healthcare provider  Call your healthcare provider right away if you have any of these:    Tingling or  numbness in the injured body part    Severe pain that cannot be relieved    A fever of 100.4 F (38 C) or higher, or as advised    Swelling, coldness, or blue-gray color in the fingers or toes    Unna boot that feels too tight or too loose    Unna boot that is damaged or has rough edges that hurt    Unna boot that gets wet    Drainage from Unna boot dressing that smells different than usual   Date Last Reviewed: 7/1/2016 2000-2018 [employer]. [address] [place] [address]. All rights reserved. This information is not intended as a substitute for professional medical care. Always follow your healthcare professional's instructions.

## 2019-06-05 ENCOUNTER — PATIENT OUTREACH (OUTPATIENT)
Dept: CARE COORDINATION | Facility: CLINIC | Age: 84
End: 2019-06-05

## 2019-06-05 DIAGNOSIS — I50.32 CHRONIC DIASTOLIC HEART FAILURE (H): ICD-10-CM

## 2019-06-05 DIAGNOSIS — S81.802A OPEN WOUND OF LOWER LIMB, LEFT, INITIAL ENCOUNTER: Primary | ICD-10-CM

## 2019-06-05 ASSESSMENT — ACTIVITIES OF DAILY LIVING (ADL): DEPENDENT_IADLS:: TRANSPORTATION;SHOPPING;LAUNDRY;COOKING;CLEANING;MEAL PREPARATION

## 2019-06-05 NOTE — PROGRESS NOTES
Clinic Care Coordination Contact    Clinic Care Coordination Contact  OUTREACH    Referral Information:  Referral Source: Care Team    Primary Diagnosis: CHF  Chief Complaint   Patient presents with     Clinic Care Coordination - Initial     RN   Universal Utilization: Utilizes [facility]  Clinic Utilization  Difficulty keeping appointments:: No  Compliance Concerns: No  No-Show Concerns: No  No PCP office visit in Past Year: No  Utilization    Last refreshed: 6/4/2019  6:28 PM:  Hospital Admissions 0           Last refreshed: 6/4/2019  6:28 PM:  ED Visits 2           Last refreshed: 6/4/2019  6:28 PM:  No Show Count (past year) 0              Current as of: 6/4/2019  6:28 PM          Clinical Concerns:    Current Medical Concerns:  Patient was seen in PCP clinic on 6/4/19 for lower extremity wound and pressure area on buttocks.  MD placed unna boot on lower extremity and started patient on an antibiotic. Spouse states she has to pick that up today, it was not available at pharmacy yesterday.     MD placed order for o/p wound care at [facility]. Spouse states it is difficult for her to get patient out and then drive that far for care. She was hoping they could have home care for wound care.     Spouse, Linda, states that patient wanted to take the antonio boot off when they got home, but she has managed to keep it on, so far.  They have an appointment with MD on 6/7/19 to recheck.    Spouse states that patient gets very irritated when she reminds him to get up and move around, elevate his feet or any type of instruction. She is able to get him to wear his compression stockings most of the time.         Current Behavioral Concerns: Irritable      Education Provided to patient: Home care versus o/p wound care clinic.   Elevate feet above heart several times a day.  Sit on pillow in chair, lift weight off bottom, change position. Get up and move a bit.      Pain  Pain (GOAL):: No    Health Maintenance  Reviewed: Due/Overdue   Health Maintenance Due   Topic Date Due     HF ACTION PLAN  03/01/1932     DEPRESSION ACTION PLAN  03/01/1932     ZOSTER IMMUNIZATION (1 of 2) 03/01/1982     ADVANCED DIRECTIVE PLANNING  09/14/2017     MEDICARE ANNUAL WELLNESS VISIT  10/07/2017     ALT  09/19/2018     Clinical Pathway: None    Medication Management:  Spouse manages medications     Functional Status:  Dependent ADLs:: Ambulation-cane  Dependent IADLs:: Transportation, Shopping, Laundry, Cooking, Cleaning, Meal Preparation  Bed or wheelchair confined:: No  Mobility Status: Independent w/Device  Fallen 2 or more times in the past year?: No  Any fall with injury in the past year?: No    Living Situation:  Current living arrangement:: I live in a private home with spouse  Type of residence:: Private home - stairs    Diet/Exercise/Sleep:  Diet:: No added salt  Inadequate nutrition (GOAL):: No  Food Insecurity: No  Tube Feeding: No  Exercise:: Currently not exercising  Inadequate activity/exercise (GOAL):: No  Significant changes in sleep pattern (GOAL): No    Transportation:  Transportation concerns (GOAL):: No - spouse drives. Patient is not able to drive any longer.   Transportation means:: Accessible car, Family, Regular car     Psychosocial:  Jehovah's witness or spiritual beliefs that impact treatment:: No  Mental health DX:: No  Mental health management concern (GOAL):: No  Informal Support system:: Spouse     Financial/Insurance:      Resources and Interventions:  Current Resources: Ordered home care     Supplies used at home:: Wound Care Supplies, Compression Stockings  Equipment Currently Used at Home: cane, straight    Advance Care Plan/Directive  Advanced Care Plans/Directives on file:: No    Referrals Placed: Home Care     Patient/Caregiver understanding: Spouse has good understanding    Outreach Frequency: monthly  Future Appointments              In 2 days Fab Bowles MD [facility]    In 6 days Sadaf Fox MD [facility]    In 2 months Sadaf Fox MD [facility]      Plan: Will place order for home care to provided wound care for patient.     Patient will see MD as scheduled on 6/7/19. Patient will see PCP as scheduled on 6/11/19.     Clinic care coordinator will continue to follow.     Dayana Farris RN, [facility] - Primary Care Clinic RN Coordinator  [facility]   6/5/2019    10:42 AM  [phone]

## 2019-06-06 ENCOUNTER — TELEPHONE (OUTPATIENT)
Dept: FAMILY MEDICINE | Facility: CLINIC | Age: 84
End: 2019-06-06

## 2019-06-06 NOTE — PROGRESS NOTES
Subjective     Brock Abarca is a 87 year old male who presents to clinic today for the following health issues:    HPI   Chief Complaint   Patient presents with     RECHECK     Unna boot ; ulcer, uncomfortabble     Patient presents for venous stasis ulcer follow-up visit.  Tere boot was placed 3 days ago, removed yesterday at home as it was getting too tight  Wound on right leg was re-dressed by home nurse    Pressure skin break down proximal buttock treated with cream applied by home nurse as well, stable.    BP Readings from Last 3 Encounters:   06/11/19 112/58   06/07/19 135/77   06/04/19 114/62    Wt Readings from Last 3 Encounters:   06/11/19 93 kg (205 lb)   06/07/19 89.4 kg (197 lb)   06/04/19 91.6 kg (202 lb)                      Reviewed and updated as needed this visit by Provider  Tobacco  Allergies  Meds  Problems  Med Hx  Surg Hx  Fam Hx         Review of Systems   ROS COMP: Constitutional, HEENT, cardiovascular, pulmonary, gi and gu systems are negative, except as otherwise noted.      Objective    /77   Pulse 91   Temp 98  F (36.7  C) (Oral)   Resp 18   Wt 89.4 kg (197 lb)   SpO2 97%   BMI 33.81 kg/m    Body mass index is 33.81 kg/m .  Physical Exam   GENERAL: healthy, alert and no distress  EYES: Eyes grossly normal to inspection, PERRL and conjunctivae and sclerae normal  NECK: no adenopathy, no asymmetry, masses, or scars and thyroid normal to palpation  SKIN: lower extremity lesion healing well, 3+ pitting edema with some skin erythema and scabbed/healed ulcers  NEURO: Normal strength and tone, mentation intact and speech normal  PSYCH: mentation appears normal, affect normal/bright          Assessment & Plan       ICD-10-CM    1. Pressure injury of buttock, stage 1, unspecified laterality L89.301 zinc oxide (DESITIN) 40 % external ointment   2. Venous stasis ulcer of right calf limited to breakdown of skin, unspecified whether varicose veins present (H) I83.012      L97.211      Will give desitin cream to apply to pressure wound on buttock  Leg wound was re-dressed, bacitracin applied, patient will follow-up with primary care provider within the next few days.         Return in about 5 days (around 6/12/2019) for venous stasis ulcer.    Fab Patricio MD  [facility]

## 2019-06-06 NOTE — TELEPHONE ENCOUNTER
Reason for call:  Other   Patient called regarding (reason for call): call back  Additional comments: Patients wife is calling because she took the bandage off her husbands leg which he has an ulcer on and she wants to know if that will be ok until he is seen tomorrow or if he should come in today. Please call back     Phone number to reach patient:  Home number on file [phone] (home)    Best Time:  any    Can we leave a detailed message on this number?  YES

## 2019-06-06 NOTE — TELEPHONE ENCOUNTER
Spoke with Linda (wife) wound is not draining ulcer looks wet and yellow.  Advised that patient should keep area clean-okay to use antibiotic ointment and cover to protect.  She states patient like to dry out wound and sit in the sun- advised her to have patient keep leg out of sun for now as wound is sensitive. She will cover leg to protect wound.   Tiffany Eng RN

## 2019-06-07 ENCOUNTER — OFFICE VISIT (OUTPATIENT)
Dept: FAMILY MEDICINE | Facility: CLINIC | Age: 84
End: 2019-06-07
Payer: COMMERCIAL

## 2019-06-07 ENCOUNTER — TELEPHONE (OUTPATIENT)
Dept: FAMILY MEDICINE | Facility: CLINIC | Age: 84
End: 2019-06-07

## 2019-06-07 VITALS
BODY MASS INDEX: 33.81 KG/M2 | WEIGHT: 197 LBS | TEMPERATURE: 98 F | SYSTOLIC BLOOD PRESSURE: 135 MMHG | DIASTOLIC BLOOD PRESSURE: 77 MMHG | HEART RATE: 91 BPM | RESPIRATION RATE: 18 BRPM | OXYGEN SATURATION: 97 %

## 2019-06-07 DIAGNOSIS — L89.301 PRESSURE INJURY OF BUTTOCK, STAGE 1, UNSPECIFIED LATERALITY: Primary | ICD-10-CM

## 2019-06-07 DIAGNOSIS — I83.012 VENOUS STASIS ULCER OF RIGHT CALF LIMITED TO BREAKDOWN OF SKIN, UNSPECIFIED WHETHER VARICOSE VEINS PRESENT (H): ICD-10-CM

## 2019-06-07 DIAGNOSIS — L97.211 VENOUS STASIS ULCER OF RIGHT CALF LIMITED TO BREAKDOWN OF SKIN, UNSPECIFIED WHETHER VARICOSE VEINS PRESENT (H): ICD-10-CM

## 2019-06-07 PROCEDURE — 99214 OFFICE O/P EST MOD 30 MIN: CPT | Performed by: FAMILY MEDICINE

## 2019-06-07 RX ORDER — ZINC OXIDE
OINTMENT (GRAM) TOPICAL 3 TIMES DAILY PRN
Qty: 56 G | Refills: 1 | Status: SHIPPED | OUTPATIENT
Start: 2019-06-07 | End: 2020-01-01

## 2019-06-07 NOTE — TELEPHONE ENCOUNTER
Called Leigha RN with [facility] with verbal ok for orders requested per RN protocol.     Candida Rojas RN  [facility] [place]

## 2019-06-07 NOTE — TELEPHONE ENCOUNTER
Reason for Call:  Home Health Care    Leigha with [facility] called regarding (reason for call): orders    Orders are needed for this patient. PT,OT and skilled nursing    PT: Evaluate and treat    OT: Evaluate and treat    Skilled Nursing: For assessment, wound care,and disease and symptom management.    Pt Provider: Ruddy    Phone Number Homecare Nurse can be reached at: [phone]    Can we leave a detailed message on this number? YES    Phone number patient can be reached at: NA    Best Time: ASAP    Call taken on 6/7/2019 at 1:20 PM by Eri Matos

## 2019-06-10 ENCOUNTER — DOCUMENTATION ONLY (OUTPATIENT)
Dept: CARE COORDINATION | Facility: CLINIC | Age: 84
End: 2019-06-10

## 2019-06-10 NOTE — PROGRESS NOTES
[facility] now requests orders and shares plan of care/discharge summaries for some patients through [employer].  Please REPLY TO THIS MESSAGE OR ROUTE BACK TO THE AUTHOR in order to give authorization for orders when needed.  This is considered a verbal order, you will still receive a faxed copy of orders for signature.  Thank you for your assistance in improving collaboration for our patients.    ORDER: PT 1w1, 2w2, 1w1 for LE strengthening, progressing HEP, transfer training, gait training, and fall prevention.    Arlin Oviedo, PT, DPT  [email or web address]  [phone]

## 2019-06-11 ENCOUNTER — OFFICE VISIT (OUTPATIENT)
Dept: INTERNAL MEDICINE | Facility: CLINIC | Age: 84
End: 2019-06-11
Payer: COMMERCIAL

## 2019-06-11 ENCOUNTER — PATIENT OUTREACH (OUTPATIENT)
Dept: FAMILY MEDICINE | Facility: CLINIC | Age: 84
End: 2019-06-11

## 2019-06-11 VITALS
RESPIRATION RATE: 14 BRPM | TEMPERATURE: 97.5 F | BODY MASS INDEX: 35 KG/M2 | HEART RATE: 75 BPM | WEIGHT: 205 LBS | HEIGHT: 64 IN | DIASTOLIC BLOOD PRESSURE: 58 MMHG | SYSTOLIC BLOOD PRESSURE: 112 MMHG | OXYGEN SATURATION: 96 %

## 2019-06-11 DIAGNOSIS — R41.3 MEMORY LOSS: ICD-10-CM

## 2019-06-11 DIAGNOSIS — I10 ESSENTIAL HYPERTENSION: ICD-10-CM

## 2019-06-11 DIAGNOSIS — I50.30 HEART FAILURE WITH PRESERVED EJECTION FRACTION (H): Primary | ICD-10-CM

## 2019-06-11 DIAGNOSIS — L97.901 ULCER OF LOWER EXTREMITY, LIMITED TO BREAKDOWN OF SKIN, UNSPECIFIED LATERALITY (H): ICD-10-CM

## 2019-06-11 DIAGNOSIS — G47.00 INSOMNIA, UNSPECIFIED TYPE: ICD-10-CM

## 2019-06-11 DIAGNOSIS — R60.0 PERIPHERAL EDEMA: ICD-10-CM

## 2019-06-11 PROBLEM — F32.0 MAJOR DEPRESSIVE DISORDER, SINGLE EPISODE, MILD (H): Status: RESOLVED | Noted: 2018-07-03 | Resolved: 2019-06-11

## 2019-06-11 PROCEDURE — 99214 OFFICE O/P EST MOD 30 MIN: CPT | Mod: 25 | Performed by: INTERNAL MEDICINE

## 2019-06-11 PROCEDURE — 29580 STRAPPING UNNA BOOT: CPT | Mod: 50 | Performed by: INTERNAL MEDICINE

## 2019-06-11 RX ORDER — MIRTAZAPINE 7.5 MG/1
7.5 TABLET, FILM COATED ORAL AT BEDTIME
Qty: 30 TABLET | Refills: 3 | Status: SHIPPED | OUTPATIENT
Start: 2019-06-11 | End: 2019-09-10

## 2019-06-11 RX ORDER — BUMETANIDE 1 MG/1
3 TABLET ORAL DAILY
COMMUNITY
Start: 2019-06-11 | End: 2019-07-09

## 2019-06-11 ASSESSMENT — PATIENT HEALTH QUESTIONNAIRE - PHQ9: SUM OF ALL RESPONSES TO PHQ QUESTIONS 1-9: 6

## 2019-06-11 ASSESSMENT — ACTIVITIES OF DAILY LIVING (ADL): DEPENDENT_IADLS:: TRANSPORTATION;SHOPPING;LAUNDRY;COOKING;CLEANING;MEAL PREPARATION

## 2019-06-11 ASSESSMENT — MIFFLIN-ST. JEOR: SCORE: 1515.87

## 2019-06-11 NOTE — PATIENT INSTRUCTIONS
Stop Keflex.  I agree with the wound care nurse.   Remeron at bedtime for sleep.  Leave your leg wraps on until at least tomorrow.

## 2019-06-11 NOTE — PROGRESS NOTES
Clinic Care Coordination Contact  Care Coordination Communication    Referral Source: Care Team      Home Care Contact:              Home Care Agency: [facility]              Contact name () and phone number: Leigha Salgado               Care Coordination contacted home care: Yes              Anticipated start of care date: 6/6/19    Patient is receiving wound care for pressure ulcer on buttocks and multiple open ulcers on bilateral legs due to venous status.      Plan: RN/SW Care Coordinator will await notification from home care staff informing RN/SW Care Coordinator of patients discharge plans/needs. RN/SW Care Coordinator will review chart and outreach to home care every 4 weeks and as needed.      Dayana Farris RN, CCM - Primary Care Clinic RN Coordinator  [facility]   6/11/2019    2:36 PM  [phone]

## 2019-06-12 ENCOUNTER — TELEPHONE (OUTPATIENT)
Dept: FAMILY MEDICINE | Facility: CLINIC | Age: 84
End: 2019-06-12

## 2019-06-12 DIAGNOSIS — T14.8XXA WOUND INFECTION: Primary | ICD-10-CM

## 2019-06-12 DIAGNOSIS — L08.9 WOUND INFECTION: Primary | ICD-10-CM

## 2019-06-12 RX ORDER — DOXYCYCLINE 100 MG/1
100 CAPSULE ORAL 2 TIMES DAILY
Qty: 14 CAPSULE | Refills: 0 | Status: SHIPPED | OUTPATIENT
Start: 2019-06-12 | End: 2019-07-09

## 2019-06-12 NOTE — TELEPHONE ENCOUNTER
Detailed message left on Candida's VM with Dr. Fox's note as written below.  RN hotline number [phone] given for her to call back with any questions    Prescription sent    Edwin Weiner RN

## 2019-06-12 NOTE — TELEPHONE ENCOUNTER
Reason for call:  Other   Patient called regarding (reason for call): call back  Additional comments: Patient has yellow pus and pain on right lateral ankle; also 5 cm redness. Please follow up with Candida for either appointment or antibiotics.    Phone number to reach patient:  Other phone number:  [phone]*    Best Time:  ASAP    Can we leave a detailed message on this number?  YES

## 2019-06-12 NOTE — TELEPHONE ENCOUNTER
"Per NAYANA Tirado  wound care RN, she saw patient today and noted an area where pus was draining  Per Candida, it sounds like this was an area that was not assessed as it was not one of the open wounds at yesterday's OV  However, upon further assessment, it looked like dry skin surrounding a \"closed\" wound but when she pressed on the wound, pus drained out  5cm of redness and pain noted.  Patient denies fever or feeling ill    Please advise if oral abx is needed  Otherwise, she suggests using silverfoam to the area  FYI- is also adding a non adhesive foam dressing to areas that are draining a lot      Edwin Weiner RN    "

## 2019-06-13 ENCOUNTER — DOCUMENTATION ONLY (OUTPATIENT)
Dept: FAMILY MEDICINE | Facility: CLINIC | Age: 84
End: 2019-06-13

## 2019-06-13 NOTE — PROGRESS NOTES
[facility] now requests orders and shares plan of care/discharge summaries for some patients through [employer].  Please REPLY TO THIS MESSAGE OR ROUTE BACK TO THE AUTHOR in order to give authorization for orders when needed.  This is considered a verbal order, you will still receive a faxed copy of orders for signature.  Thank you for your assistance in improving collaboration for our patients.    ORDER ok for OT to cont 2w2, 1w1 for cog test, tub xfer safey, pressure relieving tech and HEP for endurance.    Sanjuanita STRANGE/L  [phone]  [email or web address]

## 2019-06-28 DIAGNOSIS — I63.20: ICD-10-CM

## 2019-06-28 RX ORDER — METOPROLOL SUCCINATE 50 MG/1
TABLET, EXTENDED RELEASE ORAL
Qty: 90 TABLET | Refills: 2 | Status: SHIPPED | OUTPATIENT
Start: 2019-06-28 | End: 2020-01-01

## 2019-07-02 ENCOUNTER — DOCUMENTATION ONLY (OUTPATIENT)
Dept: FAMILY MEDICINE | Facility: CLINIC | Age: 84
End: 2019-07-02

## 2019-07-02 DIAGNOSIS — Z00.00 ROUTINE GENERAL MEDICAL EXAMINATION AT A HEALTH CARE FACILITY: Primary | ICD-10-CM

## 2019-07-02 DIAGNOSIS — H10.45 CHRONIC ALLERGIC CONJUNCTIVITIS: ICD-10-CM

## 2019-07-02 DIAGNOSIS — K59.03 DRUG-INDUCED CONSTIPATION: ICD-10-CM

## 2019-07-03 NOTE — PROGRESS NOTES
[facility] now requests orders and shares plan of care/discharge summaries for some patients through [employer].  Please REPLY TO THIS MESSAGE OR ROUTE BACK TO THE AUTHOR in order to give authorization for orders when needed.  This is considered a verbal order, you will still receive a faxed copy of orders for signature.  Thank you for your assistance in improving collaboration for our patients.    FYI pt scored 4.8/5.6 on short CPT indicating mild to moderate functional decline. SLUMS 19/30. OT recommend he have assist with finances and med mgmt. Pt reported he was still driving but wife confirmed that he hasn't driven for quite some time per MD instruction. Pt frequently does not know year or month during visits.    Sanjuanita STRANGE/L  [phone]  [email or web address]

## 2019-07-08 NOTE — PROGRESS NOTES
Reason for Call:  Form, our goal is to have forms completed with 72 hours, however, some forms may require a visit or additional information.    Type of letter, form or note:  Home Health Certification    Who is the form from?: Home care    Where did the form come from: form was faxed in    What clinic location was the form placed at?: [facility]    Where the form was placed: Given to MA/RN    What number is listed as a contact on the form?: [phone]           Call taken on 7/8/2019 at 1:23 PM by Destiny Galicia

## 2019-07-09 DIAGNOSIS — Z53.9 DIAGNOSIS NOT YET DEFINED: Primary | ICD-10-CM

## 2019-07-09 PROCEDURE — G0180 MD CERTIFICATION HHA PATIENT: HCPCS | Performed by: INTERNAL MEDICINE

## 2019-07-09 RX ORDER — POTASSIUM CHLORIDE 750 MG/1
CAPSULE, EXTENDED RELEASE ORAL
COMMUNITY
Start: 2019-07-09 | End: 2019-09-06

## 2019-07-09 RX ORDER — TROSPIUM CHLORIDE ER 60 MG/1
60 CAPSULE ORAL EVERY MORNING
COMMUNITY
Start: 2019-07-09 | End: 2020-01-01

## 2019-07-09 RX ORDER — BUMETANIDE 1 MG/1
TABLET ORAL
COMMUNITY
Start: 2019-07-09 | End: 2019-07-11

## 2019-07-09 RX ORDER — SENNOSIDES 8.6 MG
1 TABLET ORAL PRN
COMMUNITY
Start: 2019-07-09 | End: 2019-07-10

## 2019-07-09 RX ORDER — SOY PROTEIN
1 POWDER (GRAM) ORAL DAILY
COMMUNITY
Start: 2019-07-09 | End: 2019-07-12

## 2019-07-09 RX ORDER — OLOPATADINE HYDROCHLORIDE 1 MG/ML
1 SOLUTION/ DROPS OPHTHALMIC 2 TIMES DAILY
Qty: 5 ML | Refills: 3 | Status: SHIPPED | OUTPATIENT
Start: 2019-07-09

## 2019-07-09 NOTE — PROGRESS NOTES
See full discrepancies note below. Per home care patient is Not taking the following medications but these are on clinic list: Plavix 75mg, Doxycycline 100mg (therapy completed), cephalexin 500mg (therapy completed).  Should plavix be removed from med list or should patient be taking this? Verified with  that patient is taking Trospium cl 60mg daily- see pending prescription. Per home care patient should be using Patanol 1-2 drops- see dosage warning below.  Patient is taking potassium 40meq daily- see pending prescription.  Patient is taking senna PRN not scheduled- see pending prescription. Verified patient is taking levothyroxine 112mcg daily (updated home care list).  Verified Bumex dose is 3mg daily in morning and 1mg every other evening (updated home care list). Verified patient is taking metamucil- see pending order.  Per home care patient is taking Vitron C daily - see pending order.    Home care med list placed in provider folder for review  Tiffany Eng RN

## 2019-07-09 NOTE — PROGRESS NOTES
Need to call and verify with HC nurse  Reviewed [facility] med list and reconciled against Epic med list    Discrepancies note -   1) We have Clopidogrel (Plavix) 75 mg: take 1 tablet po daily HC does not have listed  2) We have Cyanocobalamin (Vitamin B 12): Take 2500 mcg po daily HC has Vitamin B 12 500 mcg-folic acid 400 mcg tablet 1 tablet daily  3) We have Doxycycline hyclate (Vibramycine) 100 mg capsule: take 1 capsule po bid x 7 days HC also has listed Is patient still taking this?  4) We have Olopatadine (patanol) 0.1% ophthalmic solution: place 1 drop into both eyes 2 times daily HC has Olopatadine 0.1% eye drops apply 1-2 drops 2 times daily  5) We have Potassium Chloride ER (Micro-K) 10 Meq Cr capsule: take 20 meq (2 tabs) daily and take additional 1 meq (1 tab) if patient has leg swelling or weight is 3 lbs above baseline. HC has Potassium Chloride listed with 2 different dosing instructions: Take 10 meq po daily and take 40 meq po daily. Need to find out how patient is taking her potassium  6) We have Sennosides (Senokot) 8.6 mg: Take 1 tablet PO bid HC has listed Sennosides (Senokot) 8.6 mg take 1 tab po bid prn  7) We have Zinc Oxide (Desitin) 40% external ointment: apply topically tid prn, HC does not have listed  8) HC has Bumex list 2 ways: Take Bumex 1 mg po bid and Bumex 1 mg take 3 tabs po daily, We have it listed as Bumex 1 mg tablet: take 3 tablets po daily How is patient taking her Bumex?  9) HC has Cephalexin 500 mg take 1 cap po bid We do not have listed. Is patient still taking?  10) HC has levothyroxine listed twice with 2 different doses: 112 mcg daily and 125 mcg daily. We have Levothyroxine listed as 112 mcg po daily How is patient taking levothyroxine?  11) HC has Metamucil oral packet: take 1 pack po daily We do not have listed  12) HC has Vitron- C Plus Tablet take 1 tab po daily  We do not have listed  13) HC has Acetaminophen 325 mg tablet: take 1-2 tablets po every 4 hours prn for back  pain, We do not have listed  14) HC has Trospium Cl take 60 mg po daily We do not have listed.    Routing to provider -  Please review Plan of Care AND med list, make any changes appropriate, and sign plan of care to be faxed back to [facility]    Merlene Crouch RN - BC

## 2019-07-09 NOTE — PROGRESS NOTES
He has been prescribed plavix for stroke secondary prevention since 2012.  Please investigate further when the patient stopped this.  Was it done in the hospital, by the patient, etc. ?

## 2019-07-10 RX ORDER — SENNOSIDES 8.6 MG
1 TABLET ORAL 2 TIMES DAILY PRN
COMMUNITY
Start: 2019-07-10 | End: 2019-09-06

## 2019-07-10 RX ORDER — ACETAMINOPHEN 325 MG/1
325-650 TABLET ORAL EVERY 4 HOURS PRN
COMMUNITY
Start: 2019-07-10

## 2019-07-10 NOTE — PROGRESS NOTES
Epic and [facility] lists updated to match (except Plavix still needs clarification)    Left message on [facility] for patient to return call to RN hotline at # [phone].  Is he still taking Plavix 75mg daily? Was it stopped? By who, when, and why?  ([facility] still had it as active on their list. See 6/17/19 OV notes)    Edwin Weiner RN

## 2019-07-10 NOTE — PROGRESS NOTES
Patient's wife called back and she stated that patient is not taking Plavix.  Patient was also answering questions in the background  They are not sure when and why patient is no longer taking Plavix    Called [facility] (Adeline Smith PA) and LM asking if they have any info on this or if they could clarify whether or not patient should be on Plavix 75mg daily    Edwin Weiner RN

## 2019-07-11 RX ORDER — BUMETANIDE 1 MG/1
TABLET ORAL
COMMUNITY
Start: 2019-07-11 | End: 2019-09-06

## 2019-07-11 RX ORDER — MULTIVIT WITH MINERALS/LUTEIN
1 TABLET ORAL DAILY
Qty: 90 TABLET | Refills: 3 | Status: SHIPPED | OUTPATIENT
Start: 2019-07-11 | End: 2020-01-01

## 2019-07-11 NOTE — PROGRESS NOTES
Per Leigha, patient is taking Plavix 75mg daily.  Patient/wife did not realize it was the same as Clopidogrel   Patient is not taking Vit B12, vit D, or vitron C  Wife thought he did not need to be on these supplements since he is taking Centrum Silver 1 a day    Does he still need to be on the Vit B12, Vit D and Vitron C?  It is too difficult for patient to remember to take the evening dose of Bumex 1mg every other day  Can patient take the 1mg every other day in the AM with his 3mg dose?    Edwin Weiner RN

## 2019-07-11 NOTE — PROGRESS NOTES
Spoke with JS Johnston at [facility] [place]. They have Plavix listed as a historical med.Their group is not prescribing medication as they have the indication listed as CVA, so this is deferred to  primary care to fill. Pt told them he was on it on 6/17 when seen by Melanie LEYVA.   Please advise.    Candida Rojas RN  [facility]

## 2019-07-12 NOTE — PROGRESS NOTES
Epic and [facility] med lists updated    LM on Leigha' and Linda's VM with Dr. Fox's message  Ok to stop B12, Vit D and Vitron  Ok to change Bumex to 3mg daily every morning. Take an extra 1mg (total of 4mg) every other morning    RN hotline number [phone] given for them to call back with any questions    - please fax [facility] paperwork back to them.     Edwin Weiner RN

## 2019-07-16 ENCOUNTER — PATIENT OUTREACH (OUTPATIENT)
Dept: CARE COORDINATION | Facility: CLINIC | Age: 84
End: 2019-07-16

## 2019-07-16 ASSESSMENT — ACTIVITIES OF DAILY LIVING (ADL): DEPENDENT_IADLS:: TRANSPORTATION;SHOPPING;LAUNDRY;COOKING;CLEANING;MEAL PREPARATION

## 2019-07-16 NOTE — LETTER
[facility]  Complex Care Plan  About Me:    Patient Name:  Brock Meredith    YOB: 1932  Age:         87 year old   [place] MRN:    [id number] Telephone Information:  Home Phone [phone]   Mobile [phone]       Address:  [place] [address] Email address:  [email or web address]      Emergency Contact(s)    Name Relationship Lgl Grd Work Phone Home Phone Mobile Phone   1. AGA MEREDITH Spouse   [phone]   2. AARTI ENGEL Daughter    [phone]           Primary language:  English     needed? No   [place] [employer]:  [phone] op. 1  Other communication barriers: Glasses  Preferred Method of Communication:  Mail  Current living arrangement: I live in a private home with spouse  Mobility Status/ Medical Equipment: Independent w/Device    Health Maintenance  Health Maintenance Reviewed: Due/Overdue   Health Maintenance Due   Topic Date Due     HF ACTION PLAN  03/01/1932     DEPRESSION ACTION PLAN  03/01/1932     ZOSTER IMMUNIZATION (1 of 2) 03/01/1982     ADVANCE CARE PLANNING  09/14/2017     MEDICARE ANNUAL WELLNESS VISIT  10/07/2017     ALT  09/19/2018     LIPID  08/13/2019       My Access Plan  Medical Emergency 911   Primary Clinic Line [facility] [phone]   24 Hour Appointment Line [phone] or  [phone]) (toll-free)   24 Hour Nurse Line [phone] (toll-free)   Preferred Urgent Care     Preferred [facility]  [phone]   Preferred Pharmacy [id number] [place] - [address]     Behavioral Health Crisis Line The National Suicide Prevention Lifeline at 1-748.351.1460 or 911             My Care Team Members  Patient Care Team       Relationship Specialty Notifications Start End    Sadaf Fox MD PCP - General Internal Medicine  8/24/12     Phone: [phone] Fax: [phone] [address]  [facility] [address]    Matt Escalante, PhD LP MD Psychology  9/29/15     Phone: [phone] Fax: [phone]         [address] [facility] [address]    Sadaf Fox MD Assigned PCP   7/15/18     Phone: [phone] Fax: [phone]         [address] [facility] [address]    Dayana Farris, RN Lead Care Coordinator Primary Care - CC Admissions 6/5/19     Phone: [phone] Fax: [phone]                My Care Plans  Self Management and Treatment Plan  Goals and (Comments)  Goals        General    Functional (pt-stated)     Notes - Note created  7/16/2019 10:34 AM by Dayana Farris, RN    Goal Statement: I want the swelling in my legs to go down    Measure of Success: When I can walk better and the sores heal    Supportive Steps to Achieve: I will keep my feet elevated when sitting.  I will take medication as prescribed.    Barriers: Heart failure     Strengths: Family support    Date to Achieve By: January 2019    Patient expressed understanding of goal: Yes               Action Plans on File:                       Advance Care Plans/Directives Type:        My Medical and Care Information  Problem List   Patient Active Problem List   Diagnosis     Arthritis     Pulmonary embolism (H)     BPH (benign prostatic hyperplasia)     Urinary frequency     Essential hypertension     Osteoporosis, senile     Hypothyroidism     Left foot drop     Lip tremor     Hand paresthesia     Thrombocytopenia (H)     Fatigue     Memory loss     Hypercalcemia     Advanced directives, counseling/discussion     Reticulocytosis     Hypovitaminosis D     Epiphora     Cerebral infarction (H)     History of dacryocystorhinostomy, od; os?     Brain dysfunction     Hyperlipidemia LDL goal <100     Peripheral edema     Diverticulosis of colon     H/O: CVA (cerebrovascular accident)     Hemorrhoids     Spinal stenosis, unspecified region other than cervical     Heart failure with preserved ejection fraction (H)     Obesity  (BMI 35.0-39.9) with comorbidity (H)     Driving safety issue     Ulcer of lower extremity, limited to breakdown of skin, unspecified laterality (H)      Current Medications and Allergies:  See printed Medication Report.    Care Coordination Start Date: 7/16/2019   Frequency of Care Coordination: monthly   Form Last Updated: 07/16/2019

## 2019-07-16 NOTE — PROGRESS NOTES
Clinic Care Coordination Contact    Clinic Care Coordination Contact  OUTREACH    Referral Information:  Referral Source: Care Team    Primary Diagnosis: CHF  Chief Complaint   Patient presents with     Clinic Care Coordination - Follow-up     RN   Universal Utilization: Follows with cardiology and CORE through [facility].   Clinic Utilization  Difficulty keeping appointments:: No  Compliance Concerns: No  No-Show Concerns: No  No PCP office visit in Past Year: No  Utilization    Last refreshed: 7/16/2019  8:05 AM:  Hospital Admissions 0           Last refreshed: 7/16/2019  8:05 AM:  ED Visits 0           Last refreshed: 7/16/2019  8:05 AM:  No Show Count (past year) 0              Current as of: 7/16/2019  8:05 AM          Clinical Concerns:     Current Medical Concerns:  Spoke with patient's spouse, Linda.    Linda states that they are doing ok. She states they had patient's sores on his legs all healed up, but now he has two new blisters again.     Patient has wound care and home care nursing coming from [facility]. Spouse has been taught to do dressings in between home care visits.    They are encouraging patient to be up and around more. He is to elevate his legs when he sits, he needs reminders for this.  Linda states they are going out for lunch today with friends.     Wt: 194 #  this is stable  She states his O2 sats are good, he is not on any oxygen. His blood pressure is good.    Denies shortness of breath, cough or fatigue.       Current Behavioral Concerns: Denies concerns. Spouse states she gets irritated with patient at times      Education Provided to patient: Elevate feet. Get up and move around the house hourly     Pain  Pain (GOAL):: No    Health Maintenance Reviewed: Due/Overdue   Health Maintenance Due   Topic Date Due     HF ACTION PLAN  03/01/1932     DEPRESSION ACTION PLAN  03/01/1932     ZOSTER IMMUNIZATION (1 of 2) 03/01/1982     ADVANCE CARE PLANNING  09/14/2017     MEDICARE ANNUAL  WELLNESS VISIT  10/07/2017     ALT  09/19/2018     LIPID  08/13/2019     Clinical Pathway: CHF    Medication Management:  Spouse manages all medications     Functional Status:  Dependent ADLs:: Ambulation-cane  Dependent IADLs:: Transportation, Shopping, Laundry, Cooking, Cleaning, Meal Preparation  Bed or wheelchair confined:: No  Mobility Status: Independent w/Device  Fallen 2 or more times in the past year?: No  Any fall with injury in the past year?: No    Living Situation:  Current living arrangement:: I live in a private home with spouse  Type of residence:: Private home - stairs    Diet/Exercise/Sleep:  Diet:: No added salt  Inadequate nutrition (GOAL):: No  Food Insecurity: No  Tube Feeding: No  Exercise:: Currently not exercising  Inadequate activity/exercise (GOAL):: No  Significant changes in sleep pattern (GOAL): No    Transportation:  Transportation concerns (GOAL):: No - spouse does all of the driving  Transportation means:: Accessible car, Family, Regular car     Psychosocial:  Christian or spiritual beliefs that impact treatment:: No  Mental health DX:: No  Mental health management concern (GOAL):: No  Informal Support system:: Spouse     Financial/Insurance:   Financial/Insurance concerns (GOAL):: No     Resources and Interventions:  Current Resources:   List of home care services:: Skilled Nursing, Physicial Therapy, Occupational Therapy;   Community Resources: Home Care  Supplies used at home:: Wound Care Supplies, Compression Stockings  Equipment Currently Used at Home: cane, straight    Advance Care Plan/Directive  Advanced Care Plans/Directives on file:: No  Advanced Care Plan/Directive Status: Declined Further Information    Referrals Placed: Home Care     Goals:   Goals        General    Functional (pt-stated)     Notes - Note created  7/16/2019 10:34 AM by Dayana Farris RN    Goal Statement: I want the swelling in my legs to go down    Measure of Success: When I can walk better and the sores  heal    Supportive Steps to Achieve: I will keep my feet elevated when sitting.  I will take medication as prescribed.    Barriers: Heart failure     Strengths: Family support    Date to Achieve By: January 2019    Patient expressed understanding of goal: Yes          Patient/Caregiver understanding: Caregiver has good understanding    Outreach Frequency: monthly  Future Appointments              In 4 weeks Sadaf Fox MD [facility]      Plan: Clinic care coordinator will continue to follow and outreach to spouse in one month.   Home care continues to follow.     Dayana Farris RN, [facility] RN Coordinator  [facility]   7/16/2019    10:42 AM  [phone]

## 2019-07-19 ENCOUNTER — TELEPHONE (OUTPATIENT)
Dept: FAMILY MEDICINE | Facility: CLINIC | Age: 84
End: 2019-07-19

## 2019-07-19 NOTE — TELEPHONE ENCOUNTER
Reason for call:  Home Healthcare Reason for Call:  Home Health Care        Orders are needed for this patient. verbal    PT: na    OT: yes, for one time eval    Skilled Nursing: yes for weekly nursing visits    Pt Provider: [facility]    Phone Number Homecare Nurse can be reached at: [phone]    Can we leave a detailed message on this number? YES    Phone number patient can be reached at: na    Best Time: any    Call taken on 7/19/2019 at 2:02 PM by Shweta Bearden

## 2019-07-23 DIAGNOSIS — I63.20: ICD-10-CM

## 2019-07-25 RX ORDER — CLOPIDOGREL BISULFATE 75 MG/1
TABLET ORAL
Qty: 90 TABLET | Refills: 0 | Status: SHIPPED | OUTPATIENT
Start: 2019-07-25 | End: 2019-10-25

## 2019-07-25 NOTE — TELEPHONE ENCOUNTER
Prescription approved per [facility] Refill Protocol.  Adenike Mclaughlin RN

## 2019-08-19 ENCOUNTER — PATIENT OUTREACH (OUTPATIENT)
Dept: FAMILY MEDICINE | Facility: CLINIC | Age: 84
End: 2019-08-19

## 2019-08-19 NOTE — PROGRESS NOTES
Clinic Care Coordination Contact    Follow Up Progress Note      Assessment: Spoke with patient's spouse, Linda.     Linda states they are doing ok. Patient's biggest problem is he has difficulty walking. His legs are always edematous. He refuses to use a cane or walker.  She states he currently has a blister on the back of one leg. She has dressing from home care wound nurse that she states are miracles. Linda wraps patient's legs daily but she states he does remove the wraps some days.     Linda states she took a four day vacation with her sister and family and patient's daughters cared for him.     Patient is incontinent off and on and refuses to wear depends. He can be very difficult to deal with.      Linda states his memory is getting progressively worse. She is not sure how long they will be able to stay in their current home. This is a second marriage for them and they both have their own grown children.     Goals addressed this encounter:   Goals Addressed                 This Visit's Progress      Functional (pt-stated)   On track     Goal Statement: I want the swelling in my legs to go down    Measure of Success: When I can walk better and the sores heal    Supportive Steps to Achieve: I will keep my feet elevated when sitting.  I will take medication as prescribed.    Barriers: Heart failure     Strengths: Family support    Date to Achieve By: January 2019    Patient expressed understanding of goal: Yes          Intervention/Education provided during outreach: Provided Linda with information that she can discuss future plans with RN CC when ready.      Outreach Frequency: monthly    Plan:   Patient will see PCP in September as scheduled.    Care Coordinator will follow up in one month.     Dayana Farris RN, [facility] RN Coordinator  [facility]   8/19/2019    3:43 PM  [phone]

## 2019-08-23 ENCOUNTER — TELEPHONE (OUTPATIENT)
Dept: FAMILY MEDICINE | Facility: CLINIC | Age: 84
End: 2019-08-23

## 2019-08-23 DIAGNOSIS — L97.901 ULCER OF LOWER EXTREMITY, LIMITED TO BREAKDOWN OF SKIN, UNSPECIFIED LATERALITY (H): ICD-10-CM

## 2019-08-23 DIAGNOSIS — I50.30 HEART FAILURE WITH PRESERVED EJECTION FRACTION (H): ICD-10-CM

## 2019-08-23 DIAGNOSIS — R60.0 PERIPHERAL EDEMA: Primary | ICD-10-CM

## 2019-08-23 DIAGNOSIS — R41.3 MEMORY LOSS: ICD-10-CM

## 2019-08-23 DIAGNOSIS — Z86.73 H/O: CVA (CEREBROVASCULAR ACCIDENT): ICD-10-CM

## 2019-08-23 NOTE — TELEPHONE ENCOUNTER
Per Dr. Fox: ok for  skilled nursing     Referral placed with Dr. Fox's help    LM on Linda's VM with updates    Edwin Weiner RN

## 2019-08-23 NOTE — TELEPHONE ENCOUNTER
"Per wife, Linda, patient has had problems with edema and leg wounds for awhile   Had home care nurses managing wounds but services ended a couple of weeks ago when wounds healed and everything was looking \"stable\"  He now has 2 open wounds the size of a quarter on the right leg  Noted some bleeding after taking a shower yesterday  Wife placed a bandage that HC had left and has not opened it back up to check the wounds again  Legs continue to be edematous and pinkish which is not new  Painful to the touch and she is not sure if legs are warm/hot     She called HC and was advised to see if another HC order can be placed to see if he qualifies for services and wound care again    Has future appointment with Dr. Fox on 9/10/19 for 6 month f/u    Edwin Weiner RN        "

## 2019-08-23 NOTE — TELEPHONE ENCOUNTER
Reason for call:  Patient reporting a symptom    Symptom or request:  Edema on legs, blisters.  Advise or does he need a wound nurse.     Duration (how long have symptoms been present):  Last week     Have you been treated for this before?  Yes     Additional comments: it has been awhile    Phone Number patient can be reached at:  Home number on file [phone] (home)    Best Time:   Any     Can we leave a detailed message on this number:  YES    Call taken on 8/23/2019 at 10:31 AM by Alannah Weeks

## 2019-08-25 ENCOUNTER — NURSE TRIAGE (OUTPATIENT)
Dept: NURSING | Facility: CLINIC | Age: 84
End: 2019-08-25

## 2019-08-25 NOTE — TELEPHONE ENCOUNTER
In need of orders: Referral placed for skilled nursing for wound care. OK for initial assessment needed.  2:16 p.m. I paged Dr Daniel Kerr to call back home care RN, Ashlee, to give her an order as needed above.  I advised her to call back if she's not heard from the on call MD within 30 minutes and we'll repage.  Leeanne De Jesus RN-[facility]

## 2019-08-26 ENCOUNTER — DOCUMENTATION ONLY (OUTPATIENT)
Dept: CARE COORDINATION | Facility: CLINIC | Age: 84
End: 2019-08-26

## 2019-08-26 NOTE — PROGRESS NOTES
[facility] now requests orders and shares plan of care/discharge summaries for some patients through [employer].  Please REPLY TO THIS MESSAGE OR ROUTE BACK TO THE AUTHOR in order to give authorization for orders when needed.  This is considered a verbal order, you will still receive a faxed copy of orders for signature.  Thank you for your assistance in improving collaboration for our patients.    Patient has 2 open wounds to right lower leg. Has moderate amount of blood and yellow purulent drainage, surrounding skin is edematous and red and warm. Has had for about 1.5 weeks. Measurement of wound 1 is 4x5.2 cm  Wound 2 measurement 3x2 cm. Afebrile.   Please advise if have recommendations or want to start on antibiotic, possible concern of infection. Has appt scheduled with you on 9/10. Instructed wife Linda to call today to see if can get in sooner. She will take to  ED if wound continues to worsen.     ORDER  Skilled nursing 2 week 1, 3 week 3, 2 week 1, 3 PRN for wound care and assessment, medication teaching and assess effects, disease and symptom management.   Wound care. Cleanse with saline or wound cleanser, cover with non adhesive foam dressing, roll gauze, tubigrip for light compression and to hold dressing in place. To be completed 3 times weekly.   OT to evaluate and treat to include lymphedema therapy.     June Sepulveda RN Admission Clinician  [facility]  [phone]

## 2019-08-28 ENCOUNTER — DOCUMENTATION ONLY (OUTPATIENT)
Dept: CARE COORDINATION | Facility: CLINIC | Age: 84
End: 2019-08-28

## 2019-08-28 NOTE — PROGRESS NOTES
[facility] now requests orders and shares plan of care/discharge summaries for some patients through [employer].  Please REPLY TO THIS MESSAGE OR ROUTE BACK TO THE AUTHOR in order to give authorization for orders when needed.  This is considered a verbal order, you will still receive a faxed copy of orders for signature.  Thank you for your assistance in improving collaboration for our patients.    Update,   Woc assessment to new stasis ulcers on R calf area.  Periwound erythema extending out approx 4cm, increase in periwound pain noted, pt reporting 5/10 today.  Afebrile, VSS,   Please advise if pt should be seen by MD for possible wound culture and assessment, home care will cont to monitor for worsening s/s of infection.  Candida Chu RN, CWON

## 2019-08-29 ENCOUNTER — TELEPHONE (OUTPATIENT)
Dept: FAMILY MEDICINE | Facility: CLINIC | Age: 84
End: 2019-08-29

## 2019-08-29 ENCOUNTER — DOCUMENTATION ONLY (OUTPATIENT)
Dept: CARE COORDINATION | Facility: CLINIC | Age: 84
End: 2019-08-29

## 2019-08-29 NOTE — TELEPHONE ENCOUNTER
Reason for call:  Patient reporting a symptom    Symptom or request: leg sores    Duration (how long have symptoms been present):  1 wk    Have you been treated for this before? Yes    Additional comments:  Home care nurse checking    Phone Number patient can be reached at:  Home number on file [phone] (home)    Best Time:   Any     Can we leave a detailed message on this number:  YES    Call taken on 8/29/2019 at 11:33 AM by Alannah Weeks

## 2019-08-29 NOTE — TELEPHONE ENCOUNTER
"See \"documentation only\" encounter from 8/28/19 from  wound care nurse    Dr Fox is out of the office  Nella Feliciano NP advised patient to be seen for further eval    Called wife, Linda.  Appointment scheduled for tomorrow with Dr. Luis Weiner, JS    "

## 2019-08-29 NOTE — PROGRESS NOTES
[facility] now requests orders and shares plan of care/discharge summaries for some patients through [employer].  Please REPLY TO THIS MESSAGE OR ROUTE BACK TO THE AUTHOR in order to give authorization for orders when needed.  This is considered a verbal order, you will still receive a faxed copy of orders for signature.  Thank you for your assistance in improving collaboration for our patients.    ORDER    Requesting ongoing lymphedema orders for client to educate them on proper use of compression garments, wear schedule, care and precautions. Educate on wound care and symptom management. Requesting 6 visits.    Fernando Acosta OTRL, CLT  [phone]  [email or web address]

## 2019-08-30 ENCOUNTER — OFFICE VISIT (OUTPATIENT)
Dept: FAMILY MEDICINE | Facility: CLINIC | Age: 84
End: 2019-08-30
Payer: COMMERCIAL

## 2019-08-30 VITALS
SYSTOLIC BLOOD PRESSURE: 112 MMHG | TEMPERATURE: 96.8 F | DIASTOLIC BLOOD PRESSURE: 60 MMHG | BODY MASS INDEX: 34.33 KG/M2 | HEART RATE: 79 BPM | WEIGHT: 200 LBS | OXYGEN SATURATION: 98 %

## 2019-08-30 DIAGNOSIS — L97.919 ULCER OF RIGHT LOWER EXTREMITY, UNSPECIFIED ULCER STAGE (H): Primary | ICD-10-CM

## 2019-08-30 PROCEDURE — 99213 OFFICE O/P EST LOW 20 MIN: CPT | Performed by: FAMILY MEDICINE

## 2019-08-30 NOTE — LETTER
"My Heart Failure Action Plan   Name: Brock Abarca    YOB: 1932   Date: 8/30/2019    My doctor: Sadaf Fox     [address] [facility] [id number] [phone]  My Diagnosis: {HF STAGES:238266}   My Exercise Goal: 30 minutes daily  .     My Weight Plan:   Wt Readings from Last 2 Encounters:   06/11/19 93 kg (205 lb)   06/07/19 89.4 kg (197 lb)     Weigh yourself daily using the same scale. If you gain more than 2 pounds in 24 hours or 5 pounds in a week {HF WEIGHT GOAL:136755}    My Diet Goal: { :351225::\"No added salt\"}    Emergency Room Visits:    Our goal is to improve your quality of life and help you avoid a visit to the emergency room or hospital.  If we work together, we can achieve this goal. But, if you feel you need to call 911 or go to the emergency room, please do so.  If you go to the emergency room, please bring your list of medicines and your daily weight chart with you.       GREEN ZONE     Doing well today    Weight gained is no more than 2 pounds a day or 5 pounds a week.    No swelling in feet, ankles, legs or stomach.    No more swelling than usual.    No more trouble breathing than usual.    No change in my sleep.    No other problems. Actions:    I am doing fine.  I will take my medicine, follow my diet, see my doctor, exercise, and watch for symptoms.           YELLOW ZONE         Having a bad day or flare up    Weight gain of more than 2 pounds in one day or 5 pounds in one week.    New swelling in ankle, leg, knee or thigh.    Bloating in belly, pants feel tighter.    Swelling in hands or face.    Coughing or trouble breathing while walking or talking.    Harder to breathe last night.    Have trouble sleeping, wake up short of breath.    Much more tired than usual.    Not eating.    Pain in my chest or bad leg cramps.    Feel weak or dizzy. Actions:    I need to take action and call my doctor or nurse today.                 " RED ZONE         Need medical care now    Weight gain of 5 pounds overnight.    Chest pain or pressure that does not go away.    Feel less alert.    Wheezing or have trouble breathing when at rest.    Cannot sleep lying down.    Cannot take my water pill.    Pass out or faint. Actions:    I need to call my doctor or nurse now!    Call 911 if I have chest pain or cannot breathe.

## 2019-08-30 NOTE — LETTER
My Depression Action Plan  Name: Brock Abarca   Date of Birth 3/1/1932  Date: 8/30/2019    My doctor: Sadaf Fox   My clinic: [address]  [place] [address]  [phone]          GREEN    ZONE   Good Control    What it looks like:     Things are going generally well. You have normal up s and down s. You may even feel depressed from time to time, but bad moods usually last less than a day.   What you need to do:  1. Continue to care for yourself (see self care plan)  2. Check your depression survival kit and update it as needed  3. Follow your physician s recommendations including any medication.  4. Do not stop taking medication unless you consult with your physician first.           YELLOW         ZONE Getting Worse    What it looks like:     Depression is starting to interfere with your life.     It may be hard to get out of bed; you may be starting to isolate yourself from others.    Symptoms of depression are starting to last most all day and this has happened for several days.     You may have suicidal thoughts but they are not constant.   What you need to do:     1. Call your care team, your response to treatment will improve if you keep your care team informed of your progress. Yellow periods are signs an adjustment may need to be made.     2. Continue your self-care, even if you have to fake it!    3. Talk to someone in your support network    4. Open up your depression survival kit           RED    ZONE Medical Alert - Get Help    What it looks like:     Depression is seriously interfering with your life.     You may experience these or other symptoms: You can t get out of bed most days, can t work or engage in other necessary activities, you have trouble taking care of basic hygiene, or basic responsibilities, thoughts of suicide or death that will not go away, self-injurious behavior.     What you need to do:  1. Call your care team and  request a same-day appointment. If they are not available (weekends or after hours) call your local crisis line, emergency room or 911.            Depression Self Care Plan / Survival Kit    Self-Care for Depression  Here s the deal. Your body and mind are really not as separate as most people think.  What you do and think affects how you feel and how you feel influences what you do and think. This means if you do things that people who feel good do, it will help you feel better.  Sometimes this is all it takes.  There is also a place for medication and therapy depending on how severe your depression is, so be sure to consult with your medical provider and/ or Behavioral Health Consultant if your symptoms are worsening or not improving.     In order to better manage my stress, I will:    Exercise  Get some form of exercise, every day. This will help reduce pain and release endorphins, the  feel good  chemicals in your brain. This is almost as good as taking antidepressants!  This is not the same as joining a gym and then never going! (they count on that by the way ) It can be as simple as just going for a walk or doing some gardening, anything that will get you moving.      Hygiene   Maintain good hygiene (Get out of bed in the morning, Make your bed, Brush your teeth, Take a shower, and Get dressed like you were going to work, even if you are unemployed).  If your clothes don't fit try to get ones that do.    Diet  I will strive to eat foods that are good for me, drink plenty of water, and avoid excessive sugar, caffeine, alcohol, and other mood-altering substances.  Some foods that are helpful in depression are: complex carbohydrates, B vitamins, flaxseed, fish or fish oil, fresh fruits and vegetables.    Psychotherapy  I agree to participate in Individual Therapy (if recommended).    Medication  If prescribed medications, I agree to take them.  Missing doses can result in serious side effects.  I understand that  drinking alcohol, or other illicit drug use, may cause potential side effects.  I will not stop my medication abruptly without first discussing it with my provider.    Staying Connected With Others  I will stay in touch with my friends, family members, and my primary care provider/team.    Use your imagination  Be creative.  We all have a creative side; it doesn t matter if it s oil painting, sand castles, or mud pies! This will also kick up the endorphins.    Witness Beauty  (AKA stop and smell the roses) Take a look outside, even in mid-winter. Notice colors, textures. Watch the squirrels and birds.     Service to others  Be of service to others.  There is always someone else in need.  By helping others we can  get out of ourselves  and remember the really important things.  This also provides opportunities for practicing all the other parts of the program.    Humor  Laugh and be silly!  Adjust your TV habits for less news and crime-drama and more comedy.    Control your stress  Try breathing deep, massage therapy, biofeedback, and meditation. Find time to relax each day.     My support system    Clinic Contact:  Phone number:    Contact 1:  Phone number:    Contact 2:  Phone number:    Jainism/:  Phone number:    Therapist:  Phone number:    Local crisis center:    Phone number:    Other community support:  Phone number:

## 2019-08-30 NOTE — PROGRESS NOTES
Subjective     Brock Abarca is a 87 year old male who presents to clinic today for the following health issues:    HPI   Chief Complaint   Patient presents with     Wound Check     open sore-Right lower leg x 2 weeks-discomfort, sees wound nurse. Wants to make sure not infective      Patient has on going lymphedema, but developed an open sore in the back of the leg and wife worried if this could be infected. Been painful lately.  He does have home care nurse who cleans wound weekly.  He has no swelling, drainage or fever but much of the skin in back of leg been appearing pinkish.    Patient Active Problem List   Diagnosis     Arthritis     Pulmonary embolism (H)     BPH (benign prostatic hyperplasia)     Urinary frequency     Essential hypertension     Osteoporosis, senile     Hypothyroidism     Left foot drop     Lip tremor     Hand paresthesia     Thrombocytopenia (H)     Fatigue     Memory loss     Hypercalcemia     Advanced directives, counseling/discussion     Reticulocytosis     Hypovitaminosis D     Epiphora     Cerebral infarction (H)     History of dacryocystorhinostomy, od; os?     Brain dysfunction     Hyperlipidemia LDL goal <100     Peripheral edema     Diverticulosis of colon     H/O: CVA (cerebrovascular accident)     Hemorrhoids     Spinal stenosis, unspecified region other than cervical     Heart failure with preserved ejection fraction (H)     Obesity (BMI 35.0-39.9) with comorbidity (H)     Driving safety issue     Ulcer of lower extremity, limited to breakdown of skin, unspecified laterality (H)     Past Surgical History:   Procedure Laterality Date     APPENDECTOMY       BACK SURGERY       CATARACT IOL, RT/LT      s/p cataract surgery both eyes     Cystoscopy, left ureteroscopy, holium laser lithotripsy, stent exchange  1/23/17     Cystoscopy, left urethral stone removed, stent placement  1/5/17     DACRYOCYSTORHINOSTOMY BILATERAL  2013    right eye; left eye also?     EXTRACORPOREAL  SHOCK WAVE LITHOTRIPSY, CYSTOSCOPY, INSERT STENT URETER(S), COMBINED       HEAD & NECK SURGERY       ORTHOPEDIC SURGERY       PARATHYROIDECTOMY N/A 2/14/2017    Procedure: PARATHYROIDECTOMY;  Surgeon: Genesis Dixon MD;  Location:  OR       Social History     Tobacco Use     Smoking status: Never Smoker     Smokeless tobacco: Never Used   Substance Use Topics     Alcohol use: Yes     Comment: occ.     Family History   Problem Relation Age of Onset     Cerebrovascular Disease Father      Heart Disease Sister          PROBLEMS TO ADD ON...  Reviewed and updated as needed this visit by Provider    Review of Systems   ROS COMP: Constitutional, HEENT, cardiovascular, pulmonary, gi and gu systems are negative, except as otherwise noted.      Objective    /60   Pulse 79   Temp 96.8  F (36  C) (Oral)   Wt 90.7 kg (200 lb)   SpO2 98%   BMI 34.33 kg/m    Body mass index is 34.33 kg/m .  Physical Exam   GENERAL: healthy, alert and no distress  RESP: lungs clear to auscultation - no rales, rhonchi or wheezes  CV: regular rate and rhythm, no murmur, click or rub, peripheral edema RLE   MS: Rt Lower Extremity       Open sore on back of lower leg       Has good granulation tissue, with some fresh bleeding (anticoagulated). No swelling.    SKIN: no suspicious lesions or rashes    Diagnostic Test Results:    Assessment & Plan     Brock was seen today for wound check.    Diagnoses and all orders for this visit:    Ulcer of right lower extremity, unspecified ulcer stage (H): not infected       -    Wound clean, no sign of infection at this time, has good granulation tissue.     Reassured patient and spouse. Will apply dressing and continue home care    Other orders  -     Wound care    Return in about 1 month (around 9/30/2019) for Routine Visit.    Mando Smith MD  [facility]

## 2019-09-04 NOTE — PROGRESS NOTES
Reason for Call:  Form, our goal is to have forms completed with 72 hours, however, some forms may require a visit or additional information.    Type of letter, form or note:  Home Health Certification    Who is the form from?: Home care ([facility])    Where did the form come from: form was faxed in    What clinic location was the form placed at?: [facility]    Where the form was placed: Given to MA/RN    What number is listed as a contact on the form?: [phone]       Additional comments:     Call taken on 9/4/2019 at 9:04 AM by Carri Rojo

## 2019-09-06 DIAGNOSIS — Z53.9 DIAGNOSIS NOT YET DEFINED: Primary | ICD-10-CM

## 2019-09-06 PROCEDURE — G0180 MD CERTIFICATION HHA PATIENT: HCPCS | Performed by: INTERNAL MEDICINE

## 2019-09-06 RX ORDER — BUMETANIDE 1 MG/1
2 TABLET ORAL 2 TIMES DAILY
COMMUNITY
Start: 2019-09-06 | End: 2020-01-01

## 2019-09-06 RX ORDER — POTASSIUM CHLORIDE 750 MG/1
20 CAPSULE, EXTENDED RELEASE ORAL 2 TIMES DAILY
COMMUNITY
Start: 2019-09-06

## 2019-09-06 NOTE — PROGRESS NOTES
Reviewed [facility] med list and reconciled against Epic med list    Discrepancies noted-  1. Sennosides 8.6mg, take 1 tab BID PRN- listed on epic but not on [facility]- per June, patient is not taking. Removed from epic    - please fax [facility] once provider signs    Edwin Weiner RN

## 2019-09-10 ENCOUNTER — OFFICE VISIT (OUTPATIENT)
Dept: INTERNAL MEDICINE | Facility: CLINIC | Age: 84
End: 2019-09-10
Payer: COMMERCIAL

## 2019-09-10 VITALS
TEMPERATURE: 97.4 F | SYSTOLIC BLOOD PRESSURE: 108 MMHG | OXYGEN SATURATION: 95 % | WEIGHT: 203.2 LBS | DIASTOLIC BLOOD PRESSURE: 58 MMHG | HEART RATE: 84 BPM | BODY MASS INDEX: 34.88 KG/M2 | RESPIRATION RATE: 16 BRPM

## 2019-09-10 DIAGNOSIS — E78.5 HYPERLIPIDEMIA LDL GOAL <70: ICD-10-CM

## 2019-09-10 DIAGNOSIS — Z23 NEED FOR PROPHYLACTIC VACCINATION AND INOCULATION AGAINST INFLUENZA: ICD-10-CM

## 2019-09-10 DIAGNOSIS — I50.32 CHRONIC HEART FAILURE WITH PRESERVED EJECTION FRACTION (H): Primary | ICD-10-CM

## 2019-09-10 DIAGNOSIS — I63.20: ICD-10-CM

## 2019-09-10 DIAGNOSIS — G47.00 INSOMNIA, UNSPECIFIED TYPE: ICD-10-CM

## 2019-09-10 PROCEDURE — 99214 OFFICE O/P EST MOD 30 MIN: CPT | Mod: 25 | Performed by: INTERNAL MEDICINE

## 2019-09-10 PROCEDURE — 90662 IIV NO PRSV INCREASED AG IM: CPT | Performed by: INTERNAL MEDICINE

## 2019-09-10 PROCEDURE — G0008 ADMIN INFLUENZA VIRUS VAC: HCPCS | Performed by: INTERNAL MEDICINE

## 2019-09-10 RX ORDER — SIMVASTATIN 40 MG
40 TABLET ORAL DAILY
Qty: 90 TABLET | Refills: 3 | Status: SHIPPED | OUTPATIENT
Start: 2019-09-10 | End: 2020-01-01

## 2019-09-10 RX ORDER — MIRTAZAPINE 7.5 MG/1
7.5 TABLET, FILM COATED ORAL AT BEDTIME
Qty: 30 TABLET | Refills: 3 | Status: SHIPPED | OUTPATIENT
Start: 2019-09-10 | End: 2019-01-01

## 2019-09-10 RX ORDER — SIMVASTATIN 40 MG
40 TABLET ORAL DAILY
Qty: 90 TABLET | Refills: 3 | Status: CANCELLED | OUTPATIENT
Start: 2019-09-10

## 2019-09-10 RX ORDER — CLOPIDOGREL BISULFATE 75 MG/1
75 TABLET ORAL DAILY
Qty: 90 TABLET | Refills: 11 | Status: CANCELLED | OUTPATIENT
Start: 2019-09-10

## 2019-09-10 ASSESSMENT — PAIN SCALES - GENERAL: PAINLEVEL: NO PAIN (0)

## 2019-09-10 NOTE — LETTER
My Depression Action Plan  Name: Brock Abarca   Date of Birth 3/1/1932  Date: 8/5/2019    My doctor: Sadaf Fox   My clinic: [address]  [place] [address]  [phone]          GREEN    ZONE   Good Control    What it looks like:     Things are going generally well. You have normal up s and down s. You may even feel depressed from time to time, but bad moods usually last less than a day.   What you need to do:  1. Continue to care for yourself (see self care plan)  2. Check your depression survival kit and update it as needed  3. Follow your physician s recommendations including any medication.  4. Do not stop taking medication unless you consult with your physician first.           YELLOW         ZONE Getting Worse    What it looks like:     Depression is starting to interfere with your life.     It may be hard to get out of bed; you may be starting to isolate yourself from others.    Symptoms of depression are starting to last most all day and this has happened for several days.     You may have suicidal thoughts but they are not constant.   What you need to do:     1. Call your care team, your response to treatment will improve if you keep your care team informed of your progress. Yellow periods are signs an adjustment may need to be made.     2. Continue your self-care, even if you have to fake it!    3. Talk to someone in your support network    4. Open up your depression survival kit           RED    ZONE Medical Alert - Get Help    What it looks like:     Depression is seriously interfering with your life.     You may experience these or other symptoms: You can t get out of bed most days, can t work or engage in other necessary activities, you have trouble taking care of basic hygiene, or basic responsibilities, thoughts of suicide or death that will not go away, self-injurious behavior.     What you need to do:  1. Call your care team and  request a same-day appointment. If they are not available (weekends or after hours) call your local crisis line, emergency room or 911.            Depression Self Care Plan / Survival Kit    Self-Care for Depression  Here s the deal. Your body and mind are really not as separate as most people think.  What you do and think affects how you feel and how you feel influences what you do and think. This means if you do things that people who feel good do, it will help you feel better.  Sometimes this is all it takes.  There is also a place for medication and therapy depending on how severe your depression is, so be sure to consult with your medical provider and/ or Behavioral Health Consultant if your symptoms are worsening or not improving.     In order to better manage my stress, I will:    Exercise  Get some form of exercise, every day. This will help reduce pain and release endorphins, the  feel good  chemicals in your brain. This is almost as good as taking antidepressants!  This is not the same as joining a gym and then never going! (they count on that by the way ) It can be as simple as just going for a walk or doing some gardening, anything that will get you moving.      Hygiene   Maintain good hygiene (Get out of bed in the morning, Make your bed, Brush your teeth, Take a shower, and Get dressed like you were going to work, even if you are unemployed).  If your clothes don't fit try to get ones that do.    Diet  I will strive to eat foods that are good for me, drink plenty of water, and avoid excessive sugar, caffeine, alcohol, and other mood-altering substances.  Some foods that are helpful in depression are: complex carbohydrates, B vitamins, flaxseed, fish or fish oil, fresh fruits and vegetables.    Psychotherapy  I agree to participate in Individual Therapy (if recommended).    Medication  If prescribed medications, I agree to take them.  Missing doses can result in serious side effects.  I understand that  drinking alcohol, or other illicit drug use, may cause potential side effects.  I will not stop my medication abruptly without first discussing it with my provider.    Staying Connected With Others  I will stay in touch with my friends, family members, and my primary care provider/team.    Use your imagination  Be creative.  We all have a creative side; it doesn t matter if it s oil painting, sand castles, or mud pies! This will also kick up the endorphins.    Witness Beauty  (AKA stop and smell the roses) Take a look outside, even in mid-winter. Notice colors, textures. Watch the squirrels and birds.     Service to others  Be of service to others.  There is always someone else in need.  By helping others we can  get out of ourselves  and remember the really important things.  This also provides opportunities for practicing all the other parts of the program.    Humor  Laugh and be silly!  Adjust your TV habits for less news and crime-drama and more comedy.    Control your stress  Try breathing deep, massage therapy, biofeedback, and meditation. Find time to relax each day.     My support system    Clinic Contact:  Phone number:    Contact 1:  Phone number:    Contact 2:  Phone number:    Oriental orthodox/:  Phone number:    Therapist:  Phone number:    Local crisis center:    Phone number:    Other community support:  Phone number:

## 2019-09-10 NOTE — PROGRESS NOTES
Subjective     Brock Abarca is a 87 year old male who presents to clinic today for the following health issues:      Patient Instructions on Last Visit 6/11/2019:  Patient Instructions   Stop Keflex.  I agree with the wound care nurse.   Remeron at bedtime for sleep.  Leave your leg wraps on until at least tomorrow.   8/30/2019 visit with Dr. Smith: Ulcer of right lower extremity - wound clean and no sign of infection at this time, had good granulation tissue. Dressing was applied on the wound. Home care nurse cleans the wound weekly.    HPI   Patient felt like he wasn't getting enough compressions on his legs due to consistent swelling legs. The home aides came in and put compressions on him. He had sores on his lower extremities but has cleared up now. He had a big ulcer on right lower extremity and was seen by Dr. Smith last week. He saw his vasular doctor yesterday. Patient's wife notes that he rushes his medications and tries to take many pills at once so his wife is adjusting the time and how much he is taking. He sleeps more in the daytime and less at night.    Patient Active Problem List   Diagnosis     Arthritis     Pulmonary embolism (H)     BPH (benign prostatic hyperplasia)     Urinary frequency     Essential hypertension     Osteoporosis, senile     Hypothyroidism     Left foot drop     Lip tremor     Hand paresthesia     Thrombocytopenia (H)     Fatigue     Memory loss     Hypercalcemia     Advanced directives, counseling/discussion     Reticulocytosis     Hypovitaminosis D     Epiphora     Cerebral infarction (H)     History of dacryocystorhinostomy, od; os?     Brain dysfunction     Hyperlipidemia LDL goal <100     Peripheral edema     Diverticulosis of colon     H/O: CVA (cerebrovascular accident)     Hemorrhoids     Spinal stenosis, unspecified region other than cervical     Heart failure with preserved ejection fraction (H)     Obesity (BMI 35.0-39.9) with comorbidity (H)     Driving  safety issue     Ulcer of lower extremity, limited to breakdown of skin, unspecified laterality (H)     Past Surgical History:   Procedure Laterality Date     APPENDECTOMY       BACK SURGERY       CATARACT IOL, RT/LT      s/p cataract surgery both eyes     Cystoscopy, left ureteroscopy, holium laser lithotripsy, stent exchange  1/23/17     Cystoscopy, left urethral stone removed, stent placement  1/5/17     DACRYOCYSTORHINOSTOMY BILATERAL  2013    right eye; left eye also?     EXTRACORPOREAL SHOCK WAVE LITHOTRIPSY, CYSTOSCOPY, INSERT STENT URETER(S), COMBINED       HEAD & NECK SURGERY       ORTHOPEDIC SURGERY       PARATHYROIDECTOMY N/A 2/14/2017    Procedure: PARATHYROIDECTOMY;  Surgeon: Genesis Dixon MD;  Location:  OR       Social History     Tobacco Use     Smoking status: Never Smoker     Smokeless tobacco: Never Used   Substance Use Topics     Alcohol use: Yes     Comment: occ.     Family History   Problem Relation Age of Onset     Cerebrovascular Disease Father      Heart Disease Sister          Current Outpatient Medications   Medication Sig Dispense Refill     acetaminophen (TYLENOL) 325 MG tablet Take 1-2 tablets (325-650 mg) by mouth every 4 hours as needed for mild pain       bumetanide (BUMEX) 1 MG tablet Take 2 tablets (2 mg) by mouth 2 times daily       clopidogrel (PLAVIX) 75 MG tablet TAKE 1 TABLET BY MOUTH EVERY DAY 90 tablet 0     levothyroxine (SYNTHROID/LEVOTHROID) 112 MCG tablet Take 1 tablet (112 mcg) by mouth daily 90 tablet 5     loratadine (CLARITIN) 10 MG tablet Take 1 tablet (10 mg) by mouth daily       metoprolol succinate ER (TOPROL-XL) 50 MG 24 hr tablet TAKE 1 TABLET BY MOUTH EVERY DAY 90 tablet 2     mirtazapine (REMERON) 7.5 MG tablet Take 1 tablet (7.5 mg) by mouth At Bedtime 30 tablet 3     multivitamin (CENTRUM SILVER) tablet Take 1 tablet by mouth daily 90 tablet 3     olopatadine (PATANOL) 0.1 % ophthalmic solution Place 1 drop into both eyes 2 times daily 5 mL 3      order for DME Equipment being ordered: 10-20 mm HG knee high compression stockings 1 Device 0     potassium chloride ER (MICRO-K) 10 MEQ CR capsule Potassium to 50 mEq daily (You can take 30 mEq in the AM and 20 mEq in the PM)- per cardiology       simvastatin (ZOCOR) 40 MG tablet Take 1 tablet (40 mg) by mouth daily 90 tablet 3     tamsulosin (FLOMAX) 0.4 MG 24 hr capsule Take 1 capsule (0.4 mg) by mouth daily (Patient taking differently: Take 0.4 mg by mouth every evening ) 90 capsule 4     trospium (SANCTURA XR) 60 MG CP24 24 hr capsule Take 1 capsule (60 mg) by mouth every morning       zinc oxide (DESITIN) 40 % external ointment Apply topically 3 times daily as needed for dry skin or irritation 56 g 1     Allergies   Allergen Reactions     Zolpidem Other (See Comments)     Pt was found wandering halls during last hospitalization and was confused.   Pt and wife request that pt not receive Ambien.       Reviewed and updated as needed this visit by Provider         Review of Systems   ROS COMP: Constitutional, HEENT, cardiovascular, pulmonary, GI, , musculoskeletal, neuro, skin, endocrine and psych systems are negative, except as otherwise noted.    This document serves as a record of the services and decisions personally performed and made by Sadaf Fox MD. It was created on her behalf by Channing Castano, a trained medical scribe. The creation of this document is based on the provider's statements to the medical scribe.  Channing Castnao 3:33 PM September 10, 2019      Objective    /58   Pulse 84   Temp 97.4  F (36.3  C) (Oral)   Resp 16   Wt 92.2 kg (203 lb 3.2 oz)   SpO2 95%   BMI 34.88 kg/m    Body mass index is 34.88 kg/m .  Physical Exam   GENERAL: healthy, alert and no distress  RESP: lungs clear to auscultation - no rales, rhonchi or wheezes  CV: regular rate and rhythm, normal S1 S2, no S3 or S4, no murmur, click or rub, no peripheral edema and peripheral pulses strong  MS: no gross musculoskeletal  defects noted, no edema  PSYCH: mentation appears normal, affect normal/bright      Diagnostic Test Results:  Labs reviewed in Epic  No results found for this or any previous visit (from the past 24 hour(s)).        Assessment & Plan     1. Hyperlipidemia LDL goal <70  Stable with current measures without complications.  Labs ordered- will check in follow-up visit.  Refills placed today.  Will continue to monitor.  - ALT; Future  - Lipid panel reflex to direct LDL Non-fasting; Future  - simvastatin (ZOCOR) 40 MG tablet; Take 1 tablet (40 mg) by mouth daily  Dispense: 90 tablet; Refill: 3    2. Insomnia, unspecified type  Patient is sleeping more in the morning and less at night- he takes his sleeping pills in the morning.  Advised patient to move Remeron to bedtime to see it it will improve his insomnia.  Patient will let me know of any improvements in 1 month.  Will continue to monitor.  - mirtazapine (REMERON) 7.5 MG tablet; Take 1 tablet (7.5 mg) by mouth At Bedtime  Dispense: 30 tablet; Refill: 3    3. Chronic heart failure with preserved ejection fraction (H)  Stable with current measures without complications.  Labs ordered- will check in follow-up visit.  Will continue to monitor.  - Basic metabolic panel; Future  - **TSH with free T4 reflex FUTURE anytime; Future      Patient Instructions   - Take Remeron/mirtazepine at night.  This is a sleeping pill.  - Let me know in 1 month how it is working.      The information in this document, created by the medical scribe for me, accurately reflects the services I personally performed and the decisions made by me. I have reviewed and approved this document for accuracy prior to leaving the patient care area.  September 10, 2019 3:33 PM    I spent 12 minutes of time with the patient and >50% of it was in education and counseling regarding health maintenance and medication recheck.  In: 3:24 PM  Out: 3:36 PM    Sadfa Fox MD  [facility]

## 2019-09-10 NOTE — PATIENT INSTRUCTIONS
- Take Remeron/mirtazepine at night.  This is a sleeping pill.  - Let me know if 1 month how it is working.

## 2019-09-25 ENCOUNTER — PATIENT OUTREACH (OUTPATIENT)
Dept: CARE COORDINATION | Facility: CLINIC | Age: 84
End: 2019-09-25

## 2019-09-25 NOTE — PROGRESS NOTES
Clinic Care Coordination Contact    Follow Up Progress Note      Assessment: Left message on patient's voicemail and requested return call.     Patient has been seen by cardiology and PCP during this review period.      Goals addressed this encounter:   Goals Addressed                 This Visit's Progress      Functional (pt-stated)   On track     Goal Statement: I want the swelling in my legs to go down    Measure of Success: When I can walk better and the sores heal    Supportive Steps to Achieve: I will keep my feet elevated when sitting.  I will take medication as prescribed.    Barriers: Heart failure     Strengths: Family support    Date to Achieve By: January 2020    Patient expressed understanding of goal: Yes          Intervention/Education provided during outreach: Requested return call     Outreach Frequency: monthly    Plan:   Patient or spouse will return call.    Care Coordinator will follow up in one month.     Dayana Farris RN, [facility] - [facility] Clinic RN Coordinator  [facility]   9/25/2019    2:10 PM  [phone]

## 2019-09-27 ENCOUNTER — TELEPHONE (OUTPATIENT)
Dept: FAMILY MEDICINE | Facility: CLINIC | Age: 84
End: 2019-09-27

## 2019-09-27 NOTE — TELEPHONE ENCOUNTER
Reason for Call:  Home Health Care    Prescious with [facility] called regarding patient Brock Abarca.    Patient is Discharged today from Home Health Care.    His wounds have healed.    No further skill needs.    CLAUDIAI only.      Phone Number Homecare Nurse can be reached at: [phone]    Can we leave a detailed message on this number? YES      Call taken on 9/27/2019 at 11:46 AM by Ignacia Muller

## 2019-10-15 ENCOUNTER — TELEPHONE (OUTPATIENT)
Dept: INTERNAL MEDICINE | Facility: CLINIC | Age: 84
End: 2019-10-15

## 2019-10-15 NOTE — TELEPHONE ENCOUNTER
Panel Management Review      Patient has the following on his problem list:     Depression / Dysthymia review    Measure:  Needs PHQ-9 score of 4 or less during index window.  Administer PHQ-9 and if score is 5 or more, send encounter to provider for next steps.    5 - 7 month window range: 10/5/2019-2/2/2020    PHQ-9 SCORE 10/2/2018 6/4/2019 6/11/2019   PHQ-9 Total Score 3 14 6       If PHQ-9 recheck is 5 or more, route to provider for next steps.    Patient is due for:  PHQ9    Hypertension   Last three blood pressure readings:  BP Readings from Last 3 Encounters:   09/10/19 108/58   08/30/19 112/60   06/11/19 112/58     Blood pressure: Passed    HTN Guidelines:  Less than 140/90      Composite cancer screening  Chart review shows that this patient is due/due soon for the following None  Summary:    Patient is due/failing the following:   PHQ9 and PHYSICAL    Action needed:   Patient needs to do PHQ9.    Type of outreach:    Phone, spoke to patient.  Wife answer and stated patient was in the shower and he cannot hear the best so she states it would be best to complete at his follow up appointment at 12/7    Questions for provider review:    None                                                                                                                                    LS     Chart routed to none .

## 2019-10-23 ENCOUNTER — PATIENT OUTREACH (OUTPATIENT)
Dept: CARE COORDINATION | Facility: CLINIC | Age: 84
End: 2019-10-23

## 2019-10-23 NOTE — PROGRESS NOTES
Clinic Care Coordination Contact    Follow Up Progress Note      Assessment: Linda, spouse, is returning call to clinic care coordinator.     Linda states they are doing well.  Patient is taking the potassium that was ordered.     Denies shortness of breath, no increased edema.    Home care has discharged patient. Wounds on his lower legs are currently healed.      Goals addressed this encounter:   Goals Addressed                 This Visit's Progress      Functional (pt-stated)   On track     Goal Statement: I want the swelling in my legs to go down    Measure of Success: When I can walk better and the sores heal    Supportive Steps to Achieve: I will keep my feet elevated when sitting.  I will take medication as prescribed.    Barriers: Heart failure     Strengths: Family support    Date to Achieve By: January 2020    Patient expressed understanding of goal: Yes          Intervention/Education provided during outreach: Elevate legs when sitting. Monitor legs daily for edema or new open areas.      Outreach Frequency: monthly    Care Coordinator will follow up in one month.    Dayana Farris RN, [facility] - [facility] Clinic RN Coordinator  [facility]   10/23/2019    2:08 PM  [phone]

## 2019-10-25 DIAGNOSIS — I63.20: ICD-10-CM

## 2019-10-28 RX ORDER — CLOPIDOGREL BISULFATE 75 MG/1
TABLET ORAL
Qty: 90 TABLET | Refills: 0 | Status: SHIPPED | OUTPATIENT
Start: 2019-10-28 | End: 2020-01-01

## 2019-10-28 NOTE — TELEPHONE ENCOUNTER
"Routing refill request to provider for review/approval because:  Labs out of range:  Platelets    Requested Prescriptions   Pending Prescriptions Disp Refills     clopidogrel (PLAVIX) 75 MG tablet [Pharmacy Med Name: CLOPIDOGREL 75 MG TABLET] 90 tablet 0     Sig: TAKE 1 TABLET BY MOUTH EVERY DAY       Plavix Failed - 10/25/2019  7:26 AM        Failed - Normal Platelets on file in past 12 months     Recent Labs   Lab Test 02/08/19  1040   *               Passed - No active PPI on record unless is Protonix        Passed - Normal HGB on file in past 12 months     Recent Labs   Lab Test 02/08/19  1040   HGB 16.0               Passed - Recent (12 mo) or future (30 days) visit within the authorizing provider's specialty     Patient has had an office visit with the authorizing provider or a provider within the authorizing providers department within the previous 12 mos or has a future within next 30 days. See \"Patient Info\" tab in inbasket, or \"Choose Columns\" in Meds & Orders section of the refill encounter.              Passed - Medication is active on med list        Passed - Patient is age 18 or older        Laurie Abbasi RN  "

## 2019-10-29 ENCOUNTER — MYC MEDICAL ADVICE (OUTPATIENT)
Dept: FAMILY MEDICINE | Facility: CLINIC | Age: 84
End: 2019-10-29

## 2019-10-29 NOTE — LETTER
November 5, 2019          Brock Abarca,  [address] [place] [address]          Dear Brock Fox wanted me to reach out to check in with you. Dr. Fox asked me to message you to request that you complete the Patient Health Questionnaire so Dr. Fox can better understand if you treatment is helping with your moods. I've attached it for you to this message. We really care about you and want to help you in every way possible to feel better.     If you have received your health care elsewhere, please call the clinic so the information can be documented in your chart.    Please call [phone] or message us through your [employer] account to schedule an appointment or provide information for your chart.     Feel free to contact us if you have any questions or concerns!    I look forward to seeing you and working with you on your health care needs.     Sincerely,       Your [facility] Team/

## 2019-11-05 NOTE — TELEPHONE ENCOUNTER
Called patient and left VM to return call. Sent letter with PHQ9. Postponing for 1 month  Penny Castano CMA on 11/5/2019 at 11:00 AM

## 2019-11-22 NOTE — TELEPHONE ENCOUNTER
Reason for call:  Other   Patient called regarding (reason for call): appointment  Additional comments:  Hospital follow up for anxiety, sob.     Phone number to reach patient:  Home number on file [phone] (home)    Best Time:   Any     Can we leave a detailed message on this number?  YES

## 2019-11-22 NOTE — TELEPHONE ENCOUNTER
Left message for patient to return call to reschedule appointment     Magui Pugh MA on 11/22/2019 at 3:22 PM

## 2019-11-22 NOTE — TELEPHONE ENCOUNTER
Called the pt and told there is no opening next with Dr Fox. Pt's wife wanted me to send the message to Dr Fox and know if she can squeeze him in next week.  Guillermina HEBERT CMA

## 2019-11-25 NOTE — TELEPHONE ENCOUNTER
Spoke to wife and she is wondering if patient can get any medication for his anxiety until Dec.9  Penny Castano CMA on 11/25/2019 at 9:39 AM

## 2019-11-25 NOTE — PROGRESS NOTES
"Clinic Care Coordination Contact    Follow Up Progress Note      Assessment: patient was in [facility] ED on 11/9/19 for dyspnea and inpatient at [facility] from 11/20/19 to 11/22/19 for dyspnea.    Spoke with patient's spouse, Linda.     Patient has cardiac stress test and CT of chest while inpatient both within normal limits. They feel these symptoms may be caused from stress.     Linda states she has been talking him \"out of them\" and that seems to be working. She states she has him sit and rest  And his breathing returns to normal.     Linda states she thinks PCP will give him something for \"anxiety\" when they are seen on 12/9/19.     Goals addressed this encounter:   Goals Addressed                 This Visit's Progress      Functional (pt-stated)   On track     Goal Statement: I want the swelling in my legs to go down    Measure of Success: When I can walk better and the sores heal    Supportive Steps to Achieve: I will keep my feet elevated when sitting.  I will take medication as prescribed.    Barriers: Heart failure     Strengths: Family support    Date to Achieve By: January 2020    Patient expressed understanding of goal: Yes          Intervention/Education provided during outreach: Discussed relaxation techniques     Outreach Frequency: monthly    Plan:   Patient will be seen and evaluated by PCP as scheduled.   Care Coordinator will follow up in one month,    Dayana Farris RN, [facility] - Primary Care Clinic RN Coordinator  [facility]   11/25/2019    3:14 PM  [phone]  "

## 2019-11-25 NOTE — TELEPHONE ENCOUNTER
Spoke to wife, asked her to do e-visit prabhjot [facility] but she said that she is not really good at [facility] so completed the questionnaires over the phone with patient and wife. PHQ9 score a 10 and JOYCELYN score a 1  Penny Castano CMA on 11/25/2019 at 12:34 PM

## 2019-11-26 NOTE — TELEPHONE ENCOUNTER
Huddled with Dr. Fox. She advised that Remeron is a 24 hour med and most side effects happen in the 1st 8 hours. The medication is used for anxiety, depression and sleep. She recommends to have pt restart Remeron 7.5mg at night x 1 month, then increase to 15mg. If they want to try something else, can do sertraline 25mg in am x 2 weeks, then increase to 50mg ok for 1 month supply with 1 refill.    Spoke with Wife. She is ok with having pt try the Remeron again. She still has 30 days worth of the 7.5mg dose and will see Dr. Fox prior to this running out, so can discuss further at visit. Med list updated. No refill sent in at this time since pt has enough medication.    Candida Rojas RN  [facility]

## 2019-11-26 NOTE — TELEPHONE ENCOUNTER
Left message on answering machine for patient's Wife to call back to the RN hotline at [phone].    Candida Rojas RN  [facility]

## 2019-11-26 NOTE — TELEPHONE ENCOUNTER
Spoke with Wife. Has not been taking the Remeron for 1 month. Pt was taking them every day during the day. This was removed from his pill container. Has not been having trouble sleeping during the night.   Get's nervous when he has this breathing problem and his appt is so far out. Is there something during the day he can take, or if taking this at night will it keep him calm during the day? Please advise.    Candida Rojas RN  [facility]

## 2019-11-26 NOTE — TELEPHONE ENCOUNTER
Patient's wife Linda returned call to RN Hotline and left VM.   Please call back at [phone].    Laurie Abbasi RN

## 2019-11-26 NOTE — TELEPHONE ENCOUNTER
Increase Remeron to 15 mg daily at bedtime.  1 month supply with 1 refill. Keep follow up appointment

## 2019-12-06 NOTE — TELEPHONE ENCOUNTER
Patient's wife called RN Hotline and left VM.   Please call back at [phone].    Laurie Abbasi RN

## 2019-12-06 NOTE — TELEPHONE ENCOUNTER
Left message on voicemail for patient to return call to RN hotline at # [phone].    Edwin Weiner RN

## 2019-12-06 NOTE — TELEPHONE ENCOUNTER
"Routing refill request to provider for review/approval because:  Dosage change. 7.5 mg x1 month then increase to 15 mg daily    Requested Prescriptions   Pending Prescriptions Disp Refills     mirtazapine (REMERON) 7.5 MG tablet [Pharmacy Med Name: MIRTAZAPINE 7.5 MG TABLET] 90 tablet 1     Sig: TAKE 1 TABLET (7.5 MG) BY MOUTH AT BEDTIME       Atypical Antidepressants Protocol Passed - 12/4/2019  7:13 AM        Passed - Recent (12 mo) or future (30 days) visit within the authorizing provider's specialty     Patient has had an office visit with the authorizing provider or a provider within the authorizing providers department within the previous 12 mos or has a future within next 30 days. See \"Patient Info\" tab in inbasket, or \"Choose Columns\" in Meds & Orders section of the refill encounter.              Passed - Medication active on med list        Passed - Patient is age 18 or older        Laurie Abbasi RN  "

## 2019-12-09 PROBLEM — I50.32 CHRONIC HEART FAILURE WITH PRESERVED EJECTION FRACTION (H): Status: ACTIVE | Noted: 2019-05-15

## 2019-12-09 NOTE — PROGRESS NOTES
"Subjective     Brock Abarca is a 87 year old male who presents to clinic today for the following health issues:      Patient Instructions on Last Visit 9/10/2019:  Patient Instructions   - Take Remeron/mirtazepine at night.  This is a sleeping pill.  - Let me know in 1 month how it is working.      HPI   Wellness - Patient presents today with his wife. Wife notes that he doesn't always tell the truth about how he is feeling when filling out PHQ-9. Patient notes that he feels tired and he lacks ambition or interest in doing things. He denies feeling sad or down. His appetite decreased and weight has gone down. His wife notes that his memory is the biggest issue right now. She notices that within 5 minutes after discussing about something, he forgets what was talked about. His wife notes that it takes him two hours to read and will mostly forget what was read in the paper. She also notes that he needs new hearing aids but he won't get them due to cost.      Other concerns:   - Patient has dry mouth and occasionally coughs up phlegm. Has a tough time getting rid of phlegm. Notes that he sucks on sugar-free candy for his dry mouth. Wife notes that he doesn't move around often and she often retrieve things for him.   - For his sleep, some nights are good and some nights are bad. He notes that he forgot to take his pills last night and he could fall asleep. Wife relates his sleeplessness, chest pain and shortness of breath to anxiety.   - Once in a while he gets \"noises\" in his head. He notes \"popping and fire work\" noises. It disturbs him on occasion but relates that it is manageable.  - Patient's wife notes a blister on left leg and would like to have it looked at. Wrapping his legs helps a little for swelling.   - Patient notes that he has dizzy spells. He notes that his head feels \"light\" and it comes and goes.    ED/UC Followup:    Facility:  [facility]  Date of visit: 11/9/2019  Reason for visit: " Shortness of breath  Current Status: Improved    Patient had shortness of breath over the past week. Shortness of breath worsens with exertion. He is asymptomatic at rest. He could not sleep laying flat without feeling short of breath. He sleeps in his recliner. He had left shoulder pain for a few weeks and worsens with movement. Denies trauma.         Patient Active Problem List   Diagnosis     Arthritis     Pulmonary embolism (H)     BPH (benign prostatic hyperplasia)     Urinary frequency     Essential hypertension     Osteoporosis, senile     Hypothyroidism     Left foot drop     Lip tremor     Hand paresthesia     Thrombocytopenia (H)     Fatigue     Memory loss     Hypercalcemia     Advanced directives, counseling/discussion     Reticulocytosis     Hypovitaminosis D     Epiphora     Cerebral infarction (H)     History of dacryocystorhinostomy, od; os?     Brain dysfunction     Hyperlipidemia LDL goal <100     Peripheral edema     Diverticulosis of colon     H/O: CVA (cerebrovascular accident)     Hemorrhoids     Spinal stenosis, unspecified region other than cervical     Heart failure with preserved ejection fraction (H)     Obesity (BMI 35.0-39.9) with comorbidity (H)     Driving safety issue     Ulcer of lower extremity, limited to breakdown of skin, unspecified laterality (H)     CKD (chronic kidney disease) stage 3, GFR 30-59 ml/min (H)     Chronic heart failure with preserved ejection fraction (H)     Past Surgical History:   Procedure Laterality Date     APPENDECTOMY       BACK SURGERY       CATARACT IOL, RT/LT      s/p cataract surgery both eyes     Cystoscopy, left ureteroscopy, holium laser lithotripsy, stent exchange  1/23/17     Cystoscopy, left urethral stone removed, stent placement  1/5/17     DACRYOCYSTORHINOSTOMY BILATERAL  2013    right eye; left eye also?     EXTRACORPOREAL SHOCK WAVE LITHOTRIPSY, CYSTOSCOPY, INSERT STENT URETER(S), COMBINED       HEAD & NECK SURGERY       ORTHOPEDIC SURGERY        PARATHYROIDECTOMY N/A 2/14/2017    Procedure: PARATHYROIDECTOMY;  Surgeon: Genesis Dixon MD;  Location:  OR       Social History     Tobacco Use     Smoking status: Never Smoker     Smokeless tobacco: Never Used   Substance Use Topics     Alcohol use: Yes     Comment: occ.     Family History   Problem Relation Age of Onset     Cerebrovascular Disease Father      Heart Disease Sister          Current Outpatient Medications   Medication Sig Dispense Refill     acetaminophen (TYLENOL) 325 MG tablet Take 1-2 tablets (325-650 mg) by mouth every 4 hours as needed for mild pain       bumetanide (BUMEX) 1 MG tablet Take 2 tablets (2 mg) by mouth 2 times daily       clopidogrel (PLAVIX) 75 MG tablet TAKE 1 TABLET BY MOUTH EVERY DAY 90 tablet 0     levothyroxine (SYNTHROID/LEVOTHROID) 112 MCG tablet Take 1 tablet (112 mcg) by mouth daily 90 tablet 5     loratadine (CLARITIN) 10 MG tablet Take 1 tablet (10 mg) by mouth daily       metoprolol succinate ER (TOPROL-XL) 50 MG 24 hr tablet TAKE 1 TABLET BY MOUTH EVERY DAY 90 tablet 2     mirtazapine (REMERON) 7.5 MG tablet Take 7.5mg daily x 1 month, then increase to 15mg daily 30 tablet 1     multivitamin (CENTRUM SILVER) tablet Take 1 tablet by mouth daily 90 tablet 3     olopatadine (PATANOL) 0.1 % ophthalmic solution Place 1 drop into both eyes 2 times daily 5 mL 3     order for DME Equipment being ordered: 10-20 mm HG knee high compression stockings 1 Device 0     potassium chloride ER (MICRO-K) 10 MEQ CR capsule Potassium to 50 mEq daily (You can take 30 mEq in the AM and 20 mEq in the PM)- per cardiology       simvastatin (ZOCOR) 40 MG tablet Take 1 tablet (40 mg) by mouth daily 90 tablet 3     tamsulosin (FLOMAX) 0.4 MG 24 hr capsule Take 1 capsule (0.4 mg) by mouth daily (Patient taking differently: Take 0.4 mg by mouth every evening ) 90 capsule 4     trospium (SANCTURA XR) 60 MG CP24 24 hr capsule Take 1 capsule (60 mg) by mouth every morning       zinc  oxide (DESITIN) 40 % external ointment Apply topically 3 times daily as needed for dry skin or irritation 56 g 1     mirtazapine (REMERON) 7.5 MG tablet TAKE 1 TABLET (7.5 MG) BY MOUTH AT BEDTIME 90 tablet 1     Allergies   Allergen Reactions     Zolpidem Other (See Comments)     Pt was found wandering halls during last hospitalization and was confused.   Pt and wife request that pt not receive Ambien.       Reviewed and updated as needed this visit by Provider  Tobacco  Allergies  Meds  Problems  Med Hx  Surg Hx  Fam Hx         Review of Systems   ROS COMP: Constitutional, HEENT, cardiovascular, pulmonary, GI, , musculoskeletal, neuro, skin, endocrine and psych systems are negative, except as otherwise noted.    This document serves as a record of the services and decisions personally performed and made by Sadaf Fox MD. It was created on her behalf by Channing Castano, a trained medical scribe. The creation of this document is based on the provider's statements to the medical scribe.  Channing Castano 1:47 PM December 9, 2019      Objective    /68   Pulse 83   Temp 96.8  F (36  C) (Oral)   Resp 16   Wt 89.4 kg (197 lb 3.2 oz)   SpO2 96%   BMI 33.85 kg/m    Body mass index is 33.85 kg/m .  Physical Exam   GENERAL: healthy, alert and no distress  RESP: lungs clear to auscultation - no rales, rhonchi or wheezes  CV: regular rate and rhythm, normal S1 S2, no S3 or S4, no murmur, click or rub, no peripheral edema and peripheral pulses strong  MS: no gross musculoskeletal defects noted, 1+ edema below the knee and venous stasis changes bilaterally  PSYCH: mentation appears normal, affect normal/bright      Diagnostic Test Results:  Labs reviewed in Epic  No results found for this or any previous visit (from the past 24 hour(s)).        Assessment & Plan     1. CKD (chronic kidney disease) stage 3, GFR 30-59 ml/min (H)  Stable with current measures without complications.    2. Dry mouth  Patient sucks on  "sugar-less candy for salivary stimulation.    3. Psychophysiological insomnia  Patient has insomnia- some nights are good and some nights are bad.  Discussed with patient that his insomnia may be related to his anxiety.  Advised patient to try walking around or do deep breathing to help relax himself before bed.  Will continue to monitor.    4. Mild cognitive impairment with memory loss  Memory test was borderline.  He definitely has MCI  I think his memory will improve with new hearing aids.  Will continue to monitor and retest in next visit.    5. Anxiety  Not well managed. I suspect that his sleeplessness, chest pain and persistent shortness of breath are related to his anxiety.   Advised patient to increase exercise and movement to better manage his anxiety.  Will continue to monitor.    6. Chronic heart failure with preserved ejection fraction (H)  Stable without complications. Will continue to monitor.    7. Dizziness  Patient has dizzy spells intermittently and relates that his head feels \"light\". etiology is not clear   Ultrasound placed today. Will schedule for appointment.  - US Carotid Bilateral; Future      Patient Instructions   - Don't take any Aleve, Ibuprofen, or Advil. Drink normal amounts of water.  - Increase movement and exercise to help with blood flow in your legs.  - Elevate your legs.  - Get new hearing aids.      BMI:   Estimated body mass index is 34.88 kg/m  as calculated from the following:    Height as of 6/11/19: 1.626 m (5' 4\").    Weight as of 9/10/19: 92.2 kg (203 lb 3.2 oz).   Weight management plan: Discussed healthy diet and exercise guidelines      The information in this document, created by the medical scribe for me, accurately reflects the services I personally performed and the decisions made by me. I have reviewed and approved this document for accuracy prior to leaving the patient care area.  December 9, 2019 1:48 PM    I spent 30 minutes of time with the patient and >50% " of it was in education and counseling regarding health maintenance and medication recheck.  In: 1:47 PM  Out: 2:17 PM    Sadaf Fox MD  [facility]

## 2019-12-09 NOTE — TELEPHONE ENCOUNTER
Called and spoke with patient's wife. Reports that he has been taking 7.5 mg daily and seems to be doing OK with that dose. Reports that he doesn't always take it but does seem to be sleeping fine.   Does have an appointment this afternoon at 1:30 PM.    Laurie Abbasi RN

## 2019-12-09 NOTE — PROGRESS NOTES
Six Item Cognitive Impairment Test   (6CIT):      What year is it?                               Correct - 0 points    What month is it?                               Incorrect - 3 points      Give the patient an address to remember with five components:   [place] ( first and last name - 2 components)   [address]  (number and name of street - 2 components)   [place] ( city - 1 component)      About what time is it (within the hour)? Correct - 0 points    Count backwards from 20 to 1:   Correct - 0 points    Say the months of the year in reverse: Correct - 0 points    Repeat the address phrase:   2 errors - 4 points    Total 6CIT Score:      7/28    Interpretation: The 6CIT uses an inverse score and questions are weighted to produce a total out of 28. Scores of 0-7 are considered normal and 8 or more significant.    Advantages The test has high sensitivity without compromising specificity even in mild dementia. It is easy to translate linguistically and culturally.  Disadvantages The main disadvantage is in the scoring and weighting of the test, which is initially confusing, however computer models have simplified this greatly.    Probability Statistics: At the 7/8 cut off: Overall figures sensitivity 90% specificity 100%, in mild dementia sensitivity = 78% , specificity = 100%    Copyright 2000 [facility], [facility], [place]. Courtesy of Dr. Edgardo Montano

## 2019-12-09 NOTE — PATIENT INSTRUCTIONS
- Don't take any Aleve, Ibuprofen, or Advil. Drink normal amounts of water.  - Increase movement and exercise to help with blood flow in your legs.  - elevate your legs.  - Get new hearing aids.

## 2019-12-11 PROBLEM — G31.84 MILD COGNITIVE IMPAIRMENT WITH MEMORY LOSS: Status: ACTIVE | Noted: 2019-01-01

## 2019-12-11 PROBLEM — F51.04 PSYCHOPHYSIOLOGICAL INSOMNIA: Status: ACTIVE | Noted: 2019-01-01

## 2020-01-01 ENCOUNTER — OFFICE VISIT - HEALTHEAST (OUTPATIENT)
Dept: GERIATRICS | Facility: CLINIC | Age: 85
End: 2020-01-01

## 2020-01-01 ENCOUNTER — TELEPHONE (OUTPATIENT)
Dept: GERIATRICS | Facility: CLINIC | Age: 85
End: 2020-01-01

## 2020-01-01 ENCOUNTER — ASSISTED LIVING VISIT (OUTPATIENT)
Dept: GERIATRICS | Facility: CLINIC | Age: 85
End: 2020-01-01
Payer: COMMERCIAL

## 2020-01-01 ENCOUNTER — PATIENT OUTREACH (OUTPATIENT)
Dept: CARE COORDINATION | Facility: CLINIC | Age: 85
End: 2020-01-01

## 2020-01-01 ENCOUNTER — MEDICAL CORRESPONDENCE (OUTPATIENT)
Dept: HEALTH INFORMATION MANAGEMENT | Facility: CLINIC | Age: 85
End: 2020-01-01

## 2020-01-01 ENCOUNTER — TELEPHONE (OUTPATIENT)
Dept: FAMILY MEDICINE | Facility: CLINIC | Age: 85
End: 2020-01-01

## 2020-01-01 ENCOUNTER — PATIENT OUTREACH (OUTPATIENT)
Dept: NURSING | Facility: CLINIC | Age: 85
End: 2020-01-01
Payer: COMMERCIAL

## 2020-01-01 ENCOUNTER — HEALTH MAINTENANCE LETTER (OUTPATIENT)
Age: 85
End: 2020-01-01

## 2020-01-01 ENCOUNTER — DOCUMENTATION ONLY (OUTPATIENT)
Dept: OTHER | Facility: CLINIC | Age: 85
End: 2020-01-01

## 2020-01-01 ENCOUNTER — COMMUNICATION - HEALTHEAST (OUTPATIENT)
Dept: GERIATRICS | Facility: CLINIC | Age: 85
End: 2020-01-01

## 2020-01-01 ENCOUNTER — RECORDS - HEALTHEAST (OUTPATIENT)
Dept: LAB | Facility: CLINIC | Age: 85
End: 2020-01-01

## 2020-01-01 ENCOUNTER — TELEPHONE (OUTPATIENT)
Dept: INTERNAL MEDICINE | Facility: CLINIC | Age: 85
End: 2020-01-01

## 2020-01-01 ENCOUNTER — TRANSFERRED RECORDS (OUTPATIENT)
Dept: HEALTH INFORMATION MANAGEMENT | Facility: CLINIC | Age: 85
End: 2020-01-01

## 2020-01-01 ENCOUNTER — AMBULATORY - HEALTHEAST (OUTPATIENT)
Dept: OTHER | Facility: CLINIC | Age: 85
End: 2020-01-01

## 2020-01-01 ENCOUNTER — AMBULATORY - HEALTHEAST (OUTPATIENT)
Dept: ADMINISTRATIVE | Facility: CLINIC | Age: 85
End: 2020-01-01

## 2020-01-01 ENCOUNTER — VIRTUAL VISIT (OUTPATIENT)
Dept: INTERNAL MEDICINE | Facility: CLINIC | Age: 85
End: 2020-01-01
Payer: COMMERCIAL

## 2020-01-01 ENCOUNTER — OFFICE VISIT (OUTPATIENT)
Dept: INTERNAL MEDICINE | Facility: CLINIC | Age: 85
End: 2020-01-01
Payer: COMMERCIAL

## 2020-01-01 ENCOUNTER — AMBULATORY - HEALTHEAST (OUTPATIENT)
Dept: GERIATRICS | Facility: CLINIC | Age: 85
End: 2020-01-01

## 2020-01-01 VITALS
WEIGHT: 170 LBS | SYSTOLIC BLOOD PRESSURE: 127 MMHG | TEMPERATURE: 97.7 F | DIASTOLIC BLOOD PRESSURE: 77 MMHG | BODY MASS INDEX: 26.68 KG/M2 | RESPIRATION RATE: 20 BRPM | HEIGHT: 67 IN | HEART RATE: 67 BPM | OXYGEN SATURATION: 93 %

## 2020-01-01 VITALS
HEIGHT: 67 IN | OXYGEN SATURATION: 93 % | BODY MASS INDEX: 26.53 KG/M2 | TEMPERATURE: 97.7 F | RESPIRATION RATE: 20 BRPM | HEART RATE: 67 BPM | SYSTOLIC BLOOD PRESSURE: 127 MMHG | WEIGHT: 169 LBS | DIASTOLIC BLOOD PRESSURE: 77 MMHG

## 2020-01-01 VITALS
HEIGHT: 64 IN | TEMPERATURE: 97.2 F | BODY MASS INDEX: 32.47 KG/M2 | SYSTOLIC BLOOD PRESSURE: 104 MMHG | WEIGHT: 190.2 LBS | OXYGEN SATURATION: 95 % | RESPIRATION RATE: 15 BRPM | DIASTOLIC BLOOD PRESSURE: 68 MMHG | HEART RATE: 75 BPM

## 2020-01-01 VITALS
OXYGEN SATURATION: 93 % | HEIGHT: 67 IN | WEIGHT: 178 LBS | TEMPERATURE: 97.7 F | BODY MASS INDEX: 27.94 KG/M2 | DIASTOLIC BLOOD PRESSURE: 77 MMHG | RESPIRATION RATE: 20 BRPM | SYSTOLIC BLOOD PRESSURE: 127 MMHG | HEART RATE: 67 BPM

## 2020-01-01 VITALS
DIASTOLIC BLOOD PRESSURE: 55 MMHG | TEMPERATURE: 98.8 F | HEIGHT: 67 IN | RESPIRATION RATE: 18 BRPM | OXYGEN SATURATION: 93 % | BODY MASS INDEX: 29.19 KG/M2 | SYSTOLIC BLOOD PRESSURE: 108 MMHG | WEIGHT: 186 LBS | HEART RATE: 84 BPM

## 2020-01-01 DIAGNOSIS — R19.7 DIARRHEA, UNSPECIFIED TYPE: ICD-10-CM

## 2020-01-01 DIAGNOSIS — Z86.73 H/O: CVA (CEREBROVASCULAR ACCIDENT): ICD-10-CM

## 2020-01-01 DIAGNOSIS — F03.91 DEMENTIA WITH BEHAVIORAL DISTURBANCE, UNSPECIFIED DEMENTIA TYPE: ICD-10-CM

## 2020-01-01 DIAGNOSIS — F03.918 DEMENTIA WITH BEHAVIORAL DISTURBANCE (H): ICD-10-CM

## 2020-01-01 DIAGNOSIS — N18.30 CKD (CHRONIC KIDNEY DISEASE) STAGE 3, GFR 30-59 ML/MIN (H): ICD-10-CM

## 2020-01-01 DIAGNOSIS — N17.9 AKI (ACUTE KIDNEY INJURY) (H): ICD-10-CM

## 2020-01-01 DIAGNOSIS — R29.6 FALLS FREQUENTLY: ICD-10-CM

## 2020-01-01 DIAGNOSIS — L97.929 STASIS ULCER OF LEFT LOWER EXTREMITY (H): ICD-10-CM

## 2020-01-01 DIAGNOSIS — F32.A MILD DEPRESSION: ICD-10-CM

## 2020-01-01 DIAGNOSIS — I89.0 LYMPHEDEMA OF BOTH LOWER EXTREMITIES: ICD-10-CM

## 2020-01-01 DIAGNOSIS — I50.32 CHRONIC HEART FAILURE WITH PRESERVED EJECTION FRACTION (H): Primary | ICD-10-CM

## 2020-01-01 DIAGNOSIS — Z71.89 ENCOUNTER FOR MEDICATION REVIEW AND COUNSELING: ICD-10-CM

## 2020-01-01 DIAGNOSIS — I50.22 CHRONIC SYSTOLIC CONGESTIVE HEART FAILURE (H): Primary | ICD-10-CM

## 2020-01-01 DIAGNOSIS — L89.90 PRESSURE INJURY OF SKIN, UNSPECIFIED INJURY STAGE, UNSPECIFIED LOCATION: ICD-10-CM

## 2020-01-01 DIAGNOSIS — I10 ESSENTIAL HYPERTENSION: ICD-10-CM

## 2020-01-01 DIAGNOSIS — G47.00 INSOMNIA, UNSPECIFIED TYPE: ICD-10-CM

## 2020-01-01 DIAGNOSIS — E78.5 HYPERLIPIDEMIA LDL GOAL <70: ICD-10-CM

## 2020-01-01 DIAGNOSIS — Z97.8 FOLEY CATHETER PRESENT: ICD-10-CM

## 2020-01-01 DIAGNOSIS — R21 RASH: Primary | ICD-10-CM

## 2020-01-01 DIAGNOSIS — I87.2 VENOUS STASIS ULCER LIMITED TO BREAKDOWN OF SKIN WITHOUT VARICOSE VEINS, UNSPECIFIED SITE (H): ICD-10-CM

## 2020-01-01 DIAGNOSIS — N40.1 BENIGN PROSTATIC HYPERPLASIA WITH URINARY RETENTION: ICD-10-CM

## 2020-01-01 DIAGNOSIS — G31.84 MILD COGNITIVE IMPAIRMENT WITH MEMORY LOSS: ICD-10-CM

## 2020-01-01 DIAGNOSIS — I63.20: ICD-10-CM

## 2020-01-01 DIAGNOSIS — R65.20 SEVERE SEPSIS (H): ICD-10-CM

## 2020-01-01 DIAGNOSIS — I87.8 VENOUS STASIS: ICD-10-CM

## 2020-01-01 DIAGNOSIS — F03.91 DEMENTIA WITH BEHAVIORAL DISTURBANCE, UNSPECIFIED DEMENTIA TYPE: Primary | ICD-10-CM

## 2020-01-01 DIAGNOSIS — E83.51 HYPOCALCEMIA: ICD-10-CM

## 2020-01-01 DIAGNOSIS — L97.901 VENOUS STASIS ULCER LIMITED TO BREAKDOWN OF SKIN WITHOUT VARICOSE VEINS, UNSPECIFIED SITE (H): ICD-10-CM

## 2020-01-01 DIAGNOSIS — I89.0 LYMPHEDEMA: ICD-10-CM

## 2020-01-01 DIAGNOSIS — Z79.899 ENCOUNTER FOR MEDICATION REVIEW: ICD-10-CM

## 2020-01-01 DIAGNOSIS — E03.9 HYPOTHYROIDISM, UNSPECIFIED TYPE: ICD-10-CM

## 2020-01-01 DIAGNOSIS — F32.A DEPRESSION: Primary | ICD-10-CM

## 2020-01-01 DIAGNOSIS — D69.6 THROMBOCYTOPENIA (H): ICD-10-CM

## 2020-01-01 DIAGNOSIS — A41.9 SEVERE SEPSIS (H): ICD-10-CM

## 2020-01-01 DIAGNOSIS — M81.0 OSTEOPOROSIS, SENILE: ICD-10-CM

## 2020-01-01 DIAGNOSIS — I50.32 CHRONIC HEART FAILURE WITH PRESERVED EJECTION FRACTION (H): ICD-10-CM

## 2020-01-01 DIAGNOSIS — R31.0 GROSS HEMATURIA: ICD-10-CM

## 2020-01-01 DIAGNOSIS — L89.322 DECUBITUS ULCER OF LEFT BUTTOCK, STAGE 2 (H): Primary | ICD-10-CM

## 2020-01-01 DIAGNOSIS — Z53.9 DIAGNOSIS NOT YET DEFINED: Primary | ICD-10-CM

## 2020-01-01 DIAGNOSIS — E87.6 HYPOKALEMIA: ICD-10-CM

## 2020-01-01 DIAGNOSIS — H10.45 CHRONIC ALLERGIC CONJUNCTIVITIS: ICD-10-CM

## 2020-01-01 DIAGNOSIS — F41.9 ANXIETY: ICD-10-CM

## 2020-01-01 DIAGNOSIS — R35.0 URINARY FREQUENCY: ICD-10-CM

## 2020-01-01 DIAGNOSIS — R33.8 BENIGN PROSTATIC HYPERPLASIA WITH URINARY RETENTION: ICD-10-CM

## 2020-01-01 DIAGNOSIS — L89.152 STAGE II PRESSURE ULCER OF SACRAL REGION (H): ICD-10-CM

## 2020-01-01 DIAGNOSIS — M48.02 SPINAL STENOSIS IN CERVICAL REGION: ICD-10-CM

## 2020-01-01 DIAGNOSIS — K59.03 DRUG-INDUCED CONSTIPATION: ICD-10-CM

## 2020-01-01 DIAGNOSIS — D62 ANEMIA DUE TO BLOOD LOSS, ACUTE: Primary | ICD-10-CM

## 2020-01-01 DIAGNOSIS — F33.0 MILD EPISODE OF RECURRENT MAJOR DEPRESSIVE DISORDER (H): ICD-10-CM

## 2020-01-01 DIAGNOSIS — Z00.00 ROUTINE GENERAL MEDICAL EXAMINATION AT A HEALTH CARE FACILITY: ICD-10-CM

## 2020-01-01 DIAGNOSIS — Z46.6 FITTING AND ADJUSTMENT OF URINARY DEVICE: ICD-10-CM

## 2020-01-01 DIAGNOSIS — R33.9 URINE RETENTION: ICD-10-CM

## 2020-01-01 DIAGNOSIS — L03.119 CELLULITIS OF LOWER EXTREMITY, UNSPECIFIED LATERALITY: ICD-10-CM

## 2020-01-01 DIAGNOSIS — N18.30 CHRONIC RENAL DISEASE, STAGE III (H): ICD-10-CM

## 2020-01-01 DIAGNOSIS — I50.32 CHRONIC DIASTOLIC HF (HEART FAILURE) (H): ICD-10-CM

## 2020-01-01 DIAGNOSIS — R31.0 GROSS HEMATURIA: Primary | ICD-10-CM

## 2020-01-01 DIAGNOSIS — I83.029 STASIS ULCER OF LEFT LOWER EXTREMITY (H): ICD-10-CM
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
## 2020-01-01 DIAGNOSIS — E16.2 HYPOGLYCEMIA: ICD-10-CM

## 2020-01-01 LAB
ALBUMIN UR-MCNC: ABNORMAL MG/DL
ALBUMIN UR-MCNC: NEGATIVE MG/DL
ANION GAP SERPL CALCULATED.3IONS-SCNC: 11 MMOL/L (ref 5–18)
ANION GAP SERPL CALCULATED.3IONS-SCNC: 15 MMOL/L (ref 5–18)
ANION GAP SERPL CALCULATED.3IONS-SCNC: 8 MMOL/L (ref 5–18)
ANION GAP SERPL CALCULATED.3IONS-SCNC: 8 MMOL/L (ref 5–18)
ANION GAP SERPL CALCULATED.3IONS-SCNC: 9 MMOL/L (ref 5–18)
APPEARANCE UR: ABNORMAL
APPEARANCE UR: CLEAR
BACTERIA #/AREA URNS HPF: ABNORMAL HPF
BACTERIA SPEC CULT: ABNORMAL
BASOPHILS # BLD AUTO: 0 THOU/UL (ref 0–0.2)
BASOPHILS NFR BLD AUTO: 1 % (ref 0–2)
BILIRUB UR QL STRIP: NEGATIVE
BILIRUB UR QL STRIP: NEGATIVE
BUN SERPL-MCNC: 11 MG/DL (ref 8–28)
BUN SERPL-MCNC: 13 MG/DL (ref 8–28)
BUN SERPL-MCNC: 15 MG/DL (ref 8–28)
BUN SERPL-MCNC: 24 MG/DL (ref 8–28)
BUN SERPL-MCNC: 25 MG/DL (ref 8–28)
CALCIUM SERPL-MCNC: 7.8 MG/DL (ref 8.5–10.5)
CALCIUM SERPL-MCNC: 8.3 MG/DL (ref 8.5–10.5)
CALCIUM SERPL-MCNC: 8.6 MG/DL (ref 8.5–10.5)
CALCIUM SERPL-MCNC: 8.6 MG/DL (ref 8.5–10.5)
CALCIUM SERPL-MCNC: 8.7 MG/DL (ref 8.5–10.5)
CALCIUM SERPL-MCNC: 8.7 MG/DL (ref 8.5–10.5)
CALCIUM SERPL-MCNC: 9.2 MG/DL (ref 8.5–10.5)
CHLORIDE BLD-SCNC: 102 MMOL/L (ref 98–107)
CHLORIDE BLD-SCNC: 104 MMOL/L (ref 98–107)
CHLORIDE BLD-SCNC: 108 MMOL/L (ref 98–107)
CHLORIDE BLD-SCNC: 111 MMOL/L (ref 98–107)
CHLORIDE BLD-SCNC: 99 MMOL/L (ref 98–107)
CHLORIDE SERPLBLD-SCNC: 104 MMOL/L (ref 98–107)
CHLORIDE SERPLBLD-SCNC: 106 MMOL/L (ref 98–107)
CO2 SERPL-SCNC: 24 MMOL/L (ref 22–31)
CO2 SERPL-SCNC: 25 MMOL/L (ref 22–31)
CO2 SERPL-SCNC: 29 MMOL/L (ref 22–31)
COLOR UR AUTO: YELLOW
COLOR UR AUTO: YELLOW
CREAT SERPL-MCNC: 0.95 MG/DL (ref 0.7–1.3)
CREAT SERPL-MCNC: 0.95 MG/DL (ref 0.7–1.3)
CREAT SERPL-MCNC: 1.06 MG/DL (ref 0.7–1.3)
CREAT SERPL-MCNC: 1.11 MG/DL (ref 0.7–1.3)
CREAT SERPL-MCNC: 1.14 MG/DL (ref 0.7–1.3)
CREAT SERPL-MCNC: 1.14 MG/DL (ref 0.7–1.3)
CREAT SERPL-MCNC: 1.45 MG/DL (ref 0.7–1.3)
CREAT SERPL-MCNC: 1.45 MG/DL (ref 0.7–1.3)
CREAT SERPL-MCNC: 1.47 MG/DL (ref 0.7–1.3)
CREAT SERPL-MCNC: 1.58 MG/DL (ref 0.72–1.25)
CREAT SERPL-MCNC: 2.02 MG/DL (ref 0.7–1.3)
EOSINOPHIL # BLD AUTO: 0.2 THOU/UL (ref 0–0.4)
EOSINOPHIL NFR BLD AUTO: 3 % (ref 0–6)
ERYTHROCYTE [DISTWIDTH] IN BLOOD BY AUTOMATED COUNT: 14.5 % (ref 11–14.5)
ERYTHROCYTE [DISTWIDTH] IN BLOOD BY AUTOMATED COUNT: 14.6 % (ref 11–14.5)
GFR SERPL CREATININE-BSD FRML MDRD: 29 ML/MIN
GFR SERPL CREATININE-BSD FRML MDRD: 42 ML/MIN/1.73M2
GFR SERPL CREATININE-BSD FRML MDRD: 45 ML/MIN/1.73M2
GFR SERPL CREATININE-BSD FRML MDRD: 46 ML/MIN/1.73M2
GFR SERPL CREATININE-BSD FRML MDRD: 46 ML/MIN/1.73M2
GFR SERPL CREATININE-BSD FRML MDRD: >60 ML/MIN/1.73M2
GLUCOSE BLD-MCNC: 55 MG/DL (ref 70–125)
GLUCOSE BLD-MCNC: 83 MG/DL (ref 70–125)
GLUCOSE BLD-MCNC: 83 MG/DL (ref 70–125)
GLUCOSE BLD-MCNC: 88 MG/DL (ref 70–125)
GLUCOSE BLD-MCNC: 89 MG/DL (ref 70–125)
GLUCOSE SERPL-MCNC: 107 MG/DL (ref 70–125)
GLUCOSE SERPL-MCNC: 55 MG/DL (ref 70–125)
GLUCOSE SERPL-MCNC: 83 MG/DL (ref 70–125)
GLUCOSE SERPL-MCNC: 83 MG/DL (ref 70–125)
GLUCOSE SERPL-MCNC: 96 MG/DL (ref 65–100)
GLUCOSE UR STRIP-MCNC: NEGATIVE MG/DL
GLUCOSE UR STRIP-MCNC: NEGATIVE MG/DL
HCT VFR BLD AUTO: 33.6 % (ref 40–54)
HCT VFR BLD AUTO: 33.6 % (ref 40–54)
HEMOGLOBIN: 11.5 G/DL (ref 14–18)
HEMOGLOBIN: 9.1 G/DL (ref 14–18)
HGB BLD-MCNC: 10.7 G/DL (ref 14–18)
HGB BLD-MCNC: 11.2 G/DL (ref 14–18)
HGB BLD-MCNC: 11.5 G/DL (ref 14–18)
HGB UR QL STRIP: ABNORMAL
HGB UR QL STRIP: NEGATIVE
IMM GRANULOCYTES # BLD: 0.1 THOU/UL
IMM GRANULOCYTES NFR BLD: 1 %
KETONES UR STRIP-MCNC: NEGATIVE MG/DL
KETONES UR STRIP-MCNC: NEGATIVE MG/DL
LEUKOCYTE ESTERASE UR QL STRIP: ABNORMAL
LEUKOCYTE ESTERASE UR QL STRIP: NEGATIVE
LYMPHOCYTES # BLD AUTO: 1.1 THOU/UL (ref 0.8–4.4)
LYMPHOCYTES NFR BLD AUTO: 18 % (ref 20–40)
MAGNESIUM SERPL-MCNC: 2.1 MG/DL (ref 1.8–2.6)
MCH RBC QN AUTO: 31.1 PG (ref 27–34)
MCH RBC QN AUTO: 31.1 PG (ref 27–34)
MCHC RBC AUTO-ENTMCNC: 31.8 G/DL (ref 32–36)
MCHC RBC AUTO-ENTMCNC: 33.3 G/DL (ref 32–36)
MCV RBC AUTO: 93 FL (ref 80–100)
MCV RBC AUTO: 98 FL (ref 80–100)
MONOCYTES # BLD AUTO: 0.6 THOU/UL (ref 0–0.9)
MONOCYTES NFR BLD AUTO: 11 % (ref 2–10)
NEUTROPHILS # BLD AUTO: 3.9 THOU/UL (ref 2–7.7)
NEUTROPHILS NFR BLD AUTO: 66 % (ref 50–70)
NITRATE UR QL: NEGATIVE
NITRATE UR QL: NEGATIVE
PH UR STRIP: 6 [PH] (ref 4.5–8)
PH UR STRIP: 6.5 [PH] (ref 4.5–8)
PLATELET # BLD AUTO: 136 THOU/UL (ref 140–440)
PLATELET # BLD AUTO: 162 THOU/UL (ref 140–440)
PMV BLD AUTO: 10.6 FL (ref 8.5–12.5)
PMV BLD AUTO: 11.3 FL (ref 8.5–12.5)
POTASSIUM BLD-SCNC: 2.8 MMOL/L (ref 3.5–5)
POTASSIUM BLD-SCNC: 2.9 MMOL/L (ref 3.5–5)
POTASSIUM BLD-SCNC: 3.6 MMOL/L (ref 3.5–5)
POTASSIUM BLD-SCNC: 3.7 MMOL/L (ref 3.5–5)
POTASSIUM BLD-SCNC: 4.3 MMOL/L (ref 3.5–5)
POTASSIUM SERPL-SCNC: 2.3 MMOL/L (ref 3.5–5.1)
POTASSIUM SERPL-SCNC: 2.9 MMOL/L (ref 3.5–5)
POTASSIUM SERPL-SCNC: 3.4 MMOL/L (ref 3.5–5)
POTASSIUM SERPL-SCNC: 3.6 MMOL/L (ref 3.5–5)
POTASSIUM SERPL-SCNC: 3.6 MMOL/L (ref 3.5–5)
POTASSIUM SERPL-SCNC: 4.3 MMOL/L (ref 3.5–5)
RBC # BLD AUTO: 3.44 MILL/UL (ref 4.4–6.2)
RBC # BLD AUTO: 3.6 MILL/UL (ref 4.4–6.2)
RBC #/AREA URNS AUTO: ABNORMAL HPF
SODIUM SERPL-SCNC: 139 MMOL/L (ref 136–145)
SODIUM SERPL-SCNC: 140 MMOL/L (ref 136–145)
SODIUM SERPL-SCNC: 141 MMOL/L (ref 136–145)
SODIUM SERPL-SCNC: 143 MMOL/L (ref 136–145)
SP GR UR STRIP: 1.01 (ref 1–1.03)
SP GR UR STRIP: 1.03 (ref 1–1.03)
SQUAMOUS #/AREA URNS AUTO: ABNORMAL LPF
TSH SERPL-ACNC: 5.39 UIU/ML (ref 0.3–5)
UROBILINOGEN UR STRIP-ACNC: ABNORMAL
UROBILINOGEN UR STRIP-ACNC: NORMAL
WBC #/AREA URNS AUTO: >100 HPF
WBC CLUMPS #/AREA URNS HPF: PRESENT /[HPF]
WBC: 5.6 THOU/UL (ref 4–11)
WBC: 5.8 THOU/UL (ref 4–11)

## 2020-01-01 PROCEDURE — 99214 OFFICE O/P EST MOD 30 MIN: CPT | Performed by: INTERNAL MEDICINE

## 2020-01-01 PROCEDURE — G0180 MD CERTIFICATION HHA PATIENT: HCPCS | Performed by: INTERNAL MEDICINE

## 2020-01-01 PROCEDURE — 99213 OFFICE O/P EST LOW 20 MIN: CPT | Mod: TEL | Performed by: INTERNAL MEDICINE

## 2020-01-01 RX ORDER — METOPROLOL SUCCINATE 50 MG/1
TABLET, EXTENDED RELEASE ORAL
Qty: 90 TABLET | Refills: 0 | Status: SHIPPED | OUTPATIENT
Start: 2020-01-01 | End: 2020-01-01

## 2020-01-01 RX ORDER — SERTRALINE HYDROCHLORIDE 100 MG/1
TABLET, FILM COATED ORAL
Qty: 90 TABLET | Refills: 1 | OUTPATIENT
Start: 2020-01-01

## 2020-01-01 RX ORDER — CLOPIDOGREL BISULFATE 75 MG/1
TABLET ORAL
Qty: 28 TABLET | Status: SHIPPED | OUTPATIENT
Start: 2020-01-01 | End: 2020-01-01

## 2020-01-01 RX ORDER — SERTRALINE HYDROCHLORIDE 25 MG/1
25 TABLET, FILM COATED ORAL DAILY
Qty: 30 TABLET | Refills: 1 | Status: SHIPPED | OUTPATIENT
Start: 2020-01-01 | End: 2020-01-01

## 2020-01-01 RX ORDER — CLOPIDOGREL BISULFATE 75 MG/1
TABLET ORAL
Qty: 90 TABLET | Refills: 0 | Status: SHIPPED | OUTPATIENT
Start: 2020-01-01

## 2020-01-01 RX ORDER — CLOPIDOGREL BISULFATE 75 MG/1
TABLET ORAL
Qty: 90 TABLET | Refills: 1 | Status: SHIPPED | OUTPATIENT
Start: 2020-01-01 | End: 2020-01-01

## 2020-01-01 RX ORDER — SERTRALINE HYDROCHLORIDE 25 MG/1
TABLET, FILM COATED ORAL
Qty: 30 TABLET | Refills: 1 | OUTPATIENT
Start: 2020-01-01

## 2020-01-01 RX ORDER — CLOPIDOGREL BISULFATE 75 MG/1
TABLET ORAL
Qty: 90 TABLET | Refills: 0 | Status: SHIPPED | OUTPATIENT
Start: 2020-01-01 | End: 2020-01-01

## 2020-01-01 RX ORDER — LOPERAMIDE HYDROCHLORIDE 2 MG/1
2 TABLET ORAL EVERY 6 HOURS PRN
Qty: 30 TABLET | Refills: 11
Start: 2020-01-01

## 2020-01-01 RX ORDER — CIPROFLOXACIN 500 MG/1
500 TABLET, FILM COATED ORAL 2 TIMES DAILY
Qty: 14 TABLET | Refills: 0
Start: 2020-01-01 | End: 2020-01-01

## 2020-01-01 RX ORDER — AMOXICILLIN 250 MG
2 CAPSULE ORAL 2 TIMES DAILY PRN
Qty: 60 TABLET | Refills: 11
Start: 2020-01-01

## 2020-01-01 RX ORDER — SERTRALINE HYDROCHLORIDE 100 MG/1
100 TABLET, FILM COATED ORAL DAILY
Qty: 90 TABLET | Refills: 1 | Status: SHIPPED | OUTPATIENT
Start: 2020-01-01 | End: 2020-01-01

## 2020-01-01 RX ORDER — SERTRALINE HYDROCHLORIDE 100 MG/1
TABLET, FILM COATED ORAL
Qty: 28 TABLET | Status: SHIPPED | OUTPATIENT
Start: 2020-01-01

## 2020-01-01 RX ORDER — METOPROLOL SUCCINATE 50 MG/1
TABLET, EXTENDED RELEASE ORAL
Qty: 28 TABLET | Status: SHIPPED | OUTPATIENT
Start: 2020-01-01

## 2020-01-01 RX ORDER — MULTIVITAMIN WITH FOLIC ACID 400 MCG
TABLET ORAL
Qty: 28 TABLET | Status: SHIPPED | OUTPATIENT
Start: 2020-01-01

## 2020-01-01 RX ORDER — BUMETANIDE 1 MG/1
TABLET ORAL
Qty: 56 TABLET | Status: SHIPPED | OUTPATIENT
Start: 2020-01-01

## 2020-01-01 RX ORDER — POTASSIUM CHLORIDE 1500 MG/1
TABLET, EXTENDED RELEASE ORAL
Qty: 112 TABLET | Status: SHIPPED | OUTPATIENT
Start: 2020-01-01 | End: 2020-01-01

## 2020-01-01 RX ORDER — MIRTAZAPINE 7.5 MG/1
7.5 TABLET, FILM COATED ORAL AT BEDTIME
Qty: 90 TABLET | Refills: 1 | OUTPATIENT
Start: 2020-01-01

## 2020-01-01 RX ORDER — SIMVASTATIN 40 MG
TABLET ORAL
Qty: 28 TABLET | Status: SHIPPED | OUTPATIENT
Start: 2020-01-01 | End: 2020-11-04

## 2020-01-01 RX ORDER — METOPROLOL SUCCINATE 50 MG/1
50 TABLET, EXTENDED RELEASE ORAL DAILY
Qty: 90 TABLET | Refills: 2 | Status: CANCELLED | OUTPATIENT
Start: 2020-01-01

## 2020-01-01 RX ORDER — LEVOTHYROXINE SODIUM 112 UG/1
112 TABLET ORAL DAILY
Qty: 90 TABLET | Refills: 5 | Status: SHIPPED | OUTPATIENT
Start: 2020-01-01 | End: 2020-01-01

## 2020-01-01 RX ORDER — ANORECTAL OINTMENT 15.7; .44; 24; 20.6 G/100G; G/100G; G/100G; G/100G
OINTMENT TOPICAL
Qty: 113 G | Refills: 97 | Status: SHIPPED | OUTPATIENT
Start: 2020-01-01

## 2020-01-01 RX ORDER — METOPROLOL SUCCINATE 50 MG/1
TABLET, EXTENDED RELEASE ORAL
Qty: 90 TABLET | Refills: 0
Start: 2020-01-01

## 2020-01-01 RX ORDER — CALCIUM CARBONATE 500(1250)
1 TABLET ORAL 2 TIMES DAILY
Qty: 60 TABLET | Refills: 11
Start: 2020-01-01

## 2020-01-01 RX ORDER — LEVOTHYROXINE SODIUM 112 UG/1
TABLET ORAL
Qty: 28 TABLET | Status: SHIPPED | OUTPATIENT
Start: 2020-01-01

## 2020-01-01 ASSESSMENT — PATIENT HEALTH QUESTIONNAIRE - PHQ9
SUM OF ALL RESPONSES TO PHQ QUESTIONS 1-9: 11
SUM OF ALL RESPONSES TO PHQ QUESTIONS 1-9: 12
SUM OF ALL RESPONSES TO PHQ QUESTIONS 1-9: 17

## 2020-01-01 ASSESSMENT — ACTIVITIES OF DAILY LIVING (ADL)
DEPENDENT_IADLS:: TRANSPORTATION;SHOPPING;LAUNDRY;COOKING;CLEANING;MEAL PREPARATION

## 2020-01-01 ASSESSMENT — MIFFLIN-ST. JEOR
SCORE: 1472.32
SCORE: 1399.74
SCORE: 1436.03
SCORE: 1395.21
SCORE: 1443.74

## 2020-01-02 NOTE — PROGRESS NOTES
"Clinic Care Coordination Contact    Follow Up Progress Note      Assessment: Outreach to patient. Spoke with spouse and caregiver, Linda.     She states that patient has not been compliant with diet during the holidays. She states his weight went up, but this has now come back down. She states he continues to have edema in his legs. Denies any blisters or open areas currently. She states he is not good about keeping his feet elevated and it was even harder with others around. Denies shortness of breath.     Linda states patient drinks \"a lot:\". He is drinking mainly juice, not much water. She states she find emtpy containers on the counter in the morning, so he is getting up and drinking during the night.  He complains of dry mouth.  Linda is going to decrease the amount of juice they have in the house.     She states patient sleeps a lot during the day. She encourage allison to be up and moving around the house but he does not. She states he forgets to take the Remeron at night sometimes.  She does not feel it makes a lot of difference.     She states their son from [facility] is coming today to celebrate [place] with them. He was not able to get here for the holiday due to a snow storm.      Goals addressed this encounter:   Goals Addressed                 This Visit's Progress      Functional (pt-stated)   On track     Goal Statement: I want the swelling in my legs to go down    Measure of Success: When I can walk better and the sores heal    Supportive Steps to Achieve: I will keep my feet elevated when sitting.  I will take medication as prescribed.    Barriers: Heart failure     Strengths: Family support    Date to Achieve By: April 2020    Patient expressed understanding of goal: Yes          Intervention/Education provided during outreach: Elevate legs. Decrease juice intake. Weigh daily. Monitor legs for edema and open areas daily.       Outreach Frequency: monthly    Plan:   Patient will attempt to move around " the [facility]     Care Coordinator will follow up in one month.     Dayana Farris RN, [facility] - Primary [facility] Clinic RN Coordinator  [facility]   1/2/2020    2:09 PM  [phone]

## 2020-01-02 NOTE — LETTER
[facility]  Complex Care Plan  About Me:    Patient Name:  Brock Meredith    YOB: 1932  Age:         87 year old   [facility] MRN:    [id number] Telephone Information:  Home Phone [phone]   Mobile [phone]       Address:  [address] [place] [address] Email address:  [email or web address]      Emergency Contact(s)    Name Relationship Lgl Grd Work Phone Home Phone Mobile Phone   1. AGA MEREDITH Spouse   [phone]   2. AARTI ENGEL Daughter    [phone]           Primary language:  English     needed? No   [facility] [employer]:  [phone] op. 1  Other communication barriers: Glasses  Preferred Method of Communication:  Mail  Current living arrangement:    Mobility Status/ Medical Equipment:      Health Maintenance  Health Maintenance Reviewed:      My Access Plan  Medical Emergency 911   Primary Clinic Line [facility] - [phone]   24 Hour Appointment Line [phone] or  [phone]) (toll-free)   24 Hour Nurse Line [phone] (toll-free)   Preferred Urgent Care     Preferred Hospital     Preferred Pharmacy [facility] [place] [facility] - [place], [place] - [address]     Behavioral Health Crisis Line The National Suicide Prevention Lifeline at 1-427.141.7544 or 911             My Care Team Members  Patient Care Team       Relationship Specialty Notifications Start End    Sadaf Fox MD PCP - General Internal Medicine  8/24/12     Phone: [phone] Fax: [phone] [address] [address]    Matt Escalante, PhD LP MD Psychology  9/29/15     Phone: [phone] Fax: [phone]         [address] [facility] [place] [address]    Sadaf Fox MD Assigned PCP   7/15/18     Phone: [phone] Fax: [phone] [address] [address]    Dayana Farris, JS Lead Care Coordinator Primary Care - [facility] Admissions 6/5/19     Phone:  [phone]                 My Care Plans  Self Management and Treatment Plan  Goals and (Comments)  Goals        General    Functional (pt-stated)     Notes - Note edited  1/2/2020  2:00 PM by Dayana Farris RN    Goal Statement: I want the swelling in my legs to go down    Measure of Success: When I can walk better and the sores heal    Supportive Steps to Achieve: I will keep my feet elevated when sitting.  I will take medication as prescribed.    Barriers: Heart failure     Strengths: Family support    Date to Achieve By: April 2020    Patient expressed understanding of goal: Yes               Action Plans on File:                       Advance Care Plans/Directives Type:        My Medical and Care Information  Problem List   Patient Active Problem List   Diagnosis     Arthritis     Pulmonary embolism (H)     BPH (benign prostatic hyperplasia)     Urinary frequency     Essential hypertension     Osteoporosis, senile     Hypothyroidism     Left foot drop     Lip tremor     Hand paresthesia     Thrombocytopenia (H)     Fatigue     Memory loss     Hypercalcemia     Advanced directives, counseling/discussion     Reticulocytosis     Hypovitaminosis D     Epiphora     Cerebral infarction (H)     History of dacryocystorhinostomy, od; os?     Brain dysfunction     Hyperlipidemia LDL goal <100     Peripheral edema     Diverticulosis of colon     H/O: CVA (cerebrovascular accident)     Hemorrhoids     Spinal stenosis, unspecified region other than cervical     Heart failure with preserved ejection fraction (H)     Obesity (BMI 35.0-39.9) with comorbidity (H)     Driving safety issue     Ulcer of lower extremity, limited to breakdown of skin, unspecified laterality (H)     CKD (chronic kidney disease) stage 3, GFR 30-59 ml/min (H)     Chronic heart failure with preserved ejection fraction (H)     Mild cognitive impairment with memory loss     Psychophysiological insomnia      Current Medications and Allergies:  See printed  Medication Report.    Care Coordination Start Date: 7/16/2019   Frequency of Care Coordination: monthly   Form Last Updated: 01/02/2020

## 2020-01-13 NOTE — TELEPHONE ENCOUNTER
Letter sent for patient to complete PHQ9. Postponing for 1 month  Penny Castano CMA on 1/13/2020 at 7:23 AM

## 2020-01-13 NOTE — LETTER
January 13, 2020          Brock Abarca,  [address] [address]          Dear Brock Fox wanted me to reach out to check in with you. She asked me to message you to request that you complete the Patient Health Questionnaire so she can better understand if you treatment is helping with your moods. I've attached it for you to this message. We really care about you and want to help you in every way possible to feel better.       If you have received your health care elsewhere, please call the clinic so the information can be documented in your chart.    Please call [phone] or message us through your [facility] account to schedule an appointment or provide information for your chart.     Feel free to contact us if you have any questions or concerns!    I look forward to seeing you and working with you on your health care needs.     Sincerely,       [facility]

## 2020-01-28 NOTE — TELEPHONE ENCOUNTER
"Requested Prescriptions   Pending Prescriptions Disp Refills     clopidogrel (PLAVIX) 75 MG tablet [Pharmacy Med Name: CLOPIDOGREL 75 MG TABLET] 90 tablet 0     Sig: TAKE 1 TABLET BY MOUTH EVERY DAY       Plavix Failed - 1/27/2020  9:18 AM        Failed - Normal Platelets on file in past 12 months     Recent Labs   Lab Test 02/08/19  1040   *               Passed - No active PPI on record unless is Protonix        Passed - Normal HGB on file in past 12 months     Recent Labs   Lab Test 02/08/19  1040   HGB 16.0               Passed - Recent (12 mo) or future (30 days) visit within the authorizing provider's specialty     Patient has had an office visit with the authorizing provider or a provider within the authorizing providers department within the previous 12 mos or has a future within next 30 days. See \"Patient Info\" tab in inbasket, or \"Choose Columns\" in Meds & Orders section of the refill encounter.              Passed - Medication is active on med list        Passed - Patient is age 18 or older        Routing refill request to provider for review/approval because:  Labs out of range:  platelets      Barbara Ridley RN    "

## 2020-01-30 NOTE — TELEPHONE ENCOUNTER
Patient mailed back in with a score of 11. Please advise.     June ALATORRE CMA ([facility]) g

## 2020-01-31 NOTE — TELEPHONE ENCOUNTER
Called patient and spoke with spouse, Linda. Pt was taking mirtazapine for insomnia. Pt had an issues going on with breathing during the day and trouble sleeping.   Pt stopped taking medication about 3 weeks ago.  Per spouse, is not taking and he has not complained of his breathing problem for a long time. He is also no longer having trouble sleeping at night.  Linda doesn't feel that there is a reason for pt to be on the mirtazapine. Writer explained that his depression score was high (showing that he may be dealing with some depression) and medication could help. That would be why provider may recommend medication. States that she does not feel that a psych referral is necessary. She would like to know what Dr. Fox thinks and recommends.

## 2020-01-31 NOTE — TELEPHONE ENCOUNTER
Huddled with Dr. Fox who recommends that patient could try Zoloft 25 mg for mild depression if he would like, then would need a f/u appt in 4 weeks.    Called patient. JIGAR to call RN hotline ([phone]).

## 2020-01-31 NOTE — TELEPHONE ENCOUNTER
Options include going up further on the Remeron (verify the dose he is on now) or changing to a different antidepressant or refer to psychiatry.  Please discuss with patient and wife.

## 2020-02-03 NOTE — PROGRESS NOTES
Clinic Care Coordination Contact    Follow Up Progress Note      Assessment: Outreach to patient. Spoke with patient's spouse, Linda.     Linda states they are doing well. They are not getting out of the house much, Islam and occasionally out to eat. She states she worries about patient on the ice. He needs to hang on to her when going out.    She states they have been able to avoid any colds or flu.     Patient continues with swollen feet and legs. Linda states he is terrible about keeping them elevated. She applies velcro compression wraps daily.  She states he does not have any blister, weeping or open areas.     Goals addressed this encounter: Stable  Goals Addressed                 This Visit's Progress      Functional (pt-stated)   On track     Goal Statement: I want the swelling in my legs to go down    Measure of Success: When I can walk better and the sores heal    Supportive Steps to Achieve: I will keep my feet elevated when sitting.  I will take medication as prescribed.    Barriers: Heart failure     Strengths: Family support    Date to Achieve By: April 2020    Patient expressed understanding of goal: Yes          Intervention/Education provided during outreach: Discussed elevating feet Low salt diet. Weigh every other day     Outreach Frequency: monthly    Plan:   Patient will continue to take Bumex (he occasionally refuses later dose) as prescribed.   Care Coordinator will follow up in One month.     Dayana Farris RN, [facility] - Primary [facility] Clinic RN Coordinator  [facility]   2/3/2020    2:00 PM  [phone]

## 2020-02-06 NOTE — TELEPHONE ENCOUNTER
Wife, Linda, updated and agreed with plan  Prescription for Zoloft sent  Appointment made with Dr. Fox for 3/6/2020    Edwin Weiner RN

## 2020-03-02 NOTE — TELEPHONE ENCOUNTER
Duplicate    Disp Refills Start End ROBBY   sertraline (ZOLOFT) 25 MG tablet 30 tablet 1 2/6/2020  --   Sig - Route: Take 1 tablet (25 mg) by mouth daily - Oral   Sent to pharmacy as: sertraline (ZOLOFT) 25 MG tablet   Class: E-Prescribe   Order: [id number]   E-Prescribing Status: Receipt confirmed by pharmacy (2/6/2020 11:02 AM CST)     Magui Pugh MA on 3/2/2020 at 1:31 PM

## 2020-03-03 NOTE — PROGRESS NOTES
Subjective     Brock Abarca is a 88 year old male who presents to clinic today for the following health issues:      Patient Instructions on Last Visit 12/09/2019:  Patient Instructions   - Don't take any Aleve, Ibuprofen, or Advil. Drink normal amounts of water.  - Increase movement and exercise to help with blood flow in your legs.  - Elevate your legs.  - Get new hearing aids.       HPI   Medication recheck - Zoloft  Patient is here with wife today. She relates that he takes metolazone once a week, usually on Mondays. He is watching his salt and diet. he eats out but will limit it. He was increased on potassium. He does not have much of an appetite and he sleeps a lot. He gets up at 10-11am, reads the paper for about an hour and sleeps for another 3 hours. After his first nap he would have super and then take another nap. He doesn't watch much TV like he use to. He has no interest in doing things due to low energy and low effort. He looks forwards to reading the paper every morning and sees friends on occasions. His memory if still an issue, unchanged. His focus is not the best. He got new hearing aids and is still adjusting to it. She states that he isn't use to the new change in his hearing-still delay his responses. His attention span is short.      Patient Active Problem List   Diagnosis     Arthritis     BPH (benign prostatic hyperplasia)     Urinary frequency     Essential hypertension     Osteoporosis, senile     Hypothyroidism     Left foot drop     Lip tremor     Hand paresthesia     Thrombocytopenia (H)     Fatigue     Memory loss     Hypercalcemia     Advanced directives, counseling/discussion     Reticulocytosis     Hypovitaminosis D     Epiphora     Cerebral infarction (H)     History of dacryocystorhinostomy, od; os?     Brain dysfunction     Hyperlipidemia LDL goal <100     Peripheral edema     Diverticulosis of colon     H/O: CVA (cerebrovascular accident)     Hemorrhoids     Spinal  stenosis, unspecified region other than cervical     Heart failure with preserved ejection fraction (H)     Driving safety issue     CKD (chronic kidney disease) stage 3, GFR 30-59 ml/min (H)     Chronic heart failure with preserved ejection fraction (H)     Mild cognitive impairment with memory loss     Psychophysiological insomnia     Past Surgical History:   Procedure Laterality Date     APPENDECTOMY       BACK SURGERY       CATARACT IOL, RT/LT      s/p cataract surgery both eyes     Cystoscopy, left ureteroscopy, holium laser lithotripsy, stent exchange  1/23/17     Cystoscopy, left urethral stone removed, stent placement  1/5/17     DACRYOCYSTORHINOSTOMY BILATERAL  2013    right eye; left eye also?     EXTRACORPOREAL SHOCK WAVE LITHOTRIPSY, CYSTOSCOPY, INSERT STENT URETER(S), COMBINED       HEAD & NECK SURGERY       ORTHOPEDIC SURGERY       PARATHYROIDECTOMY N/A 2/14/2017    Procedure: PARATHYROIDECTOMY;  Surgeon: Genesis Dixon MD;  Location:  OR       Social History     Tobacco Use     Smoking status: Never Smoker     Smokeless tobacco: Never Used   Substance Use Topics     Alcohol use: Yes     Comment: occ.     Family History   Problem Relation Age of Onset     Cerebrovascular Disease Father      Heart Disease Sister          Current Outpatient Medications   Medication Sig Dispense Refill     acetaminophen (TYLENOL) 325 MG tablet Take 1-2 tablets (325-650 mg) by mouth every 4 hours as needed for mild pain       bumetanide (BUMEX) 1 MG tablet Take 2 tablets (2 mg) by mouth 2 times daily       clopidogrel (PLAVIX) 75 MG tablet TAKE 1 TABLET BY MOUTH EVERY DAY 90 tablet 1     levothyroxine (SYNTHROID/LEVOTHROID) 112 MCG tablet Take 1 tablet (112 mcg) by mouth daily 90 tablet 5     loratadine (CLARITIN) 10 MG tablet Take 1 tablet (10 mg) by mouth daily       metoprolol succinate ER (TOPROL-XL) 50 MG 24 hr tablet TAKE 1 TABLET BY MOUTH EVERY DAY 90 tablet 2     multivitamin (CENTRUM SILVER) tablet Take 1  tablet by mouth daily 90 tablet 3     olopatadine (PATANOL) 0.1 % ophthalmic solution Place 1 drop into both eyes 2 times daily 5 mL 3     order for DME Equipment being ordered: 10-20 mm HG knee high compression stockings 1 Device 0     potassium chloride ER (MICRO-K) 10 MEQ CR capsule Potassium to 50 mEq daily (You can take 30 mEq in the AM and 20 mEq in the PM)- per cardiology       sertraline (ZOLOFT) 50 MG tablet Take 1 tablet (50 mg) by mouth daily 30 tablet 4     simvastatin (ZOCOR) 40 MG tablet Take 1 tablet (40 mg) by mouth daily 90 tablet 3     tamsulosin (FLOMAX) 0.4 MG 24 hr capsule Take 1 capsule (0.4 mg) by mouth daily (Patient taking differently: Take 0.4 mg by mouth every evening ) 90 capsule 4     trospium (SANCTURA XR) 60 MG CP24 24 hr capsule Take 1 capsule (60 mg) by mouth every morning       zinc oxide (DESITIN) 40 % external ointment Apply topically 3 times daily as needed for dry skin or irritation 56 g 1     mirtazapine (REMERON) 7.5 MG tablet TAKE 1 TABLET (7.5 MG) BY MOUTH AT BEDTIME (Patient not taking: Reported on 3/6/2020) 90 tablet 1     mirtazapine (REMERON) 7.5 MG tablet Take 7.5mg daily x 1 month, then increase to 15mg daily 30 tablet 1     Allergies   Allergen Reactions     Zolpidem Other (See Comments)     Pt was found wandering halls during last hospitalization and was confused.   Pt and wife request that pt not receive Ambien.     BP Readings from Last 3 Encounters:   03/06/20 104/68   12/09/19 108/68   09/10/19 108/58    Wt Readings from Last 3 Encounters:   03/06/20 86.3 kg (190 lb 3.2 oz)   12/09/19 89.4 kg (197 lb 3.2 oz)   09/10/19 92.2 kg (203 lb 3.2 oz)            Reviewed and updated as needed this visit by Provider  Tobacco  Allergies  Meds  Problems  Med Hx  Surg Hx  Fam Hx         Review of Systems   ROS COMP: Constitutional, HEENT, cardiovascular, pulmonary, GI, , musculoskeletal, neuro, skin, endocrine and psych systems are negative, except as otherwise  "noted.    This document serves as a record of the services and decisions personally performed and made by Sadaf Fox MD. It was created on her behalf by Channing Castano, a trained medical scribe. The creation of this document is based on the provider's statements to the medical scribe.  Channing Castano 12:39 PM March 6, 2020      Objective    /68   Pulse 75   Temp 97.2  F (36.2  C) (Oral)   Resp 15   Ht 1.626 m (5' 4\")   Wt 86.3 kg (190 lb 3.2 oz)   SpO2 95%   BMI 32.65 kg/m    Body mass index is 32.65 kg/m .  Physical Exam   GENERAL: healthy, alert and no distress  RESP: lungs clear to auscultation - no rales, rhonchi or wheezes  CV: regular rate and rhythm, normal S1 S2, no S3 or S4, no murmur, click or rub, no peripheral edema and peripheral pulses strong  MS: no gross musculoskeletal defects noted, 1+ edema to the mid-calf  PSYCH: mentation appears normal, affect normal/bright      Diagnostic Test Results:  Labs reviewed in Epic  No results found for this or any previous visit (from the past 24 hour(s)).        Assessment & Plan     1. Hypothyroidism, unspecified type  Stabled without complications.  Refills placed today. Will continue to monitor.  - levothyroxine (SYNTHROID/LEVOTHROID) 112 MCG tablet; Take 1 tablet (112 mcg) by mouth daily  Dispense: 90 tablet; Refill: 5    2. Mild depression (H)  Advised patient to increase 100 mg sertraline.  Prescription placed today. Will continue to monitor.  - sertraline (ZOLOFT) 50 MG tablet; Take 1 tablet (50 mg) by mouth daily  Dispense: 30 tablet; Refill: 4    3. Chronic heart failure with preserved ejection fraction (H)  Stable. Sees cardiology.    4. Osteoporosis, senile  Bone density ordered today. Patient will schedule appointment.  - DEXA HIP/PELVIS/SPINE - Future; Future    5. Thrombocytopenia (H)  Stable without complications. Will continue to monitor.    6. CKD (chronic kidney disease) stage 3, GFR 30-59 ml/min (H)  Stable without complications. Will " continue to monitor.      Patient Instructions   Increase sertraline to 100 mg daily.    Up date me in 1 month with how you are feeling.  Schedule your bone density.      The information in this document, created by the medical scribe for me, accurately reflects the services I personally performed and the decisions made by me. I have reviewed and approved this document for accuracy prior to leaving the patient care area.  March 6, 2020 12:39 PM    I spent 13 minutes of time with the patient and >50% of it was in education and counseling regarding health maintenance and medication recheck.  In: 12:37 PM  Out: 12:50 PM    Sadaf Fox MD  -[facility]

## 2020-03-06 PROBLEM — E66.01 MORBID OBESITY (H): Status: RESOLVED | Noted: 2019-04-30 | Resolved: 2020-01-01

## 2020-03-06 PROBLEM — L97.901 ULCER OF LOWER EXTREMITY, LIMITED TO BREAKDOWN OF SKIN, UNSPECIFIED LATERALITY (H): Status: RESOLVED | Noted: 2019-06-11 | Resolved: 2020-01-01

## 2020-03-06 NOTE — PATIENT INSTRUCTIONS
Increase sertraline to 100 mg daily.    Up date me in 1 month with how you are feeling.  Schedule your bone density.

## 2020-03-09 NOTE — TELEPHONE ENCOUNTER
Reason for call:  Other   Patient called regarding (reason for call): call back  Additional comments: Patients wife is calling because she said her  was recently seen and prescribed a new medication that she needs clarification on the directions. Please call back     Phone number to reach patient:  Home number on file [phone] (home)    Best Time:  any    Can we leave a detailed message on this number?  YES    Travel screening: Not Applicable

## 2020-03-09 NOTE — TELEPHONE ENCOUNTER
Left message on answering machine for patient's Wife to call back to the RN hotline at [phone].    Candida Rojas RN  [facility]

## 2020-03-11 NOTE — TELEPHONE ENCOUNTER
Per Dr. Fox: should be sertraline 50mg daily    Updated spouse on clarification  She verbalized understanding    Edwin Weiner RN

## 2020-03-11 NOTE — TELEPHONE ENCOUNTER
"Patient's wife returned call to RN Hotline.   She states on AVS it states \"increase sertraline to 100 mg daily\" however on the back page with medications, it states increase to sertraline 50 mg daily.   Patient was previously on 25 mg.  She just wants to make sure he should only be taking 50 mg daily.     Laurie Abbasi, JS       "

## 2020-03-11 NOTE — TELEPHONE ENCOUNTER
2nd attempt to reach.    Left message on answering machine for patient's Wife to call back to the RN hotline at [phone].    Candida Rojas RN  [facility]

## 2020-03-12 NOTE — PROGRESS NOTES
"Clinic Care Coordination Contact    Follow Up Progress Note      Assessment: outreach to patient and spouse.    Spoke with spouse, Linda. She states they are \"about the same\"  Patient saw cardiologist on 3/10/20, and his potassium was a little low. ([facility])They increased his dose to 40 meq on top of his normal 10 meq then he is to take 50 meq BID until he has his labs drawn again on 3/13/2020. Linda understands and administers increased dose.     Discussed diet high in potassium.    Patients weight is 188 lbs. Edema in legs is minimal.     Linda states they have had a busy week with doctor appointments and a [facility] nurse came and did an assessment this week.            Goals addressed this encounter:   Goals Addressed                 This Visit's Progress      Functional (pt-stated)   On track     Goal Statement: I want the swelling in my legs to go down    Measure of Success: When I can walk better and the sores heal    Supportive Steps to Achieve: I will keep my feet elevated when sitting.  I will take medication as prescribed.    Barriers: Heart failure     Strengths: Family support    Date to Achieve By: April 2020    Patient expressed understanding of goal: Yes            Intervention/Education provided during outreach: Reviewed potassium doses.      Outreach Frequency: monthly    Plan:   Patient will follow up with cardiology as recommended.     Care Coordinator will follow up in one month.     Dayana Farris RN, [facility] - Primary Care Clinic RN Coordinator  [facility]   3/12/2020    11:15 AM  [phone]  "

## 2020-03-25 NOTE — PROGRESS NOTES
"Brock Abarca is a 88 year old male who is being evaluated via a billable telephone visit. Spoke to Linda, patient's wife.      The patient has been notified of following:   \"This telephone visit will be conducted via a call between you and your physician/provider. We have found that certain health care needs can be provided without the need for a physical exam.  This service lets us provide the care you need with a short phone conversation.  If a prescription is necessary we can send it directly to your pharmacy.  If lab work is needed we can place an order for that and you can then stop by our lab to have the test done at a later time.  If during the course of the call the physician/provider feels a telephone visit is not appropriate, you will not be charged for this service.\"     Brock Abarca complains of    Chief Complaint   Patient presents with     Depression Follow Up      Health Maintenance     PHQ-9, CBC, Dexa, Physical, ADP, DAP, Hf action Plan        I have reviewed and updated the patient's Past Medical History, Social History, Family History and Medication List.    ALLERGIES  Zolpidem    Depression Followup    How are you doing with your depression since your last visit? No change    Are you having other symptoms that might be associated with depression? No    Have you had a significant life event?  No     Are you feeling anxious or having panic attacks?   No    Do you have any concerns with your use of alcohol or other drugs? No  He is still napping a lot.  He needs prodding to get ready.  Memory is impaired   Social History     Tobacco Use     Smoking status: Never Smoker     Smokeless tobacco: Never Used   Substance Use Topics     Alcohol use: Yes     Comment: occ.     Drug use: No     PHQ 1/30/2020 3/6/2020 4/3/2020   PHQ-9 Total Score 11 17 12   Q9: Thoughts of better off dead/self-harm past 2 weeks Not at all Not at all Not at all     JOYCELYN-7 SCORE 11/25/2019   Total Score 1     Last " PHQ-9 4/3/2020   1.  Little interest or pleasure in doing things 3   2.  Feeling down, depressed, or hopeless 0   3.  Trouble falling or staying asleep, or sleeping too much 3   4.  Feeling tired or having little energy 3   5.  Poor appetite or overeating 2   6.  Feeling bad about yourself 0   7.  Trouble concentrating 1   8.  Moving slowly or restless 0   Q9: Thoughts of better off dead/self-harm past 2 weeks 0   PHQ-9 Total Score 12   Difficulty at work, home, or with people Not difficult at all         Suicide Assessment Five-step Evaluation and Treatment (SAFE-T)    Additional provider notes:     Assessment/Plan:  1. Mild depression (H)  Will increase dose to help mood   - sertraline (ZOLOFT) 100 MG tablet; Take 1 tablet (100 mg) by mouth daily  Dispense: 90 tablet; Refill: 1    2. Chronic heart failure with preserved ejection fraction (H)  Patient to connect with cardiology via phone about when the next lab needs drawn       Phone call duration:  5 minutes    Start 11:56 AM   Stop 12:01 PM     Sadaf Fox MD

## 2020-03-27 NOTE — TELEPHONE ENCOUNTER
"Prescription approved per [facility] Refill Protocol.    Requested Prescriptions   Pending Prescriptions Disp Refills     metoprolol succinate ER (TOPROL-XL) 50 MG 24 hr tablet [Pharmacy Med Name: METOPROLOL SUCC ER 50 MG TAB] 90 tablet 2     Sig: TAKE 1 TABLET BY MOUTH EVERY DAY       Beta-Blockers Protocol Passed - 3/27/2020  7:00 AM        Passed - Blood pressure under 140/90 in past 12 months     BP Readings from Last 3 Encounters:   03/06/20 104/68   12/09/19 108/68   09/10/19 108/58                 Passed - Patient is age 6 or older        Passed - Recent (12 mo) or future (30 days) visit within the authorizing provider's specialty     Patient has had an office visit with the authorizing provider or a provider within the authorizing providers department within the previous 12 mos or has a future within next 30 days. See \"Patient Info\" tab in inbasket, or \"Choose Columns\" in Meds & Orders section of the refill encounter.              Passed - Medication is active on med list           "

## 2020-04-14 NOTE — PROGRESS NOTES
"Clinic Care Coordination Contact    Follow Up Progress Note      Assessment: Outreach to patient and spouse. Spoke with spouse, Linda.    She states they are getting along fine. She states \"the world is in a difficult place now\" She states they are staying at home. She is able to go to the grocery store. She states she wears a mask and is careful. She states we are better than many folks. She is praying for a end to the virus.     Patient is stable. No shortness of breath, no cough. He is sleepy at baseline. Linda is hoping for warmer weather then they can spend time on their deck. Patient does well in the fresh air.   Patient has edema in lower legs as baseline. Linda wraps his legs daily.  No weight gain.     Linda checked with cardiology and he does not need a potassium check until next routine visit.      Goals addressed this encounter: Stable  Goals Addressed                 This Visit's Progress      Functional (pt-stated)   On track     Goal Statement: I want the swelling in my legs to go down    Measure of Success: When I can walk better and the sores heal    Supportive Steps to Achieve: I will keep my feet elevated when sitting.  I will take medication as prescribed.    Barriers: Heart failure     Strengths: Family support    Date to Achieve By: July 2020    Patient expressed understanding of goal: Yes          Intervention/Education provided during outreach: Continue wrapping legs. Elevate when sitting.   Listening and reassurnace regarding covid-19 virus pandemic.      Outreach Frequency: monthly    Plan:   Patient will get outside on deck when weather is warmer.   Care Coordinator will follow up in one month.    Dayana Farris RN, [facility] - Primary Care Clinic RN Coordinator  [facility]   4/14/2020    11:53 AM  [phone]  "

## 2020-04-15 NOTE — TELEPHONE ENCOUNTER
Panel Management Review      Patient has the following on his problem list:     Hypertension   Last three blood pressure readings:  BP Readings from Last 3 Encounters:   03/06/20 104/68   12/09/19 108/68   09/10/19 108/58     Blood pressure: MONITOR    HTN Guidelines:  Less than 140/90      Composite cancer screening  Chart review shows that this patient is due/due soon for the following None  Summary:    Patient is due/failing the following:   PHQ9 and PHYSICAL    Action needed:   Patient needs office visit for physical. and Patient needs to do PHQ9.    Type of outreach:    Sent [employer] message.    Questions for provider review:    None                                                                                                                                    LS     Chart routed to none .

## 2020-04-15 NOTE — LETTER
April 21, 2020          Brock Abarca,  [address] [place] [address]        Dear Brock Abarca      Monitoring and managing your preventative and chronic health conditions are very important to us. Our records indicate that you have not scheduled for Annual physical exam  which was recommended by Dr. Fox, due to the coronavirus outbreak we are asking patients to schedule this in July or later      If you have received your health care elsewhere, please call the clinic so the information can be documented in your chart.    Please call [phone] or message us through your [employer] account to schedule an appointment or provide information for your chart.     Feel free to contact us if you have any questions or concerns!    I look forward to seeing you and working with you on your health care needs.     Sincerely,       Your [facility] Team/LS

## 2020-05-01 NOTE — TELEPHONE ENCOUNTER
Duplicate     Disp  Refills  Start  End  ROBBY    metoprolol succinate ER (TOPROL-XL) 50 MG 24 hr tablet  90 tablet  0  3/27/2020   No    Sig: TAKE 1 TABLET BY MOUTH EVERY DAY    Sent to pharmacy as: metoprolol succinate ER (TOPROL-XL) 50 MG 24 hr tablet    Class: E-Prescribe    Order: [id number]    E-Prescribing Status: Receipt confirmed by pharmacy (3/27/2020 11:01 AM CDT)

## 2020-05-06 NOTE — TELEPHONE ENCOUNTER
"Patient's wife, Linda, LM on RN hotline stating that patient has been falling and she is not sure what to do    Patient fell Omar 5/3/2020 and scraped his leg  Last night he fell again and was found on the living room floor  Wife does not think patient hit his head  She could not get him up herself but patient was able to assist with transfer and she eventually got him into a chair  She stated that he seemed coherant and was able to cooperate and assist   He did not want her to call 911 but if this happens again, she is not sure what to do  Advised that if this happens again and they are unable to get him up from the ground safely, she would need to call 911  She stated that patient has no strength in his legs and this has been gradually worsening   He has CHF and she wraps his legs every day for the swelling  He has low appetite     Asked if they have thought about moving patient into a facility with nursing help  Wife stated that this has been discussed but patient refuses  Asked if patient currently has HC and if not, would they be open to us ordering HC even tho this would not be a 24/7 service  Wife stated that there is a nurse, Akua OCHOA from [employer] ph. [phone] that comes out very 6 weeks but she is not sure if this is HC    Called [employer] and was updated that patient is in their LEADS program which is a service offered through [facility] and [facility]  This is not the same as their HC services that they offer  Was transferred to [facility] so that she can assist further  LM on [facility] with updates on patient's falls and weakness  Asked that she call RN hotline back at [phone] with any resources that could help patient and also asked her to verify if her service is considered \"home care\" since we cannot duplicate home care services    Does provider have any further recommendations while we wait for Akua with [employer] to call back?  Should patient have a visit?    Edwin Weiner, " RN

## 2020-05-07 NOTE — TELEPHONE ENCOUNTER
Wife called back to check status  Advised that we need to ask Dr. Fox for a new HC referral first  Advised to call 911 if patient falls again  She verbalized understanding    Edwin Weiner RN

## 2020-05-07 NOTE — TELEPHONE ENCOUNTER
Akua left detailed message on RN hotline stating they are considered HC services.  Right now patient receives visit every 6 weeks to avoid hospitalizations and UC visits.  To increase services she recommends a referral be placed for home care, physical therapy, and nursing assessment.  Akua would like a call if order is placed her direct line is [phone].  Verbal orders can be called into [phone], but they do not have access to med list or provider notes so they would like those faxed, can also fax HC order to this number instead of calling in verbal order.  Fax# [phone].  HC order pended  Tiffany Eng RN

## 2020-05-07 NOTE — TELEPHONE ENCOUNTER
HC referral, medication list and copy of last two visits with Dr. Fox faxed to [phone].  Akua called and informed.    LM for Linda, (spouse) that this was sent. Carri Rojo,

## 2020-05-12 NOTE — TELEPHONE ENCOUNTER
Called [facility]. Detailed VM was left leaving verbal orders as requested for:  continue orders for PT x2 a week for 4 weeks - functional mobility   Requested call back if she has any questions at RN hotline ([phone]).

## 2020-05-12 NOTE — TELEPHONE ENCOUNTER
Reason for call:  Order     Order or referral being requested: Physical Therapy     Reason for request: To continue     Date needed: as soon as possible    Has the patient been seen by the PCP for this problem? Not Applicable    Additional comments:     REHAN Schaefer requesting continue orders for PT     x2 a week for 4 weeks - functional mobility     Phone number to reach patient:  Other phone number:  [phone]    Best Time:  Any     Can we leave a detailed message on this number?  YES    Travel screening: Not Applicable

## 2020-05-13 NOTE — TELEPHONE ENCOUNTER
Reason for Call:  Home Health Care    Charmaine with [facility] called regarding (reason for call): orders     Orders are needed for this patient. Skilled Nursing    PT: NA    OT: NA    Skilled Nursin times a week for 3 week, 1 time a week for 2 weeks, monitor CHS and toe blisters    Pt Provider: Dr. Fox    Phone Number Homecare Nurse can be reached at: [phone]    Can we leave a detailed message on this number? YES    Phone number patient can be reached at: Other phone number:  NA    Best Time: any    Call taken on 2020 at 11:54 AM by Emiliano Newton

## 2020-05-13 NOTE — TELEPHONE ENCOUNTER
Called and spoke with Charmaine from [facility] and gave verbal OK for orders below.     Laurie Abbasi RN

## 2020-05-18 NOTE — LETTER
[facility]  Complex Care Plan  About Me:    Patient Name:  Brock Meredith    YOB: 1932  Age:         88 year old   [facility] MRN:    [id number] Telephone Information:  Home Phone [phone]   Mobile [phone]       Address:  [address] [place] [address] Email address:  [email or web address]      Emergency Contact(s)    Name Relationship Lgl Grd Work Phone Home Phone Mobile Phone   1. AGA MEREDITH Spouse   [phone]   2. AARTI ENGEL Daughter    [phone]           Primary language:  English     needed? No   [facility] [employer]:  [phone] op. 1  Other communication barriers: Glasses  Preferred Method of Communication:  Mail  Current living arrangement:    Mobility Status/ Medical Equipment:      Health Maintenance  Health Maintenance Reviewed:      My Access Plan  Medical Emergency 911   Primary Clinic Line [facility] - [phone]   24 Hour Appointment Line [phone] or  [phone]) (toll-free)   24 Hour Nurse Line [phone] (toll-free)   Preferred Urgent Care     Preferred Hospital     Preferred Pharmacy [facility] [place], [place] - [address]     Behavioral Health Crisis Line The National Suicide Prevention Lifeline at 1-724.608.5761 or 911             My Care Team Members  Patient Care Team       Relationship Specialty Notifications Start End    Sadaf Fox MD PCP - General Internal Medicine  8/24/12     Phone: [phone] Fax: [phone] [address] [address]    Matt Escalante, PhD LP MD Psychology  9/29/15     Phone: [phone] Fax: [phone]         [address] [facility] [place] [address]    Sadaf Fox MD Assigned PCP   7/15/18     Phone: [phone] Fax: [phone] [address] [address]    Dayana Farris, JS Lead Care Coordinator Primary Care - CC Admissions 6/5/19     Phone:  [phone]                 My Care Plans  Self Management and Treatment Plan  Goals and (Comments)  Goals        General    Functional (pt-stated)     Notes - Note edited  4/14/2020 11:45 AM by Dayana Farris RN    Goal Statement: I want the swelling in my legs to go down    Measure of Success: When I can walk better and the sores heal    Supportive Steps to Achieve: I will keep my feet elevated when sitting.  I will take medication as prescribed.    Barriers: Heart failure     Strengths: Family support    Date to Achieve By: July 2020    Patient expressed understanding of goal: Yes               Action Plans on File:                       Advance Care Plans/Directives Type:        My Medical and Care Information  Problem List   Patient Active Problem List   Diagnosis     Arthritis     BPH (benign prostatic hyperplasia)     Urinary frequency     Essential hypertension     Osteoporosis, senile     Hypothyroidism     Left foot drop     Lip tremor     Hand paresthesia     Thrombocytopenia (H)     Fatigue     Memory loss     Hypercalcemia     Advanced directives, counseling/discussion     Reticulocytosis     Hypovitaminosis D     Epiphora     Cerebral infarction (H)     History of dacryocystorhinostomy, od; os?     Brain dysfunction     Hyperlipidemia LDL goal <100     Peripheral edema     Diverticulosis of colon     H/O: CVA (cerebrovascular accident)     Hemorrhoids     Spinal stenosis, unspecified region other than cervical     Heart failure with preserved ejection fraction (H)     Driving safety issue     CKD (chronic kidney disease) stage 3, GFR 30-59 ml/min (H)     Chronic heart failure with preserved ejection fraction (H)     Mild cognitive impairment with memory loss     Psychophysiological insomnia      Current Medications and Allergies:  See printed Medication Report.    Care Coordination Start Date: 7/16/2019   Frequency of Care Coordination: monthly   Form Last Updated: 05/18/2020

## 2020-05-18 NOTE — TELEPHONE ENCOUNTER
Karla ([facility]) LM on RN hotline stating that wife noticed that patient's urine is pinkish in color and patient is a little slower than usual  She is requesting orders for UA/UC    Please advise if ok for her to collect    Edwin Weiner RN

## 2020-05-18 NOTE — PROGRESS NOTES
"Clinic Care Coordination Contact    Follow Up Progress Note      Assessment: Outreach to patient and spouse. Spoke with Linda, patient's spouse.    She states that patient fell about two weeks ago. First time was on the deck. She states their son was there and helped him up. He fell again a few days later and she was able to help him up. He is now using a walker all the time. She states she thinks he is getting weaker.     They have [facility] home [facility] coming for nursing and PT. The nurse is coming today to draw labs for cardiology. Linda is going to ask her to do a urine specimen as well as she notes some \"pink in patient's depend.\"  She is concerned that he may have a UTI.     Linda is concerned about future with the covid-19 virus.       Goals addressed this encounter:   Goals Addressed                 This Visit's Progress      Functional (pt-stated)   On track     Goal Statement: I want the swelling in my legs to go down    Measure of Success: When I can walk better and the sores heal    Supportive Steps to Achieve: I will keep my feet elevated when sitting.  I will take medication as prescribed.    Barriers: Heart failure     Strengths: Family support    Date to Achieve By: July 2020    Patient expressed understanding of goal: Yes          Intervention/Education provided during outreach: Discussed it is safe for patient to go to ED or hospital if needed. There are many safety measures in place to avoid exposure to the virus.      Call EMS or local fire department if help is needed to get patient up after a fall.     Outreach Frequency: monthly    Plan:   Continue home care services.  Call with questions or concerns.     Care Coordinator will follow up in one month.    Dayana Farris RN, [facility] - Primary Care Clinic RN Coordinator  [facility]   5/18/2020    2:58 PM  [phone]  "

## 2020-05-18 NOTE — TELEPHONE ENCOUNTER
Charmaine with [facility]  called RN hotline  She stated that patient had BMP drawn today with critically low potassium at 2.3  Asked her who ordered the test and when she looked, she noted it was ordered by patient's cardiologist, Adeline Smith PA  However, she has not tried contacting them yet  Advised her to contact cardiology with the lab results and call us back if she has any trouble reaching anyone there    Edwin Weiner RN

## 2020-05-21 NOTE — PROGRESS NOTES
"Clinic Care Coordination Contact    Follow Up Progress Note      Assessment: Outreach to patient's spouse, Linda.    Patient was seen in [facility] ED on 5/19/20 due to low potassium. He also had UA, which was clear.     Linda states patient continues to be weak. Not moving around much. His appetite is poor. He eats oatmeal with a banana in the morning, but is not hungry during the day. He is taking fluids well.     Linda is going to call cardiology office and ask if they want \"routine labs drawn\"  She feels they could have prevented the ED visit if labs were drawn more frequently.  He used to have them monthly.    Goals addressed this encounter:   Goals Addressed    None     Intervention/Education provided during outreach: Diet. Small meals, snacks. Try Ensure or Boost as supplement.      Outreach Frequency: monthly    Plan:   Linda will follow up with cardiology for routine lab orders.     Care Coordinator will follow up in one month.     Dayana Farris RN, [facility] - Primary [facility] Coord[facility]   5/21/2020    9:58 AM  [phone]  "

## 2020-06-25 NOTE — PROGRESS NOTES
Clinic Care Coordination Contact    Follow Up Progress Note      Assessment: Outreach to patient and spouse. Spoke with Linda.     Linda states she thinks patient has wax build up in his ears. He has new hearing aides and they do not seem to be working very well. She wonders if she can make an appointment for him to be seen. Advised she can schedule an appointment.    She states they are meeting with [facility] today.  She wants to be able to leave patient for a few hours so she can have a break. They are developing a plan that will work.       Goals addressed this encounter: Stable currently  Goals Addressed                 This Visit's Progress      Functional (pt-stated)   40%     Goal Statement: I want the swelling in my legs to go down    Measure of Success: When I can walk better and the sores heal    Supportive Steps to Achieve: I will keep my feet elevated when sitting.  I will take medication as prescribed.    Barriers: Heart failure     Strengths: Family support    Date to Achieve By: October 2020    Patient expressed understanding of goal: Yes          Intervention/Education provided during outreach: Advised home care nurse (clayton vieira) can check patient's ears also.      Outreach Frequency: monthly    Plan:   They will find a private pay program that works for them so Linda can have a break and patient has someone with him.    Care Coordinator will follow up in one month.    Dayana Farris RN, [facility] - Primary Care Clinic RN Coordinator  [facility]   6/25/2020    2:32 PM  [phone]

## 2020-07-24 NOTE — TELEPHONE ENCOUNTER
Panel Management Review      Patient has the following on his problem list: None      Composite cancer screening  Chart review shows that this patient is due/due soon for the following None  Summary:    Patient is due/failing the following:   DAP and PHYSICAL    Action needed:   Patient needs office visit for physical and PHQ9.    Type of outreach:    Sent Com[employer]set message.    Questions for provider review:    None                                                                                                                                    LS     Chart routed to none .

## 2020-07-26 NOTE — TELEPHONE ENCOUNTER
Prescription approved per [facility].  Adenike Mclaughlin RN

## 2020-07-27 NOTE — PROGRESS NOTES
Clinic Care Coordination Contact    Follow Up Progress Note      Assessment: Outreach to patient and spouse.   Spoke with spouse, Linda.    She states patient is stable. He has baseline edema in his feet and lower legs. Denies weeping or sores at this time.  His weight is stable at 176 pounds today.     Linda states patient is using his walker at all times now. He is more unsteady. No recent falls.  We discussed [facility]. She worries that she can not get him up if her were to fall. She states they had a system previously that their daughter set up but patient did not think he needed it. Linda thinks he would do better if he had a wrist band versus the neck pendant.     They have [facility] coming about every six weeks to evaluate status.      Linda states patient sits and reads the paper all day and into the night. She questions if he is actually reading. She has to redirect him to come to meals. She states his eating is very slow now, takes him a long time to finish and she needs to keep reminding him to eat.            Goals addressed this encounter: Currently stable. Due to dementia patient forgets to elevate feet.  Goals Addressed                 This Visit's Progress      Functional (pt-stated)   50%     Goal Statement: I want the swelling in my legs to go down    Measure of Success: When I can walk better and the sores heal    Supportive Steps to Achieve: I will keep my feet elevated when sitting.  I will take medication as prescribed.    Barriers: Heart failure     Strengths: Family support    Date to Achieve By: October 2020    Patient expressed understanding of goal: Yes          Intervention/Education provided during outreach: Reassurance. Education with dementia.  Provided Linda with [employer]      Outreach Frequency: monthly    Plan:   Linda will contact [employer].     Care Coordinator will follow up in one month.     Dayana Farris RN, [facility] - Primary Care Clinic RN Coordinator  [facility]   7/27/2020    10:23   [phone]

## 2020-08-07 NOTE — PROGRESS NOTES
Clinic Care Coordination Contact  Care Coordination Transition Communication    Referral Source: IP Report    Clinical Data: Patient was hospitalized at [facility] from 8/3/20 to 8/6/20 with diagnosis of weakness and severe lethargy. Additional hospital diagnoses including acute febrile illess, hypotension, and CHF.  COVID-19 test was negative.      Transition to Facility:              Facility Name: [facility]   Phone: [phone]    Fax: [phone]    Writer placed call to TCU and was transferred to the voicemail for TCU case management/SW.  Left message with request for discharge planning update to Lead RN CC.    Patient is actively enrolled in clinic care coordination with last successful outreach on 7/27/20 by RN CC.    Plan: Clinic Care Coordination will review chart to monitor for discharge planning every 4 weeks and as needed and outreach to patient once appropriate.     Radha Goodson, EUGENIAN, RN (covering for Dayana Farris RN CC on 8/7/20)  [facility]  - Clinic Care Coordinator  Phone: [phone]

## 2020-08-17 NOTE — PROGRESS NOTES
Clinic Care Coordination Contact  Care Coordination Transition Communication    Referral Source: IP Report    Clinical Data: Patient was hospitalized at [facility] from 8/12/20  to 8/16/20  with diagnosis of acute kidney failure, hypokalemia, CHF and fall.     Transition to Facility:              Facility Name: [facility]               Contact name and phone number/fax: Tammi HOOKS  [phone]    This is second hospital admission since August 1st. Difficulty managing potassium levels and kidney function. Patient had an unwitnessed fall in STR. Close monitoring and regular lab draws are needed to maintain normal levels.      Plan: RN/SW Care Coordinator will await notification from facility staff informing RN/SW Care Coordinator of patient's discharge plans/needs. RN/SW Care Coordinator will review chart and outreach to facility staff every 4 weeks and as needed.     Dayana Farris RN, [facility] - Primary Care Clinic RN Coordinator  [facility]   8/17/2020     [phone]

## 2020-09-03 NOTE — TELEPHONE ENCOUNTER
Linda, wife, updated  Advised her to discuss this with the TCU and see if they can set patient up with a neurologist  She stated that he may be transferred to a memory care unit next week  Advised that he may have an in house provider while in memory care as well and she should discuss this with them, however, he he does have have an in house provider, he can follow-up with [facility]   She verbalized understanding    Edwin Duke RN

## 2020-09-03 NOTE — TELEPHONE ENCOUNTER
Wife called RN hotline. States pt was hospitalized and is currently in a TCU- [facility] in [place]. Pt has been having increased anxiety and agitation. Pt had a fall last night. PA at the TCU recommended that pt be seen by a provider that specializes in Dementia. She is not sure who pt should see. She is requesting primary provider's input on what they think about this? Neurologist for dementia? States he is getting meds all the time. Is not sure if the meds are making him worse or is he that much worse? Needs a higher opinion on his medications. Advised that Dr. Fox is out of the office returning on 9/9. Wife would like Nella's opinion. Please advise.    Candida Rojas RN  [facility]

## 2020-09-10 PROBLEM — F03.918 DEMENTIA WITH BEHAVIORAL DISTURBANCE (H): Status: ACTIVE | Noted: 2020-01-01

## 2020-09-10 PROBLEM — L89.90 DECUBITUS ULCERS: Status: ACTIVE | Noted: 2020-01-01
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
## 2020-09-10 PROBLEM — I25.10 CORONARY ATHEROSCLEROSIS: Status: ACTIVE | Noted: 2020-01-01

## 2020-09-10 PROBLEM — N18.30 CHRONIC RENAL DISEASE, STAGE III (H): Status: ACTIVE | Noted: 2019-01-01

## 2020-09-10 PROBLEM — I20.0 UNSTABLE ANGINA (H): Status: ACTIVE | Noted: 2019-01-01

## 2020-09-10 PROBLEM — R65.20 SEVERE SEPSIS (H): Status: ACTIVE | Noted: 2020-01-01

## 2020-09-10 PROBLEM — E87.6 HYPOKALEMIA: Status: ACTIVE | Noted: 2020-01-01

## 2020-09-10 PROBLEM — N17.9 AKI (ACUTE KIDNEY INJURY) (H): Status: ACTIVE | Noted: 2020-01-01

## 2020-09-10 PROBLEM — A41.9 SEVERE SEPSIS (H): Status: ACTIVE | Noted: 2020-01-01

## 2020-09-10 NOTE — TELEPHONE ENCOUNTER
Patient new admit to [facility], will plan to do in-person admission on 9/15.      The discharge information has orders for OT/PT/RN but cannot find any information on having catheter nor changes.  Facility to contact Edy RE: catheter specs and when last changed, homecare will need this information.  Also need BR nursing will do full skin evaluation within 24h as homecare will not be in overnight.  -PT/OT/RN for weakness, lymphedema, chronic molina, sacral PU, cellulitis  -VS daily x5 days

## 2020-09-14 NOTE — PROGRESS NOTES
[place] [facility]  PRIMARY CARE PROVIDER AND CLINIC: Eric Nolasco, LORI CNP, [address] [place] [address] [address] / [place] [address]; Phone: [phone]; Fax: [phone]  Chief Complaint   Patient presents with     New Patient     Establish Care     [place] Medical Record Number: [id number]  Place of Service where encounter took place: [facility] ([facility]) [[id number]]    Brock Abarca is a 88 year old  (3/1/1932), admitted to the above facility from previous nursing home. Admitted to this facility for rehab, medical management and nursing care.    HPI:    HPI information obtained from: facility chart records, facility staff, patient report and [facility] chart review.   Brief Summary of Hospital Course:  Hospital Course: Patient was admitted to [facility] between August 3 and August 6, was weak and slid onto floor, work-up revealed fever 101.3, Dx with severe sepsis, required ICU admission with vasopressors, negative chest x-ray, COVID-19, blood cultures and urine culture.  Sepsis was thought secondary to cellulitis associated with his venous stasis/decubitus ulcers, broad-spectrum IV antibiotics were narrowed to p.o. clindamycin at discharge, transferred to [facility] TCU from 8/6 to 9/10.     Updates on Status Since Skilled nursing Admission: Patient resided in home with spouse, after [facility]/TCU stay transferred to  on 9/10, had been followed by Dr. Fox  Clinics and now transferred PCP to [facility] in-facility, since arrival VS stable, weight 180lbs, leg edema+ with ace wraps on, leg skin intact, L buttock PU slightly excoriated epidermal layer only, homecare services ordered, molina for urine retention patent, weak, pleasantly confused with no behaviors, wanders independently with walker, today was looking for his wife and glasses in hallway, no recent labs, medications reviewed, overall presents stable, no acute concerns.    CODE  STATUS/ADVANCE DIRECTIVES DISCUSSION: CPR/Full code   Patient's living condition: lives in a nursing home  ALLERGIES: Zolpidem  PAST MEDICAL HISTORY:  has a past medical history of Arthritis, Hyperlipidemia LDL goal <70 (8/24/2012), Hypertension, Hypothyroidism (8/24/2012), Memory loss (8/26/2012), Nephrolithiasis, Nonsenile cataract, Osteoporosis (8/24/2012), Pulmonary embolism (H) (2008), and Unspecified cerebral artery occlusion with cerebral infarction (2006). He also has no past medical history of Amblyopia, Diabetic retinopathy (H), Glaucoma, Macular degeneration, Retinal detachment, Strabismus, or Uveitis.  PAST SURGICAL HISTORY:   has a past surgical history that includes appendectomy; orthopedic surgery; back surgery; Head and neck surgery; cataract iol, rt/lt; Dacryocystorhinostomy bilateral (2013); Extracorporeal shock wave lithotripsy, cystoscopy, insert stent ureter(s), combined; Cystoscopy, left urethral stone removed, stent placement (1/5/17); Cystoscopy, left ureteroscopy, holium laser lithotripsy, stent exchange (1/23/17); and Parathyroidectomy (N/A, 2/14/2017).  FAMILY HISTORY: family history includes Cerebrovascular Disease in his father; Heart Disease in his sister.  SOCIAL HISTORY:   reports that he has never smoked. He has never used smokeless tobacco. He reports current alcohol use. He reports that he does not use drugs.    Post Discharge Medication Reconciliation Status: discharge medications reconciled and changed, per note/orders    Current Outpatient Medications   Medication Sig Dispense Refill     acetaminophen (TYLENOL) 325 MG tablet Take 1-2 tablets (325-650 mg) by mouth every 4 hours as needed for mild pain       bumetanide (BUMEX) 1 MG tablet Take 2 tablets (2 mg) by mouth 2 times daily       clopidogrel (PLAVIX) 75 MG tablet TAKE 1 TABLET BY MOUTH EVERY DAY 90 tablet 0     levothyroxine (SYNTHROID/LEVOTHROID) 112 MCG tablet Take 1 tablet (112 mcg) by mouth daily 90 tablet 5      loratadine (CLARITIN) 10 MG tablet Take 1 tablet (10 mg) by mouth daily       metoprolol succinate ER (TOPROL-XL) 50 MG 24 hr tablet TAKE 1 TABLET BY MOUTH EVERY DAY 90 tablet 0     multivitamin (CENTRUM SILVER) tablet Take 1 tablet by mouth daily 90 tablet 3     olopatadine (PATANOL) 0.1 % ophthalmic solution Place 1 drop into both eyes 2 times daily 5 mL 3     order for DME Equipment being ordered: 10-20 mm HG knee high compression stockings 1 Device 0     potassium chloride ER (MICRO-K) 10 MEQ CR capsule Potassium to 50 mEq daily (You can take 30 mEq in the AM and 20 mEq in the PM)- per cardiology       sertraline (ZOLOFT) 100 MG tablet Take 1 tablet (100 mg) by mouth daily 90 tablet 1     simvastatin (ZOCOR) 40 MG tablet Take 1 tablet (40 mg) by mouth daily 90 tablet 3     tamsulosin (FLOMAX) 0.4 MG 24 hr capsule Take 1 capsule (0.4 mg) by mouth daily (Patient taking differently: Take 0.4 mg by mouth every evening ) 90 capsule 4     trospium (SANCTURA XR) 60 MG CP24 24 hr capsule Take 1 capsule (60 mg) by mouth every morning       zinc oxide (DESITIN) 40 % external ointment Apply topically 3 times daily as needed for dry skin or irritation 56 g 1       ROS:  4 point ROS including Respiratory, CV, GI and , other than that noted in the HPI,  is negative    Vitals:  There were no vitals taken for this visit.  Exam:  GENERAL APPEARANCE:  in no distress, appears healthy  ENT:  Mouth and posterior oropharynx normal, moist mucous membranes, [place]  RESP:  lungs clear to auscultation , no respiratory distress  CV:  regular rate and rhythm, no murmur, rub, or gallop, peripheral edema 1-2+ in lower legs and pedal  ABDOMEN:  no guarding or rebound, bowel sounds normal  M/S:   Gait and station abnormal using walker, fall risk  SKIN:  Inspection of skin and subcutaneous tissueWNL, healing scar R temple, PU L buttock, misc small bruises and scabs on legs  NEURO:   Examination of sensation by touch normal  PSYCH:  oriented to  self, memory impaired     Lab/Diagnostic data:  Labs pending    ASSESSMENT/PLAN:  Decubitus ulcer of left buttock, stage 2 (H)  -mildly excoriated with pink skin, no discharge  -calmoseptin BID until healed  -home care RN to follow    Dementia with behavioral disturbance, unspecified dementia type (H)  -moderate memory loss, limited safety awareness  -Hx of frequent falls r/t impulsivity and confusion  -previously on lorazepam and seroquel for behaviors, both DC'd  -staff to support in memory care unit as possible    Mild episode of recurrent major depressive disorder (H)  -no overt s/s depression  -previously followed by , psych  -continues sertraline 100mg daily  -on mirtazapine 7.5, hospital note stated was to be PRN for insomnia  -discontinue mirtazapine; not currently indicated particularly with excellent appetite    Hypothyroidism, unspecified type  -unknown recent TSH  -continue levothyroxine 112mcg  -TSH on 9/23    Essential hypertension  -SBP's in the 126-138 range, HR's in the 60-90 range  -continue bumex, metoprolol, K supplement  -staff to check VS as scheduled    Chronic heart failure with preserved EF  -historical Dx, weight 180lbs  -continue plavix, bumex 1mg BID, K 20mEq BID, simvistatin  -followed by  at [facility]  -follow up cards PRN  -weigh MWF, if >180 notify PCP    H/O: CVA (cerebrovascular accident)  -historical, unknown year, has had TIA and PE  -continue plavix and simvistatin  -follow up neurology PRN    Chronic renal disease, stage III (H)  -no recent renal results  -dose medications renally as possible  -BMP/Hgb on 9/23    Molina catheter present  Urine retention  -previous L ureteral stent/lithotripsy  -was on tropsium but previously discontinued  -molina present/patent, placed in TCU after PVR's >500  -continue flomax  -home care to follow for molina changes  -follow up with  at [facility] on 9/28, probable trial void    Lymphedema of both lower  extremities  -bilateral legs and pedal, ace wraps in place today with uneven compression  -PT/OT/Lymph to eval and treat    Spinal stenosis in cervical region  -chronic back pain  -had previous C3-4 cervical decompression  -continue APAP PRN         Electronically signed by:  LORI Hatch CNP

## 2020-09-15 PROBLEM — L89.322 DECUBITUS ULCER OF LEFT BUTTOCK, STAGE 2 (H): Status: ACTIVE | Noted: 2020-01-01

## 2020-09-15 PROBLEM — F33.0 MILD EPISODE OF RECURRENT MAJOR DEPRESSIVE DISORDER (H): Status: ACTIVE | Noted: 2020-01-01

## 2020-09-15 PROBLEM — I89.0 LYMPHEDEMA OF BOTH LOWER EXTREMITIES: Status: ACTIVE | Noted: 2020-01-01

## 2020-09-15 NOTE — PROGRESS NOTES
Clinic Care Coordination Contact    Follow Up Progress Note      Assessment: Patient's spouse, Linda calling.     Linda states that Patient has has two hospital admissions, TCU stay and is now in memory care at [facility].     Linda states she has been going to see him daily and everyday he asks her to take him home.  She is wondering if she has done the right thing.     We had a long discussion that patient was getting to be too much for her to care for alone even before this last hospital stay. He was getting up during the night and wandering around the house or reading the newspaper that he had sat and read all day. He was incontinent of urine. He now has a catheter in as he was not able to empty his bladder and was going very 10 minutes.     Patient's memory has been declining for some time. Linda states he is still able to play cribbage, which is amazing to her.     Linda states that patient's daughters suggested [facility] as their mother( Amadeo's ex wife) is also there. In fact their rooms are across the mullen from each other.      Patient is going to receive care with on staff medical team. Linda spoke with the NP that saw patient today. She asked that the appointment with Dr. Fox be cancelled as she will not be bringing him to the clinic.       Goals addressed this encounter: Stable  Goals Addressed                 This Visit's Progress      Functional (pt-stated)   60%     Goal Statement: I want the swelling in my legs to go down    Measure of Success: When I can walk better and the sores heal    Supportive Steps to Achieve: I will keep my feet elevated when sitting.  I will take medication as prescribed.    Barriers: Heart failure     Strengths: Family support    Date to Achieve By: October 2020    Patient expressed understanding of goal: Yes          Intervention/Education provided during outreach: Supportive conversation. Reassurance      Outreach Frequency: monthly    Plan:   Linda will give patient time to  settle into his new environment.     Care Coordinator will follow up in one month with Linda to provide support.    Dayana Farris RN, [facility] - Primary [facility] Clinic RN Coordinator  [facility]   9/15/2020    2:54 PM  [phone]

## 2020-09-17 NOTE — TELEPHONE ENCOUNTER
[facility] now requests orders and shares plan of care/discharge summaries for some patients through [employer].  Please REPLY TO THIS MESSAGE OR ROUTE BACK TO THE AUTHOR in order to give authorization for orders when needed.  This is considered a verbal order, you will still receive a faxed copy of orders for signature.  Thank you for your assistance in improving collaboration for our patients.    ORDER  OT lymphedema therapy 3x/month for 1 month, 5x/month for 1 month for complete decongestive therapy including gradient compression bandaging, manual lymph drainage, skin/edema assessment, pt/cg education, long term edema management strategies, and garment education/fitting/ordering.    Please respond to this message as soon as possible.  Thank you,  Lucretia Shrestha OTR/L CLT

## 2020-09-20 NOTE — TELEPHONE ENCOUNTER
Patient has an indwelling molina catheter in place. He has been pulling on catheter, trying to remove it.   Nursing report blood in molina bag this AM  Vitals stable  Order: continue to monitor, secure so patient unable to pull on molina, Hgb in AM, update NP on Monday

## 2020-09-21 NOTE — PROGRESS NOTES
[place] [facility]  [place] Medical Record Number: [id number]  [facility] where encounter took place[facility] [[id number][facility]  Chief Complaint   Patient presents with     RECHECK       HPI:    Brock Abarca is a 88 year old (3/1/1932), who is being seen today for an episodic care visit. HPI information obtained from: facility chart records, facility staff, patient report and [facility] chart review. Today's concern is:     Dementia with behavioral disturbance, unspecified dementia type (H)  Molina catheter present  Gross hematuria  Diarrhea, unspecified type     Patient seen on request in memory care unit, molina change on 9/17 by home care RN, then on 9/20 had gross hematuria, ordered UA/UC but then sent to ER, they changed molina then returned to AL facility, on 9/20 UA returned too bloody to evaluate, on 9/21 was walking in [place]ullen naked with diarrhea running down legs into/onto lymph wraps which were removed and edema worsened, on 9/22 was seen in room with urine collection bag removed and molina output spilling out into pants and floor, UC returned >100k enterobacter cloacae, does not appear to be anxious nor agitated, no pain on faces scale, limited intake, limited decision making skills and safety awareness, continues to manipulate/pull on molina.    Past Medical and Surgical History reviewed in Epic today.    MEDICATIONS:    Current Outpatient Medications   Medication Sig Dispense Refill     ciprofloxacin (CIPRO) 500 MG tablet Take 1 tablet (500 mg) by mouth 2 times daily for 7 days 14 tablet 0     loperamide (IMODIUM A-D) 2 MG tablet Take 1 tablet (2 mg) by mouth every 6 hours as needed for diarrhea 30 tablet 11     acetaminophen (TYLENOL) 325 MG tablet Take 1-2 tablets (325-650 mg) by mouth every 4 hours as needed for mild pain       bumetanide (BUMEX) 1 MG tablet Take 2 tablets (2 mg) by mouth 2 times daily       clopidogrel (PLAVIX) 75 MG tablet TAKE  "1 TABLET BY MOUTH EVERY DAY 90 tablet 0     levothyroxine (SYNTHROID/LEVOTHROID) 112 MCG tablet Take 1 tablet (112 mcg) by mouth daily 90 tablet 5     metoprolol succinate ER (TOPROL-XL) 50 MG 24 hr tablet TAKE 1 TABLET BY MOUTH EVERY DAY 90 tablet 0     multivitamin (CENTRUM SILVER) tablet Take 1 tablet by mouth daily 90 tablet 3     olopatadine (PATANOL) 0.1 % ophthalmic solution Place 1 drop into both eyes 2 times daily 5 mL 3     order for DME Equipment being ordered: 10-20 mm HG knee high compression stockings 1 Device 0     potassium chloride ER (MICRO-K) 10 MEQ CR capsule Take 20 mEq by mouth 2 times daily       sertraline (ZOLOFT) 100 MG tablet Take 1 tablet (100 mg) by mouth daily 90 tablet 1     simvastatin (ZOCOR) 40 MG tablet Take 1 tablet (40 mg) by mouth daily 90 tablet 3     tamsulosin (FLOMAX) 0.4 MG 24 hr capsule Take 1 capsule (0.4 mg) by mouth daily (Patient taking differently: Take 0.4 mg by mouth every evening ) 90 capsule 4     REVIEW OF SYSTEMS:  Unobtainable secondary to cognitive impairment.     Objective:  /55   Pulse 84   Temp 98.8  F (37.1  C)   Resp 18   Ht 1.702 m (5' 7\")   Wt 84.4 kg (186 lb)   SpO2 93%   BMI 29.13 kg/m    Exam:  GENERAL APPEARANCE:  in no distress, appears healthy  ENT:  Mouth and posterior oropharynx normal, moist mucous membranes  RESP:  no respiratory distress  CV:  peripheral edema 2-3+ in lower legs  ABDOMEN:  bowel sounds normal  M/S:   Gait and station abnormal unsteady, using walker  SKIN:  Inspection of skin and subcutaneous tissue baseline  NEURO:   Examination of sensation by touch normal  PSYCH:  oriented to unknown    Labs:   Labs done in SNF are in [place] EPIC. Please refer to them using [employer] Everywhere.    ASSESSMENT/PLAN:  Dementia with behavioral disturbance, unspecified dementia type (H)  Benoit catheter present  Gross hematuria  Diarrhea, unspecified type  BPH with urinary retention  -hematuria resolved, was trauma from pulling on " molina  -UC+, started on cipro 500mg BID x7d  -leg edema; lymphedema to re-wrap today  -start loperamide PRN for diarrhea  -pending Cdiff to rule out  -Hgb, BMP and TSH due 9/23  -due to BPH, difficulty inserting regular catheter,  ordering molina coude 16f  -[facility] order to also provide catheters and insertion trays for molina changes bi-monthly       Electronically signed by:  LORI Hatch CNP

## 2020-09-22 PROBLEM — Z97.8 FOLEY CATHETER PRESENT: Status: ACTIVE | Noted: 2020-01-01

## 2020-09-22 NOTE — LETTER
9/22/2020        RE: Brock Abarca  [address] [place] [address]        [place] [facility]  [place] Medical Record Number: [id number]  Place of Service where encounter took place[facility] [[id number]]  Chief Complaint   Patient presents with     RECHECK       HPI:    Brock Abarca is a 88 year old (3/1/1932), who is being seen today for an episodic care visit. HPI information obtained from: facility chart records, facility staff, patient report and [facility] chart review. Today's concern is:     Dementia with behavioral disturbance, unspecified dementia type (H)  Molina catheter present  Gross hematuria  Diarrhea, unspecified type     Patient seen on request in memory care unit, molina change on 9/17 by home care RN, then on 9/20 had gross hematuria, ordered UA/UC but then sent to ER, they changed molina then returned to AL facility, on 9/20 UA returned too bloody to evaluate, on 9/21 was walking in [place] naked with diarrhea running down legs into/onto lymph wraps which were removed and edema worsened, on 9/22 was seen in room with urine collection bag removed and molina output spilling out into pants and floor, UC returned >100k enterobacter cloacae, does not appear to be anxious nor agitated, no pain on faces scale, limited intake, limited decision making skills and safety awareness, continues to manipulate/pull on molina.    Past Medical and Surgical History reviewed in Epic today.    MEDICATIONS:    Current Outpatient Medications   Medication Sig Dispense Refill     ciprofloxacin (CIPRO) 500 MG tablet Take 1 tablet (500 mg) by mouth 2 times daily for 7 days 14 tablet 0     loperamide (IMODIUM A-D) 2 MG tablet Take 1 tablet (2 mg) by mouth every 6 hours as needed for diarrhea 30 tablet 11     acetaminophen (TYLENOL) 325 MG tablet Take 1-2 tablets (325-650 mg) by mouth every 4 hours as needed for mild pain       bumetanide (BUMEX) 1 MG  "tablet Take 2 tablets (2 mg) by mouth 2 times daily       clopidogrel (PLAVIX) 75 MG tablet TAKE 1 TABLET BY MOUTH EVERY DAY 90 tablet 0     levothyroxine (SYNTHROID/LEVOTHROID) 112 MCG tablet Take 1 tablet (112 mcg) by mouth daily 90 tablet 5     metoprolol succinate ER (TOPROL-XL) 50 MG 24 hr tablet TAKE 1 TABLET BY MOUTH EVERY DAY 90 tablet 0     multivitamin (CENTRUM SILVER) tablet Take 1 tablet by mouth daily 90 tablet 3     olopatadine (PATANOL) 0.1 % ophthalmic solution Place 1 drop into both eyes 2 times daily 5 mL 3     order for DME Equipment being ordered: 10-20 mm HG knee high compression stockings 1 Device 0     potassium chloride ER (MICRO-K) 10 MEQ CR capsule Take 20 mEq by mouth 2 times daily       sertraline (ZOLOFT) 100 MG tablet Take 1 tablet (100 mg) by mouth daily 90 tablet 1     simvastatin (ZOCOR) 40 MG tablet Take 1 tablet (40 mg) by mouth daily 90 tablet 3     tamsulosin (FLOMAX) 0.4 MG 24 hr capsule Take 1 capsule (0.4 mg) by mouth daily (Patient taking differently: Take 0.4 mg by mouth every evening ) 90 capsule 4     REVIEW OF SYSTEMS:  Unobtainable secondary to cognitive impairment.     Objective:  /55   Pulse 84   Temp 98.8  F (37.1  C)   Resp 18   Ht 1.702 m (5' 7\")   Wt 84.4 kg (186 lb)   SpO2 93%   BMI 29.13 kg/m    Exam:  GENERAL APPEARANCE:  in no distress, appears healthy  ENT:  Mouth and posterior oropharynx normal, moist mucous membranes  RESP:  no respiratory distress  CV:  peripheral edema 2-3+ in lower legs  ABDOMEN:  bowel sounds normal  M/S:   Gait and station abnormal unsteady, using walker  SKIN:  Inspection of skin and subcutaneous tissue baseline  NEURO:   Examination of sensation by touch normal  PSYCH:  oriented to unknown    Labs:   Labs done in SNF are in [place] EPIC. Please refer to them using [employer]/Care Everywhere.    ASSESSMENT/PLAN:  Dementia with behavioral disturbance, unspecified dementia type (H)  Benoit catheter present  Gross " hematuria  Diarrhea, unspecified type  BPH with urinary retention  -hematuria resolved, was trauma from pulling on molina  -UC+, started on cipro 500mg BID x7d  -leg edema; lymphedema to re-wrap today  -start loperamide PRN for diarrhea  -pending Cdiff to rule out  -Hgb, BMP and TSH due 9/23  -due to BPH, difficulty inserting regular catheter,  ordering molina coude 16f  -[facility] order to also provide additional catheters and insertion trays plus anchoring devices due to increased need to replace catheter due to pulling out and/or malfunction      Electronically signed by:  LORI Hatch CNP         Sincerely,        LORI Hatch CNP

## 2020-09-29 NOTE — TELEPHONE ENCOUNTER
Called and spoke to spouse (Linda). Per spouse: Amadeo is in a memory care facilities and a doctor at the facilities has take over his medication. Please disregard request.  Leslye Castano, CMA

## 2020-10-01 NOTE — LETTER
[facility]  Complex Care Plan  About Me:    Patient Name:  Brock Meredith    YOB: 1932  Age:         88 year old   Jeannette MRN:    [id number] Telephone Information:  Home Phone [phone]   Mobile [phone]       Address:  [address] [place] [address] Email address:  [email or web address]      Emergency Contact(s)    Name Relationship Lgl Grd Work Phone Home Phone Mobile Phone   1. AGA MEREDITH Spouse No  [phone]   2. AARTI ENGEL Daughter No  [phone]   3. RENATO HINKLE Daughter    [phone]   4. REBA MEREDITH Daughter No   [phone]           Primary language:  English     needed? No   [place] [facility]:  [phone] op. 1  Other communication barriers:  Memory loss  Preferred Method of Communication:  Mail  Current living arrangement: I live in a memory care unit  Mobility Status/ Medical Equipment: Independent w/Device    Health Maintenance  Health Maintenance Reviewed:    Health Maintenance Due   Topic Date Due     HF ACTION PLAN  03/01/1932     DEPRESSION ACTION PLAN  03/01/1932     Pneumococcal Vaccine: 65+ Years (1 of 1 - PPSV23) 10/01/2012     MEDICARE ANNUAL WELLNESS VISIT  10/07/2017     DEXA  11/28/2019     CBC  02/08/2020     BMP  06/04/2020     ZOSTER IMMUNIZATION (2 of 2) 05/01/2020     PHQ-9  10/03/2020       My Access Plan  Medical Emergency 911   Primary Clinic Line [facility] - [phone]   24 Hour Appointment Line [phone] or  [phone]) (toll-free)   24 Hour Nurse Line [phone] (toll-free)   Preferred Urgent Care     Preferred [facility]  [phone]   Preferred Pharmacy [facility] [place] - [address]     Behavioral Health Crisis Line The National Suicide Prevention Lifeline at 1-266.667.9670 or 911             My Care Team Members  Patient Care Team       Relationship  Specialty Notifications Start End    Eric Nolasco APRN CNP PCP - General Nurse Practitioner  9/10/20     Phone: [phone] Fax: [phone]         [address] [address]    Matt Escalante, PhD LP MD Psychology  9/29/15     Phone: [phone] Fax: [phone]         [address] [facility] [address]    Sadaf Fox MD Assigned PCP   7/15/18     Phone: [phone] Fax: [phone]         [address] [facility] [address]    Dayana Farris, RN Lead Care Coordinator Primary Care - CC Admissions 6/5/19     Phone: [phone]         [facility] ([facility])  9/8/20     Phone: [phone]         Nella Man Nursing Assistant Gerontology  9/14/20     [facility]     Phone: [phone] Fax: [phone]         [address] [address]            My Care Plans  Self Management and Treatment Plan  Goals and (Comments)  Goals        General    Functional (pt-stated)     Notes - Note edited  10/1/2020  9:22 AM by Dayana Farris, RN    Goal Statement: I want the swelling in my legs to go down    Measure of Success: When I can walk better and the sores heal    Supportive Steps to Achieve: I will keep my feet elevated when sitting.  I will take medication as prescribed. I will wear compression garments    Barriers: Heart failure     Strengths: Family support    Date to Achieve By: February 2021    Patient expressed understanding of goal: Yes               Action Plans on File:                       Advance Care Plans/Directives Type:        My Medical and Care Information  Problem List   Patient Active Problem List   Diagnosis     Arthritis     BPH (benign prostatic hyperplasia)     Urinary frequency     Essential hypertension     Osteoporosis, senile     Hypothyroidism     Left foot drop     Lip tremor     Hand paresthesia     Thrombocytopenia (H)     Fatigue     Memory loss      Hypercalcemia     Advanced directives, counseling/discussion     Reticulocytosis     Hypovitaminosis D     Epiphora     Cerebral infarction (H)     History of dacryocystorhinostomy, od; os?     Brain dysfunction     Hyperlipidemia LDL goal <100     Peripheral edema     Diverticulosis of colon     H/O: CVA (cerebrovascular accident)     Hemorrhoids     Spinal stenosis in cervical region     Heart failure with preserved ejection fraction (H)     Driving safety issue     Chronic renal disease, stage III     Chronic heart failure with preserved ejection fraction (H)     Mild cognitive impairment with memory loss     Psychophysiological insomnia     WILLIS (acute kidney injury) (H)     Coronary atherosclerosis     Decubitus ulcer of left buttock, stage 2 (H)     Dementia with behavioral disturbance (H)     Fall     Hypokalemia     Severe sepsis (H)     Unstable angina (H)     Mild episode of recurrent major depressive disorder (H)     Lymphedema of both lower extremities     Benoit catheter present      Current Medications and Allergies:  See printed Medication Report.    Care Coordination Start Date: 7/16/2019   Frequency of Care Coordination: monthly   Form Last Updated: 10/01/2020

## 2020-10-01 NOTE — PROGRESS NOTES
"Clinic Care Coordination Contact    Follow Up Progress Note      Assessment: Patient's spouse, Linda, calling RN clinic care coordinator.     Patient is currently in memory care unit at [facility]. Linda is not sure that this is the \"right place for him\"  He wants to be with her.  She is having a difficult time knowing what to do.    She is thinking about moving into the assisted living area of the facility. She states they have a nice two bedroom apartment available. She is struggling with thinking she is \"ready\" for assisted living. We had a long discussion including that this is still an independent apartment for her, she can do their cooking if she wants, she can come and go (as covid-19 restrictions will allow) but she will be with patient which will give him comfort, but she will still have staff available to help her as needed.      We discussed she could consider moving in for the winter, she how they do, and make a final decision later. She can leave their home intake, not have to worry about cleaning it out for now. Her son and granddaughter live near by and can check on it periodically over winter. Linda also still drives and can go back and forth if needed. This way she can only worry about taking the bare minimum.        Linda is feeling very anxious with all that is happening. Advised she schedule a virtual visit with her PCP (Dr. Fox) and discuss options. She is currently on Celexa 20 mg daily.   Goals addressed this encounter:   Goals Addressed                 This Visit's Progress      Functional (pt-stated)   30%     Goal Statement: I want the swelling in my legs to go down    Measure of Success: When I can walk better and the sores heal    Supportive Steps to Achieve: I will keep my feet elevated when sitting.  I will take medication as prescribed. I will wear compression garments    Barriers: Heart failure     Strengths: Family support    Date to Achieve By: February 2021    Patient expressed " understanding of goal: Yes          Intervention/Education provided during outreach: As above     Outreach Frequency: monthly    Plan:   Linda will make an appointment with her PCP.    Care Coordinator will follow up in one month to support spouse.    Dayana Farris RN, [facility] RN Coordinator  [facility]   10/1/2020    9:32 AM  [phone]

## 2020-10-06 NOTE — PROGRESS NOTES
[facility]  [place] Medical Record Number: [id number]  Place of Service where encounter took place[facility] [[id number]]  Chief Complaint   Patient presents with     RECHECK       HPI:    Brock Abarca is a 88 year old (3/1/1932), who is being seen today for an episodic care visit. HPI information obtained from: facility chart records, facility staff, patient report and [facility] chart review. Today's concern is:     Dementia with behavioral disturbance, unspecified dementia type (H)  Chronic diastolic HF (heart failure) (H)  Molina catheter present  Fitting and adjustment of urinary device  Lymphedema of both lower extremities  Stasis ulcer of left lower extremity (H)  Stage II pressure ulcer of sacral region (H)     Patient seen on memory care unit, recent Hx of diarrhea and constipation, pulling molina out, hematuria, edema with lymph wraps on, Tx of UTI with Cipro, sacral pressure ulcer, R shin stasis ulcer, labs on 9/23 Hgb 9.1, Ca 7.8, TSH 5.39, sleeping more, moderate SOB with activity, full ADL assist, followed by homecare, overall progressive age related decline.    Past Medical and Surgical History reviewed in Epic today.    MEDICATIONS:    Current Outpatient Medications   Medication Sig Dispense Refill     calcium carbonate (OS-JENIFER) 500 MG tablet Take 1 tablet (500 mg) by mouth 2 times daily 60 tablet 11     senna-docusate (SENOKOT-S/PERICOLACE) 8.6-50 MG tablet Take 2 tablets by mouth 2 times daily as needed for constipation 60 tablet 11     acetaminophen (TYLENOL) 325 MG tablet Take 1-2 tablets (325-650 mg) by mouth every 4 hours as needed for mild pain       bumetanide (BUMEX) 1 MG tablet TAKE 1 TABLET BY MOUTH TWICE DAILY 56 tablet PRN     clopidogrel (PLAVIX) 75 MG tablet TAKE 1 TABLET BY MOUTH ONCE DAILY 28 tablet PRN     levothyroxine (SYNTHROID/LEVOTHROID) 112 MCG tablet TAKE 1 TABLET BY MOUTH EVERY MORNING BEFORE BREAKFAST 28  "tablet PRN     loperamide (IMODIUM A-D) 2 MG tablet Take 1 tablet (2 mg) by mouth every 6 hours as needed for diarrhea 30 tablet 11     metoprolol succinate ER (TOPROL-XL) 50 MG 24 hr tablet TAKE 1 TABLET BY MOUTH ONCE DAILY 28 tablet PRN     Multiple Vitamins-Minerals (TAB-A-LALO) TABS TAKE 1 TABLET BY MOUTH ONCE DAILY 28 tablet PRN     olopatadine (PATANOL) 0.1 % ophthalmic solution Place 1 drop into both eyes 2 times daily 5 mL 3     order for DME Equipment being ordered: 10-20 mm HG knee high compression stockings 1 Device 0     potassium chloride ER (MICRO-K) 10 MEQ CR capsule Take 20 mEq by mouth 2 times daily       sertraline (ZOLOFT) 100 MG tablet TAKE 1 TABLET BY MOUTH ONCE DAILY 28 tablet PRN     simvastatin (ZOCOR) 40 MG tablet TAKE 1 TABLET BY MOUTH ONCE DAILY 28 tablet PRN     tamsulosin (FLOMAX) 0.4 MG 24 hr capsule Take 1 capsule (0.4 mg) by mouth daily (Patient taking differently: Take 0.4 mg by mouth every evening ) 90 capsule 4     REVIEW OF SYSTEMS:  Limited secondary to cognitive impairment but today pt reports I'm hungry    Objective:  /77   Pulse 67   Temp 97.7  F (36.5  C)   Resp 20   Ht 1.702 m (5' 7\")   Wt 76.7 kg (169 lb)   SpO2 93%   BMI 26.47 kg/m    Exam:  GENERAL APPEARANCE:  in no distress, appears healthy  ENT:  Mouth and posterior oropharynx normal, moist mucous membranes  RESP:  lungs clear to auscultation   CV:  peripheral edema 1+ in lower legs/pedal, rate-normal  ABDOMEN:  bowel sounds normal  M/S:   Gait and station abnormal transfer assist  SKIN:  Inspection of skin and subcutaneous tissueas in HPI  NEURO:   Examination of sensation by touch normal  PSYCH:  oriented to self    Labs:   Labs done in SNF are in [place] EPIC. Please refer to them using [employer]/Care Everywhere.    ASSESSMENT/PLAN:  Dementia with behavioral disturbance, unspecified dementia type (H)  -previously pulling on molina, pulled completely out once  -generally redirectable with support  -consider " "potential UTI if having exacerbation of behaviors    Chronic diastolic HF (heart failure) (H)  -mild/moderate SOB with activity  -hospital bed for cares and control  -of note, started Ca supplement; Hgb and BMP on 10/7    Molina catheter present  Fitting and adjustment of urinary device  -molina currently patent, output WNL  -HC RN to change Q4 weeks  -supplies have arrived at facility    Lymphedema of both lower extremities  -bilateral legs in dependent position much of day  -encourage elevation  -followed by OT/lymph with wraps  -plan is to order velcro wraps when edema better controlled    Stasis ulcer of left lower extremity (H)  Stage II pressure ulcer of sacral region (H)  -both wounds being followed by HC RN, late seen by ARISTEO last week  -continue current cares      Electronically signed by:  LORI Hatch CNP     Documentation of Face to Face and Certification for Home Health Services    I certify that patient: Brock Abarca is under my care and that I, or a nurse practitioner or physician's assistant working with me, had a face-to-face encounter that meets the physician face-to-face encounter requirements with this patient on: 10/6/2020.    This encounter with the patient was in whole, or in part, for the following medical condition, which is the primary reason for DME:  1. Dementia with behavioral disturbance, unspecified dementia type (H)    2. Chronic diastolic HF (heart failure) (H)    3. Molina catheter present    4. Fitting and adjustment of urinary device    5. Lymphedema of both lower extremities    6. Stasis ulcer of left lower extremity (H)    7. Stage II pressure ulcer of sacral region (H)        I certify that, based on my findings, the following DME is medically necessary:     Hospital bed, semi-electric, half rails, regular mattress    Ht 5'7\", Wt 176lbs    1.  The patient requires positioning of the body in ways not feasible with an ordinary bed due to fitting and " adjustment of urinary device (Z466), cellulitis of left lower leg (L30898), chronic diastolic heart failure (), and pressure ulcer of sacral region (C80297) which is expected to last more than a month.  2.  The patient requires positioning of the body in ways not feasible with an ordinary bed for alleviation of pain due to unspecified dementia with behavioral disturbances (), to fitting and adjustment of urinary device (Z466), cellulitis of left lower leg (L55670), chronic diastolic heart failure (), and pressure ulcer of sacral region (S55801)  3. The patient require the head of the bed to be elevated more than 30 degrees most of the time due to CHF.  5.  The patient requires a bed height different than a fixed height hospital bed in order to transfer to/from for catheter care line management for toileting tasks and transfers with assist x1 using 4WW.  6.  The patient requires frequent changes in body and/or has a need for immediate position change because of cellulitis of left lower leg (A59859), chronic diastolic heart failure (), and pressure ulcer of sacral region (E76306)         The patient is under my care, and I have initiated the establishment of the plan of care.  This patient will be followed by a physician who will periodically review the plan of care.  Physician/Provider to provide follow up care: Eric Nolasco [phone]  [phone]    Attending [facility] physician (the Medicare certified [facility] provider): LORI Hatch CNP   Electronic Signature  Date: 10/6/2020

## 2020-10-06 NOTE — LETTER
10/6/2020        RE: Brock Abarca  [address] [address]        [facility]  [place] Medical Record Number: [id number]  [facility] where [facility] took place[facility] [[id number]]  Chief Complaint   Patient presents with     RECHECK       HPI:    Brock Abarca is a 88 year old (3/1/1932), who is being seen today for an episodic care visit. HPI information obtained from: facility chart records, facility staff, patient report and [facility] chart review. Today's concern is:     Dementia with behavioral disturbance, unspecified dementia type (H)  Chronic diastolic HF (heart failure) (H)  Molina catheter present  Fitting and adjustment of urinary device  Lymphedema of both lower extremities  Stasis ulcer of left lower extremity (H)  Stage II pressure ulcer of sacral region (H)     Patient seen on memory care unit, recent Hx of diarrhea and constipation, pulling molina out, hematuria, edema with lymph wraps on, Tx of UTI with Cipro, sacral pressure ulcer, R shin stasis ulcer, labs on 9/23 Hgb 9.1, Ca 7.8, TSH 5.39, sleeping more, moderate SOB with activity, full ADL assist, followed by homecare, overall progressive age related decline.    Past Medical and Surgical History reviewed in Epic today.    MEDICATIONS:    Current Outpatient Medications   Medication Sig Dispense Refill     calcium carbonate (OS-JENIFER) 500 MG tablet Take 1 tablet (500 mg) by mouth 2 times daily 60 tablet 11     senna-docusate (SENOKOT-S/PERICOLACE) 8.6-50 MG tablet Take 2 tablets by mouth 2 times daily as needed for constipation 60 tablet 11     acetaminophen (TYLENOL) 325 MG tablet Take 1-2 tablets (325-650 mg) by mouth every 4 hours as needed for mild pain       bumetanide (BUMEX) 1 MG tablet TAKE 1 TABLET BY MOUTH TWICE DAILY 56 tablet PRN     clopidogrel (PLAVIX) 75 MG tablet TAKE 1 TABLET BY MOUTH ONCE DAILY 28 tablet PRN     levothyroxine  "(SYNTHROID/LEVOTHROID) 112 MCG tablet TAKE 1 TABLET BY MOUTH EVERY MORNING BEFORE BREAKFAST 28 tablet PRN     loperamide (IMODIUM A-D) 2 MG tablet Take 1 tablet (2 mg) by mouth every 6 hours as needed for diarrhea 30 tablet 11     metoprolol succinate ER (TOPROL-XL) 50 MG 24 hr tablet TAKE 1 TABLET BY MOUTH ONCE DAILY 28 tablet PRN     Multiple Vitamins-Minerals (TAB-A-LALO) TABS TAKE 1 TABLET BY MOUTH ONCE DAILY 28 tablet PRN     olopatadine (PATANOL) 0.1 % ophthalmic solution Place 1 drop into both eyes 2 times daily 5 mL 3     order for DME Equipment being ordered: 10-20 mm HG knee high compression stockings 1 Device 0     potassium chloride ER (MICRO-K) 10 MEQ CR capsule Take 20 mEq by mouth 2 times daily       sertraline (ZOLOFT) 100 MG tablet TAKE 1 TABLET BY MOUTH ONCE DAILY 28 tablet PRN     simvastatin (ZOCOR) 40 MG tablet TAKE 1 TABLET BY MOUTH ONCE DAILY 28 tablet PRN     tamsulosin (FLOMAX) 0.4 MG 24 hr capsule Take 1 capsule (0.4 mg) by mouth daily (Patient taking differently: Take 0.4 mg by mouth every evening ) 90 capsule 4     REVIEW OF SYSTEMS:  Limited secondary to cognitive impairment but today pt reports I'm hungry    Objective:  /77   Pulse 67   Temp 97.7  F (36.5  C)   Resp 20   Ht 1.702 m (5' 7\")   Wt 76.7 kg (169 lb)   SpO2 93%   BMI 26.47 kg/m    Exam:  GENERAL APPEARANCE:  in no distress, appears healthy  ENT:  Mouth and posterior oropharynx normal, moist mucous membranes  RESP:  lungs clear to auscultation   CV:  peripheral edema 1+ in lower legs/pedal, rate-normal  ABDOMEN:  bowel sounds normal  M/S:   Gait and station abnormal transfer assist  SKIN:  Inspection of skin and subcutaneous tissueas in HPI  NEURO:   Examination of sensation by touch normal  PSYCH:  oriented to self    Labs:   Labs done in SNF are in [place] EPIC. Please refer to them using [employer]/Care Everywhere.    ASSESSMENT/PLAN:  Dementia with behavioral disturbance, unspecified dementia type (H)  -previously " "pulling on molina, pulled completely out once  -generally redirectable with support  -consider potential UTI if having exacerbation of behaviors    Chronic diastolic HF (heart failure) (H)  -mild/moderate SOB with activity  -hospital bed for cares and control    Molina catheter present  Fitting and adjustment of urinary device  -molina currently patent, output WNL  -HC RN to change Q4 weeks  -supplies have arrived at facility    Lymphedema of both lower extremities  -bilateral legs in dependent position much of day  -encourage elevation  -followed by OT/lymph with wraps  -plan is to order velcro wraps when edema better controlled    Stasis ulcer of left lower extremity (H)  Stage II pressure ulcer of sacral region (H)  -both wounds being followed by HC RN, late seen by ARISTEO last week  -continue current cares      Electronically signed by:  LORI Hatch CNP     Documentation of Face to Face and Certification for Home Health Services    I certify that patient: Brock Abarca is under my care and that I, or a nurse practitioner or physician's assistant working with me, had a face-to-face encounter that meets the physician face-to-face encounter requirements with this patient on: 10/6/2020.    This encounter with the patient was in whole, or in part, for the following medical condition, which is the primary reason for DME:  1. Dementia with behavioral disturbance, unspecified dementia type (H)    2. Chronic diastolic HF (heart failure) (H)    3. Molina catheter present    4. Fitting and adjustment of urinary device    5. Lymphedema of both lower extremities    6. Stasis ulcer of left lower extremity (H)    7. Stage II pressure ulcer of sacral region (H)        I certify that, based on my findings, the following DME is medically necessary:     Hospital bed, semi-electric, half rails, regular mattress    Ht 5'7\", Wt 176lbs    1.  The patient requires positioning of the body in ways not feasible with an " ordinary bed due to fitting and adjustment of urinary device (Z466), cellulitis of left lower leg (I72877), chronic diastolic heart failure (), and pressure ulcer of sacral region (O04300) which is expected to last more than a month.  2.  The patient requires positioning of the body in ways not feasible with an ordinary bed for alleviation of pain due to unspecified dementia with behavioral disturbances (), to fitting and adjustment of urinary device (Z466), cellulitis of left lower leg (A81978), chronic diastolic heart failure (), and pressure ulcer of sacral region (Y70832)  3. The patient require the head of the bed to be elevated more than 30 degrees most of the time due to CHF.  5.  The patient requires a bed height different than a fixed height hospital bed in order to transfer to/from for catheter care line management for toileting tasks and transfers with assist x1 using 4WW.  6.  The patient requires frequent changes in body and/or has a need for immediate position change because of cellulitis of left lower leg (P86861), chronic diastolic heart failure (), and pressure ulcer of sacral region (K91545)         The patient is under my care, and I have initiated the establishment of the plan of care.  This patient will be followed by a physician who will periodically review the plan of care.  Physician/Provider to provide follow up care: Eric Nolasco [phone]  [phone]    Attending hospital physician (the Medicare certified [facility] provider): LORI Hatch CNP   Electronic Signature  Date: 10/6/2020          Sincerely,        LORI Hatch CNP

## 2020-10-07 NOTE — PROGRESS NOTES
[facility]  [place] [facility] Record Number: [id number]  Place of Service where encounter took place: [facility] [[id number]]  Chief Complaint   Patient presents with     RECHECK       HPI:    Brock Abarca is a 88 year old (3/1/1932), who is being seen today for an episodic care visit. HPI information obtained from: facility chart records, facility staff, patient report and [facility] chart review. Today's concern is:     Anemia due to blood loss, acute  Molina catheter present  Lymphedema of both lower extremities  Hypocalcemia     Patient seen today in memory care unit for follow up, recent Hgb after pulling molina out and hematuria was 9.1 but now up to 11.5, urine output WNL, leg lymph wraps on and edema decreased, recent Ca was 7.8 unknown etiology and now up to 8.6 with supplement, new hospital bed delivered today for cares needed, overall status confused with progressive age related decline.    Past Medical and Surgical History reviewed in Epic today.    MEDICATIONS:    Current Outpatient Medications   Medication Sig Dispense Refill     acetaminophen (TYLENOL) 325 MG tablet Take 1-2 tablets (325-650 mg) by mouth every 4 hours as needed for mild pain       bumetanide (BUMEX) 1 MG tablet TAKE 1 TABLET BY MOUTH TWICE DAILY 56 tablet PRN     calcium carbonate (OS-JENIFER) 500 MG tablet Take 1 tablet (500 mg) by mouth 2 times daily 60 tablet 11     clopidogrel (PLAVIX) 75 MG tablet TAKE 1 TABLET BY MOUTH ONCE DAILY 28 tablet PRN     levothyroxine (SYNTHROID/LEVOTHROID) 112 MCG tablet TAKE 1 TABLET BY MOUTH EVERY MORNING BEFORE BREAKFAST 28 tablet PRN     loperamide (IMODIUM A-D) 2 MG tablet Take 1 tablet (2 mg) by mouth every 6 hours as needed for diarrhea 30 tablet 11     metoprolol succinate ER (TOPROL-XL) 50 MG 24 hr tablet TAKE 1 TABLET BY MOUTH ONCE DAILY 28 tablet PRN     Multiple Vitamins-Minerals (TAB-A-LALO) TABS TAKE 1 TABLET BY MOUTH ONCE DAILY 28  "tablet PRN     olopatadine (PATANOL) 0.1 % ophthalmic solution Place 1 drop into both eyes 2 times daily 5 mL 3     order for DME Equipment being ordered: 10-20 mm HG knee high compression stockings 1 Device 0     potassium chloride ER (MICRO-K) 10 MEQ CR capsule Take 20 mEq by mouth 2 times daily       senna-docusate (SENOKOT-S/PERICOLACE) 8.6-50 MG tablet Take 2 tablets by mouth 2 times daily as needed for constipation 60 tablet 11     sertraline (ZOLOFT) 100 MG tablet TAKE 1 TABLET BY MOUTH ONCE DAILY 28 tablet PRN     simvastatin (ZOCOR) 40 MG tablet TAKE 1 TABLET BY MOUTH ONCE DAILY 28 tablet PRN     REVIEW OF SYSTEMS:  Limited secondary to cognitive impairment but today pt reports I am pretty good    Objective:  /77   Pulse 67   Temp 97.7  F (36.5  C)   Resp 20   Ht 1.702 m (5' 7\")   Wt 80.7 kg (178 lb)   SpO2 93%   BMI 27.88 kg/m    Exam:  GENERAL APPEARANCE:  in no distress, appears healthy  ENT:  Mouth and posterior oropharynx normal, moist mucous membranes  RESP:  lungs clear to auscultation   CV:  peripheral edema 1-2+ in lower extremities  ABDOMEN:  bowel sounds normal  M/S:   Gait and station abnormal transfer assist, walker  SKIN:  Inspection of skin and subcutaneous tissue baseline  NEURO:   Examination of sensation by touch normal  PSYCH:  oriented to self    Labs:   Labs done in SNF are in [place] EPIC. Please refer to them using EPIC/Care Everywhere.    ASSESSMENT/PLAN:  Anemia due to blood loss, acute  -after pulling molina out/hematuria Hgb was 9.1  -Hgb on 10/7 was 11.5  -periodic Hgb follow up labs    Molina catheter present  -supplies ordered and delivered  -output WNL  -[facility] to follow, change as indicated plus PRN    Lymphedema of both lower extremities  -followed by OT/lymph  -continue lymph wraps for now  -plan is for velcro compression garments when under control    Hypocalcemia  -Ca on 9/24 was 9.1, started CaCarb 500mg BID  -Ca on 10/7 was 8.6  -continue CaCarb as " precribed  -periodic BMP's        Electronically signed by:  LORI Hatch CNP

## 2020-10-19 NOTE — PROGRESS NOTES
[facility]  [place] [facility] Record Number: [id number]  Place of Service where encounter took place: [facility] [[id number]]  Chief Complaint   Patient presents with     RECHECK       HPI:    Brock Abarca is a 88 year old (3/1/1932), who is being seen today for an episodic care visit. HPI information obtained from: facility chart records, facility staff, patient report and [facility] chart review. Today's concern is:     Dementia with behavioral disturbance, unspecified dementia type (H)  Molina catheter present  Lymphedema of both lower extremities     Patient seen for follow up, newer admit to  on memory care unit, today sitting up in walker eating breakfast and reading paper, limited cognition and when asked how he was doing just wanted a glass of orange juice, labs on 10/7 generally WNL, has hospital bed now for cares including molina changes, followed by homecare, staff report no recent issues, molina draining WNL, overall no acute concerns, appears to have transitioned to facility.    Past Medical and Surgical History reviewed in Epic today.    MEDICATIONS:    Current Outpatient Medications   Medication Sig Dispense Refill     acetaminophen (TYLENOL) 325 MG tablet Take 1-2 tablets (325-650 mg) by mouth every 4 hours as needed for mild pain       bumetanide (BUMEX) 1 MG tablet TAKE 1 TABLET BY MOUTH TWICE DAILY 56 tablet PRN     calcium carbonate (OS-JENIFER) 500 MG tablet Take 1 tablet (500 mg) by mouth 2 times daily 60 tablet 11     CALMOSEPTINE 0.44-20.6 % OINT ointment APPLY TOPICALLY TO BUTTOCKS FOUR TIMES A DAY AFTER PROPER CLEANSING OF BUTTOCKS 113 g 97     clopidogrel (PLAVIX) 75 MG tablet TAKE 1 TABLET BY MOUTH EVERY DAY 90 tablet 0     levothyroxine (SYNTHROID/LEVOTHROID) 112 MCG tablet TAKE 1 TABLET BY MOUTH EVERY MORNING BEFORE BREAKFAST 28 tablet PRN     loperamide (IMODIUM A-D) 2 MG tablet Take 1 tablet (2 mg) by mouth every 6 hours as  "needed for diarrhea 30 tablet 11     metoprolol succinate ER (TOPROL-XL) 50 MG 24 hr tablet TAKE 1 TABLET BY MOUTH ONCE DAILY 28 tablet PRN     Multiple Vitamins-Minerals (TAB-A-LALO) TABS TAKE 1 TABLET BY MOUTH ONCE DAILY 28 tablet PRN     olopatadine (PATANOL) 0.1 % ophthalmic solution Place 1 drop into both eyes 2 times daily 5 mL 3     order for DME Equipment being ordered: 10-20 mm HG knee high compression stockings 1 Device 0     potassium chloride ER (MICRO-K) 10 MEQ CR capsule Take 20 mEq by mouth 2 times daily       senna-docusate (SENOKOT-S/PERICOLACE) 8.6-50 MG tablet Take 2 tablets by mouth 2 times daily as needed for constipation 60 tablet 11     sertraline (ZOLOFT) 100 MG tablet TAKE 1 TABLET BY MOUTH ONCE DAILY 28 tablet PRN     simvastatin (ZOCOR) 40 MG tablet TAKE 1 TABLET BY MOUTH ONCE DAILY 28 tablet PRN     REVIEW OF SYSTEMS:  Limited secondary to cognitive impairment but today pt reports I would like some orange juice    Objective:  /77   Pulse 67   Temp 97.7  F (36.5  C)   Resp 20   Ht 1.702 m (5' 7\")   Wt 77.1 kg (170 lb)   SpO2 93%   BMI 26.63 kg/m    Exam:  GENERAL APPEARANCE:  in no distress, appears healthy  ENT:  Mouth and posterior oropharynx normal, moist mucous membranes  RESP:  lungs clear to auscultation   CV:  peripheral edema 1-2+ in lower legs, rate-normal  ABDOMEN:  bowel sounds normal  M/S:   Gait and station abnormal using walker  SKIN:  Inspection of skin and subcutaneous tissue baseline  NEURO:   Examination of sensation by touch normal  PSYCH:  oriented to self    Labs:   Labs done in SNF are in [place] EPIC. Please refer to them using [employer]/Care Everywhere.    ASSESSMENT/PLAN:  Dementia with behavioral disturbance, unspecified dementia type (H)  -moderate cognitive limitations  -appears to have transitioned well, no recent falls  -staff to support as indicated  -consider hospice consult in future  -continue PT/OT/SN services    Benoit catheter present  -followed " by  home care nursing  -output WNL  -change Q month and PRN    Lymphedema of both lower extremities  -bilateral legs 1-2+ pitting  -continue farrow wraps  -plan to refer to lymphedema if edema increases        Electronically signed by:  LORI Hatch CNP

## 2020-10-20 NOTE — TELEPHONE ENCOUNTER
Routing refill request to provider for review/approval because:  Labs not current:  Hgb, platelet count    Kaitlyn BELLON, RN

## 2020-10-22 NOTE — TELEPHONE ENCOUNTER
Benoit output appears dirty, pain with urination, SBP's <100.  -UA/UC pending  -cipro 500mg BID x7d  -hold metoprolol indefinitely

## 2020-11-03 ENCOUNTER — PATIENT OUTREACH (OUTPATIENT)
Dept: NURSING | Facility: CLINIC | Age: 85
End: 2020-11-03
Payer: COMMERCIAL

## 2020-11-03 NOTE — PROGRESS NOTES
"Clinic Care Coordination Contact    Follow Up Progress Note      Assessment: Outreach to patient's spouse, Linda.    Linda states patient passed away on 10/30/20 in the hospital. She states he went to the ED on 10/23/20 due to blood in his catheter bag. He was found to be septic, had pneumonia and a kidney stone. She states he was started on IV antibiotics. They had a difficult time keeping his blood pressure up even with medications. They were not able to treat the kidney stone. Linda states they had a family meeting and decided to move Amadeo to [facility].   She states his children were allowed to say their goodbyes.     The  is planned for . It will be in there [employer] with social distancing and a lunch for immediate family only.     Linda states she has \"good days and bad days\". Amadeo passing leaves a big hole in her heart.   She states everything seems a little harder in our world today. The virus is terrifying.     Goals addressed this encounter:   Goals Addressed                 This Visit's Progress      COMPLETED: Functional (pt-stated)        Goal Statement: I want the swelling in my legs to go down    Measure of Success: When I can walk better and the sores heal    Supportive Steps to Achieve: I will keep my feet elevated when sitting.  I will take medication as prescribed. I will wear compression garments    Barriers: Heart failure     Strengths: Family support    Date to Achieve By: 2021    Patient expressed understanding of goal: Yes          Intervention/Education provided during outreach: Expressed sympathy and provided support and encouragement.     Plan: Encouraged Linda to call RN care coordinator as needed.    Will close to care coordination.     Dayana Farris RN, [facility] RN Coordinator  [facility]   11/3/2020    10:13 AM  [phone]    "

## 2020-11-04 DIAGNOSIS — E78.5 HYPERLIPIDEMIA LDL GOAL <70: ICD-10-CM

## 2020-11-04 RX ORDER — SIMVASTATIN 40 MG
TABLET ORAL
Qty: 90 TABLET | Refills: 0 | Status: SHIPPED | OUTPATIENT
Start: 2020-11-04

## 2020-12-02 ENCOUNTER — PATIENT OUTREACH (OUTPATIENT)
Dept: GERIATRIC MEDICINE | Facility: CLINIC | Age: 85
End: 2020-12-02

## 2020-12-03 NOTE — PROGRESS NOTES
Late entry    [employer] Disenrollment    Member was disenrolled from [employer] effective: 10-30-20  Reason for disenrollment: Death     Member passed away before enrollment was processed for [employer]     Adenike FRAUSTO   [facility] Care Coordinator   [phone] - wkgb cell phone   [phone] - otrz fax

## 2021-03-05 NOTE — ADDENDUM NOTE
Addended by: YUSUF NICE on: 7/11/2019 04:03 PM     Modules accepted: Orders     PAST MEDICAL HISTORY:  Asthma     Breast CA     HTN (hypertension)     Migraine

## 2021-06-04 VITALS
RESPIRATION RATE: 16 BRPM | WEIGHT: 176.4 LBS | OXYGEN SATURATION: 94 % | DIASTOLIC BLOOD PRESSURE: 56 MMHG | BODY MASS INDEX: 30.28 KG/M2 | HEART RATE: 74 BPM | SYSTOLIC BLOOD PRESSURE: 92 MMHG | TEMPERATURE: 97.7 F

## 2021-06-04 VITALS
WEIGHT: 172.2 LBS | DIASTOLIC BLOOD PRESSURE: 54 MMHG | OXYGEN SATURATION: 94 % | BODY MASS INDEX: 29.56 KG/M2 | TEMPERATURE: 98.2 F | HEART RATE: 66 BPM | SYSTOLIC BLOOD PRESSURE: 110 MMHG | RESPIRATION RATE: 16 BRPM

## 2021-06-04 VITALS
HEART RATE: 77 BPM | SYSTOLIC BLOOD PRESSURE: 132 MMHG | BODY MASS INDEX: 29.39 KG/M2 | DIASTOLIC BLOOD PRESSURE: 71 MMHG | RESPIRATION RATE: 18 BRPM | TEMPERATURE: 97.9 F | WEIGHT: 171.2 LBS | OXYGEN SATURATION: 97 %

## 2021-06-04 VITALS
RESPIRATION RATE: 16 BRPM | DIASTOLIC BLOOD PRESSURE: 60 MMHG | OXYGEN SATURATION: 96 % | TEMPERATURE: 98.2 F | HEART RATE: 77 BPM | WEIGHT: 169.2 LBS | BODY MASS INDEX: 29.04 KG/M2 | SYSTOLIC BLOOD PRESSURE: 112 MMHG

## 2021-06-10 NOTE — PROGRESS NOTES
[facility]/ [facility]iatrics    Facility:  [facility] [[id number]]    Code Status:  FULL CODE    Chief Complaint   Patient presents with     H & P   :                    Patient Active Problem List   Diagnosis     Severe sepsis due to cellulitis     Decubitus ulcers     Dementia with behavioral disturbance (H)       History:  Brock Abarca  is an 88 year old male with history of heart failure with preserved ejection fraction, chronic pedal edema, CKD 3, coronary artery disease, remote PE, CVA, hypertension, hyperlipidemia, dementia, hard of hearing, hypothyroidism, anxiety/depression, osteoarthritis, osteoporosis, cervical decompression C3-4, left ureteral stent/lithotripsy procedure e seen for admission to TCU on 8/7/2020    Hospital Course: Patient was admitted to [facility] between August 3 and August 6.  At home he was very weak and slid onto the floor off of his chair and family summoned help.    Work-up revealed fever to 101.3.  He was diagnosed with severe sepsis and required ICU admission with vasopressors.  Work-up included negative chest x-ray negative COVID-19 negative blood cultures negative urine culture.  Sepsis was felt secondary to cellulitis associated with his venous stasis/decubitus ulcers.  Initial broad-spectrum IV antibiotics were narrowed to p.o. clindamycin which he continues on at this point.  I do not have culture result information.      Unfortunately I do not have access to the discharge summary which has not yet been performed per the hospital.  I do not yet have access to care everywhere.  Staff is working on getting me better medical record access.    Subjective/ROS:    -augmented by discussion with facility staff involved in direct care.  Augmented by discussion with patient's wife by phone today.  -limited by: Lack of medical record access, memory      -Staff greets me with the news that patient has been having behavioral issues, wandering is a big  problem.  He is calling his family members 15-30 times a day.  He is continually asking to return home.  He has been trying to call his sister who is been dead for 6 years.  He is generally been redirectable.  He just arrived yesterday afternoon so no one knows him well yet.  - Patient is pleasantly confused and redirectable when I see him.  He is just been up to the bathroom without getting help and has urinated in his clothes.  Staff is helping him get cleaned up.  -Patient does not recall the hospitalization.  He does not recall why he is here.  He does not know the name of this place and thought it was [facility] last night.  -He says he feels fine and wants to go back home.  He denies pain.  He denies knowledge of his pressure ulcers  -Patient additionally denies headaches change in vision speaking swallowing or hearing.  He is clearly hard of hearing family scheduling of his left hearing aid is right is already here.  OT is fitted him with talking box which is helpful with interview.  He denies sore throat chest pain cough shortness of breath orthopnea PND he is unaware of his peripheral edema and denies it.  The remainder of ROS is negative as well but of dubious accuracy.  However I did talk things over with his wife who reports that besides the wounds at home he does pretty well and that she is talked to them many times with his calls and she can tell that his brain is not working nearly as well as it should.  Remainder negative or unobtainable                            Past medical/surgical history:  heart failure with preserved ejection fraction, chronic pedal edema, CKD 3, coronary artery disease, remote PE, CVA, hypertension, hyperlipidemia, dementia, hard of hearing, hypothyroidism, anxiety/depression, osteoarthritis, osteoporosis, cervical decompression C3-4, left ureteral stent/lithotripsy procedure    Family history: Mom had COPD dad had CVA Sister had diabetes Sister had chronic kidney  disease    Social History     Socioeconomic History     Marital status:      Spouse name: Not on file     Number of children: Not on file     Years of education: Not on file     Highest education level: Not on file   Occupational History     Not on file   Social Needs     Financial resource strain: Not on file     Food insecurity     Worry: Not on file     Inability: Not on file     Transportation needs     Medical: Not on file     Non-medical: Not on file   Tobacco Use     Smoking status: Not on file   Substance and Sexual Activity     Alcohol use: Not on file     Drug use: Not on file     Sexual activity: Not on file   Lifestyle     Physical activity     Days per week: Not on file     Minutes per session: Not on file     Stress: Not on file   Relationships     Social connections     Talks on phone: Not on file     Gets together: Not on file     Attends Baptism service: Not on file     Active member of club or organization: Not on file     Attends meetings of clubs or organizations: Not on file     Relationship status: Not on file     Intimate partner violence     Fear of current or ex partner: Not on file     Emotionally abused: Not on file     Physically abused: Not on file     Forced sexual activity: Not on file   Other Topics Concern     Not on file   Social History Narrative     Not on file   :    Social history: Patient is  he lives independently with his wife    Current Outpatient Medications on File Prior to Visit   Medication Sig Dispense Refill     UNABLE TO FIND Medications as of August 7, 2020  Toprol XL Tablet Extended Release 24 Hour 50 MG (Metoprolol Succinate ER)   Give 50 mg by mouth one time a day for CHF  Pharmacy Active 8/7/2020 08:00  8/6/2020  Senna Tablet 8.6 MG (Sennosides)   Give 1 tablet by mouth as needed for constipation daily AND Give 2 tablet by mouth as needed for constipation daily  Pharmacy Active 8/6/2020 13:45  8/6/2020  Trospium Chloride ER Capsule Extended  Release 24 Hour 60 MG   Give 60 mg by mouth one time a day for overactive bladder  Pharmacy Active 8/7/2020 06:00  8/6/2020  Zocor Tablet 40 MG (Simvastatin)   Give 40 mg by mouth one time a day for excessive fat in the blood  Pharmacy Active 8/7/2020 08:00  8/6/2020  metOLazone Tablet 2.5 MG   Give 2.5 mg by mouth one time a day every Mon for CHF take 30 minutes before Bumex  Pharmacy Active 8/10/2020 07:00  8/6/2020  Patanol Solution 0.1 % (Olopatadine HCl)   Instill 1 drop in both eyes as needed for eye irritation 2x/daily  Pharmacy Active 8/6/2020 14:00  8/6/2020  Acetaminophen Tablet 325 MG   Give 650 mg by mouth every 6 hours as needed for pain max 4000mg in 24hrs  Pharmacy Active 8/6/2020 13:45  8/6/2020  There is a black box warning associated with this order. Please click to view details. Clindamycin HCl Capsule 300 MG   Give 300 mg by mouth three times a day for cellulitis for 12 Administrations x4 days  Pharmacy Active 8/6/2020 20:00  8/6/2020  MiraLax Powder 17 GM/SCOOP (Polyethylene Glycol 3350)   Give 1 scoop by mouth as needed for constipation daily  Pharmacy Active 8/6/2020 13:45  8/6/2020  There is a black box warning associated with this order. Please click to view details. Synthroid Tablet 112 MCG (Levothyroxine Sodium)   Give 112 mcg by mouth one time a day for low thyroid levels  Pharmacy Active 8/7/2020 06:00  8/6/2020  There is a black box warning associated with this order. Please click to view details. Zoloft Tablet 100 MG (Sertraline HCl)   Give 100 mg by mouth one time a day for depression  Pharmacy Active 8/7/2020 08:00  8/6/2020  There is a black box warning associated with this order. Please click to view details. Plavix Tablet 75 MG (Clopidogrel Bisulfate)   Give 75 mg by mouth one time a day for clot prevention  Pharmacy Active 8/7/2020 08:00  8/6/2020  Tab-A-Charles Tablet (Multiple Vitamins-Minerals)   Give 1 tablet by mouth one time a day for prevent vitamin  deficiency  Pharmacy Active 8/7/2020 08:00  8/6/2020  Flomax Capsule 0.4 MG (Tamsulosin HCl)   Give 0.4 mg by mouth at bedtime for BPH  Pharmacy Active 8/6/2020 20:00  8/6/2020  There is a black box warning associated with this order. Please click to view details. Bumex Tablet 1 MG (Bumetanide)   Give 3 mg by mouth one time a day for CHF AND Give 2 mg by mouth one time a day for CHF  Pharmacy Active 8/7/2020 07:30  8/6/2020  Potassium Chloride ER Tablet Extended Release 20 MEQ   Give 20 mEq by mouth two times a day for chronic renal disease  Pharmacy Active 8/6/2020 17:00  8/6/2020  Claritin-D 24 Hour Tablet Extended Release 24 Hour  MG (Loratadine-Pseudoephedrine ER)   Give 1 tablet by mouth as needed for allergy symptoms for 13 Days daily Document non-pharmacological interventions attempted prior to administration of medication.  Pharmacy Active 8/7/2020 07:45 8/20/2020 8/7/2020  There is a black box warning associated with this order. Please click to view details. SEROquel Tablet 25 MG (QUEtiapine Fumarate)   Give 12.5 mg by mouth at bedtime for agitiation AND Give 12.5 mg by mouth as needed for NON-redirectable agitation for 14 Days 3x/daily Document non-pharmacological interventions attempted prior to administration. 1. Help pt. call wife 2. Provide calm, quiet environment 3. Provide re-orientation and re-direction to situation  Pharmacy Active 8/7/2020 12:15  8/7/2020       Current Facility-Administered Medications on File Prior to Visit   Medication Dose Route Frequency Provider Last Rate Last Dose     [DISCONTINUED] acetaminophen tablet (TYLENOL)  325-650 mg Oral  GENERIC EXTERNAL DATA PROVIDER         [DISCONTINUED] bumetanide tablet (BUMEX)  2 mg Oral  GENERIC EXTERNAL DATA PROVIDER         [DISCONTINUED] calcium (as carbonate) chewable tablet (TUMS)  500-1,000 mg Oral  GENERIC EXTERNAL DATA PROVIDER         [DISCONTINUED] clindamycin capsule (CLEOCIN)  300 mg Oral  GENERIC EXTERNAL DATA PROVIDER          [DISCONTINUED] clopidogreL tablet (PLAVIX)  75 mg Oral  GENERIC EXTERNAL DATA PROVIDER         [DISCONTINUED] GENERIC EXTERNAL MEDICATION     GENERIC EXTERNAL DATA PROVIDER         [DISCONTINUED] GENERIC EXTERNAL MEDICATION   NOT APPLICABLE  GENERIC EXTERNAL DATA PROVIDER         [DISCONTINUED] GENERIC EXTERNAL MEDICATION   NOT APPLICABLE  GENERIC EXTERNAL DATA PROVIDER         [DISCONTINUED] GENERIC EXTERNAL MEDICATION  3 mg Oral  GENERIC EXTERNAL DATA PROVIDER         [DISCONTINUED] heparin (porcine) injection  5,000 Units Subcutaneous  GENERIC EXTERNAL DATA PROVIDER         [DISCONTINUED] labetaloL 20 mg/4 mL (5 mg/mL) injection (TRANDATE)  10 mg Intravenous  GENERIC EXTERNAL DATA PROVIDER         [DISCONTINUED] levothyroxine tablet (SYNTHROID, LEVOTHROID)  112 mcg Oral  GENERIC EXTERNAL DATA PROVIDER         [DISCONTINUED] melatonin tablet  3 mg Oral  GENERIC EXTERNAL DATA PROVIDER         [DISCONTINUED] menthol Lozg  1 lozenge Sublingual  GENERIC EXTERNAL DATA PROVIDER         [DISCONTINUED] metoprolol succinate 24 hr tablet (TOPROL-XL)  50 mg Oral  GENERIC EXTERNAL DATA PROVIDER         [DISCONTINUED] mirtazapine tablet (REMERON)  7.5 mg Oral  GENERIC EXTERNAL DATA PROVIDER         [DISCONTINUED] naloxone injection (NARCAN)  0.08 mg Intravenous  GENERIC EXTERNAL DATA PROVIDER         [DISCONTINUED] olopatadine 0.1 % ophthalmic solution (PATANOL)  1 drop Ophthalmic  GENERIC EXTERNAL DATA PROVIDER         [DISCONTINUED] polyethylene glycol packet (MIRALAX)  17 g Nasogastric  GENERIC EXTERNAL DATA PROVIDER         [DISCONTINUED] potassium chloride CR tablet (K-DUR,KLOR-CON)  20 mEq Oral  GENERIC EXTERNAL DATA PROVIDER         [DISCONTINUED] senna tablet (SENOKOT)  8.6-17.2 mg Oral  GENERIC EXTERNAL DATA PROVIDER         [DISCONTINUED] sertraline tablet (ZOLOFT)  100 mg Oral  GENERIC EXTERNAL DATA PROVIDER         [DISCONTINUED] simvastatin tablet (ZOCOR)  40 mg Oral  GENERIC EXTERNAL DATA PROVIDER          [DISCONTINUED] sodium chloride flush (NS)  5 mL Intravenous  GENERIC EXTERNAL DATA PROVIDER         [DISCONTINUED] sodium chloride flush (NS)  5 mL Intravenous  GENERIC EXTERNAL DATA PROVIDER         [DISCONTINUED] sodium chloride flush (NS)  10 mL Intravenous  GENERIC EXTERNAL DATA PROVIDER         [DISCONTINUED] tamsulosin 24 hr capsule (FLOMAX)  0.8 mg Oral  GENERIC EXTERNAL DATA PROVIDER         [DISCONTINUED] trospium ER capsule (SANCTURA XR)  60 mg Oral  GENERIC EXTERNAL DATA PROVIDER       :      ALLERGIES:  Ambien [zolpidem]    Vitals:    Current Vitals   BP: 131/61 mmHg  8/7/2020 19:47  Temp:98.3  F  8/7/2020 19:47  Pulse: 66 bpm  8/7/2020 19:47  Weight: 180.6 Lbs  8/7/2020 11:06  Resp: 18 Breaths/min  8/7/2020 19:47  BS:  O2: 95 %  8/7/2020 19:47  Pain: 0  8/7/2020 19:47  Physical exam:    General:  Alert  oriented pleasantly confused quite hard of hearing appears no acute distress he is cooperative with staff who is helping him change his clothes.  He understands my questions and answers appropriately.  He is normocephalic atraumatic kyphotic gaze is conjugate sclera clear pupils are round he is seen to rotate his head in all directions he has difficulty lifting his arm straight above his head he is breathing comfortably without accessory muscle use or tachypnea abdomen is soft without tenderness or mass pulses are strong and regular.  Bilateral sacral area wounds covered with Mepilex I take a peek under that with the nurse and find them to be stage II in appearance  He also has left shin wound/venous stasis ulcer covered with Mepilex.  His left ankle wound is mainly along his old surgical scar which protrudes along its length and is weeping a bit of clear fluid but there is no erythema or foul smell.  CMS is intact in the toe tips  Left greater than right venous stasis dermatitis with purplish red discoloration of skin circumferentially    Due to the 2020 Covid 19 pandemic, except as noted above, the  "patient was visually observed at a 6 foot plus distance.  An observational exam was performed in an effort to keep patient safe from Covid 19 and other communicable diseases.   Labs:  No results found for: WBC, HGB, HCT, MCV, PLT  No results found for this or any previous visit.      No results found for: TSH  No results found for: HGBA1C  [unfilled]  No results found for: [id number]  No results found for: BNP  [unfilled]        Invalid input(s): PRINTERVAL      Assessment/Plan:      ICD-10-CM    1. Severe sepsis due to cellulitis  A41.9     R65.20    2. Pressure injury of skin, unspecified injury stage, unspecified location  L89.90    3. Dementia with behavioral disturbance, unspecified dementia type (H)  F03.91        Severe sepsis resolved  Cellulitis  Decubitus ulcer  Venous stasis ulcer left shin  Venous stasis dermatitis left greater than right  Peripheral edema  Left ankle wound   Patient was seen by wound care at the hospital.  Wound orders are in place and being carried out including Mepilex.  The lower extremity wounds are the \"angriest\" and potential source of infection.  -Wound care as ordered  -Complete clindamycin course  -Elevate legs  -Lymphedema therapy consult  -Lymphedema garment  -Bumex and metolazone  -Monitor weights    Dementia with behavioral disturbance  Acute delirium  Anxiety    depression   Delirium not unexpected due to acute severe illness/ICU admission, unfamiliar surroundings.  Patient has generally been redirectable.  Did start some as needed Remeron in the hospital but it is unclear if he got any and he is only been here one night and getting it that last night.  He is also on Zoloft chronically.  I called his wife and discussed the situation and the options.  She is very worried about his anxiety and fear.  She knows that he can come home in this state and she really wants us to try to help him calm down so that he can gain the benefit of the therapy.  She thinks that he does " not understand that this is temporary and that he has not been abandoned by her/the family  -Seroquel side effects and risks blackbox warning discussed with wife.  We will proceed with 12.5 mg at 8 PM and 12.5 mg 3 times daily as needed.  Depending on his response may push the evening dose and/or schedule it more frequently  -Continue Seroquel  - I did not discontinue Flomax at this time but would consider it.      Coronary artery disease   Says he is not had problems recently.  Continue Plavix and metoprolol and Zocor    Hyperlipidemia   Zocor    CKD 3  History of left ureteral stent and lithotripsy   Avoid nephrotoxins were possible.  BMP CBC next week    Hard of hearing   Able to converse with a talking box    Hypothyroidism   Levothyroxine without change    Constipation   Senna plus MiraLAX in place    Hypertension   On metoprolol alone at this point.  Blood pressures look okay for the most part despite his anxiety    PE   Noted    Remote CVA   Noted continue aspirin statin and blood pressure control as above          Case discussed with:  Primary CNP   Facility staff   Family wife          Yoel Duy Nugent MD

## 2021-06-10 NOTE — PROGRESS NOTES
[facility]    Facility:   [facility] [[id number]]   Code Status:Full      CHIEF COMPLAINT/REASON FOR VISIT:  Chief Complaint   Patient presents with     Review Of Multiple Medical Conditions     F/U behaviors, sepsis, labs       HISTORY:      HPI: Brock is a 88 y.o. male undergoing [facility] and occupational [facility]. with a past medical history for HFpEF, chronic edema, CKD3, CAD, hx PE, CVA, HTN, HLD, dementia, hypothyroidism, anxiety/depression, OA, Osteoporosis. He was recently hospitalized for severe sepsis needing to be in the ICU on vasopressors. COVID was negative, BC and UC was negative, chest xray was negative.  Sepsis was felt to be due to cellulitis 2/2 venous stasis and stasis ulcers.  IV abx started and changed to po clindamycin.    Today patient is seen for a face to face for medication review.  His U/A was negative and UC came back 10,000-50,000 col/ml Klebsiella oxytoca. He was afebrile and not treated at that time. However colleague did treat him due to his behaviors and confusion. He continues to be oriented to self only. His ativan was renewed for 14 days. He is on 1:1  safety sitter.   Nurse reported he was calm yesterday and no increased behaviors. If he continues to be calm then ativan will be stopped.  He  Denies fever, chills,  Denies any chest pain, headaches, lightheadedness, dizziness, shortness of breath, or cough. Appetite is good. Denies any GERD symptoms. Denies any difficulty with swallowing, Denies any abdominal pain, constipation or loose stools. No insomnia. No active bleeding.  He does have intermittent left hip pain and reports relief with Tylenol.         Past Medical History:   Diagnosis Date     Arthritis      BPH (benign prostatic hyperplasia)      CAD (coronary artery disease)      CHF (congestive heart failure) (H)      Chronic heart failure with preserved ejection fraction (H)      Chronic renal disease, stage III  (H)      CVA (cerebral vascular accident) (H)      Diverticulitis      Hand paresthesia      HLD (hyperlipidemia)      HTN (hypertension)      Hypercalcemia      Hyperparathyroidism (H)      Hypokalemia      Hypothyroid      Hypovitaminosis D      Left foot drop      Osteoporosis      Pulmonary embolism (H)      Severe sepsis (H)      Urinary frequency              Family History   Problem Relation Age of Onset     COPD Mother      Stroke Father      Diabetes Sister      Kidney failure Sister      Social History     Socioeconomic History     Marital status:      Spouse name: Not on file     Number of children: Not on file     Years of education: Not on file     Highest education level: Not on file   Occupational History     Not on file   Social Needs     Financial resource strain: Not on file     Food insecurity     Worry: Not on file     Inability: Not on file     Transportation needs     Medical: Not on file     Non-medical: Not on file   Tobacco Use     Smoking status: Former Smoker     Years: 5.00     Types: Pipe     Last attempt to quit: 2003     Years since quittin.6     Smokeless tobacco: Never Used   Substance and Sexual Activity     Alcohol use: Yes     Drug use: Not on file     Sexual activity: Not on file   Lifestyle     Physical activity     Days per week: Not on file     Minutes per session: Not on file     Stress: Not on file   Relationships     Social connections     Talks on phone: Not on file     Gets together: Not on file     Attends [employer] service: Not on file     Active member of club or organization: Not on file     Attends meetings of clubs or organizations: Not on file     Relationship status: Not on file     Intimate partner violence     Fear of current or ex partner: Not on file     Emotionally abused: Not on file     Physically abused: Not on file     Forced sexual activity: Not on file   Other Topics Concern     Not on file   Social History Narrative     Not on file          Review of Systems   Constitutional: Negative for activity change, appetite change, chills, fatigue and fever.   HENT: Negative for congestion and sore throat.    Eyes: Negative for visual disturbance.   Respiratory: Negative for cough, shortness of breath and wheezing.    Cardiovascular: Negative for chest pain and leg swelling.   Gastrointestinal: Negative for abdominal distention, abdominal pain, constipation, diarrhea and nausea.   Genitourinary: Negative for dysuria.   Musculoskeletal: Positive for arthralgias. Negative for myalgias.        Intermittent left hip pain.    Skin: Negative for color change, rash and wound.   Neurological: Negative for dizziness, weakness and numbness.   Psychiatric/Behavioral: Positive for confusion. Negative for agitation, behavioral problems and sleep disturbance.       Vitals:    08/25/20 0954   BP: 132/71   Pulse: 77   Resp: 18   Temp: 97.9  F (36.6  C)   SpO2: 97%   Weight: 171 lb 3.2 oz (77.7 kg)       Physical Exam  Constitutional:       Appearance: He is well-developed.      Comments: Pleasant gentleman in no acute distress, noted to be oriented to self only   HENT:      Head: Normocephalic.   Eyes:      Conjunctiva/sclera: Conjunctivae normal.   Neck:      Musculoskeletal: Normal range of motion.   Cardiovascular:      Rate and Rhythm: Normal rate and regular rhythm.      Heart sounds: Normal heart sounds. No murmur.   Pulmonary:      Effort: No respiratory distress.      Breath sounds: Normal breath sounds. No wheezing or rales.   Abdominal:      General: Bowel sounds are normal. There is no distension.      Palpations: Abdomen is soft.      Tenderness: There is no abdominal tenderness.   Musculoskeletal: Normal range of motion.   Skin:     General: Skin is warm.   Neurological:      Mental Status: He is alert and oriented to person, place, and time.   Psychiatric:         Behavior: Behavior normal.           LABS:   Recent Results (from the past 240 hour(s))    Urinalysis   Result Value Ref Range    Color, UA Yellow Colorless, Yellow, Straw, Light Yellow    Clarity, UA Clear Clear    Glucose, UA Negative Negative    Bilirubin, UA Negative Negative    Ketones, UA Negative Negative    Specific Gravity, UA 1.011 1.001 - 1.030    Blood, UA Negative Negative    pH, UA 6.0 4.5 - 8.0    Protein, UA Negative Negative mg/dL    Urobilinogen, UA <2.0 E.U./dL <2.0 E.U./dL, 2.0 E.U./dL    Nitrite, UA Negative Negative    Leukocytes, UA Negative Negative   Culture, Urine    Specimen: Urine, Midstream   Result Value Ref Range    Culture 10,000-50,000 col/ml Klebsiella oxytoca (!)        Susceptibility    Klebsiella oxytoca - ZACH     Amoxicillin + Clavulanate <=4/2 Sensitive      Ampicillin >16 Resistant      Cefazolin >16 Resistant      Cefepime <=1 Sensitive      Ceftriaxone <=1 Sensitive      Ciprofloxacin <=0.5 Sensitive      Gentamicin <=2 Sensitive      Levofloxacin <=1 Sensitive      Meropenem <=0.25 Sensitive      Nitrofurantoin <=16 Sensitive      Tetracycline <=2 Sensitive      Tobramycin <=2 Sensitive      Trimethoprim + Sulfamethoxazole <=0.5 Sensitive    Basic Metabolic Panel   Result Value Ref Range    Sodium 143 136 - 145 mmol/L    Potassium 4.3 3.5 - 5.0 mmol/L    Chloride 111 (H) 98 - 107 mmol/L    CO2 24 22 - 31 mmol/L    Anion Gap, Calculation 8 5 - 18 mmol/L    Glucose 83 70 - 125 mg/dL    Calcium 9.2 8.5 - 10.5 mg/dL    BUN 13 8 - 28 mg/dL    Creatinine 0.95 0.70 - 1.30 mg/dL    GFR MDRD Af Amer >60 >60 mL/min/1.73m2    GFR MDRD Non Af Amer >60 >60 mL/min/1.73m2   HM1 (CBC with Diff)   Result Value Ref Range    WBC 5.8 4.0 - 11.0 thou/uL    RBC 3.60 (L) 4.40 - 6.20 mill/uL    Hemoglobin 11.2 (L) 14.0 - 18.0 g/dL    Hematocrit 33.6 (L) 40.0 - 54.0 %    MCV 93 80 - 100 fL    MCH 31.1 27.0 - 34.0 pg    MCHC 33.3 32.0 - 36.0 g/dL    RDW 14.5 11.0 - 14.5 %    Platelets 136 (L) 140 - 440 thou/uL    MPV 11.3 8.5 - 12.5 fL    Neutrophils % 66 50 - 70 %    Lymphocytes % 18  (L) 20 - 40 %    Monocytes % 11 (H) 2 - 10 %    Eosinophils % 3 0 - 6 %    Basophils % 1 0 - 2 %    Immature Granulocyte % 1 (H) <=0 %    Neutrophils Absolute 3.9 2.0 - 7.7 thou/uL    Lymphocytes Absolute 1.1 0.8 - 4.4 thou/uL    Monocytes Absolute 0.6 0.0 - 0.9 thou/uL    Eosinophils Absolute 0.2 0.0 - 0.4 thou/uL    Basophils Absolute 0.0 0.0 - 0.2 thou/uL    Immature Granulocyte Absolute 0.1 (H) <=0.0 thou/uL   Basic Metabolic Panel   Result Value Ref Range    Sodium 140 136 - 145 mmol/L    Potassium 3.7 3.5 - 5.0 mmol/L    Chloride 108 (H) 98 - 107 mmol/L    CO2 24 22 - 31 mmol/L    Anion Gap, Calculation 8 5 - 18 mmol/L    Glucose 89 70 - 125 mg/dL    Calcium 8.3 (L) 8.5 - 10.5 mg/dL    BUN 15 8 - 28 mg/dL    Creatinine 1.06 0.70 - 1.30 mg/dL    GFR MDRD Af Amer >60 >60 mL/min/1.73m2    GFR MDRD Non Af Amer >60 >60 mL/min/1.73m2     ASSESSMENT:      ICD-10-CM    1. Severe sepsis due to cellulitis  A41.9     R65.20    2. Dementia with behavioral disturbance, unspecified dementia type (H)  F03.91    3. Encounter for medication review and counseling  Z71.89        PLAN:    Dementia with behavioral disturbance- Provide a safe environment resume 7.5 mg Remeron , Seroquel was discontinued in the hospital per family request    Hypokalemia -potassium on 8/16/20 was 3.1 recheck after replacement was 4.3.     Pressure ulcers- wounds cares as ordered    Cellulitis- completed clindamycin     Urinary frequency - U/AU/C negative, diuretics  recently decreased. No swelling and pt with a 9# weight loss over 17 days.        Electronically signed by: Mayuri Herman CNP

## 2021-06-10 NOTE — PROGRESS NOTES
[facility]    Facility:   [facility] [[id number]]   Code Status: UNKNOWN      CHIEF COMPLAINT/REASON FOR VISIT:  Chief Complaint   Patient presents with     Review Of Multiple Medical Conditions       HISTORY:      HPI: Brock is a 88 y.o. male undergoing physical and occupational therapy at [facility]. with a past medical history for HFpEF, chronic edema, CKD3, CAD, hx PE, CVA, HTN, HLD, dementia, hypothyroidism, anxiety/depression, OA, Osteoporosis. He was recently hospitalized for severe sepsis needing to be in the ICU on vasopressors. COVID was negative, BC and UC was negative, chest xray was negative.  Sepsis was felt to be due to cellulitis 2/2 venous stasis and stasis ulcers.  IV abx started and changed to po clindamycin.    Today patient is seen to review multiple medical issues and to establish care.  Denies fever, chills,  Denies any chest pain, headaches, lightheadedness, dizziness,   shortness of breath, or cough. Appetite is good. Denies any GERD symptoms. Denies any difficulty with swallowing,   Denies any abdominal pain, constipation or loose stools. No insomnia. No active bleeding.  He does report urinary frequency and burning.  Was noted to be alert to self only.  He appeared to be on a one-to-one for safety.      Past Medical History:   Diagnosis Date     Arthritis      BPH (benign prostatic hyperplasia)      CAD (coronary artery disease)      CHF (congestive heart failure) (H)      Chronic heart failure with preserved ejection fraction (H)      Chronic renal disease, stage III (H)      CVA (cerebral vascular accident) (H)      Diverticulitis      Hand paresthesia      HLD (hyperlipidemia)      HTN (hypertension)      Hypercalcemia      Hyperparathyroidism (H)      Hypothyroid      Hypovitaminosis D      Left foot drop      Osteoporosis      Pulmonary embolism (H)      Severe sepsis (H)      Urinary frequency              Family History   Problem Relation  Age of Onset     COPD Mother      Stroke Father      Diabetes Sister      Kidney failure Sister      Social History     Socioeconomic History     Marital status:      Spouse name: Not on file     Number of children: Not on file     Years of education: Not on file     Highest education level: Not on file   Occupational History     Not on file   Social Needs     Financial resource strain: Not on file     Food insecurity     Worry: Not on file     Inability: Not on file     Transportation needs     Medical: Not on file     Non-medical: Not on file   Tobacco Use     Smoking status: Former Smoker     Years: 5.00     Types: Pipe     Last attempt to quit: 2003     Years since quittin.6     Smokeless tobacco: Never Used   Substance and Sexual Activity     Alcohol use: Yes     Drug use: Not on file     Sexual activity: Not on file   Lifestyle     Physical activity     Days per week: Not on file     Minutes per session: Not on file     Stress: Not on file   Relationships     Social connections     Talks on phone: Not on file     Gets together: Not on file     Attends [employer] service: Not on file     Active member of club or organization: Not on file     Attends meetings of clubs or organizations: Not on file     Relationship status: Not on file     Intimate partner violence     Fear of current or ex partner: Not on file     Emotionally abused: Not on file     Physically abused: Not on file     Forced sexual activity: Not on file   Other Topics Concern     Not on file   Social History Narrative     Not on file         Review of Systems   Constitutional: Negative for activity change, appetite change, chills, fatigue and fever.   HENT: Negative for congestion and sore throat.    Eyes: Negative for visual disturbance.   Respiratory: Negative for cough, shortness of breath and wheezing.    Cardiovascular: Negative for chest pain and leg swelling.   Gastrointestinal: Negative for abdominal distention, abdominal  pain, constipation, diarrhea and nausea.   Genitourinary: Positive for frequency. Negative for dysuria.   Musculoskeletal: Negative for arthralgias and myalgias.   Skin: Negative for color change, rash and wound.   Neurological: Negative for dizziness, weakness and numbness.   Psychiatric/Behavioral: Positive for confusion. Negative for agitation, behavioral problems and sleep disturbance.       Vitals:    08/18/20 0844   BP: 110/54   Pulse: 66   Resp: 16   Temp: 98.2  F (36.8  C)   SpO2: 94%   Weight: 172 lb 3.2 oz (78.1 kg)       Physical Exam  Constitutional:       Appearance: He is well-developed.      Comments: Pleasant gentleman in no acute distress, noted to be oriented to self only   HENT:      Head: Normocephalic.   Eyes:      Conjunctiva/sclera: Conjunctivae normal.   Neck:      Musculoskeletal: Normal range of motion.   Cardiovascular:      Rate and Rhythm: Normal rate and regular rhythm.      Heart sounds: Normal heart sounds. No murmur.   Pulmonary:      Effort: No respiratory distress.      Breath sounds: Normal breath sounds. No wheezing or rales.   Abdominal:      General: Bowel sounds are normal. There is no distension.      Palpations: Abdomen is soft.      Tenderness: There is no abdominal tenderness.   Musculoskeletal: Normal range of motion.   Skin:     General: Skin is warm.   Neurological:      Mental Status: He is alert and oriented to person, place, and time.   Psychiatric:         Behavior: Behavior normal.           LABS:   Recent Results (from the past 240 hour(s))   Basic Metabolic Panel   Result Value Ref Range    Sodium 139 136 - 145 mmol/L    Potassium 2.8 (LL) 3.5 - 5.0 mmol/L    Chloride 99 98 - 107 mmol/L    CO2 29 22 - 31 mmol/L    Anion Gap, Calculation 11 5 - 18 mmol/L    Glucose 88 70 - 125 mg/dL    Calcium 8.7 8.5 - 10.5 mg/dL    BUN 25 8 - 28 mg/dL    Creatinine 1.47 (H) 0.70 - 1.30 mg/dL    GFR MDRD Af Amer 55 (L) >60 mL/min/1.73m2    GFR MDRD Non Af Amer 45 (L) >60  mL/min/1.73m2   Magnesium   Result Value Ref Range    Magnesium 2.1 1.8 - 2.6 mg/dL   HM2(CBC w/o Differential)   Result Value Ref Range    WBC 5.6 4.0 - 11.0 thou/uL    RBC 3.44 (L) 4.40 - 6.20 mill/uL    Hemoglobin 10.7 (L) 14.0 - 18.0 g/dL    Hematocrit 33.6 (L) 40.0 - 54.0 %    MCV 98 80 - 100 fL    MCH 31.1 27.0 - 34.0 pg    MCHC 31.8 (L) 32.0 - 36.0 g/dL    RDW 14.6 (H) 11.0 - 14.5 %    Platelets 162 140 - 440 thou/uL    MPV 10.6 8.5 - 12.5 fL   COVID-19 VIRUS (CORONAVIRUS) BY PCR - EXTERNAL RESULT    Specimen: Other    Specimen type and source: Other, Specimen from nasopharyngeal structure (specimen)   Result Value Ref Range    COVID-19 Virus by PCR (External Result) Not Detected Not Detected   Urinalysis   Result Value Ref Range    Color, UA Yellow Colorless, Yellow, Straw, Light Yellow    Clarity, UA Clear Clear    Glucose, UA Negative Negative    Bilirubin, UA Negative Negative    Ketones, UA Negative Negative    Specific Gravity, UA 1.011 1.001 - 1.030    Blood, UA Negative Negative    pH, UA 6.0 4.5 - 8.0    Protein, UA Negative Negative mg/dL    Urobilinogen, UA <2.0 E.U./dL <2.0 E.U./dL, 2.0 E.U./dL    Nitrite, UA Negative Negative    Leukocytes, UA Negative Negative   Culture, Urine    Specimen: Urine, Midstream   Result Value Ref Range    Culture 10,000-50,000 col/ml Gram Negative Rods (!)      ASSESSMENT:      ICD-10-CM    1. Dementia with behavioral disturbance, unspecified dementia type (H)  F03.91    2. Hypokalemia  E87.6    3. Pressure injury of skin, unspecified injury stage, unspecified location  L89.90        PLAN:    Dementia with behavioral disturbance- Provide a safe environment resume 7.5 mg Remeron , Seroquel was discontinued in the hospital per family request    Hypokalemia -potassium on 8/16/20 was 3.1 recheck ordered for AM     Pressure ulcers- wounds cares as ordered    Cellulitis- completed clindamycin     Urinary frequency/burning - U/AU/C      Electronically signed by: Mayuri  Virgil, CNP

## 2021-06-10 NOTE — PROGRESS NOTES
" [facility]    Facility:  [facility] [[id number]]    Code Status:  FULL CODE    Chief Complaint   Patient presents with     H & P   :                    Patient Active Problem List   Diagnosis     Severe sepsis due to cellulitis     Decubitus ulcers     Dementia with behavioral disturbance (H)     Fall     Hypokalemia     WILLIS (acute kidney injury) (H)       History:  Brock Abarca  is an 88 year old male with history of heart failure with preserved ejection fraction, chronic pedal edema, CKD 3, coronary artery disease, remote PE, CVA, hypertension, hyperlipidemia, dementia, hard of hearing, hypothyroidism, anxiety/depression, osteoarthritis, osteoporosis, cervical decompression C3-4, left ureteral stent/lithotripsy procedure e seen for re-admission to TCU on 8/21/2020        Hospital course: Patient was admitted to [facility] from this facility/TCU on August 12 through August 16 following a fall.  Patient had had restless/agitated behavior during the entire time on the TCU following his first hospitalization earlier in August (see below)    Evaluation revealed hypokalemia 2.4 increased to 3.1 by the time of discharge.  He also had acute kidney injury 1.8 with improvement in discontinuation of his metolazone as well as reduction/holding of his Bumex--- eventually decided to discharge him on 1 mg twice daily (had been 3 mg and 2 mg p.m.).  Patient had a forehead laceration which was repaired.  He was seen by wound care for his lower extremity wounds.    His Seroquel which had only recently been started was discontinued at the hospital as family said it seemed to make things worse rather than better.  Since return to the TCU another provider has started Ativan 0.5 mg every 6 as needed for \"anxiety\" and restlessness without clear response yet.  Patient was also continued on mirtazapine 7.5 mg at bedtime.    As urinary frequency continues to drive some of his waking " restlessness, he has been treated with trospium and tamsulosin and reportedly had no worrisome degree of urinary retention while hospitalized.             Patient was also admitted to [facility] between August 3 and August 6.  At home he was very weak and slid onto the floor off of his chair and family summoned help.    Work-up revealed fever to 101.3.  He was diagnosed with severe sepsis and required ICU admission with vasopressors.  Work-up included negative chest x-ray negative COVID-19 negative blood cultures negative urine culture.  Sepsis was felt secondary to cellulitis associated with his venous stasis/decubitus ulcers.  Initial broad-spectrum IV antibiotics were narrowed to p.o. clindamycin which has since been completed.      Subjective/ROS:    -augmented by discussion with facility staff involved in direct care.  Augmented by discussion with patient's wife and daughter in person today as they are here for care conference.  -limited by: Memory    -Staff reports that the patient was up to 5 AM this morning despite Ativan last night.  He has been sleeping ever since.  He has no bladder complaints or apparent discomfort when he is sleeping and sleeps soundly.  However when he is awake he is very frequently requesting to void as often as every 5 minutes.  He usually gets up a little bit of urine and does not complain of pain.  He has had restlessness and wandering and is needed a steady one-to-one sitter.  He will do things such as get on the floor to primitivo with his wheelchair as if he is fixing it.  He will also remove his incontinence garment in favor of his usual boxers after going to the bathroom.  This seems to be out of habit.  Will frequently say that he wishes to go home.    - Patient has been seen by Ms. Herman a couple of times.  She did do a repeat urine culture on the 18th which has since come back showing 10-50,000 colonies Klebsiella oxytoca which has not yet been treated by  "antibiotics.  Sensitivities are back and it is sensitive to several antibiotics.    -Staff says that his behaviors wandering restlessness are generally redirectable but very similar to his past stay.  He has had limited ability to work with therapy but they have granted him another week.  -I did ask family what they noticed with the Seroquel \"the worse\" that I was told about.  They cannot give me anything concrete but just had general observations that it did not seem to help her change his behavior in a positive way.  His daughter is also very concerned about antipsychotics and wondering why I would choose this drug class over \"regular anxiety pills\".  Another provider has started him on lorazepam and I did discuss my concern about benzodiazepines however he is in a safe environment with one-to-one monitoring and no clear improvement with antipsychotic, already treated with mirtazapine, so I think it is at least reasonable to give the Ativan a chance.  I did discuss with them that there are other pharmaceutical classes used to treat restlessness/anxiety/agitation but that I also feel he be a good candidate to see a psychiatrist again at this point considering his failure to improve with time and other treatments, though finding a psychiatrist to see him in the short-term could prove difficult.  Meanwhile, unless the urine culture findings prove significant, no certain cause for delirium has been identified over his recent hospitalization or here.      He has had no fever sweats or chills.  Staff reports no falls or injuries.  He has a sundowning type pattern to his confusion/wandering.  Staff reports good appetite with bowel movements frequent urination but does not seem to be in pain with urination.                        Past medical/surgical history:  heart failure with preserved ejection fraction, chronic pedal edema, CKD 3, coronary artery disease, remote PE, CVA, hypertension, hyperlipidemia, dementia, hard " of hearing, hypothyroidism, anxiety/depression, osteoarthritis, osteoporosis, cervical decompression C3-4, left ureteral stent/lithotripsy procedure    Family history:   Mom had COPD dad had CVA Sister had diabetes Sister had chronic kidney disease    Social History     Socioeconomic History     Marital status:      Spouse name: Not on file     Number of children: Not on file     Years of education: Not on file     Highest education level: Not on file   Occupational History     Not on file   Social Needs     Financial resource strain: Not on file     Food insecurity     Worry: Not on file     Inability: Not on file     Transportation needs     Medical: Not on file     Non-medical: Not on file   Tobacco Use     Smoking status: Former Smoker     Years: 5.00     Types: Pipe     Last attempt to quit: 2003     Years since quittin.6     Smokeless tobacco: Never Used   Substance and Sexual Activity     Alcohol use: Yes     Drug use: Not on file     Sexual activity: Not on file   Lifestyle     Physical activity     Days per week: Not on file     Minutes per session: Not on file     Stress: Not on file   Relationships     Social connections     Talks on phone: Not on file     Gets together: Not on file     Attends Jewish service: Not on file     Active member of club or organization: Not on file     Attends meetings of clubs or organizations: Not on file     Relationship status: Not on file     Intimate partner violence     Fear of current or ex partner: Not on file     Emotionally abused: Not on file     Physically abused: Not on file     Forced sexual activity: Not on file   Other Topics Concern     Not on file   Social History Narrative     Not on file   :    Social history: Patient is  he lives independently with his wife    Current Outpatient Medications on File Prior to Visit   Medication Sig Dispense Refill     acetaminophen (TYLENOL) 325 MG tablet Take 650 mg by mouth every 6 (six) hours as  needed for pain.       bumetanide (BUMEX) 1 MG tablet Take 1 mg by mouth 2 (two) times a day at 9am and 6pm.        clopidogreL (PLAVIX) 75 mg tablet Take 75 mg by mouth daily.       doxycycline (ADOXA) 100 MG tablet Take 100 mg by mouth 2 (two) times a day.       levothyroxine (SYNTHROID, LEVOTHROID) 112 MCG tablet Take 112 mcg by mouth daily.       loperamide (IMODIUM) 2 mg capsule Take 2 mg by mouth every 6 (six) hours as needed for diarrhea.       LORazepam (ATIVAN) 0.5 MG tablet Take by mouth every 6 (six) hours as needed for anxiety.       metoprolol succinate (TOPROL-XL) 50 MG 24 hr tablet Take 50 mg by mouth daily.       mirtazapine (REMERON) 7.5 MG tablet Take 7.5 mg by mouth at bedtime.       multivitamin (TAB-A-LALO ORAL) Take 1 tablet by mouth daily.       olopatadine (PATANOL) 0.1 % ophthalmic solution Administer 1 drop to both eyes 2 (two) times a day as needed for allergies.       polyethylene glycol (MIRALAX) 17 gram packet Take 17 g by mouth daily as needed.       potassium chloride (K-DUR,KLOR-CON) 10 MEQ tablet Take 40 mEq by mouth 2 (two) times a day.       senna (SENOKOT) 8.6 mg tablet Take 1-2 tablets by mouth daily as needed for constipation.       sertraline (ZOLOFT) 100 MG tablet Take 100 mg by mouth daily.       simvastatin (ZOCOR) 40 MG tablet Take 40 mg by mouth daily.       tamsulosin (FLOMAX) 0.4 mg cap Take 0.4 mg by mouth daily after supper.       trospium (SANCTURA XR) 60 mg Cp24 ER capsule Take 60 mg by mouth daily before breakfast.       [] loratadine-pseudoephedrine (CLARITIN-D 24-HOUR)  mg per 24 hr tablet Take 1 tablet by mouth daily as needed for allergies.       [DISCONTINUED] metOLazone (ZAROXOLYN) 2.5 MG tablet Take 2.5 mg by mouth once a week. Every Mon       [DISCONTINUED] QUEtiapine (SEROQUEL) 25 MG tablet Take 25 mg by mouth at bedtime.       [DISCONTINUED] QUEtiapine (SEROQUEL) 25 MG tablet Take 12.5 mg by mouth 3 (three) times a day as needed.       No  current facility-administered medications on file prior to visit.    :      ALLERGIES:  Ambien [zolpidem] patient and family cannot remember what happened to him when he took the Ambien.  He said no untoward effects to the lorazepam initial doses.    Vitals:      Current Vitals   BP: 131/61 mmHg  8/21/2020 20:13  Temp:97.9  F  8/21/2020 20:13  Pulse: 91 bpm  8/21/2020 20:13    Weight: 171 Lbs  8/20/2020 10:15  Resp: 20 Breaths/min  8/21/2020 20:13  BS: 110 mg/dL  8/11/2020 06:19  O2: 96 %  8/21/2020 20:13  Pain: 0  8/21/2020 20:1  Physical exam:  Patient is sleeping soundly this afternoon but does alert to my voice.  He is hard of hearing but he can hear me.  He cannot tell me where he is and asks repeatedly where he is.  He does follow commands.  His  facies are symmetric sclera clear nonicteric EOMI oropharynx moist and clear neck supple with good range of motion skin shows Mepilex on the left elbow area and the right Achilles area which have been recently redressed wounds were not examined (see PCC pictures).  I did not examine his coccyx wound although staff says it is clean and improved from last week.  Patient appears quite comfortable with heart rate in the 70s S1-S2 without murmur gallop his lungs show decreased breath sounds in the bases but otherwise clear.  His abdomen is soft he was deep palpation in all areas without any sign of discomfort no organomegaly mass bowel sounds are normal his extremities look improved.  He has wrinkling of his pretibial skin including and ankles and the tops of the feet.  He has minimal edema in the feet.  The venous stasis dermatitis looks significantly improved, a quieter purplish color rather than the erythematous seen last time and he is not weeping at all.  Purposeful movement of all 4 extremities noted.      Due to the 2020 Covid 19 pandemic, except as noted above, the patient was visually observed at a 6 foot plus distance.  An observational exam was performed in an  effort to keep patient safe from Covid 19 and other communicable diseases.   Labs:  Lab Results   Component Value Date    WBC 5.8 08/21/2020    HGB 11.2 (L) 08/21/2020    HCT 33.6 (L) 08/21/2020    MCV 93 08/21/2020     (L) 08/21/2020     Results for orders placed or performed in visit on 08/19/20   Basic Metabolic Panel   Result Value Ref Range    Sodium 143 136 - 145 mmol/L    Potassium 4.3 3.5 - 5.0 mmol/L    Chloride 111 (H) 98 - 107 mmol/L    CO2 24 22 - 31 mmol/L    Anion Gap, Calculation 8 5 - 18 mmol/L    Glucose 83 70 - 125 mg/dL    Calcium 9.2 8.5 - 10.5 mg/dL    BUN 13 8 - 28 mg/dL    Creatinine 0.95 0.70 - 1.30 mg/dL    GFR MDRD Af Amer >60 >60 mL/min/1.73m2    GFR MDRD Non Af Amer >60 >60 mL/min/1.73m2         No results found for: TSH  No results found for: HGBA1C  [unfilled]  No results found for: [id number]  No results found for: BNP  [unfilled]        Invalid input(s): PRINTERVAL      Assessment/Plan:      ICD-10-CM    1. Dementia with behavioral disturbance, unspecified dementia type (H)  F03.91    2. Fall, subsequent encounter  W19.XXXD    3. Hypokalemia  E87.6    4. WILLIS (acute kidney injury) (H)  N17.9      Dementia with behavioral disturbance  Acute delirium  Anxiety    depression   I again reviewed history with his wife and daughter.  It seems relatively certain that he had some degree of dementia prior to his 2 recent hospitalizations but he did not have anything like this degree of restlessness confusion agitation.  His sepsis from the first hospitalization is long resolved.  He had minimal metabolic derangements discovered on his second hospitalization such as hypokalemia which is improved now.  His WILLIS was mild and has improved as well.  Family believes that the Seroquel made things worse and he has been off of it now for better than a week.    Patient continues on sertraline 100 mg daily is also on Remeron 7.5 mg at at bedtime.  Ativan was added yesterday and high of reservations  about it it is continued (see discussion above).    The only other possible contributor would be UTI.  His urine culture is abnormal but not impressive numbers.  Nonetheless dysuria and urinary frequency seem to drive much of his daytime restlessness and preoccupations.  He does have up to 50,000 colonies of Klebsiella oxytoca.  While I agree with Ms. Herman' decision not to treat it initially, at this point after weighing likelihood of contributing to his issues side effects and risks of antibiotic treatment and his overall lack of improvement, elect to treat with doxycycline 100 mg twice daily for 7 days to see if it might help him.    No antipsychotics per family request    Consider psychiatric consultation or transfer to geriatric psychiatric bed if patient is not improving with the above therapy changes    Anticholinergic effect of his bladder medication is noted but perhaps the medication is doing him more good than harm?  Continue Flomax.  Continue trospium.    Delirium precautions, day night reversal precautions with increased activity in the day, quiet dark room at night as examples    Urinary frequency  Possible UTI Klebsiella oxytoca   See discussion above.  Apparently he had acceptable amounts of postvoid residual in the hospital and did not require catheterization.  Unclear if this low growth of Klebsiella organism contributes in any way but at this point with his failure to improve with other measures treatment is recommended with the hope that we could see some improvement with his restlessness.  - Doxycycline 100 mg p.o. twice daily  - Postvoid residual checks, if he has greater than 300 cc straight cath would be appropriate  -If he has greater than 300 cc 3 or more times, Benoit catheterization would be appropriate and if that happens with strongly encourage urologic consultation  -Continue Flomax and trospium for now      severe sepsis resolved  Cellulitis  Decubitus ulcer  Venous stasis ulcer left  shin  Venous stasis dermatitis left greater than right  Peripheral edema  Left ankle wound   Sepsis in early August has resolved.  Cellulitis has resolved.  Ulcers are improving.  Peripheral edema is much improved and his weight is down to 171 pounds  -Agree with metolazone discontinuation  -Agree with decreased dose of Bumex as he seems to be doing just fine with decreased weight and decreased edema at 1 mg twice daily  - Call for weight 175 or greater which would be a 4 pound weight gain from current at which point we might need to at least temporarily increase his diuretic  - Leg elevation as much as possible while resting above heart level  -Wound care as ordered  -Lymphedema therapy consult  -Lymphedema garments per consultan  -Monitor weights daily for now      Coronary artery disease   No chest pain no shortness of breath.  Continue Plavix and metoprolol and Zocor    Hyperlipidemia   Zocor    CKD 3  History of left ureteral stent and lithotripsy  Acute kidney injury   Acute kidney injury has resolved.  Recent blood work reviewed,  looks quite good as above.  Avoid nephrotoxins were possible.      Hard of hearing   Use talking box/headphones hearing aids     Hypothyroidism   Levothyroxine without change    Constipation   No longer an issue if anything he is on the loose side.  Senna plus MiraLAX in place    Hypertension   On metoprolol alone at this point.  Blood pressures acceptable, to good    PE   Historically noted    Remote CVA   Historically noted.  Head imaging has shown no further/recent CVAs contributing to the above.  Continue aspirin statin and blood pressure control as above          Case discussed with:  Primary CNP   Facility staff   Family wife, daughter          Yoel Duy Nugent MD

## 2021-06-10 NOTE — TELEPHONE ENCOUNTER
[facility] Nurse Triage Telephone Note      Provider: NADEEN Ma  Facility: [facility]    Facility Type: TCU    Caller: Kali  Call Back Number:  [phone]    Allergies: Ambien [zolpidem]    Reason for call: Nurse calling to report BMP results.  Patient also recently had his molina d/c'd.  He was supposed to have PVR's done Q shift, however staff has only done 2 for 612cc and cathed for 500cc and 338cc and cathed for 400cc.       Verbal Order/Direction given by Provider: Do a PVR today, if PVR is >300cc, insert a molina catheter and set up a urology appointment.  If PVR is <300cc, DC PVR's.      Provider giving order: NADEEN Ma    Verbal order given to: Kali Hester RN

## 2021-06-10 NOTE — PROGRESS NOTES
Code Status:  FULL CODE  Visit Type: Follow Up (fall, hypoglycemia, lymphedema, hypokalemia)     Facility:  [facility] [[id number]]      Facility Type: SNF (Skilled Nursing Facility, TCU)    History of Present Illness:   Hospital Admission Date: 8/3/2020 Hospital Discharge Date: 8/7/2020     Brock Abarca is a 88 y.o. male with a past medical history for HFpEF, chronic edema, CKD3, CAD, hx PE, CVA, HTN, HLD, dementia, hypothyroidism, anxiety/depression, OA, Osteoporosis. He was recently hospitalized for severe sepsis needing to be in the ICU on vasopressors. COVID was negative, BC and UC was negative, chest xray was negative.  Sepsis was felt to be due to cellulitis 2/2 venous stasis and stasis ulcers.  IV abx started and changed to po clindamycin. Unfortunately, we do not have thorough medical records due to no discharge summary.    Over the weekend, a BMP was drawn and revealed a K of 2.9. Cr was 1.45 and glucose was 55. On-call was contacted and he was given an additional 40meq of K in addition to his 20meq two times a day. Repeat K was not drawn today and is scheduled for tomorrow. He was given his scheduled metolazone today. Weights the same at 180.lbs. Patient denies any chest pain or palpitations.  Nursing reports concern regarding his LE edema.  Upon exam, he does have extreme lymphedema.  He has hyperpigmentation however it is not warm to touch or painful.  He has told therapy that he would not tolerate being wrapped 24 hours a day.  He has multiple open areas on both his LE which are weeping serous fluid and venous stasis in nature.     He was started on seroquel this weekend due to his delirium on his dementia and agitation.  Today, he is calm and is requesting to go home tomorrow.  When I tell him that he is not ready due to his weakness, LE edema and wounds he does not argue.     BS drawn on BMP was 55 which was likely due to hemolysis.     No past medical history on file.  No past  surgical history on file.  No family history on file.  Social History     Socioeconomic History     Marital status:      Spouse name: Not on file     Number of children: Not on file     Years of education: Not on file     Highest education level: Not on file   Occupational History     Not on file   Social Needs     Financial resource strain: Not on file     Food insecurity     Worry: Not on file     Inability: Not on file     Transportation needs     Medical: Not on file     Non-medical: Not on file   Tobacco Use     Smoking status: Not on file   Substance and Sexual Activity     Alcohol use: Not on file     Drug use: Not on file     Sexual activity: Not on file   Lifestyle     Physical activity     Days per week: Not on file     Minutes per session: Not on file     Stress: Not on file   Relationships     Social connections     Talks on phone: Not on file     Gets together: Not on file     Attends Jainism service: Not on file     Active member of club or organization: Not on file     Attends meetings of clubs or organizations: Not on file     Relationship status: Not on file     Intimate partner violence     Fear of current or ex partner: Not on file     Emotionally abused: Not on file     Physically abused: Not on file     Forced sexual activity: Not on file   Other Topics Concern     Not on file   Social History Narrative     Not on file     Additional Geriatric Review  Current Outpatient Medications   Medication Sig Dispense Refill     UNABLE TO FIND Medications as of August 7, 2020  Toprol XL Tablet Extended Release 24 Hour 50 MG (Metoprolol Succinate ER)   Give 50 mg by mouth one time a day for CHF  Pharmacy Active 8/7/2020 08:00  8/6/2020  Senna Tablet 8.6 MG (Sennosides)   Give 1 tablet by mouth as needed for constipation daily AND Give 2 tablet by mouth as needed for constipation daily  Pharmacy Active 8/6/2020 13:45  8/6/2020  Trospium Chloride ER Capsule Extended Release 24 Hour 60 MG   Give 60 mg  by mouth one time a day for overactive bladder  Pharmacy Active 8/7/2020 06:00  8/6/2020  Zocor Tablet 40 MG (Simvastatin)   Give 40 mg by mouth one time a day for excessive fat in the blood  Pharmacy Active 8/7/2020 08:00  8/6/2020  metOLazone Tablet 2.5 MG   Give 2.5 mg by mouth one time a day every Mon for CHF take 30 minutes before Bumex  Pharmacy Active 8/10/2020 07:00  8/6/2020  Patanol Solution 0.1 % (Olopatadine HCl)   Instill 1 drop in both eyes as needed for eye irritation 2x/daily  Pharmacy Active 8/6/2020 14:00  8/6/2020  Acetaminophen Tablet 325 MG   Give 650 mg by mouth every 6 hours as needed for pain max 4000mg in 24hrs  Pharmacy Active 8/6/2020 13:45  8/6/2020  There is a black box warning associated with this order. Please click to view details. Clindamycin HCl Capsule 300 MG   Give 300 mg by mouth three times a day for cellulitis for 12 Administrations x4 days  Pharmacy Active 8/6/2020 20:00  8/6/2020  MiraLax Powder 17 GM/SCOOP (Polyethylene Glycol 3350)   Give 1 scoop by mouth as needed for constipation daily  Pharmacy Active 8/6/2020 13:45  8/6/2020  There is a black box warning associated with this order. Please click to view details. Synthroid Tablet 112 MCG (Levothyroxine Sodium)   Give 112 mcg by mouth one time a day for low thyroid levels  Pharmacy Active 8/7/2020 06:00  8/6/2020  There is a black box warning associated with this order. Please click to view details. Zoloft Tablet 100 MG (Sertraline HCl)   Give 100 mg by mouth one time a day for depression  Pharmacy Active 8/7/2020 08:00  8/6/2020  There is a black box warning associated with this order. Please click to view details. Plavix Tablet 75 MG (Clopidogrel Bisulfate)   Give 75 mg by mouth one time a day for clot prevention  Pharmacy Active 8/7/2020 08:00  8/6/2020  Tab-A-Charles Tablet (Multiple Vitamins-Minerals)   Give 1 tablet by mouth one time a day for prevent vitamin deficiency  Pharmacy Active 8/7/2020 08:00  8/6/2020  Flomax  Capsule 0.4 MG (Tamsulosin HCl)   Give 0.4 mg by mouth at bedtime for BPH  Pharmacy Active 8/6/2020 20:00  8/6/2020  There is a black box warning associated with this order. Please click to view details. Bumex Tablet 1 MG (Bumetanide)   Give 3 mg by mouth one time a day for CHF AND Give 2 mg by mouth one time a day for CHF  Pharmacy Active 8/7/2020 07:30  8/6/2020  Potassium Chloride powder packet  Give 20 mEq by mouth two times a day for chronic renal disease  Pharmacy Active 8/6/2020 17:00  8/6/2020  Claritin-D 24 Hour Tablet Extended Release 24 Hour  MG (Loratadine-Pseudoephedrine ER)   Give 1 tablet by mouth as needed for allergy symptoms for 13 Days daily Document non-pharmacological interventions attempted prior to administration of medication.  Pharmacy Active 8/7/2020 07:45 8/20/2020 8/7/2020  There is a black box warning associated with this order. Please click to view details. SEROquel Tablet 25 MG (QUEtiapine Fumarate)   Give 12.5 mg by mouth at bedtime for agitiation AND Give 12.5 mg by mouth as needed for NON-redirectable agitation for 14 Days 3x/daily Document non-pharmacological interventions attempted prior to administration. 1. Help pt. call wife 2. Provide calm, quiet environment 3. Provide re-orientation and re-direction to situation  Pharmacy Active 8/7/2020 12:15  8/7/2020       No current facility-administered medications for this visit.      Allergies   Allergen Reactions     Ambien [Zolpidem]        There is no immunization history on file for this patient.    Post Discharge Medication Reconciliation Status: discharge medications reconciled, continue medications without change    Review of Systems   Patient denies fever, chills, headache, lightheadedness, dizziness, rhinorrhea, cough, congestion, shortness of breath, chest pain, palpitations, abdominal pain, n/v, diarrhea, constipation, change in appetite, dysuria, frequency, burning or pain with urination.  Other than stated in HPI all  other review of systems is negative.         Physical Exam   Vital signs: /78, HR 78, resp 18, temp 98.6  GENERAL APPEARANCE: Elderly male in no acute distress  HEENT: normocephalic, atraumatic  sclerae anicteric, conjunctivae clear and moist, EOM intact  NECK: Supple and symmetric. Trachea is midline, no thyromegaly, no adenopathy, and no tenderness  LUNGS: Lung sounds CTA, no adventitious sounds, respiratory effort normal.  CARD: RRR, S1, S2, without murmurs, gallops, rubs  ABD: Soft and nontender with normal bowel sounds.   MSK: Muscle strength and tone were equal bilateral  EXTREMITIES: severe BLE lymphedema with hyperpigmentation.   NEURO: Alert, cognitive impairment.  Face is symmetric.  SKIN: open venous stasis ulcers BLE with serous drainage.   PSYCH: euthymic            Labs:    Recent Results (from the past 240 hour(s))   COVID-19 VIRUS (CORONAVIRUS) BY PCR - EXTERNAL RESULT    Specimen: Other    Specimen type and source: Other, Specimen from nasopharyngeal structure (specimen)   Result Value Ref Range    COVID-19 Virus by PCR (External Result) Negative Negative   Basic Metabolic Panel   Result Value Ref Range    Sodium 141 136 - 145 mmol/L    Potassium 2.9 (L) 3.5 - 5.0 mmol/L    Chloride 102 98 - 107 mmol/L    CO2 24 22 - 31 mmol/L    Anion Gap, Calculation 15 5 - 18 mmol/L    Glucose 55 (LL) 70 - 125 mg/dL    Calcium 8.7 8.5 - 10.5 mg/dL    BUN 24 8 - 28 mg/dL    Creatinine 1.45 (H) 0.70 - 1.30 mg/dL    GFR MDRD Af Amer 56 (L) >60 mL/min/1.73m2    GFR MDRD Non Af Amer 46 (L) >60 mL/min/1.73m2         Assessment:  1. Lymphedema     2. Cellulitis of lower extremity, unspecified laterality     3. Hypokalemia     4. Dementia with behavioral disturbance, unspecified dementia type (H)     5. Venous stasis     6. Venous stasis ulcer limited to breakdown of skin without varicose veins, unspecified site (H)     7. CKD (chronic kidney disease) stage 3, GFR 30-59 ml/min (H)     8. Hypoglycemia         Plan:    Lymphedema: I discussed with PT and patient and for now will do ace wraps during the day.  Will likely need more compression and could consider unna boot if patient tolerate.  Continue bumex and metolazone    Cellulitis: last dose of clindamycin today.  I believe that if fluid gets out of legs they will be less erythematous.  Will need to closely monitor for reoccurence of cellulitis.     Hypokalemia: give extra 40meq today.  Patient does not like large pills so will change to powder.  Recheck BMP tomorrow.  Likely will need to increase his KCL replacement.      Dementia: agitation less, continue with seroquel with goal to wean off in the next 2 weeks.     Venous stasis ulcers: continue with mepilex.  Unna boot may be helpful in healing.      CKD: check renal function and monitor.     Hypoglycemia: likely lab error but will check FBS x 5 days.     35 total minutes spent with 20 minutes spent in counseling and coordination with patient, nursing and PT regarding lymphedema plan of care and hypokalemia POC.     Electronically signed by: Ruth Escalante, SOUMYA

## 2021-06-10 NOTE — TELEPHONE ENCOUNTER
[facility] Nurse Triage Telephone Note      Provider: NADEEN Ma  Facility: [facility]    Facility Type: TCU    Caller: Ronny  Call Back Number:  [phone]    Allergies: Ambien [zolpidem]    Reason for call: Pt having a lot of anxiety & wants to leave & go home with his wife. He has no PRN meds ordered for anxiety. VSS He has been 1:1 care.  Family also requesting med for him. Previously had been on Seroguel.     Verbal Order/Direction given by Provider: Ativan 0.5mg po q 6hrPRN. Check CBC in am.  Mayuri will call in script so can get emergent dose.    Provider giving order: NADEEN Ma    Verbal order given to: Ronny Vergara RN

## 2021-06-10 NOTE — TELEPHONE ENCOUNTER
[facility] Nurse Triage Telephone Note      Provider: NADEEN Cohen  Facility: [facility]    Facility Type: TCU    Caller: Sara  Call Back Number:  [phone]    Allergies: Ambien [zolpidem]    Reason for call: Nurse calling to report Heme 2, BMP, and Mg levels.  Notable meds:  Potassium 20meq two times a day(got an extra 40meq yesterday), Bumex 3mg Q AM and 2mg Q PM, Metolazone 2.5mg Q Monday, Metoprolol ER 50mg daily.       Verbal Order/Direction given by Provider: Give potassium 40meq x 1 dose now, then 60meq for the HS dose tonight.  Starting tomorrow, give potassium 40meq two times a day.  Check potassium level tomorrow AM.      Provider giving order: NADEEN Cohen    Verbal order given to: Sara Hester RN

## 2021-06-10 NOTE — PROGRESS NOTES
[facility]    Facility:   [facility] [[id number]]   Code Status:Full      CHIEF COMPLAINT/REASON FOR VISIT:  Chief Complaint   Patient presents with     Problem Visit     medication review       HISTORY:      HPI: Brock is a 88 y.o. male undergoing physical and occupational therapy at [facility]. with a past medical history for HFpEF, chronic edema, CKD3, CAD, hx PE, CVA, HTN, HLD, dementia, hypothyroidism, anxiety/depression, OA, Osteoporosis. He was recently hospitalized for severe sepsis needing to be in the ICU on vasopressors. COVID was negative, BC and UC was negative, chest xray was negative.  Sepsis was felt to be due to cellulitis 2/2 venous stasis and stasis ulcers.  IV abx started and changed to po clindamycin.    Today patient is seen to review continued urinary frequency. His U/A was negative and UC came back 10,000-50,000 col/ml Klebsiella oxytoca. He is afebrile.   He was noted to be on high doses of Bumex. He had clear lung sounds and 1-2 + lower extremity edema however he does show a 9.6 pound weight loss over the last 12 days. His bumex was decreased. Denies fever, chills,  Denies any chest pain, headaches, lightheadedness, dizziness, shortness of breath, or cough. Appetite is good. Denies any GERD symptoms. Denies any difficulty with swallowing, Denies any abdominal pain, constipation or loose stools. No insomnia. No active bleeding.  He was oriented to self only.         Past Medical History:   Diagnosis Date     Arthritis      BPH (benign prostatic hyperplasia)      CAD (coronary artery disease)      CHF (congestive heart failure) (H)      Chronic heart failure with preserved ejection fraction (H)      Chronic renal disease, stage III (H)      CVA (cerebral vascular accident) (H)      Diverticulitis      Hand paresthesia      HLD (hyperlipidemia)      HTN (hypertension)      Hypercalcemia      Hyperparathyroidism (H)      Hypothyroid       Hypovitaminosis D      Left foot drop      Osteoporosis      Pulmonary embolism (H)      Severe sepsis (H)      Urinary frequency              Family History   Problem Relation Age of Onset     COPD Mother      Stroke Father      Diabetes Sister      Kidney failure Sister      Social History     Socioeconomic History     Marital status:      Spouse name: Not on file     Number of children: Not on file     Years of education: Not on file     Highest education level: Not on file   Occupational History     Not on file   Social Needs     Financial resource strain: Not on file     Food insecurity     Worry: Not on file     Inability: Not on file     Transportation needs     Medical: Not on file     Non-medical: Not on file   Tobacco Use     Smoking status: Former Smoker     Years: 5.00     Types: Pipe     Last attempt to quit: 2003     Years since quittin.6     Smokeless tobacco: Never Used   Substance and Sexual Activity     Alcohol use: Yes     Drug use: Not on file     Sexual activity: Not on file   Lifestyle     Physical activity     Days per week: Not on file     Minutes per session: Not on file     Stress: Not on file   Relationships     Social connections     Talks on phone: Not on file     Gets together: Not on file     Attends Orthodoxy service: Not on file     Active member of club or organization: Not on file     Attends meetings of clubs or organizations: Not on file     Relationship status: Not on file     Intimate partner violence     Fear of current or ex partner: Not on file     Emotionally abused: Not on file     Physically abused: Not on file     Forced sexual activity: Not on file   Other Topics Concern     Not on file   Social History Narrative     Not on file         Review of Systems   Constitutional: Negative for activity change, appetite change, chills, fatigue and fever.   HENT: Negative for congestion and sore throat.    Eyes: Negative for visual disturbance.   Respiratory:  Negative for cough, shortness of breath and wheezing.    Cardiovascular: Negative for chest pain and leg swelling.   Gastrointestinal: Negative for abdominal distention, abdominal pain, constipation, diarrhea and nausea.   Genitourinary: Positive for frequency. Negative for dysuria.   Musculoskeletal: Negative for arthralgias and myalgias.   Skin: Negative for color change, rash and wound.   Neurological: Negative for dizziness, weakness and numbness.   Psychiatric/Behavioral: Positive for confusion. Negative for agitation, behavioral problems and sleep disturbance.       Vitals:    08/20/20 1012   BP: 92/56   Pulse: 74   Resp: 16   Temp: 97.7  F (36.5  C)   SpO2: 94%   Weight: 176 lb 6.4 oz (80 kg)       Physical Exam  Constitutional:       Appearance: He is well-developed.      Comments: Pleasant gentleman in no acute distress, noted to be oriented to self only   HENT:      Head: Normocephalic.   Eyes:      Conjunctiva/sclera: Conjunctivae normal.   Neck:      Musculoskeletal: Normal range of motion.   Cardiovascular:      Rate and Rhythm: Normal rate and regular rhythm.      Heart sounds: Normal heart sounds. No murmur.   Pulmonary:      Effort: No respiratory distress.      Breath sounds: Normal breath sounds. No wheezing or rales.   Abdominal:      General: Bowel sounds are normal. There is no distension.      Palpations: Abdomen is soft.      Tenderness: There is no abdominal tenderness.   Musculoskeletal: Normal range of motion.   Skin:     General: Skin is warm.   Neurological:      Mental Status: He is alert and oriented to person, place, and time.   Psychiatric:         Behavior: Behavior normal.           LABS:   Recent Results (from the past 240 hour(s))   Basic Metabolic Panel   Result Value Ref Range    Sodium 139 136 - 145 mmol/L    Potassium 2.8 (LL) 3.5 - 5.0 mmol/L    Chloride 99 98 - 107 mmol/L    CO2 29 22 - 31 mmol/L    Anion Gap, Calculation 11 5 - 18 mmol/L    Glucose 88 70 - 125 mg/dL     Calcium 8.7 8.5 - 10.5 mg/dL    BUN 25 8 - 28 mg/dL    Creatinine 1.47 (H) 0.70 - 1.30 mg/dL    GFR MDRD Af Amer 55 (L) >60 mL/min/1.73m2    GFR MDRD Non Af Amer 45 (L) >60 mL/min/1.73m2   Magnesium   Result Value Ref Range    Magnesium 2.1 1.8 - 2.6 mg/dL   HM2(CBC w/o Differential)   Result Value Ref Range    WBC 5.6 4.0 - 11.0 thou/uL    RBC 3.44 (L) 4.40 - 6.20 mill/uL    Hemoglobin 10.7 (L) 14.0 - 18.0 g/dL    Hematocrit 33.6 (L) 40.0 - 54.0 %    MCV 98 80 - 100 fL    MCH 31.1 27.0 - 34.0 pg    MCHC 31.8 (L) 32.0 - 36.0 g/dL    RDW 14.6 (H) 11.0 - 14.5 %    Platelets 162 140 - 440 thou/uL    MPV 10.6 8.5 - 12.5 fL   COVID-19 VIRUS (CORONAVIRUS) BY PCR - EXTERNAL RESULT    Specimen: Other    Specimen type and source: Other, Specimen from nasopharyngeal structure (specimen)   Result Value Ref Range    COVID-19 Virus by PCR (External Result) Not Detected Not Detected   Urinalysis   Result Value Ref Range    Color, UA Yellow Colorless, Yellow, Straw, Light Yellow    Clarity, UA Clear Clear    Glucose, UA Negative Negative    Bilirubin, UA Negative Negative    Ketones, UA Negative Negative    Specific Gravity, UA 1.011 1.001 - 1.030    Blood, UA Negative Negative    pH, UA 6.0 4.5 - 8.0    Protein, UA Negative Negative mg/dL    Urobilinogen, UA <2.0 E.U./dL <2.0 E.U./dL, 2.0 E.U./dL    Nitrite, UA Negative Negative    Leukocytes, UA Negative Negative   Culture, Urine    Specimen: Urine, Midstream   Result Value Ref Range    Culture 10,000-50,000 col/ml Klebsiella oxytoca (!)        Susceptibility    Klebsiella oxytoca - ZACH     Amoxicillin + Clavulanate <=4/2 Sensitive      Ampicillin >16 Resistant      Cefazolin >16 Resistant      Cefepime <=1 Sensitive      Ceftriaxone <=1 Sensitive      Ciprofloxacin <=0.5 Sensitive      Gentamicin <=2 Sensitive      Levofloxacin <=1 Sensitive      Meropenem <=0.25 Sensitive      Nitrofurantoin <=16 Sensitive      Tetracycline <=2 Sensitive      Tobramycin <=2 Sensitive       Trimethoprim + Sulfamethoxazole <=0.5 Sensitive    Basic Metabolic Panel   Result Value Ref Range    Sodium 143 136 - 145 mmol/L    Potassium 4.3 3.5 - 5.0 mmol/L    Chloride 111 (H) 98 - 107 mmol/L    CO2 24 22 - 31 mmol/L    Anion Gap, Calculation 8 5 - 18 mmol/L    Glucose 83 70 - 125 mg/dL    Calcium 9.2 8.5 - 10.5 mg/dL    BUN 13 8 - 28 mg/dL    Creatinine 0.95 0.70 - 1.30 mg/dL    GFR MDRD Af Amer >60 >60 mL/min/1.73m2    GFR MDRD Non Af Amer >60 >60 mL/min/1.73m2     ASSESSMENT:      ICD-10-CM    1. Encounter for medication review  Z79.899        PLAN:    Dementia with behavioral disturbance- Provide a safe environment resume 7.5 mg Remeron , Seroquel was discontinued in the hospital per family request    Hypokalemia -potassium on 8/16/20 was 3.1 recheck ordered for AM     Pressure ulcers- wounds cares as ordered    Cellulitis- completed clindamycin     Urinary frequency - U/AU/C negative, diuretics decreased.       Electronically signed by: Mayuri Herman CNP

## 2021-06-11 NOTE — PROGRESS NOTES
Code Status:  FULL CODE  Visit Type: Problem Visit (Fall, anxiety)     Facility:  [facility] [[id number]]      Facility Type: SNF (Skilled Nursing Facility, TCU)    History of Present Illness:   Hospital Admission Date: 8/3/2020 Hospital Discharge Date: 8/7/2020     Brock Abarca is a 88 y.o. male with a past medical history for HFpEF, chronic edema, CKD3, CAD, hx PE, CVA, HTN, HLD, dementia, hypothyroidism, anxiety/depression, OA, Osteoporosis. He was recently hospitalized for severe sepsis needing to be in the ICU on vasopressors. COVID was negative, BC and UC was negative, chest xray was negative.  Sepsis was felt to be due to cellulitis 2/2 venous stasis and stasis ulcers.  IV abx started and changed to po clindamycin. Unfortunately, we do not have thorough medical records due to no discharge summary.    Today, nursing request that I see him due to a fall in the middle of the night.  Nursing reports that they found him lying on the floor in his room and so fall was unwitnessed.  He did not appear to have any injury however on-call provider did recommend he be transferred to the emergency room for further evaluation due to his history.  He was sent to [facility].  I do not have the ER notes however nursing reports that all work-up was negative without injury and he was sent back to the facility.  He is planning to be discharged next week to an assisted living facility for memory care.  During his stay he has had lots of difficulty with restlessness, agitation and confusion.  I did speak with his wife on the phone during my exam as he was very sleepy.  She is voicing some exhaustion due to his rapid changes.  I did recommend that she have him follow-up with a neurologist that is a dementia specialist in order to slam back on medications and be put on medications that are appropriate for him.  I did voice concern about Ativan use with his continued falls and being on Plavix.  He is going to be  following up with urology next week.    Today, upon assessment he is sleeping in bed and he is difficult to arouse.  He does stir and moan to physical stimulation however does not open his eyes or answer my questions.  This is likely due to poor sleep last night and getting his morning dose of Ativan.  Earlier this week nursing had requested scheduled Ativan twice a day due to increased agitation and not being treated timely.  Lower extremities remain stable with lymphedema bilaterally.    Catheter draining clear yellow urine.        Current Outpatient Medications   Medication Sig Dispense Refill     acetaminophen (TYLENOL) 325 MG tablet Take 650 mg by mouth every 6 (six) hours as needed for pain.       bumetanide (BUMEX) 1 MG tablet Take 1 mg by mouth 2 (two) times a day.        clopidogreL (PLAVIX) 75 mg tablet Take 75 mg by mouth daily.       levothyroxine (SYNTHROID, LEVOTHROID) 112 MCG tablet Take 112 mcg by mouth daily.       loperamide (IMODIUM) 2 mg capsule Take 2 mg by mouth every 6 (six) hours as needed for diarrhea.       LORazepam (ATIVAN) 0.5 MG tablet Take 1 tablet (0.5 mg total) by mouth 2 (two) times a day. 30 tablet 0     metoprolol succinate (TOPROL-XL) 50 MG 24 hr tablet Take 50 mg by mouth daily.       mirtazapine (REMERON) 7.5 MG tablet Take 7.5 mg by mouth at bedtime.       multivitamin (TAB-A-LALO ORAL) Take 1 tablet by mouth daily.       olopatadine (PATANOL) 0.1 % ophthalmic solution Administer 1 drop to both eyes daily as needed for allergies.        polyethylene glycol (MIRALAX) 17 gram packet Take 17 g by mouth daily as needed.       potassium chloride (K-DUR,KLOR-CON) 10 MEQ tablet Take 40 mEq by mouth 2 (two) times a day.       senna (SENOKOT) 8.6 mg tablet Take 1 tablet by mouth daily as needed for constipation.        sertraline (ZOLOFT) 100 MG tablet Take 100 mg by mouth daily.       simvastatin (ZOCOR) 40 MG tablet Take 40 mg by mouth daily.       tamsulosin (FLOMAX) 0.4 mg cap Take  0.4 mg by mouth daily after supper.       trospium (SANCTURA XR) 60 mg Cp24 ER capsule Take 60 mg by mouth daily before breakfast.       No current facility-administered medications for this visit.          Review of Systems   Review of systems difficult due to patient's sleepiness        Physical Exam   In order to maintain social distancing during the COVID pandemic, a visual physical exam was completed.    /72, heart rate 92, respirations 16, temp 97.6, weight 166.6 pounds.    Patient appearing in no distress.  Head is atraumatic and normocephalic.  Does not open eyes to physical stimulation however does moan and move his body.  Respiratory effort is normal.  Lower extremities with lymphedema bilaterally is stable.  Benoit catheter draining clear yellow urine.      Assessment:  1. Dementia with behavioral disturbance, unspecified dementia type (H)     2. Falls frequently         Plan:   In speaking with his wife I did recommend that he follow-up with a neurologist that specializes in dementia in order to parse down his medications and determine the best combination for his restlessness, agitation and frequent falls.  I would recommend starting him on risperidone however wife and daughter do not want him started on an antipsychotic as they felt the Seroquel had increased his agitation on a prior admission.  At this time I will make no adjustments to his Ativan which is scheduled twice a day and PRN.  He also will need to continue on his Zoloft and Remeron.  He is on anticholinergics for his urinary retention and I encouraged the wife to discuss this with his urologist at his appointment next week, as these medications can oftentimes cause falls and confusion and dementia patients.     I did  her that he will only have increased agitation and behaviors when transitioned to the assisted living.  I assured her to hang in there and follow-up with her primary provider.  He does have frequent falls due to  his impulsivity and confusion.  He often times does not remember things in the last 5 minutes.  He will need supervision to allow for safety.    35 total minutes spent with 20 minutes spent face-to-face with patient and wife on the phone due to patient's lethargy and somnolence, counseling and coordination of the above plan of care.    Electronically signed by: Ruth Escalante CNP

## 2021-06-20 NOTE — LETTER
Letter by Ruth Escalante CNP at      Author: Ruth Escalante CNP Service: -- Author Type: --    Filed:  Encounter Date: 9/8/2020 Status: (Other)         Patient: Brock Abarca   MR Number: [id number]   YOB: 1932   Date of Visit: 9/8/2020     Code Status:  FULL CODE  Visit Type: Discharge Summary and Problem Visit (frequent falls)     Facility:  [facility] [[id number]]          PCP:  Sadaf Fox MD  [phone]       Admission Date to our Facility: 8/7/2020 Discharge Date from our Facility: 9/10/2020    Discharge Diagnosis:    1. Dementia with behavioral disturbance, unspecified dementia type (H)     2. Falls frequently     3. Lymphedema     4. Urinary frequency          History of Present Illness: Brock Abarca is a 88 y.o. male with a past medical history for HFpEF, chronic edema, CKD3, CAD, hx PE, CVA, HTN, HLD, dementia, hypothyroidism, anxiety/depression, OA, Osteoporosis. He was recently hospitalized 8/4/2020 for severe sepsis needing to be in the ICU on vasopressors. COVID was negative, BC and UC was negative, chest xray was negative.  Sepsis was felt to be due to cellulitis 2/2 venous stasis and stasis ulcers.  IV abx started and changed to po clindamycin.    He did have another hospitalization from 8/12/to 8/16 after a fall at the TCU and restless/agitated behavior for which was recently started on Seroquel.  He was found to have a K of 2.4 and WILLIS which improved with reduction of Bumex.  Family requested that the Seroquel be discontinued due to feeling this had increased his anxiety.     Skilled Nursing Facility Course:  While at the TCU, he had issues with urinary frequency and so a molina catheter was placed due to frequent falls and him trying to get up to the bathroom.  He was placed on trospium and tamsulosin in the hospital, however due to his most recent frequent falls I did discontinue the trospium in efforts to minimize anticholinergic effects. He will be  following up with urology later this week.  His agitation/restlessness and dementia plays a big role in his falls.  Upon return to the TCU another provider started him on ativan prn.  Last week, nursing was concerned that he was getting too agitated and so his ativan was scheduled two times a day.  Since then he has had approximately 6 falls without injury.  I did change the ativan back to three times a day prn.  I don't believe that ativan is the best medication for his restlessness however family is resistant to trying risperidone.  I did speak with the wife last week and recommend that she get an appointment with Neurology that specializes in dementia to be evaluated for polypharm and find the right medications for his behaviors.      He has had some weight loss and will need close monitoring and supplementation.  Weight today is 176lbs down from admission weight of 190lbs.     Discharge Medications:    Current Outpatient Medications   Medication Sig Dispense Refill   ? acetaminophen (TYLENOL) 325 MG tablet Take 650 mg by mouth every 6 (six) hours as needed for pain.     ? bumetanide (BUMEX) 1 MG tablet Take 1 mg by mouth 2 (two) times a day.      ? clopidogreL (PLAVIX) 75 mg tablet Take 75 mg by mouth daily.     ? levothyroxine (SYNTHROID, LEVOTHROID) 112 MCG tablet Take 112 mcg by mouth daily.     ? loperamide (IMODIUM) 2 mg capsule Take 2 mg by mouth every 6 (six) hours as needed for diarrhea.     ? LORazepam (ATIVAN) 0.5 MG tablet Take 1 tablet (0.5 mg total) by mouth 2 (two) times a day. (Patient taking differently: Take 0.5 mg by mouth 3 (three) times a day as needed. ) 30 tablet 0   ? metoprolol succinate (TOPROL-XL) 50 MG 24 hr tablet Take 50 mg by mouth daily.     ? mirtazapine (REMERON) 7.5 MG tablet Take 7.5 mg by mouth at bedtime.     ? multivitamin (TAB-A-LALO ORAL) Take 1 tablet by mouth daily.     ? olopatadine (PATANOL) 0.1 % ophthalmic solution Administer 1 drop to both eyes daily as needed for  allergies.      ? polyethylene glycol (MIRALAX) 17 gram packet Take 17 g by mouth daily as needed.     ? potassium chloride (K-DUR,KLOR-CON) 10 MEQ tablet Take 40 mEq by mouth 2 (two) times a day.     ? senna (SENOKOT) 8.6 mg tablet Take 1 tablet by mouth daily as needed for constipation.      ? sertraline (ZOLOFT) 100 MG tablet Take 100 mg by mouth daily.     ? simvastatin (ZOCOR) 40 MG tablet Take 40 mg by mouth daily.     ? tamsulosin (FLOMAX) 0.4 mg cap Take 0.4 mg by mouth daily after supper.     ? trospium (SANCTURA XR) 60 mg Cp24 ER capsule Take 60 mg by mouth daily before breakfast.       No current facility-administered medications for this visit.        For most current and accurate medication list, please contact the skilled nursing facility that this patient visit took place at.      Discharge Plan:  Patient is stable to discharge to [facility] .  He is high falls risk due to impulsivity and anxiety.  Will need close follow up and recommend for him to follow up with neurology.      Review of Systems   ROS difficult to obtain due to patient's [place] and dementia    Physical Exam   In order to maintain social distancing during the COVID pandemic, a visual exam was completed.     Vitals: /68, HR 84, resp 17, temp 97.7.     Patient is elderly male in no distress.  Head is AT/NC and EOM intact.  Respiratory effort is normal.  LE with chornic lymphedema.  Benoit catheter draining clear yellow urine.  Alert with cognitive impairment.  Mood is stable today.     Labs:  No results found for this or any previous visit (from the past 240 hour(s)).      Assessment:  1. Dementia with behavioral disturbance, unspecified dementia type (H)     2. Falls frequently     3. Lymphedema     4. Urinary frequency         MEDICAL EQUIPMENT NEEDS:  NA        The patient is, or has been, under my care and I have initiated the establishment of the plan of care. This patient will be followed by a physician who will periodically review the  plan of care.        Electronically signed by: Ruth Escalante, CNP

## 2021-06-20 NOTE — LETTER
Letter by Mayuri Herman CNP at      Author: Mayuri Herman CNP Service: -- Author Type: --    Filed:  Encounter Date: 8/25/2020 Status: (Other)         Patient: Brock Abarca   MR Number: [id number]   YOB: 1932   Date of Visit: 8/25/2020     [facility]    Facility:   [facility] [[id number]]   Code Status:Full      CHIEF COMPLAINT/REASON FOR VISIT:  Chief Complaint   Patient presents with   ? Review Of Multiple Medical Conditions     F/U behaviors, sepsis, labs       HISTORY:      HPI: Brock is a 88 y.o. male undergoing physical and occupational therapy at [facility]. with a past medical history for HFpEF, chronic edema, CKD3, CAD, hx PE, CVA, HTN, HLD, dementia, hypothyroidism, anxiety/depression, OA, Osteoporosis. He was recently hospitalized for severe sepsis needing to be in the ICU on vasopressors. COVID was negative, BC and UC was negative, chest xray was negative.  Sepsis was felt to be due to cellulitis 2/2 venous stasis and stasis ulcers.  IV abx started and changed to po clindamycin.    Today patient is seen for a face to face for medication review.  His U/A was negative and UC came back 10,000-50,000 col/ml Klebsiella oxytoca. He was afebrile and not treated at that time. However colleague did treat him due to his behaviors and confusion. He continues to be oriented to self only. His ativan was renewed for 14 days. He is on 1:1  safety sitter.   Nurse reported he was calm yesterday and no increased behaviors. If he continues to be calm then ativan will be stopped.  He  Denies fever, chills,  Denies any chest pain, headaches, lightheadedness, dizziness, shortness of breath, or cough. Appetite is good. Denies any GERD symptoms. Denies any difficulty with swallowing, Denies any abdominal pain, constipation or loose stools. No insomnia. No active bleeding.  He does have intermittent left hip pain and reports relief with Tylenol.         Past  Medical History:   Diagnosis Date   ? Arthritis    ? BPH (benign prostatic hyperplasia)    ? CAD (coronary artery disease)    ? CHF (congestive heart failure) (H)    ? Chronic heart failure with preserved ejection fraction (H)    ? Chronic renal disease, stage III (H)    ? CVA (cerebral vascular accident) (H)    ? Diverticulitis    ? Hand paresthesia    ? HLD (hyperlipidemia)    ? HTN (hypertension)    ? Hypercalcemia    ? Hyperparathyroidism (H)    ? Hypokalemia    ? Hypothyroid    ? Hypovitaminosis D    ? Left foot drop    ? Osteoporosis    ? Pulmonary embolism (H)    ? Severe sepsis (H)    ? Urinary frequency              Family History   Problem Relation Age of Onset   ? COPD Mother    ? Stroke Father    ? Diabetes Sister    ? Kidney failure Sister      Social History     Socioeconomic History   ? Marital status:      Spouse name: Not on file   ? Number of children: Not on file   ? Years of education: Not on file   ? Highest education level: Not on file   Occupational History   ? Not on file   Social Needs   ? Financial resource strain: Not on file   ? Food insecurity     Worry: Not on file     Inability: Not on file   ? Transportation needs     Medical: Not on file     Non-medical: Not on file   Tobacco Use   ? Smoking status: Former Smoker     Years: 5.00     Types: Pipe     Last attempt to quit: 2003     Years since quittin.6   ? Smokeless tobacco: Never Used   Substance and Sexual Activity   ? Alcohol use: Yes   ? Drug use: Not on file   ? Sexual activity: Not on file   Lifestyle   ? Physical activity     Days per week: Not on file     Minutes per session: Not on file   ? Stress: Not on file   Relationships   ? Social connections     Talks on phone: Not on file     Gets together: Not on file     Attends Mu-ism service: Not on file     Active member of club or organization: Not on file     Attends meetings of clubs or organizations: Not on file     Relationship status: Not on file   ?  Intimate partner violence     Fear of current or ex partner: Not on file     Emotionally abused: Not on file     Physically abused: Not on file     Forced sexual activity: Not on file   Other Topics Concern   ? Not on file   Social History Narrative   ? Not on file         Review of Systems   Constitutional: Negative for activity change, appetite change, chills, fatigue and fever.   HENT: Negative for congestion and sore throat.    Eyes: Negative for visual disturbance.   Respiratory: Negative for cough, shortness of breath and wheezing.    Cardiovascular: Negative for chest pain and leg swelling.   Gastrointestinal: Negative for abdominal distention, abdominal pain, constipation, diarrhea and nausea.   Genitourinary: Negative for dysuria.   Musculoskeletal: Positive for arthralgias. Negative for myalgias.        Intermittent left hip pain.    Skin: Negative for color change, rash and wound.   Neurological: Negative for dizziness, weakness and numbness.   Psychiatric/Behavioral: Positive for confusion. Negative for agitation, behavioral problems and sleep disturbance.       Vitals:    08/25/20 0954   BP: 132/71   Pulse: 77   Resp: 18   Temp: 97.9  F (36.6  C)   SpO2: 97%   Weight: 171 lb 3.2 oz (77.7 kg)       Physical Exam  Constitutional:       Appearance: He is well-developed.      Comments: Pleasant gentleman in no acute distress, noted to be oriented to self only   HENT:      Head: Normocephalic.   Eyes:      Conjunctiva/sclera: Conjunctivae normal.   Neck:      Musculoskeletal: Normal range of motion.   Cardiovascular:      Rate and Rhythm: Normal rate and regular rhythm.      Heart sounds: Normal heart sounds. No murmur.   Pulmonary:      Effort: No respiratory distress.      Breath sounds: Normal breath sounds. No wheezing or rales.   Abdominal:      General: Bowel sounds are normal. There is no distension.      Palpations: Abdomen is soft.      Tenderness: There is no abdominal tenderness.    Musculoskeletal: Normal range of motion.   Skin:     General: Skin is warm.   Neurological:      Mental Status: He is alert and oriented to person, place, and time.   Psychiatric:         Behavior: Behavior normal.           LABS:   Recent Results (from the past 240 hour(s))   Urinalysis   Result Value Ref Range    Color, UA Yellow Colorless, Yellow, Straw, Light Yellow    Clarity, UA Clear Clear    Glucose, UA Negative Negative    Bilirubin, UA Negative Negative    Ketones, UA Negative Negative    Specific Gravity, UA 1.011 1.001 - 1.030    Blood, UA Negative Negative    pH, UA 6.0 4.5 - 8.0    Protein, UA Negative Negative mg/dL    Urobilinogen, UA <2.0 E.U./dL <2.0 E.U./dL, 2.0 E.U./dL    Nitrite, UA Negative Negative    Leukocytes, UA Negative Negative   Culture, Urine    Specimen: Urine, Midstream   Result Value Ref Range    Culture 10,000-50,000 col/ml Klebsiella oxytoca (!)        Susceptibility    Klebsiella oxytoca - ZACH     Amoxicillin + Clavulanate <=4/2 Sensitive      Ampicillin >16 Resistant      Cefazolin >16 Resistant      Cefepime <=1 Sensitive      Ceftriaxone <=1 Sensitive      Ciprofloxacin <=0.5 Sensitive      Gentamicin <=2 Sensitive      Levofloxacin <=1 Sensitive      Meropenem <=0.25 Sensitive      Nitrofurantoin <=16 Sensitive      Tetracycline <=2 Sensitive      Tobramycin <=2 Sensitive      Trimethoprim + Sulfamethoxazole <=0.5 Sensitive    Basic Metabolic Panel   Result Value Ref Range    Sodium 143 136 - 145 mmol/L    Potassium 4.3 3.5 - 5.0 mmol/L    Chloride 111 (H) 98 - 107 mmol/L    CO2 24 22 - 31 mmol/L    Anion Gap, Calculation 8 5 - 18 mmol/L    Glucose 83 70 - 125 mg/dL    Calcium 9.2 8.5 - 10.5 mg/dL    BUN 13 8 - 28 mg/dL    Creatinine 0.95 0.70 - 1.30 mg/dL    GFR MDRD Af Amer >60 >60 mL/min/1.73m2    GFR MDRD Non Af Amer >60 >60 mL/min/1.73m2   HM1 (CBC with Diff)   Result Value Ref Range    WBC 5.8 4.0 - 11.0 thou/uL    RBC 3.60 (L) 4.40 - 6.20 mill/uL    Hemoglobin 11.2  (L) 14.0 - 18.0 g/dL    Hematocrit 33.6 (L) 40.0 - 54.0 %    MCV 93 80 - 100 fL    MCH 31.1 27.0 - 34.0 pg    MCHC 33.3 32.0 - 36.0 g/dL    RDW 14.5 11.0 - 14.5 %    Platelets 136 (L) 140 - 440 thou/uL    MPV 11.3 8.5 - 12.5 fL    Neutrophils % 66 50 - 70 %    Lymphocytes % 18 (L) 20 - 40 %    Monocytes % 11 (H) 2 - 10 %    Eosinophils % 3 0 - 6 %    Basophils % 1 0 - 2 %    Immature Granulocyte % 1 (H) <=0 %    Neutrophils Absolute 3.9 2.0 - 7.7 thou/uL    Lymphocytes Absolute 1.1 0.8 - 4.4 thou/uL    Monocytes Absolute 0.6 0.0 - 0.9 thou/uL    Eosinophils Absolute 0.2 0.0 - 0.4 thou/uL    Basophils Absolute 0.0 0.0 - 0.2 thou/uL    Immature Granulocyte Absolute 0.1 (H) <=0.0 thou/uL   Basic Metabolic Panel   Result Value Ref Range    Sodium 140 136 - 145 mmol/L    Potassium 3.7 3.5 - 5.0 mmol/L    Chloride 108 (H) 98 - 107 mmol/L    CO2 24 22 - 31 mmol/L    Anion Gap, Calculation 8 5 - 18 mmol/L    Glucose 89 70 - 125 mg/dL    Calcium 8.3 (L) 8.5 - 10.5 mg/dL    BUN 15 8 - 28 mg/dL    Creatinine 1.06 0.70 - 1.30 mg/dL    GFR MDRD Af Amer >60 >60 mL/min/1.73m2    GFR MDRD Non Af Amer >60 >60 mL/min/1.73m2     ASSESSMENT:      ICD-10-CM    1. Severe sepsis due to cellulitis  A41.9     R65.20    2. Dementia with behavioral disturbance, unspecified dementia type (H)  F03.91    3. Encounter for medication review and counseling  Z71.89        PLAN:    Dementia with behavioral disturbance- Provide a safe environment resume 7.5 mg Remeron , Seroquel was discontinued in the hospital per family request    Hypokalemia -potassium on 8/16/20 was 3.1 recheck after replacement was 4.3.     Pressure ulcers- wounds cares as ordered    Cellulitis- completed clindamycin     Urinary frequency - U/AU/C negative, diuretics  recently decreased. No swelling and pt with a 9# weight loss over 17 days.        Electronically signed by: Mayuri Herman CNP

## 2021-06-20 NOTE — LETTER
Letter by Yoel Nugent MD at      Author: Yoel Nugent MD Service: -- Author Type: --    Filed:  Encounter Date: 8/7/2020 Status: (Other)         Patient: Brock Abarca   MR Number: [id number]   YOB: 1932   Date of Visit: 8/7/2020      [facility]    Facility:  [facility] [[id number]]    Code Status:  FULL CODE    Chief Complaint   Patient presents with   ? H & P   :                    Patient Active Problem List   Diagnosis   ? Severe sepsis due to cellulitis   ? Decubitus ulcers   ? Dementia with behavioral disturbance (H)       History:  Brock Abarca  is an 88 year old male with history of heart failure with preserved ejection fraction, chronic pedal edema, CKD 3, coronary artery disease, remote PE, CVA, hypertension, hyperlipidemia, dementia, hard of hearing, hypothyroidism, anxiety/depression, osteoarthritis, osteoporosis, cervical decompression C3-4, left ureteral stent/lithotripsy procedure e seen for admission to TCU on 8/7/2020    Hospital Course: Patient was admitted to [facility] between August 3 and August 6.  At home he was very weak and slid onto the floor off of his chair and family summoned help.    Work-up revealed fever to 101.3.  He was diagnosed with severe sepsis and required ICU admission with vasopressors.  Work-up included negative chest x-ray negative COVID-19 negative blood cultures negative urine culture.  Sepsis was felt secondary to cellulitis associated with his venous stasis/decubitus ulcers.  Initial broad-spectrum IV antibiotics were narrowed to p.o. clindamycin which he continues on at this point.  I do not have culture result information.      Unfortunately I do not have access to the discharge summary which has not yet been performed per the hospital.  I do not yet have access to care everywhere.  Staff is working on getting me better medical record access.    Subjective/ROS:     -augmented by discussion with facility staff involved in direct care.  Augmented by discussion with patient's wife by phone today.  -limited by: Lack of medical record access, memory      -Staff greets me with the news that patient has been having behavioral issues, wandering is a big problem.  He is calling his family members 15-30 times a day.  He is continually asking to return home.  He has been trying to call his sister who is been dead for 6 years.  He is generally been redirectable.  He just arrived yesterday afternoon so no one knows him well yet.  - Patient is pleasantly confused and redirectable when I see him.  He is just been up to the bathroom without getting help and has urinated in his clothes.  Staff is helping him get cleaned up.  -Patient does not recall the hospitalization.  He does not recall why he is here.  He does not know the name of this place and thought it was [facility] last night.  -He says he feels fine and wants to go back home.  He denies pain.  He denies knowledge of his pressure ulcers  -Patient additionally denies headaches change in vision speaking swallowing or hearing.  He is clearly hard of hearing family scheduling of his left hearing aid is right is already here.  OT is fitted him with talking box which is helpful with interview.  He denies sore throat chest pain cough shortness of breath orthopnea PND he is unaware of his peripheral edema and denies it.  The remainder of ROS is negative as well but of dubious accuracy.  However I did talk things over with his wife who reports that besides the wounds at home he does pretty well and that she is talked to them many times with his calls and she can tell that his brain is not working nearly as well as it should.  Remainder negative or unobtainable                            Past medical/surgical history:  heart failure with preserved ejection fraction, chronic pedal edema, CKD 3, coronary artery disease, remote PE, CVA,  hypertension, hyperlipidemia, dementia, hard of hearing, hypothyroidism, anxiety/depression, osteoarthritis, osteoporosis, cervical decompression C3-4, left ureteral stent/lithotripsy procedure    Family history: Mom had COPD dad had CVA Sister had diabetes Sister had chronic kidney disease    Social History     Socioeconomic History   ? Marital status:      Spouse name: Not on file   ? Number of children: Not on file   ? Years of education: Not on file   ? Highest education level: Not on file   Occupational History   ? Not on file   Social Needs   ? Financial resource strain: Not on file   ? Food insecurity     Worry: Not on file     Inability: Not on file   ? Transportation needs     Medical: Not on file     Non-medical: Not on file   Tobacco Use   ? Smoking status: Not on file   Substance and Sexual Activity   ? Alcohol use: Not on file   ? Drug use: Not on file   ? Sexual activity: Not on file   Lifestyle   ? Physical activity     Days per week: Not on file     Minutes per session: Not on file   ? Stress: Not on file   Relationships   ? Social connections     Talks on phone: Not on file     Gets together: Not on file     Attends Zoroastrian service: Not on file     Active member of club or organization: Not on file     Attends meetings of clubs or organizations: Not on file     Relationship status: Not on file   ? Intimate partner violence     Fear of current or ex partner: Not on file     Emotionally abused: Not on file     Physically abused: Not on file     Forced sexual activity: Not on file   Other Topics Concern   ? Not on file   Social History Narrative   ? Not on file   :    Social history: Patient is  he lives independently with his wife    Current Outpatient Medications on File Prior to Visit   Medication Sig Dispense Refill   ? UNABLE TO FIND Medications as of August 7, 2020  Toprol XL Tablet Extended Release 24 Hour 50 MG (Metoprolol Succinate ER)   Give 50 mg by mouth one time a day for  CHF  Pharmacy Active 8/7/2020 08:00  8/6/2020  Senna Tablet 8.6 MG (Sennosides)   Give 1 tablet by mouth as needed for constipation daily AND Give 2 tablet by mouth as needed for constipation daily  Pharmacy Active 8/6/2020 13:45  8/6/2020  Trospium Chloride ER Capsule Extended Release 24 Hour 60 MG   Give 60 mg by mouth one time a day for overactive bladder  Pharmacy Active 8/7/2020 06:00  8/6/2020  Zocor Tablet 40 MG (Simvastatin)   Give 40 mg by mouth one time a day for excessive fat in the blood  Pharmacy Active 8/7/2020 08:00  8/6/2020  metOLazone Tablet 2.5 MG   Give 2.5 mg by mouth one time a day every Mon for CHF take 30 minutes before Bumex  Pharmacy Active 8/10/2020 07:00  8/6/2020  Patanol Solution 0.1 % (Olopatadine HCl)   Instill 1 drop in both eyes as needed for eye irritation 2x/daily  Pharmacy Active 8/6/2020 14:00  8/6/2020  Acetaminophen Tablet 325 MG   Give 650 mg by mouth every 6 hours as needed for pain max 4000mg in 24hrs  Pharmacy Active 8/6/2020 13:45  8/6/2020  There is a black box warning associated with this order. Please click to view details. Clindamycin HCl Capsule 300 MG   Give 300 mg by mouth three times a day for cellulitis for 12 Administrations x4 days  Pharmacy Active 8/6/2020 20:00  8/6/2020  MiraLax Powder 17 GM/SCOOP (Polyethylene Glycol 3350)   Give 1 scoop by mouth as needed for constipation daily  Pharmacy Active 8/6/2020 13:45  8/6/2020  There is a black box warning associated with this order. Please click to view details. Synthroid Tablet 112 MCG (Levothyroxine Sodium)   Give 112 mcg by mouth one time a day for low thyroid levels  Pharmacy Active 8/7/2020 06:00  8/6/2020  There is a black box warning associated with this order. Please click to view details. Zoloft Tablet 100 MG (Sertraline HCl)   Give 100 mg by mouth one time a day for depression  Pharmacy Active 8/7/2020 08:00  8/6/2020  There is a black box warning associated with this order. Please click to view  details. Plavix Tablet 75 MG (Clopidogrel Bisulfate)   Give 75 mg by mouth one time a day for clot prevention  Pharmacy Active 8/7/2020 08:00  8/6/2020  Tab-A-Charles Tablet (Multiple Vitamins-Minerals)   Give 1 tablet by mouth one time a day for prevent vitamin deficiency  Pharmacy Active 8/7/2020 08:00  8/6/2020  Flomax Capsule 0.4 MG (Tamsulosin HCl)   Give 0.4 mg by mouth at bedtime for BPH  Pharmacy Active 8/6/2020 20:00  8/6/2020  There is a black box warning associated with this order. Please click to view details. Bumex Tablet 1 MG (Bumetanide)   Give 3 mg by mouth one time a day for CHF AND Give 2 mg by mouth one time a day for CHF  Pharmacy Active 8/7/2020 07:30  8/6/2020  Potassium Chloride ER Tablet Extended Release 20 MEQ   Give 20 mEq by mouth two times a day for chronic renal disease  Pharmacy Active 8/6/2020 17:00  8/6/2020  Claritin-D 24 Hour Tablet Extended Release 24 Hour  MG (Loratadine-Pseudoephedrine ER)   Give 1 tablet by mouth as needed for allergy symptoms for 13 Days daily Document non-pharmacological interventions attempted prior to administration of medication.  Pharmacy Active 8/7/2020 07:45 8/20/2020 8/7/2020  There is a black box warning associated with this order. Please click to view details. SEROquel Tablet 25 MG (QUEtiapine Fumarate)   Give 12.5 mg by mouth at bedtime for agitiation AND Give 12.5 mg by mouth as needed for NON-redirectable agitation for 14 Days 3x/daily Document non-pharmacological interventions attempted prior to administration. 1. Help pt. call wife 2. Provide calm, quiet environment 3. Provide re-orientation and re-direction to situation  Pharmacy Active 8/7/2020 12:15  8/7/2020       Current Facility-Administered Medications on File Prior to Visit   Medication Dose Route Frequency Provider Last Rate Last Dose   ? [DISCONTINUED] acetaminophen tablet (TYLENOL)  325-650 mg Oral  GENERIC EXTERNAL DATA PROVIDER       ? [DISCONTINUED] bumetanide tablet (BUMEX)  2  mg Oral  GENERIC EXTERNAL DATA PROVIDER       ? [DISCONTINUED] calcium (as carbonate) chewable tablet (TUMS)  500-1,000 mg Oral  GENERIC EXTERNAL DATA PROVIDER       ? [DISCONTINUED] clindamycin capsule (CLEOCIN)  300 mg Oral  GENERIC EXTERNAL DATA PROVIDER       ? [DISCONTINUED] clopidogreL tablet (PLAVIX)  75 mg Oral  GENERIC EXTERNAL DATA PROVIDER       ? [DISCONTINUED] GENERIC EXTERNAL MEDICATION     GENERIC EXTERNAL DATA PROVIDER       ? [DISCONTINUED] GENERIC EXTERNAL MEDICATION   NOT APPLICABLE  GENERIC EXTERNAL DATA PROVIDER       ? [DISCONTINUED] GENERIC EXTERNAL MEDICATION   NOT APPLICABLE  GENERIC EXTERNAL DATA PROVIDER       ? [DISCONTINUED] GENERIC EXTERNAL MEDICATION  3 mg Oral  GENERIC EXTERNAL DATA PROVIDER       ? [DISCONTINUED] heparin (porcine) injection  5,000 Units Subcutaneous  GENERIC EXTERNAL DATA PROVIDER       ? [DISCONTINUED] labetaloL 20 mg/4 mL (5 mg/mL) injection (TRANDATE)  10 mg Intravenous  GENERIC EXTERNAL DATA PROVIDER       ? [DISCONTINUED] levothyroxine tablet (SYNTHROID, LEVOTHROID)  112 mcg Oral  GENERIC EXTERNAL DATA PROVIDER       ? [DISCONTINUED] melatonin tablet  3 mg Oral  GENERIC EXTERNAL DATA PROVIDER       ? [DISCONTINUED] menthol Lozg  1 lozenge Sublingual  GENERIC EXTERNAL DATA PROVIDER       ? [DISCONTINUED] metoprolol succinate 24 hr tablet (TOPROL-XL)  50 mg Oral  GENERIC EXTERNAL DATA PROVIDER       ? [DISCONTINUED] mirtazapine tablet (REMERON)  7.5 mg Oral  GENERIC EXTERNAL DATA PROVIDER       ? [DISCONTINUED] naloxone injection (NARCAN)  0.08 mg Intravenous  GENERIC EXTERNAL DATA PROVIDER       ? [DISCONTINUED] olopatadine 0.1 % ophthalmic solution (PATANOL)  1 drop Ophthalmic  GENERIC EXTERNAL DATA PROVIDER       ? [DISCONTINUED] polyethylene glycol packet (MIRALAX)  17 g Nasogastric  GENERIC EXTERNAL DATA PROVIDER       ? [DISCONTINUED] potassium chloride CR tablet (K-DUR,KLOR-CON)  20 mEq Oral  GENERIC EXTERNAL DATA PROVIDER       ? [DISCONTINUED] senna  tablet (SENOKOT)  8.6-17.2 mg Oral  GENERIC EXTERNAL DATA PROVIDER       ? [DISCONTINUED] sertraline tablet (ZOLOFT)  100 mg Oral  GENERIC EXTERNAL DATA PROVIDER       ? [DISCONTINUED] simvastatin tablet (ZOCOR)  40 mg Oral  GENERIC EXTERNAL DATA PROVIDER       ? [DISCONTINUED] sodium chloride flush (NS)  5 mL Intravenous  GENERIC EXTERNAL DATA PROVIDER       ? [DISCONTINUED] sodium chloride flush (NS)  5 mL Intravenous  GENERIC EXTERNAL DATA PROVIDER       ? [DISCONTINUED] sodium chloride flush (NS)  10 mL Intravenous  GENERIC EXTERNAL DATA PROVIDER       ? [DISCONTINUED] tamsulosin 24 hr capsule (FLOMAX)  0.8 mg Oral  GENERIC EXTERNAL DATA PROVIDER       ? [DISCONTINUED] trospium ER capsule (SANCTURA XR)  60 mg Oral  GENERIC EXTERNAL DATA PROVIDER       :      ALLERGIES:  Ambien [zolpidem]    Vitals:    Current Vitals   BP: 131/61 mmHg  8/7/2020 19:47  Temp:98.3  F  8/7/2020 19:47  Pulse: 66 bpm  8/7/2020 19:47  Weight: 180.6 Lbs  8/7/2020 11:06  Resp: 18 Breaths/min  8/7/2020 19:47  BS:  O2: 95 %  8/7/2020 19:47  Pain: 0  8/7/2020 19:47  Physical exam:    General:  Alert  oriented pleasantly confused quite hard of hearing appears no acute distress he is cooperative with staff who is helping him change his clothes.  He understands my questions and answers appropriately.  He is normocephalic atraumatic kyphotic gaze is conjugate sclera clear pupils are round he is seen to rotate his head in all directions he has difficulty lifting his arm straight above his head he is breathing comfortably without accessory muscle use or tachypnea abdomen is soft without tenderness or mass pulses are strong and regular.  Bilateral sacral area wounds covered with Mepilex I take a peek under that with the nurse and find them to be stage II in appearance  He also has left shin wound/venous stasis ulcer covered with Mepilex.  His left ankle wound is mainly along his old surgical scar which protrudes along its length and is weeping a  "bit of clear fluid but there is no erythema or foul smell.  CMS is intact in the toe tips  Left greater than right venous stasis dermatitis with purplish red discoloration of skin circumferentially    Due to the 2020 Covid 19 pandemic, except as noted above, the patient was visually observed at a 6 foot plus distance.  An observational exam was performed in an effort to keep patient safe from Covid 19 and other communicable diseases.   Labs:  No results found for: WBC, HGB, HCT, MCV, PLT  No results found for this or any previous visit.      No results found for: TSH  No results found for: HGBA1C  [unfilled]  No results found for: [id number]  No results found for: BNP  [unfilled]        Invalid input(s): PRINTERVAL      Assessment/Plan:      ICD-10-CM    1. Severe sepsis due to cellulitis  A41.9     R65.20    2. Pressure injury of skin, unspecified injury stage, unspecified location  L89.90    3. Dementia with behavioral disturbance, unspecified dementia type (H)  F03.91        Severe sepsis resolved  Cellulitis  Decubitus ulcer  Venous stasis ulcer left shin  Venous stasis dermatitis left greater than right  Peripheral edema  Left ankle wound   Patient was seen by wound care at the hospital.  Wound orders are in place and being carried out including Mepilex.  The lower extremity wounds are the \"angriest\" and potential source of infection.  -Wound care as ordered  -Complete clindamycin course  -Elevate legs  -Lymphedema therapy consult  -Lymphedema garment  -Bumex and metolazone  -Monitor weights    Dementia with behavioral disturbance  Acute delirium  Anxiety    depression   Delirium not unexpected due to acute severe illness/ICU admission, unfamiliar surroundings.  Patient has generally been redirectable.  Did start some as needed Remeron in the hospital but it is unclear if he got any and he is only been here one night and getting it that last night.  He is also on Zoloft chronically.  I called his wife and " discussed the situation and the options.  She is very worried about his anxiety and fear.  She knows that he can come home in this state and she really wants us to try to help him calm down so that he can gain the benefit of the therapy.  She thinks that he does not understand that this is temporary and that he has not been abandoned by her/the family  -Seroquel side effects and risks blackbox warning discussed with wife.  We will proceed with 12.5 mg at 8 PM and 12.5 mg 3 times daily as needed.  Depending on his response may push the evening dose and/or schedule it more frequently  -Continue Seroquel  - I did not discontinue Flomax at this time but would consider it.      Coronary artery disease   Says he is not had problems recently.  Continue Plavix and metoprolol and Zocor    Hyperlipidemia   Zocor    CKD 3  History of left ureteral stent and lithotripsy   Avoid nephrotoxins were possible.  BMP CBC next week    Hard of hearing   Able to converse with a talking box    Hypothyroidism   Levothyroxine without change    Constipation   Senna plus MiraLAX in place    Hypertension   On metoprolol alone at this point.  Blood pressures look okay for the most part despite his anxiety    PE   Noted    Remote CVA   Noted continue aspirin statin and blood pressure control as above          Case discussed with:  Primary CNP   Facility staff   Family wife          Yoel Duy Nugent MD

## 2021-06-20 NOTE — LETTER
Letter by Ruth Escalante CNP at      Author: Ruth Escalante CNP Service: -- Author Type: --    Filed:  Encounter Date: 9/3/2020 Status: (Other)         Patient: Brock Abarca   MR Number: [id number]   YOB: 1932   Date of Visit: 9/3/2020     Code Status:  FULL CODE  Visit Type: Problem Visit (Fall, anxiety)     Facility:  [facility] [[id number]]      Facility Type: SNF (Skilled Nursing Facility, TCU)    History of Present Illness:   Hospital Admission Date: 8/3/2020 Hospital Discharge Date: 8/7/2020     Brock Abarca is a 88 y.o. male with a past medical history for HFpEF, chronic edema, CKD3, CAD, hx PE, CVA, HTN, HLD, dementia, hypothyroidism, anxiety/depression, OA, Osteoporosis. He was recently hospitalized for severe sepsis needing to be in the ICU on vasopressors. COVID was negative, BC and UC was negative, chest xray was negative.  Sepsis was felt to be due to cellulitis 2/2 venous stasis and stasis ulcers.  IV abx started and changed to po clindamycin. Unfortunately, we do not have thorough medical records due to no discharge summary.    Today, nursing request that I see him due to a fall in the middle of the night.  Nursing reports that they found him lying on the floor in his room and so fall was unwitnessed.  He did not appear to have any injury however on-call provider did recommend he be transferred to the emergency room for further evaluation due to his history.  He was sent to [facility].  I do not have the ER notes however nursing reports that all work-up was negative without injury and he was sent back to the facility.  He is planning to be discharged next week to an assisted living facility for memory care.  During his stay he has had lots of difficulty with restlessness, agitation and confusion.  I did speak with his wife on the phone during my exam as he was very sleepy.  She is voicing some exhaustion due to his rapid changes.  I did recommend that she  have him follow-up with a neurologist that is a dementia specialist in order to slam back on medications and be put on medications that are appropriate for him.  I did voice concern about Ativan use with his continued falls and being on Plavix.  He is going to be following up with urology next week.    Today, upon assessment he is sleeping in bed and he is difficult to arouse.  He does stir and moan to physical stimulation however does not open his eyes or answer my questions.  This is likely due to poor sleep last night and getting his morning dose of Ativan.  Earlier this week nursing had requested scheduled Ativan twice a day due to increased agitation and not being treated timely.  Lower extremities remain stable with lymphedema bilaterally.    Catheter draining clear yellow urine.        Current Outpatient Medications   Medication Sig Dispense Refill   ? acetaminophen (TYLENOL) 325 MG tablet Take 650 mg by mouth every 6 (six) hours as needed for pain.     ? bumetanide (BUMEX) 1 MG tablet Take 1 mg by mouth 2 (two) times a day.      ? clopidogreL (PLAVIX) 75 mg tablet Take 75 mg by mouth daily.     ? levothyroxine (SYNTHROID, LEVOTHROID) 112 MCG tablet Take 112 mcg by mouth daily.     ? loperamide (IMODIUM) 2 mg capsule Take 2 mg by mouth every 6 (six) hours as needed for diarrhea.     ? LORazepam (ATIVAN) 0.5 MG tablet Take 1 tablet (0.5 mg total) by mouth 2 (two) times a day. 30 tablet 0   ? metoprolol succinate (TOPROL-XL) 50 MG 24 hr tablet Take 50 mg by mouth daily.     ? mirtazapine (REMERON) 7.5 MG tablet Take 7.5 mg by mouth at bedtime.     ? multivitamin (TAB-A-LALO ORAL) Take 1 tablet by mouth daily.     ? olopatadine (PATANOL) 0.1 % ophthalmic solution Administer 1 drop to both eyes daily as needed for allergies.      ? polyethylene glycol (MIRALAX) 17 gram packet Take 17 g by mouth daily as needed.     ? potassium chloride (K-DUR,KLOR-CON) 10 MEQ tablet Take 40 mEq by mouth 2 (two) times a day.     ?  senna (SENOKOT) 8.6 mg tablet Take 1 tablet by mouth daily as needed for constipation.      ? sertraline (ZOLOFT) 100 MG tablet Take 100 mg by mouth daily.     ? simvastatin (ZOCOR) 40 MG tablet Take 40 mg by mouth daily.     ? tamsulosin (FLOMAX) 0.4 mg cap Take 0.4 mg by mouth daily after supper.     ? trospium (SANCTURA XR) 60 mg Cp24 ER capsule Take 60 mg by mouth daily before breakfast.       No current facility-administered medications for this visit.          Review of Systems   Review of systems difficult due to patient's sleepiness        Physical Exam   In order to maintain social distancing during the COVID pandemic, a visual physical exam was completed.    /72, heart rate 92, respirations 16, temp 97.6, weight 166.6 pounds.    Patient appearing in no distress.  Head is atraumatic and normocephalic.  Does not open eyes to physical stimulation however does moan and move his body.  Respiratory effort is normal.  Lower extremities with lymphedema bilaterally is stable.  Benoit catheter draining clear yellow urine.      Assessment:  1. Dementia with behavioral disturbance, unspecified dementia type (H)     2. Falls frequently         Plan:   In speaking with his wife I did recommend that he follow-up with a neurologist that specializes in dementia in order to parse down his medications and determine the best combination for his restlessness, agitation and frequent falls.  I would recommend starting him on risperidone however wife and daughter do not want him started on an antipsychotic as they felt the Seroquel had increased his agitation on a prior admission.  At this time I will make no adjustments to his Ativan which is scheduled twice a day and PRN.  He also will need to continue on his Zoloft and Remeron.  He is on anticholinergics for his urinary retention and I encouraged the wife to discuss this with his urologist at his appointment next week, as these medications can oftentimes cause falls and  confusion and dementia patients.     I did  her that he will only have increased agitation and behaviors when transitioned to the assisted living.  I assured her to hang in there and follow-up with her primary provider.  He does have frequent falls due to his impulsivity and confusion.  He often times does not remember things in the last 5 minutes.  He will need supervision to allow for safety.    35 total minutes spent with 20 minutes spent face-to-face with patient and wife on the phone due to patient's lethargy and somnolence, counseling and coordination of the above plan of care.    Electronically signed by: Ruth Escalante, CNP

## 2021-06-20 NOTE — LETTER
Letter by Ruth Escalante CNP at      Author: Ruth Escalante CNP Service: -- Author Type: --    Filed:  Encounter Date: 8/10/2020 Status: (Other)         Patient: Brock Abarca   MR Number: [id number]   YOB: 1932   Date of Visit: 8/10/2020     Code Status:  FULL CODE  Visit Type: Follow Up (fall, hypoglycemia, lymphedema, hypokalemia)     Facility:  [facility] [[id number]]      Facility Type: SNF (Skilled Nursing Facility, TCU)    History of Present Illness:   Hospital Admission Date: 8/3/2020 Hospital Discharge Date: 8/7/2020     Brock Abarca is a 88 y.o. male with a past medical history for HFpEF, chronic edema, CKD3, CAD, hx PE, CVA, HTN, HLD, dementia, hypothyroidism, anxiety/depression, OA, Osteoporosis. He was recently hospitalized for severe sepsis needing to be in the ICU on vasopressors. COVID was negative, BC and UC was negative, chest xray was negative.  Sepsis was felt to be due to cellulitis 2/2 venous stasis and stasis ulcers.  IV abx started and changed to po clindamycin. Unfortunately, we do not have thorough medical records due to no discharge summary.    Over the weekend, a BMP was drawn and revealed a K of 2.9. Cr was 1.45 and glucose was 55. On-call was contacted and he was given an additional 40meq of K in addition to his 20meq two times a day. Repeat K was not drawn today and is scheduled for tomorrow. He was given his scheduled metolazone today. Weights the same at 180.lbs. Patient denies any chest pain or palpitations.  Nursing reports concern regarding his LE edema.  Upon exam, he does have extreme lymphedema.  He has hyperpigmentation however it is not warm to touch or painful.  He has told therapy that he would not tolerate being wrapped 24 hours a day.  He has multiple open areas on both his LE which are weeping serous fluid and venous stasis in nature.     He was started on seroquel this weekend due to his delirium on his dementia and agitation.   Today, he is calm and is requesting to go home tomorrow.  When I tell him that he is not ready due to his weakness, LE edema and wounds he does not argue.     BS drawn on BMP was 55 which was likely due to hemolysis.     No past medical history on file.  No past surgical history on file.  No family history on file.  Social History     Socioeconomic History   ? Marital status:      Spouse name: Not on file   ? Number of children: Not on file   ? Years of education: Not on file   ? Highest education level: Not on file   Occupational History   ? Not on file   Social Needs   ? Financial resource strain: Not on file   ? Food insecurity     Worry: Not on file     Inability: Not on file   ? Transportation needs     Medical: Not on file     Non-medical: Not on file   Tobacco Use   ? Smoking status: Not on file   Substance and Sexual Activity   ? Alcohol use: Not on file   ? Drug use: Not on file   ? Sexual activity: Not on file   Lifestyle   ? Physical activity     Days per week: Not on file     Minutes per session: Not on file   ? Stress: Not on file   Relationships   ? Social connections     Talks on phone: Not on file     Gets together: Not on file     Attends [employer] service: Not on file     Active member of club or organization: Not on file     Attends meetings of clubs or organizations: Not on file     Relationship status: Not on file   ? Intimate partner violence     Fear of current or ex partner: Not on file     Emotionally abused: Not on file     Physically abused: Not on file     Forced sexual activity: Not on file   Other Topics Concern   ? Not on file   Social History Narrative   ? Not on file     Additional Geriatric Review  Current Outpatient Medications   Medication Sig Dispense Refill   ? UNABLE TO FIND Medications as of August 7, 2020  Toprol XL Tablet Extended Release 24 Hour 50 MG (Metoprolol Succinate ER)   Give 50 mg by mouth one time a day for CHF  Pharmacy Active 8/7/2020  08:00  8/6/2020  Senna Tablet 8.6 MG (Sennosides)   Give 1 tablet by mouth as needed for constipation daily AND Give 2 tablet by mouth as needed for constipation daily  Pharmacy Active 8/6/2020 13:45  8/6/2020  Trospium Chloride ER Capsule Extended Release 24 Hour 60 MG   Give 60 mg by mouth one time a day for overactive bladder  Pharmacy Active 8/7/2020 06:00  8/6/2020  Zocor Tablet 40 MG (Simvastatin)   Give 40 mg by mouth one time a day for excessive fat in the blood  Pharmacy Active 8/7/2020 08:00  8/6/2020  metOLazone Tablet 2.5 MG   Give 2.5 mg by mouth one time a day every Mon for CHF take 30 minutes before Bumex  Pharmacy Active 8/10/2020 07:00  8/6/2020  Patanol Solution 0.1 % (Olopatadine HCl)   Instill 1 drop in both eyes as needed for eye irritation 2x/daily  Pharmacy Active 8/6/2020 14:00  8/6/2020  Acetaminophen Tablet 325 MG   Give 650 mg by mouth every 6 hours as needed for pain max 4000mg in 24hrs  Pharmacy Active 8/6/2020 13:45  8/6/2020  There is a black box warning associated with this order. Please click to view details. Clindamycin HCl Capsule 300 MG   Give 300 mg by mouth three times a day for cellulitis for 12 Administrations x4 days  Pharmacy Active 8/6/2020 20:00  8/6/2020  MiraLax Powder 17 GM/SCOOP (Polyethylene Glycol 3350)   Give 1 scoop by mouth as needed for constipation daily  Pharmacy Active 8/6/2020 13:45  8/6/2020  There is a black box warning associated with this order. Please click to view details. Synthroid Tablet 112 MCG (Levothyroxine Sodium)   Give 112 mcg by mouth one time a day for low thyroid levels  Pharmacy Active 8/7/2020 06:00  8/6/2020  There is a black box warning associated with this order. Please click to view details. Zoloft Tablet 100 MG (Sertraline HCl)   Give 100 mg by mouth one time a day for depression  Pharmacy Active 8/7/2020 08:00  8/6/2020  There is a black box warning associated with this order. Please click to view details. Plavix Tablet 75 MG  (Clopidogrel Bisulfate)   Give 75 mg by mouth one time a day for clot prevention  Pharmacy Active 8/7/2020 08:00  8/6/2020  Tab-A-Charles Tablet (Multiple Vitamins-Minerals)   Give 1 tablet by mouth one time a day for prevent vitamin deficiency  Pharmacy Active 8/7/2020 08:00  8/6/2020  Flomax Capsule 0.4 MG (Tamsulosin HCl)   Give 0.4 mg by mouth at bedtime for BPH  Pharmacy Active 8/6/2020 20:00  8/6/2020  There is a black box warning associated with this order. Please click to view details. Bumex Tablet 1 MG (Bumetanide)   Give 3 mg by mouth one time a day for CHF AND Give 2 mg by mouth one time a day for CHF  Pharmacy Active 8/7/2020 07:30  8/6/2020  Potassium Chloride powder packet  Give 20 mEq by mouth two times a day for chronic renal disease  Pharmacy Active 8/6/2020 17:00  8/6/2020  Claritin-D 24 Hour Tablet Extended Release 24 Hour  MG (Loratadine-Pseudoephedrine ER)   Give 1 tablet by mouth as needed for allergy symptoms for 13 Days daily Document non-pharmacological interventions attempted prior to administration of medication.  Pharmacy Active 8/7/2020 07:45 8/20/2020 8/7/2020  There is a black box warning associated with this order. Please click to view details. SEROquel Tablet 25 MG (QUEtiapine Fumarate)   Give 12.5 mg by mouth at bedtime for agitiation AND Give 12.5 mg by mouth as needed for NON-redirectable agitation for 14 Days 3x/daily Document non-pharmacological interventions attempted prior to administration. 1. Help pt. call wife 2. Provide calm, quiet environment 3. Provide re-orientation and re-direction to situation  Pharmacy Active 8/7/2020 12:15  8/7/2020       No current facility-administered medications for this visit.      Allergies   Allergen Reactions   ? Ambien [Zolpidem]        There is no immunization history on file for this patient.    Post Discharge Medication Reconciliation Status: discharge medications reconciled, continue medications without change    Review of Systems    Patient denies fever, chills, headache, lightheadedness, dizziness, rhinorrhea, cough, congestion, shortness of breath, chest pain, palpitations, abdominal pain, n/v, diarrhea, constipation, change in appetite, dysuria, frequency, burning or pain with urination.  Other than stated in HPI all other review of systems is negative.         Physical Exam   Vital signs: /78, HR 78, resp 18, temp 98.6  GENERAL APPEARANCE: Elderly male in no acute distress  HEENT: normocephalic, atraumatic  sclerae anicteric, conjunctivae clear and moist, EOM intact  NECK: Supple and symmetric. Trachea is midline, no thyromegaly, no adenopathy, and no tenderness  LUNGS: Lung sounds CTA, no adventitious sounds, respiratory effort normal.  CARD: RRR, S1, S2, without murmurs, gallops, rubs  ABD: Soft and nontender with normal bowel sounds.   MSK: Muscle strength and tone were equal bilateral  EXTREMITIES: severe BLE lymphedema with hyperpigmentation.   NEURO: Alert, cognitive impairment.  Face is symmetric.  SKIN: open venous stasis ulcers BLE with serous drainage.   PSYCH: euthymic            Labs:    Recent Results (from the past 240 hour(s))   COVID-19 VIRUS (CORONAVIRUS) BY PCR - EXTERNAL RESULT    Specimen: Other    Specimen type and source: Other, Specimen from nasopharyngeal structure (specimen)   Result Value Ref Range    COVID-19 Virus by PCR (External Result) Negative Negative   Basic Metabolic Panel   Result Value Ref Range    Sodium 141 136 - 145 mmol/L    Potassium 2.9 (L) 3.5 - 5.0 mmol/L    Chloride 102 98 - 107 mmol/L    CO2 24 22 - 31 mmol/L    Anion Gap, Calculation 15 5 - 18 mmol/L    Glucose 55 (LL) 70 - 125 mg/dL    Calcium 8.7 8.5 - 10.5 mg/dL    BUN 24 8 - 28 mg/dL    Creatinine 1.45 (H) 0.70 - 1.30 mg/dL    GFR MDRD Af Amer 56 (L) >60 mL/min/1.73m2    GFR MDRD Non Af Amer 46 (L) >60 mL/min/1.73m2         Assessment:  1. Lymphedema     2. Cellulitis of lower extremity, unspecified laterality     3. Hypokalemia      4. Dementia with behavioral disturbance, unspecified dementia type (H)     5. Venous stasis     6. Venous stasis ulcer limited to breakdown of skin without varicose veins, unspecified site (H)     7. CKD (chronic kidney disease) stage 3, GFR 30-59 ml/min (H)     8. Hypoglycemia         Plan:   Lymphedema: I discussed with PT and patient and for now will do ace wraps during the day.  Will likely need more compression and could consider unna boot if patient tolerate.  Continue bumex and metolazone    Cellulitis: last dose of clindamycin today.  I believe that if fluid gets out of legs they will be less erythematous.  Will need to closely monitor for reoccurence of cellulitis.     Hypokalemia: give extra 40meq today.  Patient does not like large pills so will change to powder.  Recheck BMP tomorrow.  Likely will need to increase his KCL replacement.      Dementia: agitation less, continue with seroquel with goal to wean off in the next 2 weeks.     Venous stasis ulcers: continue with mepilex.  Unna boot may be helpful in healing.      CKD: check renal function and monitor.     Hypoglycemia: likely lab error but will check FBS x 5 days.     35 total minutes spent with 20 minutes spent in counseling and coordination with patient, nursing and PT regarding lymphedema plan of care and hypokalemia POC.     Electronically signed by: Ruth Escalante CNP

## 2021-06-20 NOTE — LETTER
Letter by Mayuri Herman CNP at      Author: Mayuri Herman CNP Service: -- Author Type: --    Filed:  Encounter Date: 8/20/2020 Status: (Other)         Patient: Brock Abarca   MR Number: [id number]   YOB: 1932   Date of Visit: 8/20/2020     [facility]    Facility:   [facility] [[id number]]   Code Status:Full      CHIEF COMPLAINT/REASON FOR VISIT:  Chief Complaint   Patient presents with   ? Problem Visit     medication review       HISTORY:      HPI: Brock is a 88 y.o. male undergoing physical and occupational therapy at [facility]. with a past medical history for HFpEF, chronic edema, CKD3, CAD, hx PE, CVA, HTN, HLD, dementia, hypothyroidism, anxiety/depression, OA, Osteoporosis. He was recently hospitalized for severe sepsis needing to be in the ICU on vasopressors. COVID was negative, BC and UC was negative, chest xray was negative.  Sepsis was felt to be due to cellulitis 2/2 venous stasis and stasis ulcers.  IV abx started and changed to po clindamycin.    Today patient is seen to review continued urinary frequency. His U/A was negative and UC came back 10,000-50,000 col/ml Klebsiella oxytoca. He is afebrile.   He was noted to be on high doses of Bumex. He had clear lung sounds and 1-2 + lower extremity edema however he does show a 9.6 pound weight loss over the last 12 days. His bumex was decreased. Denies fever, chills,  Denies any chest pain, headaches, lightheadedness, dizziness, shortness of breath, or cough. Appetite is good. Denies any GERD symptoms. Denies any difficulty with swallowing, Denies any abdominal pain, constipation or loose stools. No insomnia. No active bleeding.  He was oriented to self only.         Past Medical History:   Diagnosis Date   ? Arthritis    ? BPH (benign prostatic hyperplasia)    ? CAD (coronary artery disease)    ? CHF (congestive heart failure) (H)    ? Chronic heart failure with preserved ejection  fraction (H)    ? Chronic renal disease, stage III (H)    ? CVA (cerebral vascular accident) (H)    ? Diverticulitis    ? Hand paresthesia    ? HLD (hyperlipidemia)    ? HTN (hypertension)    ? Hypercalcemia    ? Hyperparathyroidism (H)    ? Hypothyroid    ? Hypovitaminosis D    ? Left foot drop    ? Osteoporosis    ? Pulmonary embolism (H)    ? Severe sepsis (H)    ? Urinary frequency              Family History   Problem Relation Age of Onset   ? COPD Mother    ? Stroke Father    ? Diabetes Sister    ? Kidney failure Sister      Social History     Socioeconomic History   ? Marital status:      Spouse name: Not on file   ? Number of children: Not on file   ? Years of education: Not on file   ? Highest education level: Not on file   Occupational History   ? Not on file   Social Needs   ? Financial resource strain: Not on file   ? Food insecurity     Worry: Not on file     Inability: Not on file   ? Transportation needs     Medical: Not on file     Non-medical: Not on file   Tobacco Use   ? Smoking status: Former Smoker     Years: 5.00     Types: Pipe     Last attempt to quit: 2003     Years since quittin.6   ? Smokeless tobacco: Never Used   Substance and Sexual Activity   ? Alcohol use: Yes   ? Drug use: Not on file   ? Sexual activity: Not on file   Lifestyle   ? Physical activity     Days per week: Not on file     Minutes per session: Not on file   ? Stress: Not on file   Relationships   ? Social connections     Talks on phone: Not on file     Gets together: Not on file     Attends [employer]ism service: Not on file     Active member of club or organization: Not on file     Attends meetings of clubs or organizations: Not on file     Relationship status: Not on file   ? Intimate partner violence     Fear of current or ex partner: Not on file     Emotionally abused: Not on file     Physically abused: Not on file     Forced sexual activity: Not on file   Other Topics Concern   ? Not on file   Social  History Narrative   ? Not on file         Review of Systems   Constitutional: Negative for activity change, appetite change, chills, fatigue and fever.   HENT: Negative for congestion and sore throat.    Eyes: Negative for visual disturbance.   Respiratory: Negative for cough, shortness of breath and wheezing.    Cardiovascular: Negative for chest pain and leg swelling.   Gastrointestinal: Negative for abdominal distention, abdominal pain, constipation, diarrhea and nausea.   Genitourinary: Positive for frequency. Negative for dysuria.   Musculoskeletal: Negative for arthralgias and myalgias.   Skin: Negative for color change, rash and wound.   Neurological: Negative for dizziness, weakness and numbness.   Psychiatric/Behavioral: Positive for confusion. Negative for agitation, behavioral problems and sleep disturbance.       Vitals:    08/20/20 1012   BP: 92/56   Pulse: 74   Resp: 16   Temp: 97.7  F (36.5  C)   SpO2: 94%   Weight: 176 lb 6.4 oz (80 kg)       Physical Exam  Constitutional:       Appearance: He is well-developed.      Comments: Pleasant gentleman in no acute distress, noted to be oriented to self only   HENT:      Head: Normocephalic.   Eyes:      Conjunctiva/sclera: Conjunctivae normal.   Neck:      Musculoskeletal: Normal range of motion.   Cardiovascular:      Rate and Rhythm: Normal rate and regular rhythm.      Heart sounds: Normal heart sounds. No murmur.   Pulmonary:      Effort: No respiratory distress.      Breath sounds: Normal breath sounds. No wheezing or rales.   Abdominal:      General: Bowel sounds are normal. There is no distension.      Palpations: Abdomen is soft.      Tenderness: There is no abdominal tenderness.   Musculoskeletal: Normal range of motion.   Skin:     General: Skin is warm.   Neurological:      Mental Status: He is alert and oriented to person, place, and time.   Psychiatric:         Behavior: Behavior normal.           LABS:   Recent Results (from the past 240  hour(s))   Basic Metabolic Panel   Result Value Ref Range    Sodium 139 136 - 145 mmol/L    Potassium 2.8 (LL) 3.5 - 5.0 mmol/L    Chloride 99 98 - 107 mmol/L    CO2 29 22 - 31 mmol/L    Anion Gap, Calculation 11 5 - 18 mmol/L    Glucose 88 70 - 125 mg/dL    Calcium 8.7 8.5 - 10.5 mg/dL    BUN 25 8 - 28 mg/dL    Creatinine 1.47 (H) 0.70 - 1.30 mg/dL    GFR MDRD Af Amer 55 (L) >60 mL/min/1.73m2    GFR MDRD Non Af Amer 45 (L) >60 mL/min/1.73m2   Magnesium   Result Value Ref Range    Magnesium 2.1 1.8 - 2.6 mg/dL   HM2(CBC w/o Differential)   Result Value Ref Range    WBC 5.6 4.0 - 11.0 thou/uL    RBC 3.44 (L) 4.40 - 6.20 mill/uL    Hemoglobin 10.7 (L) 14.0 - 18.0 g/dL    Hematocrit 33.6 (L) 40.0 - 54.0 %    MCV 98 80 - 100 fL    MCH 31.1 27.0 - 34.0 pg    MCHC 31.8 (L) 32.0 - 36.0 g/dL    RDW 14.6 (H) 11.0 - 14.5 %    Platelets 162 140 - 440 thou/uL    MPV 10.6 8.5 - 12.5 fL   COVID-19 VIRUS (CORONAVIRUS) BY PCR - EXTERNAL RESULT    Specimen: Other    Specimen type and source: Other, Specimen from nasopharyngeal structure (specimen)   Result Value Ref Range    COVID-19 Virus by PCR (External Result) Not Detected Not Detected   Urinalysis   Result Value Ref Range    Color, UA Yellow Colorless, Yellow, Straw, Light Yellow    Clarity, UA Clear Clear    Glucose, UA Negative Negative    Bilirubin, UA Negative Negative    Ketones, UA Negative Negative    Specific Gravity, UA 1.011 1.001 - 1.030    Blood, UA Negative Negative    pH, UA 6.0 4.5 - 8.0    Protein, UA Negative Negative mg/dL    Urobilinogen, UA <2.0 E.U./dL <2.0 E.U./dL, 2.0 E.U./dL    Nitrite, UA Negative Negative    Leukocytes, UA Negative Negative   Culture, Urine    Specimen: Urine, Midstream   Result Value Ref Range    Culture 10,000-50,000 col/ml Klebsiella oxytoca (!)        Susceptibility    Klebsiella oxytoca - ZACH     Amoxicillin + Clavulanate <=4/2 Sensitive      Ampicillin >16 Resistant      Cefazolin >16 Resistant      Cefepime <=1 Sensitive       Ceftriaxone <=1 Sensitive      Ciprofloxacin <=0.5 Sensitive      Gentamicin <=2 Sensitive      Levofloxacin <=1 Sensitive      Meropenem <=0.25 Sensitive      Nitrofurantoin <=16 Sensitive      Tetracycline <=2 Sensitive      Tobramycin <=2 Sensitive      Trimethoprim + Sulfamethoxazole <=0.5 Sensitive    Basic Metabolic Panel   Result Value Ref Range    Sodium 143 136 - 145 mmol/L    Potassium 4.3 3.5 - 5.0 mmol/L    Chloride 111 (H) 98 - 107 mmol/L    CO2 24 22 - 31 mmol/L    Anion Gap, Calculation 8 5 - 18 mmol/L    Glucose 83 70 - 125 mg/dL    Calcium 9.2 8.5 - 10.5 mg/dL    BUN 13 8 - 28 mg/dL    Creatinine 0.95 0.70 - 1.30 mg/dL    GFR MDRD Af Amer >60 >60 mL/min/1.73m2    GFR MDRD Non Af Amer >60 >60 mL/min/1.73m2     ASSESSMENT:      ICD-10-CM    1. Encounter for medication review  Z79.899        PLAN:    Dementia with behavioral disturbance- Provide a safe environment resume 7.5 mg Remeron , Seroquel was discontinued in the hospital per family request    Hypokalemia -potassium on 8/16/20 was 3.1 recheck ordered for AM     Pressure ulcers- wounds cares as ordered    Cellulitis- completed clindamycin     Urinary frequency/burning - U/AU/C negative, diuretics decreased.       Electronically signed by: Mayuri Herman CNP

## 2021-06-20 NOTE — LETTER
Letter by Mayuri Herman CNP at      Author: Mayuri Herman CNP Service: -- Author Type: --    Filed:  Encounter Date: 8/27/2020 Status: (Other)         Patient: Brock Abarca   MR Number: [id number]   YOB: 1932   Date of Visit: 8/27/2020     [facility]    Facility:   [facility] [[id number]]   Code Status: FULL CODE  PCP: Sadaf Fox MD   Phone: [phone]   Fax: [phone]      CHIEF COMPLAINT/REASON FOR VISIT:  Chief Complaint   Patient presents with   ? Discharge Summary       HISTORY COURSE:  Brock is a 88 y.o. male undergoing physical and occupational therapy at [facility]. with a past medical history for HFpEF, chronic edema, CKD3, CAD, hx PE, CVA, HTN, HLD, dementia, hypothyroidism, anxiety/depression, OA, Osteoporosis. He was recently hospitalized for severe sepsis needing to be in the ICU on vasopressors. COVID was negative, BC and UC was negative, chest xray was negative.  Sepsis was felt to be due to cellulitis 2/2 venous stasis and stasis ulcers.  IV abx started and changed to po clindamycin.     Today patient is seen for a face to face for discharge. He will discharge to [facility] on 8/31/2 with current medications and treatments . He will Have home care services PT/OT/HHA and RN   He had a  U/A  that was negative and UC came back 10,000-50,000 col/ml Klebsiella oxytoca. He was afebrile and not treated at that time. However colleague did treat him due to his behaviors and confusion. He continues to be oriented to self only. His behaviors are calmer and he is off the 1:1 sitter.  He  Denies fever, chills,  Denies any chest pain, headaches, lightheadedness, dizziness, shortness of breath, or cough. Appetite is good. Denies any GERD symptoms. Denies any difficulty with swallowing, Denies any abdominal pain, constipation or loose stools. No insomnia. No active bleeding.  He does have intermittent left hip pain and  today he denied having any pain.     Review of Systems  Constitutional: Negative for activity change, appetite change, chills, fatigue and fever.   HENT: Negative for congestion and sore throat.    Eyes: Negative for visual disturbance.   Respiratory: Negative for cough, shortness of breath and wheezing.    Cardiovascular: Negative for chest pain and leg swelling.   Gastrointestinal: Negative for abdominal distention, abdominal pain, constipation, diarrhea and nausea.   Genitourinary: Negative for dysuria.   Musculoskeletal: Positive for arthralgias. Negative for myalgias.        Intermittent left hip pain.    Skin: Negative for color change, rash and wound.   Neurological: Negative for dizziness, weakness and numbness.   Psychiatric/Behavioral: Positive for confusion. Negative for agitation, behavioral problems and sleep disturbance.  Vitals:    08/27/20 0913   BP: 112/60   Pulse: 77   Resp: 16   Temp: 98.2  F (36.8  C)   SpO2: 96%   Weight: 169 lb 3.2 oz (76.7 kg)       Physical Exam  Constitutional:       Appearance: He is well-developed.      Comments: Pleasant gentleman in no acute distress, noted to be oriented to self only   HENT:      Head: Normocephalic.   Eyes:      Conjunctiva/sclera: Conjunctivae normal.   Neck:      Musculoskeletal: Normal range of motion.   Cardiovascular:      Rate and Rhythm: Normal rate and regular rhythm.      Heart sounds: Normal heart sounds. No murmur.   Pulmonary:      Effort: No respiratory distress.      Breath sounds: Normal breath sounds. No wheezing or rales.   Abdominal:      General: Bowel sounds are normal. There is no distension.      Palpations: Abdomen is soft.      Tenderness: There is no abdominal tenderness.   Musculoskeletal: Normal range of motion.   Skin:     General: Skin is warm.   Neurological:      Mental Status: He is alert and oriented to person, place, and time.   Psychiatric:         Behavior: Behavior normal.      MEDICATION LIST:  Current Outpatient  Medications   Medication Sig   ? acetaminophen (TYLENOL) 325 MG tablet Take 650 mg by mouth every 6 (six) hours as needed for pain.   ? bumetanide (BUMEX) 1 MG tablet Take 1 mg by mouth 2 (two) times a day.    ? clopidogreL (PLAVIX) 75 mg tablet Take 75 mg by mouth daily.   ? doxycycline (ADOXA) 100 MG tablet Take 100 mg by mouth 2 (two) times a day.   ? levothyroxine (SYNTHROID, LEVOTHROID) 112 MCG tablet Take 112 mcg by mouth daily.   ? loperamide (IMODIUM) 2 mg capsule Take 2 mg by mouth every 6 (six) hours as needed for diarrhea.   ? LORazepam (ATIVAN) 0.5 MG tablet Take 1 tablet (0.5 mg total) by mouth every 6 (six) hours as needed for anxiety. (Patient taking differently: Take 0.5 mg by mouth every 6 (six) hours as needed for anxiety. Renewed on 8/25/20 for discharge x 14 days)   ? metoprolol succinate (TOPROL-XL) 50 MG 24 hr tablet Take 50 mg by mouth daily.   ? mirtazapine (REMERON) 7.5 MG tablet Take 7.5 mg by mouth at bedtime.   ? multivitamin (TAB-A-LALO ORAL) Take 1 tablet by mouth daily.   ? olopatadine (PATANOL) 0.1 % ophthalmic solution Administer 1 drop to both eyes daily as needed for allergies.    ? polyethylene glycol (MIRALAX) 17 gram packet Take 17 g by mouth daily as needed.   ? potassium chloride (K-DUR,KLOR-CON) 10 MEQ tablet Take 40 mEq by mouth 2 (two) times a day.   ? senna (SENOKOT) 8.6 mg tablet Take 1 tablet by mouth daily as needed for constipation.    ? sertraline (ZOLOFT) 100 MG tablet Take 100 mg by mouth daily.   ? simvastatin (ZOCOR) 40 MG tablet Take 40 mg by mouth daily.   ? tamsulosin (FLOMAX) 0.4 mg cap Take 0.4 mg by mouth daily after supper.   ? trospium (SANCTURA XR) 60 mg Cp24 ER capsule Take 60 mg by mouth daily before breakfast.       DISCHARGE DIAGNOSIS:    ICD-10-CM    1. Severe sepsis due to cellulitis  A41.9     R65.20    2. Dementia with behavioral disturbance, unspecified dementia type (H)  F03.91        MEDICAL EQUIPMENT NEEDS:  NONE    DISCHARGE PLAN/FACE TO  FACE:  I certify that services are/were furnished while this patient was under the care of a physician and that a physician or an allowed non-physician practitioner (NPP), had a face-to-face encounter that meets the physician face-to-face encounter requirements. The encounter was in whole, or in part, related to the primary reason for home health. The patient is confined to his/her home and needs intermittent skilled nursing, physical therapy, speech-language pathology, or the continued need for occupational therapy. A plan of care has been established by a physician and is periodically reviewed by a physician.  Date of Face-to-Face Encounter: 8/27/20    I certify that, based on my findings, the following services are medically necessary home health services:PT/OT/HHA and RN     My clinical findings support the need for the above skilled services because:PT/OT for continued strength and endurance, HHA to assist with ADl's and RN for VS,  medication management and monitoring of sacral pressure ulcer.     This patient is homebound because: He is with cognitive impairment and oriented to self only making him unsafe to leave the home unassisted     The patient is, or has been, under my care and I have initiated the establishment of the plan of care. This patient will be followed by a physician who will periodically review the plan of care.    Schedule follow up visit with primary care provider within 7 days to reestablish care.    Electronically signed by: Mayuri Herman CNP

## 2021-06-20 NOTE — LETTER
Letter by Mayuri Herman CNP at      Author: Mayuri Herman CNP Service: -- Author Type: --    Filed:  Encounter Date: 8/18/2020 Status: (Other)         Patient: Brock Abarca   MR Number: [id number]   YOB: 1932   Date of Visit: 8/18/2020     [facility]    Facility:   [facility] [[id number]]   Code Status: UNKNOWN      CHIEF COMPLAINT/REASON FOR VISIT:  Chief Complaint   Patient presents with   ? Review Of Multiple Medical Conditions       HISTORY:      HPI: Brock is a 88 y.o. male undergoing physical and occupational therapy at [facility]. with a past medical history for HFpEF, chronic edema, CKD3, CAD, hx PE, CVA, HTN, HLD, dementia, hypothyroidism, anxiety/depression, OA, Osteoporosis. He was recently hospitalized for severe sepsis needing to be in the ICU on vasopressors. COVID was negative, BC and UC was negative, chest xray was negative.  Sepsis was felt to be due to cellulitis 2/2 venous stasis and stasis ulcers.  IV abx started and changed to po clindamycin.    Today patient is seen to review multiple medical issues and to establish care.  Denies fever, chills,  Denies any chest pain, headaches, lightheadedness, dizziness,   shortness of breath, or cough. Appetite is good. Denies any GERD symptoms. Denies any difficulty with swallowing,   Denies any abdominal pain, constipation or loose stools. No insomnia. No active bleeding.  He does report urinary frequency and burning.  Was noted to be alert to self only.  He appeared to be on a one-to-one for safety.      Past Medical History:   Diagnosis Date   ? Arthritis    ? BPH (benign prostatic hyperplasia)    ? CAD (coronary artery disease)    ? CHF (congestive heart failure) (H)    ? Chronic heart failure with preserved ejection fraction (H)    ? Chronic renal disease, stage III (H)    ? CVA (cerebral vascular accident) (H)    ? Diverticulitis    ? Hand paresthesia    ? HLD (hyperlipidemia)    ?  HTN (hypertension)    ? Hypercalcemia    ? Hyperparathyroidism (H)    ? Hypothyroid    ? Hypovitaminosis D    ? Left foot drop    ? Osteoporosis    ? Pulmonary embolism (H)    ? Severe sepsis (H)    ? Urinary frequency              Family History   Problem Relation Age of Onset   ? COPD Mother    ? Stroke Father    ? Diabetes Sister    ? Kidney failure Sister      Social History     Socioeconomic History   ? Marital status:      Spouse name: Not on file   ? Number of children: Not on file   ? Years of education: Not on file   ? Highest education level: Not on file   Occupational History   ? Not on file   Social Needs   ? Financial resource strain: Not on file   ? Food insecurity     Worry: Not on file     Inability: Not on file   ? Transportation needs     Medical: Not on file     Non-medical: Not on file   Tobacco Use   ? Smoking status: Former Smoker     Years: 5.00     Types: Pipe     Last attempt to quit: 2003     Years since quittin.6   ? Smokeless tobacco: Never Used   Substance and Sexual Activity   ? Alcohol use: Yes   ? Drug use: Not on file   ? Sexual activity: Not on file   Lifestyle   ? Physical activity     Days per week: Not on file     Minutes per session: Not on file   ? Stress: Not on file   Relationships   ? Social connections     Talks on phone: Not on file     Gets together: Not on file     Attends Mosque service: Not on file     Active member of club or organization: Not on file     Attends meetings of clubs or organizations: Not on file     Relationship status: Not on file   ? Intimate partner violence     Fear of current or ex partner: Not on file     Emotionally abused: Not on file     Physically abused: Not on file     Forced sexual activity: Not on file   Other Topics Concern   ? Not on file   Social History Narrative   ? Not on file         Review of Systems   Constitutional: Negative for activity change, appetite change, chills, fatigue and fever.   HENT: Negative for  congestion and sore throat.    Eyes: Negative for visual disturbance.   Respiratory: Negative for cough, shortness of breath and wheezing.    Cardiovascular: Negative for chest pain and leg swelling.   Gastrointestinal: Negative for abdominal distention, abdominal pain, constipation, diarrhea and nausea.   Genitourinary: Positive for frequency. Negative for dysuria.   Musculoskeletal: Negative for arthralgias and myalgias.   Skin: Negative for color change, rash and wound.   Neurological: Negative for dizziness, weakness and numbness.   Psychiatric/Behavioral: Positive for confusion. Negative for agitation, behavioral problems and sleep disturbance.       Vitals:    08/18/20 0844   BP: 110/54   Pulse: 66   Resp: 16   Temp: 98.2  F (36.8  C)   SpO2: 94%   Weight: 172 lb 3.2 oz (78.1 kg)       Physical Exam  Constitutional:       Appearance: He is well-developed.      Comments: Pleasant gentleman in no acute distress, noted to be oriented to self only   HENT:      Head: Normocephalic.   Eyes:      Conjunctiva/sclera: Conjunctivae normal.   Neck:      Musculoskeletal: Normal range of motion.   Cardiovascular:      Rate and Rhythm: Normal rate and regular rhythm.      Heart sounds: Normal heart sounds. No murmur.   Pulmonary:      Effort: No respiratory distress.      Breath sounds: Normal breath sounds. No wheezing or rales.   Abdominal:      General: Bowel sounds are normal. There is no distension.      Palpations: Abdomen is soft.      Tenderness: There is no abdominal tenderness.   Musculoskeletal: Normal range of motion.   Skin:     General: Skin is warm.   Neurological:      Mental Status: He is alert and oriented to person, place, and time.   Psychiatric:         Behavior: Behavior normal.           LABS:   Recent Results (from the past 240 hour(s))   Basic Metabolic Panel   Result Value Ref Range    Sodium 139 136 - 145 mmol/L    Potassium 2.8 (LL) 3.5 - 5.0 mmol/L    Chloride 99 98 - 107 mmol/L    CO2 29 22 -  31 mmol/L    Anion Gap, Calculation 11 5 - 18 mmol/L    Glucose 88 70 - 125 mg/dL    Calcium 8.7 8.5 - 10.5 mg/dL    BUN 25 8 - 28 mg/dL    Creatinine 1.47 (H) 0.70 - 1.30 mg/dL    GFR MDRD Af Amer 55 (L) >60 mL/min/1.73m2    GFR MDRD Non Af Amer 45 (L) >60 mL/min/1.73m2   Magnesium   Result Value Ref Range    Magnesium 2.1 1.8 - 2.6 mg/dL   HM2(CBC w/o Differential)   Result Value Ref Range    WBC 5.6 4.0 - 11.0 thou/uL    RBC 3.44 (L) 4.40 - 6.20 mill/uL    Hemoglobin 10.7 (L) 14.0 - 18.0 g/dL    Hematocrit 33.6 (L) 40.0 - 54.0 %    MCV 98 80 - 100 fL    MCH 31.1 27.0 - 34.0 pg    MCHC 31.8 (L) 32.0 - 36.0 g/dL    RDW 14.6 (H) 11.0 - 14.5 %    Platelets 162 140 - 440 thou/uL    MPV 10.6 8.5 - 12.5 fL   COVID-19 VIRUS (CORONAVIRUS) BY PCR - EXTERNAL RESULT    Specimen: Other    Specimen type and source: Other, Specimen from nasopharyngeal structure (specimen)   Result Value Ref Range    COVID-19 Virus by PCR (External Result) Not Detected Not Detected   Urinalysis   Result Value Ref Range    Color, UA Yellow Colorless, Yellow, Straw, Light Yellow    Clarity, UA Clear Clear    Glucose, UA Negative Negative    Bilirubin, UA Negative Negative    Ketones, UA Negative Negative    Specific Gravity, UA 1.011 1.001 - 1.030    Blood, UA Negative Negative    pH, UA 6.0 4.5 - 8.0    Protein, UA Negative Negative mg/dL    Urobilinogen, UA <2.0 E.U./dL <2.0 E.U./dL, 2.0 E.U./dL    Nitrite, UA Negative Negative    Leukocytes, UA Negative Negative   Culture, Urine    Specimen: Urine, Midstream   Result Value Ref Range    Culture 10,000-50,000 col/ml Gram Negative Rods (!)      ASSESSMENT:      ICD-10-CM    1. Dementia with behavioral disturbance, unspecified dementia type (H)  F03.91    2. Hypokalemia  E87.6    3. Pressure injury of skin, unspecified injury stage, unspecified location  L89.90        PLAN:    Dementia with behavioral disturbance- Provide a safe environment resume 7.5 mg Remeron , Seroquel was discontinued in the  hospital per family request    Hypokalemia -potassium on 8/16/20 was 3.1 recheck ordered for AM     Pressure ulcers- wounds cares as ordered    Cellulitis- completed clindamycin     Urinary frequency/burning - U/AU/C      Electronically signed by: Mayuri Herman CNP

## 2021-06-29 NOTE — PROGRESS NOTES
Progress Notes by Mayuri Herman CNP at 8/27/2020  9:13 AM     Author: Mayuri Herman CNP Service: -- Author Type: Nurse Practitioner    Filed: 8/28/2020  9:20 AM Encounter Date: 8/27/2020 Status: Attested    : Mayuri Herman CNP (Nurse Practitioner) Cosigner: Yoel Nugent MD at 9/1/2020 10:38 PM    Attestation signed by Yoel Nugent MD at 9/1/2020 10:38 PM    [facility]    Facility:   [facility] [[id number]]   Code Status: FULL CODE  PCP: Sadaf Fox MD   Phone: [phone]   Fax: [phone]      CHIEF COMPLAINT/REASON FOR VISIT:  Chief Complaint   Patient presents with   ? Discharge Summary       HISTORY COURSE:  Brock is a 88 y.o. male undergoing physical and occupational therapy at [facility]. with a past medical history for HFpEF, chronic edema, CKD3, CAD, hx PE, CVA, HTN, HLD, dementia, hypothyroidism, anxiety/depression, OA, Osteoporosis. He was recently hospitalized for severe sepsis needing to be in the ICU on vasopressors. COVID was negative, BC and UC was negative, chest xray was negative.  Sepsis was felt to be due to cellulitis 2/2 venous stasis and stasis ulcers.  IV abx started and changed to po clindamycin.     Today patient is seen for a face to face for discharge. He will discharge to [facility] on 8/31/2 with current medications and treatments . He will Have home care services PT/OT/HHA and RN   He had a  U/A  that was negative and UC came back 10,000-50,000 col/ml Klebsiella oxytoca. He was afebrile and not treated at that time. However colleague did treat him due to his behaviors and confusion. He continues to be oriented to self only. His behaviors are calmer and he is off the 1:1 sitter.  He  Denies fever, chills,  Denies any chest pain, headaches, lightheadedness, dizziness, shortness of breath, or cough. Appetite is good. Denies any GERD symptoms. Denies any difficulty with  swallowing, Denies any abdominal pain, constipation or loose stools. No insomnia. No active bleeding.  He does have intermittent left hip pain and today he denied having any pain.     Review of Systems  Constitutional: Negative for activity change, appetite change, chills, fatigue and fever.   HENT: Negative for congestion and sore throat.    Eyes: Negative for visual disturbance.   Respiratory: Negative for cough, shortness of breath and wheezing.    Cardiovascular: Negative for chest pain and leg swelling.   Gastrointestinal: Negative for abdominal distention, abdominal pain, constipation, diarrhea and nausea.   Genitourinary: Negative for dysuria.   Musculoskeletal: Positive for arthralgias. Negative for myalgias.        Intermittent left hip pain.    Skin: Negative for color change, rash and wound.   Neurological: Negative for dizziness, weakness and numbness.   Psychiatric/Behavioral: Positive for confusion. Negative for agitation, behavioral problems and sleep disturbance.  Vitals:    08/27/20 0913   BP: 112/60   Pulse: 77   Resp: 16   Temp: 98.2  F (36.8  C)   SpO2: 96%   Weight: 169 lb 3.2 oz (76.7 kg)       Physical Exam  Constitutional:       Appearance: He is well-developed.      Comments: Pleasant gentleman in no acute distress, noted to be oriented to self only   HENT:      Head: Normocephalic.   Eyes:      Conjunctiva/sclera: Conjunctivae normal.   Neck:      Musculoskeletal: Normal range of motion.   Cardiovascular:      Rate and Rhythm: Normal rate and regular rhythm.      Heart sounds: Normal heart sounds. No murmur.   Pulmonary:      Effort: No respiratory distress.      Breath sounds: Normal breath sounds. No wheezing or rales.   Abdominal:      General: Bowel sounds are normal. There is no distension.      Palpations: Abdomen is soft.      Tenderness: There is no abdominal tenderness.   Musculoskeletal: Normal range of motion.   Skin:     General: Skin is warm.   Neurological:      Mental Status:  He is alert and oriented to person, place, and time.   Psychiatric:         Behavior: Behavior normal.      MEDICATION LIST:  Current Outpatient Medications   Medication Sig   ? acetaminophen (TYLENOL) 325 MG tablet Take 650 mg by mouth every 6 (six) hours as needed for pain.   ? bumetanide (BUMEX) 1 MG tablet Take 1 mg by mouth 2 (two) times a day.    ? clopidogreL (PLAVIX) 75 mg tablet Take 75 mg by mouth daily.   ? doxycycline (ADOXA) 100 MG tablet Take 100 mg by mouth 2 (two) times a day.   ? levothyroxine (SYNTHROID, LEVOTHROID) 112 MCG tablet Take 112 mcg by mouth daily.   ? loperamide (IMODIUM) 2 mg capsule Take 2 mg by mouth every 6 (six) hours as needed for diarrhea.   ? LORazepam (ATIVAN) 0.5 MG tablet Take 1 tablet (0.5 mg total) by mouth every 6 (six) hours as needed for anxiety. (Patient taking differently: Take 0.5 mg by mouth every 6 (six) hours as needed for anxiety. Renewed on 8/25/20 for discharge x 14 days)   ? metoprolol succinate (TOPROL-XL) 50 MG 24 hr tablet Take 50 mg by mouth daily.   ? mirtazapine (REMERON) 7.5 MG tablet Take 7.5 mg by mouth at bedtime.   ? multivitamin (TAB-A-LALO ORAL) Take 1 tablet by mouth daily.   ? olopatadine (PATANOL) 0.1 % ophthalmic solution Administer 1 drop to both eyes daily as needed for allergies.    ? polyethylene glycol (MIRALAX) 17 gram packet Take 17 g by mouth daily as needed.   ? potassium chloride (K-DUR,KLOR-CON) 10 MEQ tablet Take 40 mEq by mouth 2 (two) times a day.   ? senna (SENOKOT) 8.6 mg tablet Take 1 tablet by mouth daily as needed for constipation.    ? sertraline (ZOLOFT) 100 MG tablet Take 100 mg by mouth daily.   ? simvastatin (ZOCOR) 40 MG tablet Take 40 mg by mouth daily.   ? tamsulosin (FLOMAX) 0.4 mg cap Take 0.4 mg by mouth daily after supper.   ? trospium (SANCTURA XR) 60 mg Cp24 ER capsule Take 60 mg by mouth daily before breakfast.       DISCHARGE DIAGNOSIS:    ICD-10-CM    1. Severe sepsis due to cellulitis  A41.9     R65.20    2.  Dementia with behavioral disturbance, unspecified dementia type (H)  F03.91        MEDICAL EQUIPMENT NEEDS:  NONE    DISCHARGE PLAN/FACE TO FACE:  I certify that services are/were furnished while this patient was under the care of a physician and that a physician or an allowed non-physician practitioner (NPP), had a face-to-face encounter that meets the physician face-to-face encounter requirements. The encounter was in whole, or in part, related to the primary reason for home health. The patient is confined to his/her home and needs intermittent skilled nursing, physical therapy, speech-language pathology, or the continued need for occupational therapy. A plan of care has been established by a physician and is periodically reviewed by a physician.  Date of Face-to-Face Encounter: 8/27/20    I certify that, based on my findings, the following services are medically necessary home health services:PT/OT/HHA and RN     My clinical findings support the need for the above skilled services because:PT/OT for continued strength and endurance, HHA to assist with ADl's and RN for VS,  medication management and monitoring of sacral pressure ulcer.     This patient is homebound because: He is with cognitive impairment and oriented to self only making him unsafe to leave the home unassisted     The patient is, or has been, under my care and I have initiated the establishment of the plan of care. This patient will be followed by a physician who will periodically review the plan of care.    Schedule follow up visit with primary care provider within 7 days to reestablish care.    Electronically signed by: Mayuri Herman CNP

## 2021-06-29 NOTE — PROGRESS NOTES
Progress Notes by Ruth Escalante CNP at 9/8/2020  3:36 PM     Author: Ruth Escalante CNP Service: -- Author Type: Nurse Practitioner    Filed: 9/8/2020  3:52 PM Encounter Date: 9/8/2020 Status: Attested    : Ruth Escalante CNP (Nurse Practitioner) Cosigner: Yoel Nugent MD at 9/8/2020  4:15 PM    Attestation signed by Yoel Nugent MD at 9/8/2020  4:15 PM    sp                Code Status:  FULL CODE  Visit Type: Discharge Summary and Problem Visit (frequent falls)     Facility:  [facility] [[id number]]          PCP:  Sadaf Fox MD  [phone]       Admission Date to our Facility: 8/7/2020 Discharge Date from our Facility: 9/10/2020    Discharge Diagnosis:    1. Dementia with behavioral disturbance, unspecified dementia type (H)     2. Falls frequently     3. Lymphedema     4. Urinary frequency          History of Present Illness: Brock Abarca is a 88 y.o. male with a past medical history for HFpEF, chronic edema, CKD3, CAD, hx PE, CVA, HTN, HLD, dementia, hypothyroidism, anxiety/depression, OA, Osteoporosis. He was recently hospitalized 8/4/2020 for severe sepsis needing to be in the ICU on vasopressors. COVID was negative, BC and UC was negative, chest xray was negative.  Sepsis was felt to be due to cellulitis 2/2 venous stasis and stasis ulcers.  IV abx started and changed to po clindamycin.    He did have another hospitalization from 8/12/to 8/16 after a fall at the TCU and restless/agitated behavior for which was recently started on Seroquel.  He was found to have a K of 2.4 and WILLIS which improved with reduction of Bumex.  Family requested that the Seroquel be discontinued due to feeling this had increased his anxiety.     Skilled Nursing Facility Course:  While at the TCU, he had issues with urinary frequency and so a molina catheter was placed due to frequent falls and him trying to get up to the bathroom.  He was placed on trospium and tamsulosin in  the hospital, however due to his most recent frequent falls I did discontinue the trospium in efforts to minimize anticholinergic effects. He will be following up with urology later this week.  His agitation/restlessness and dementia plays a big role in his falls.  Upon return to the TCU another provider started him on ativan prn.  Last week, nursing was concerned that he was getting too agitated and so his ativan was scheduled two times a day.  Since then he has had approximately 6 falls without injury.  I did change the ativan back to three times a day prn.  I don't believe that ativan is the best medication for his restlessness however family is resistant to trying risperidone.  I did speak with the wife last week and recommend that she get an appointment with Neurology that specializes in dementia to be evaluated for polypharm and find the right medications for his behaviors.      He has had some weight loss and will need close monitoring and supplementation.  Weight today is 176lbs down from admission weight of 190lbs.     Discharge Medications:    Current Outpatient Medications   Medication Sig Dispense Refill   ? acetaminophen (TYLENOL) 325 MG tablet Take 650 mg by mouth every 6 (six) hours as needed for pain.     ? bumetanide (BUMEX) 1 MG tablet Take 1 mg by mouth 2 (two) times a day.      ? clopidogreL (PLAVIX) 75 mg tablet Take 75 mg by mouth daily.     ? levothyroxine (SYNTHROID, LEVOTHROID) 112 MCG tablet Take 112 mcg by mouth daily.     ? loperamide (IMODIUM) 2 mg capsule Take 2 mg by mouth every 6 (six) hours as needed for diarrhea.     ? LORazepam (ATIVAN) 0.5 MG tablet Take 1 tablet (0.5 mg total) by mouth 2 (two) times a day. (Patient taking differently: Take 0.5 mg by mouth 3 (three) times a day as needed. ) 30 tablet 0   ? metoprolol succinate (TOPROL-XL) 50 MG 24 hr tablet Take 50 mg by mouth daily.     ? mirtazapine (REMERON) 7.5 MG tablet Take 7.5 mg by mouth at bedtime.     ? multivitamin  (TAB-A-LALO ORAL) Take 1 tablet by mouth daily.     ? olopatadine (PATANOL) 0.1 % ophthalmic solution Administer 1 drop to both eyes daily as needed for allergies.      ? polyethylene glycol (MIRALAX) 17 gram packet Take 17 g by mouth daily as needed.     ? potassium chloride (K-DUR,KLOR-CON) 10 MEQ tablet Take 40 mEq by mouth 2 (two) times a day.     ? senna (SENOKOT) 8.6 mg tablet Take 1 tablet by mouth daily as needed for constipation.      ? sertraline (ZOLOFT) 100 MG tablet Take 100 mg by mouth daily.     ? simvastatin (ZOCOR) 40 MG tablet Take 40 mg by mouth daily.     ? tamsulosin (FLOMAX) 0.4 mg cap Take 0.4 mg by mouth daily after supper.     ? trospium (SANCTURA XR) 60 mg Cp24 ER capsule Take 60 mg by mouth daily before breakfast.       No current facility-administered medications for this visit.        For most current and accurate medication list, please contact the skilled nursing facility that this patient visit took place at.      Discharge Plan:  Patient is stable to discharge to [facility] .  He is high falls risk due to impulsivity and anxiety.  Will need close follow up and recommend for him to follow up with neurology.      Review of Systems   ROS difficult to obtain due to patient's [place] and dementia    Physical Exam   In order to maintain social distancing during the COVID pandemic, a visual exam was completed.     Vitals: /68, HR 84, resp 17, temp 97.7.     Patient is elderly male in no distress.  Head is AT/NC and EOM intact.  Respiratory effort is normal.  LE with chornic lymphedema.  Benoit catheter draining clear yellow urine.  Alert with cognitive impairment.  Mood is stable today.     Labs:  No results found for this or any previous visit (from the past 240 hour(s)).      Assessment:  1. Dementia with behavioral disturbance, unspecified dementia type (H)     2. Falls frequently     3. Lymphedema     4. Urinary frequency         MEDICAL EQUIPMENT NEEDS:  NA        The patient is, or has  been, under my care and I have initiated the establishment of the plan of care. This patient will be followed by a physician who will periodically review the plan of care.        Electronically signed by: Ruth Escalante CNP

## 2021-11-24 NOTE — PROGRESS NOTES
Leigha, [facility]  RN, left message on RN hotline requesting a return call to review patient's meds  Called her back and LM explaining that we are still working on clarifying the plavix order and then will fax back the signed [facility] cert & Plan of care along with updated med list  Asked that she call the RN hotline back if she has further info on meds    Edwin Weiner RN       show

## 2022-07-05 NOTE — LETTER
Letter by Yoel Nugent MD at      Author: Yoel Nugent MD Service: -- Author Type: --    Filed:  Encounter Date: 8/21/2020 Status: (Other)         Patient: Brock Abarca   MR Number: [id number]   YOB: 1932   Date of Visit: 8/21/2020      [facility]/ Geriatrics    Facility:  [facility] [[id number]]    Code Status:  FULL CODE    Chief Complaint   Patient presents with   ? H & P   :                    Patient Active Problem List   Diagnosis   ? Severe sepsis due to cellulitis   ? Decubitus ulcers   ? Dementia with behavioral disturbance (H)   ? Fall   ? Hypokalemia   ? WILLIS (acute kidney injury) (H)       History:  Brock Abarca  is an 88 year old male with history of heart failure with preserved ejection fraction, chronic pedal edema, CKD 3, coronary artery disease, remote PE, CVA, hypertension, hyperlipidemia, dementia, hard of hearing, hypothyroidism, anxiety/depression, osteoarthritis, osteoporosis, cervical decompression C3-4, left ureteral stent/lithotripsy procedure e seen for re-admission to TCU on 8/21/2020        Hospital course: Patient was admitted to [facility] from this facility/TCU on August 12 through August 16 following a fall.  Patient had had restless/agitated behavior during the entire time on the TCU following his first hospitalization earlier in August (see below)    Evaluation revealed hypokalemia 2.4 increased to 3.1 by the time of discharge.  He also had acute kidney injury 1.8 with improvement in discontinuation of his metolazone as well as reduction/holding of his Bumex--- eventually decided to discharge him on 1 mg twice daily (had been 3 mg and 2 mg p.m.).  Patient had a forehead laceration which was repaired.  He was seen by wound care for his lower extremity wounds.    His Seroquel which had only recently been started was discontinued at the hospital as family said it seemed to make things worse rather  "than better.  Since return to the TCU another provider has started Ativan 0.5 mg every 6 as needed for \"anxiety\" and restlessness without clear response yet.  Patient was also continued on mirtazapine 7.5 mg at bedtime.    As urinary frequency continues to drive some of his waking restlessness, he has been treated with trospium and tamsulosin and reportedly had no worrisome degree of urinary retention while hospitalized.             Patient was also admitted to [facility] between August 3 and August 6.  At home he was very weak and slid onto the floor off of his chair and family summoned help.    Work-up revealed fever to 101.3.  He was diagnosed with severe sepsis and required ICU admission with vasopressors.  Work-up included negative chest x-ray negative COVID-19 negative blood cultures negative urine culture.  Sepsis was felt secondary to cellulitis associated with his venous stasis/decubitus ulcers.  Initial broad-spectrum IV antibiotics were narrowed to p.o. clindamycin which has since been completed.      Subjective/ROS:    -augmented by discussion with facility staff involved in direct care.  Augmented by discussion with patient's wife and daughter in person today as they are here for care conference.  -limited by: Memory    -Staff reports that the patient was up to 5 AM this morning despite Ativan last night.  He has been sleeping ever since.  He has no bladder complaints or apparent discomfort when he is sleeping and sleeps soundly.  However when he is awake he is very frequently requesting to void as often as every 5 minutes.  He usually gets up a little bit of urine and does not complain of pain.  He has had restlessness and wandering and is needed a steady one-to-one sitter.  He will do things such as get on the floor to primitivo with his wheelchair as if he is fixing it.  He will also remove his incontinence garment in favor of his usual boxers after going to the bathroom.  This seems to be out " "of habit.  Will frequently say that he wishes to go home.    - Patient has been seen by Ms. Herman a couple of times.  She did do a repeat urine culture on the 18th which has since come back showing 10-50,000 colonies Klebsiella oxytoca which has not yet been treated by antibiotics.  Sensitivities are back and it is sensitive to several antibiotics.    -Staff says that his behaviors wandering restlessness are generally redirectable but very similar to his past stay.  He has had limited ability to work with therapy but they have granted him another week.  -I did ask family what they noticed with the Seroquel \"the worse\" that I was told about.  They cannot give me anything concrete but just had general observations that it did not seem to help her change his behavior in a positive way.  His daughter is also very concerned about antipsychotics and wondering why I would choose this drug class over \"regular anxiety pills\".  Another provider has started him on lorazepam and I did discuss my concern about benzodiazepines however he is in a safe environment with one-to-one monitoring and no clear improvement with antipsychotic, already treated with mirtazapine, so I think it is at least reasonable to give the Ativan a chance.  I did discuss with them that there are other pharmaceutical classes used to treat restlessness/anxiety/agitation but that I also feel he be a good candidate to see a psychiatrist again at this point considering his failure to improve with time and other treatments, though finding a psychiatrist to see him in the short-term could prove difficult.  Meanwhile, unless the urine culture findings prove significant, no certain cause for delirium has been identified over his recent hospitalization or here.      He has had no fever sweats or chills.  Staff reports no falls or injuries.  He has a sundowning type pattern to his confusion/wandering.  Staff reports good appetite with bowel movements frequent " urination but does not seem to be in pain with urination.                        Past medical/surgical history:  heart failure with preserved ejection fraction, chronic pedal edema, CKD 3, coronary artery disease, remote PE, CVA, hypertension, hyperlipidemia, dementia, hard of hearing, hypothyroidism, anxiety/depression, osteoarthritis, osteoporosis, cervical decompression C3-4, left ureteral stent/lithotripsy procedure    Family history:   Mom had COPD dad had CVA Sister had diabetes Sister had chronic kidney disease    Social History     Socioeconomic History   ? Marital status:      Spouse name: Not on file   ? Number of children: Not on file   ? Years of education: Not on file   ? Highest education level: Not on file   Occupational History   ? Not on file   Social Needs   ? Financial resource strain: Not on file   ? Food insecurity     Worry: Not on file     Inability: Not on file   ? Transportation needs     Medical: Not on file     Non-medical: Not on file   Tobacco Use   ? Smoking status: Former Smoker     Years: 5.00     Types: Pipe     Last attempt to quit: 2003     Years since quittin.6   ? Smokeless tobacco: Never Used   Substance and Sexual Activity   ? Alcohol use: Yes   ? Drug use: Not on file   ? Sexual activity: Not on file   Lifestyle   ? Physical activity     Days per week: Not on file     Minutes per session: Not on file   ? Stress: Not on file   Relationships   ? Social connections     Talks on phone: Not on file     Gets together: Not on file     Attends Gnosticist service: Not on file     Active member of club or organization: Not on file     Attends meetings of clubs or organizations: Not on file     Relationship status: Not on file   ? Intimate partner violence     Fear of current or ex partner: Not on file     Emotionally abused: Not on file     Physically abused: Not on file     Forced sexual activity: Not on file   Other Topics Concern   ? Not on file   Social History  Narrative   ? Not on file   :    Social history: Patient is  he lives independently with his wife    Current Outpatient Medications on File Prior to Visit   Medication Sig Dispense Refill   ? acetaminophen (TYLENOL) 325 MG tablet Take 650 mg by mouth every 6 (six) hours as needed for pain.     ? bumetanide (BUMEX) 1 MG tablet Take 1 mg by mouth 2 (two) times a day at 9am and 6pm.      ? clopidogreL (PLAVIX) 75 mg tablet Take 75 mg by mouth daily.     ? doxycycline (ADOXA) 100 MG tablet Take 100 mg by mouth 2 (two) times a day.     ? levothyroxine (SYNTHROID, LEVOTHROID) 112 MCG tablet Take 112 mcg by mouth daily.     ? loperamide (IMODIUM) 2 mg capsule Take 2 mg by mouth every 6 (six) hours as needed for diarrhea.     ? LORazepam (ATIVAN) 0.5 MG tablet Take by mouth every 6 (six) hours as needed for anxiety.     ? metoprolol succinate (TOPROL-XL) 50 MG 24 hr tablet Take 50 mg by mouth daily.     ? mirtazapine (REMERON) 7.5 MG tablet Take 7.5 mg by mouth at bedtime.     ? multivitamin (TAB-A-LALO ORAL) Take 1 tablet by mouth daily.     ? olopatadine (PATANOL) 0.1 % ophthalmic solution Administer 1 drop to both eyes 2 (two) times a day as needed for allergies.     ? polyethylene glycol (MIRALAX) 17 gram packet Take 17 g by mouth daily as needed.     ? potassium chloride (K-DUR,KLOR-CON) 10 MEQ tablet Take 40 mEq by mouth 2 (two) times a day.     ? senna (SENOKOT) 8.6 mg tablet Take 1-2 tablets by mouth daily as needed for constipation.     ? sertraline (ZOLOFT) 100 MG tablet Take 100 mg by mouth daily.     ? simvastatin (ZOCOR) 40 MG tablet Take 40 mg by mouth daily.     ? tamsulosin (FLOMAX) 0.4 mg cap Take 0.4 mg by mouth daily after supper.     ? trospium (SANCTURA XR) 60 mg Cp24 ER capsule Take 60 mg by mouth daily before breakfast.     ? [] loratadine-pseudoephedrine (CLARITIN-D 24-HOUR)  mg per 24 hr tablet Take 1 tablet by mouth daily as needed for allergies.     ? [DISCONTINUED] metOLazone  (ZAROXOLYN) 2.5 MG tablet Take 2.5 mg by mouth once a week. Every Mon     ? [DISCONTINUED] QUEtiapine (SEROQUEL) 25 MG tablet Take 25 mg by mouth at bedtime.     ? [DISCONTINUED] QUEtiapine (SEROQUEL) 25 MG tablet Take 12.5 mg by mouth 3 (three) times a day as needed.       No current facility-administered medications on file prior to visit.    :      ALLERGIES:  Ambien [zolpidem] patient and family cannot remember what happened to him when he took the Ambien.  He said no untoward effects to the lorazepam initial doses.    Vitals:      Current Vitals   BP: 131/61 mmHg  8/21/2020 20:13  Temp:97.9  F  8/21/2020 20:13  Pulse: 91 bpm  8/21/2020 20:13    Weight: 171 Lbs  8/20/2020 10:15  Resp: 20 Breaths/min  8/21/2020 20:13  BS: 110 mg/dL  8/11/2020 06:19  O2: 96 %  8/21/2020 20:13  Pain: 0  8/21/2020 20:1  Physical exam:  Patient is sleeping soundly this afternoon but does alert to my voice.  He is hard of hearing but he can hear me.  He cannot tell me where he is and asks repeatedly where he is.  He does follow commands.  His  facies are symmetric sclera clear nonicteric EOMI oropharynx moist and clear neck supple with good range of motion skin shows Mepilex on the left elbow area and the right Achilles area which have been recently redressed wounds were not examined (see PCC pictures).  I did not examine his coccyx wound although staff says it is clean and improved from last week.  Patient appears quite comfortable with heart rate in the 70s S1-S2 without murmur gallop his lungs show decreased breath sounds in the bases but otherwise clear.  His abdomen is soft he was deep palpation in all areas without any sign of discomfort no organomegaly mass bowel sounds are normal his extremities look improved.  He has wrinkling of his pretibial skin including and ankles and the tops of the feet.  He has minimal edema in the feet.  The venous stasis dermatitis looks significantly improved, a quieter purplish color rather than  the erythematous seen last time and he is not weeping at all.  Purposeful movement of all 4 extremities noted.      Due to the 2020 Covid 19 pandemic, except as noted above, the patient was visually observed at a 6 foot plus distance.  An observational exam was performed in an effort to keep patient safe from Covid 19 and other communicable diseases.   Labs:  Lab Results   Component Value Date    WBC 5.8 08/21/2020    HGB 11.2 (L) 08/21/2020    HCT 33.6 (L) 08/21/2020    MCV 93 08/21/2020     (L) 08/21/2020     Results for orders placed or performed in visit on 08/19/20   Basic Metabolic Panel   Result Value Ref Range    Sodium 143 136 - 145 mmol/L    Potassium 4.3 3.5 - 5.0 mmol/L    Chloride 111 (H) 98 - 107 mmol/L    CO2 24 22 - 31 mmol/L    Anion Gap, Calculation 8 5 - 18 mmol/L    Glucose 83 70 - 125 mg/dL    Calcium 9.2 8.5 - 10.5 mg/dL    BUN 13 8 - 28 mg/dL    Creatinine 0.95 0.70 - 1.30 mg/dL    GFR MDRD Af Amer >60 >60 mL/min/1.73m2    GFR MDRD Non Af Amer >60 >60 mL/min/1.73m2         No results found for: TSH  No results found for: HGBA1C  [unfilled]  No results found for: [id number]  No results found for: BNP  [unfilled]        Invalid input(s): PRINTERVAL      Assessment/Plan:      ICD-10-CM    1. Dementia with behavioral disturbance, unspecified dementia type (H)  F03.91    2. Fall, subsequent encounter  W19.XXXD    3. Hypokalemia  E87.6    4. WILLIS (acute kidney injury) (H)  N17.9      Dementia with behavioral disturbance  Acute delirium  Anxiety    depression   I again reviewed history with his wife and daughter.  It seems relatively certain that he had some degree of dementia prior to his 2 recent hospitalizations but he did not have anything like this degree of restlessness confusion agitation.  His sepsis from the first hospitalization is long resolved.  He had minimal metabolic derangements discovered on his second hospitalization such as hypokalemia which is improved now.  His WILLIS was  mild and has improved as well.  Family believes that the Seroquel made things worse and he has been off of it now for better than a week.    Patient continues on sertraline 100 mg daily is also on Remeron 7.5 mg at at bedtime.  Ativan was added yesterday and high of reservations about it it is continued (see discussion above).    The only other possible contributor would be UTI.  His urine culture is abnormal but not impressive numbers.  Nonetheless dysuria and urinary frequency seem to drive much of his daytime restlessness and preoccupations.  He does have up to 50,000 colonies of Klebsiella oxytoca.  While I agree with Ms. Herman' decision not to treat it initially, at this point after weighing likelihood of contributing to his issues side effects and risks of antibiotic treatment and his overall lack of improvement, elect to treat with doxycycline 100 mg twice daily for 7 days to see if it might help him.    No antipsychotics per family request    Consider psychiatric consultation or transfer to geriatric psychiatric bed if patient is not improving with the above therapy changes    Anticholinergic effect of his bladder medication is noted but perhaps the medication is doing him more good than harm?  Continue Flomax.  Continue trospium.    Delirium precautions, day night reversal precautions with increased activity in the day, quiet dark room at night as examples    Urinary frequency  Possible UTI Klebsiella oxytoca   See discussion above.  Apparently he had acceptable amounts of postvoid residual in the hospital and did not require catheterization.  Unclear if this low growth of Klebsiella organism contributes in any way but at this point with his failure to improve with other measures treatment is recommended with the hope that we could see some improvement with his restlessness.  - Doxycycline 100 mg p.o. twice daily  - Postvoid residual checks, if he has greater than 300 cc straight cath would be  appropriate  -If he has greater than 300 cc 3 or more times, Benoit catheterization would be appropriate and if that happens with strongly encourage urologic consultation  -Continue Flomax and trospium for now      severe sepsis resolved  Cellulitis  Decubitus ulcer  Venous stasis ulcer left shin  Venous stasis dermatitis left greater than right  Peripheral edema  Left ankle wound   Sepsis in early August has resolved.  Cellulitis has resolved.  Ulcers are improving.  Peripheral edema is much improved and his weight is down to 171 pounds  -Agree with metolazone discontinuation  -Agree with decreased dose of Bumex as he seems to be doing just fine with decreased weight and decreased edema at 1 mg twice daily  - Call for weight 175 or greater which would be a 4 pound weight gain from current at which point we might need to at least temporarily increase his diuretic  - Leg elevation as much as possible while resting above heart level  -Wound care as ordered  -Lymphedema therapy consult  -Lymphedema garments per consultan  -Monitor weights daily for now      Coronary artery disease   No chest pain no shortness of breath.  Continue Plavix and metoprolol and Zocor    Hyperlipidemia   Zocor    CKD 3  History of left ureteral stent and lithotripsy  Acute kidney injury   Acute kidney injury has resolved.  Recent blood work reviewed,  looks quite good as above.  Avoid nephrotoxins were possible.      Hard of hearing   Use talking box/headphones hearing aids     Hypothyroidism   Levothyroxine without change    Constipation   No longer an issue if anything he is on the loose side.  Senna plus MiraLAX in place    Hypertension   On metoprolol alone at this point.  Blood pressures acceptable, to good    PE   Historically noted    Remote CVA   Historically noted.  Head imaging has shown no further/recent CVAs contributing to the above.  Continue aspirin statin and blood pressure control as above          Case discussed  Detail Level: Simple with:  Primary CNP   Facility staff   Family wife, daughter          Yoel Gordillo MD Jovanna          Detail Level: Zone

## 2025-04-09 NOTE — PROGRESS NOTES
Subjective     Brock Abarca is a 87 year old male who presents to clinic today for the following health issues:  Sores on his buttocks and legs.  He is getting home care with physical therapy and wound care.  He doesn't elevate his legs but is taking his bumex.  He did have unna boots on but too them off after 2 days due to discomfort.      Family is wondering if he can have a sedative.  He has problems laying down at night.      Was started in keflex in 6/4/19 and it doesn't seem to work    Continues to have problems with his memory.          History of Present Illness     Mental Health Follow-up:  Patient presents to follow-up on Depression.Patient's depression since last visit has been:  Good  The patient is not having other symptoms associated with depression.      Any significant life events: No  Patient is not feeling anxious or having panic attacks.  Patient has no concerns about alcohol or drug use.     Social History  Tobacco Use    Smoking status: Never Smoker    Smokeless tobacco: Never Used  Alcohol use: Yes    Comment: occ.  Drug use: No      Today's PHQ-9         PHQ-9 Total Score:         PHQ-9 Q9 Thoughts of better off dead/self-harm past 2 weeks :       Thoughts of suicide or self harm:      Self-harm Plan:        Self-harm Action:          Safety concerns for self or others:           Hyperlipidemia:  He presents for follow up of hyperlipidemia.  He reports shortness of breath. He is taking medication to lower cholesterol. He is not having myalgia or other side effects to statin medications.    Hypertension: He presents for follow up of hypertension.  He does not check blood pressure  regularly outside of the clinic. Outpatient blood pressures have not been over 140/90. He does not follow a low salt diet.     Hypothyroidism:     Since last visit, patient describes the following symptoms::  None    Weight gain::  No weight gain    Weight loss::  No weight loss    He eats 0-1 servings of  fruits and vegetables daily.He consumes 1 sweetened beverage(s) daily.  He is taking medications regularly.       Heart Failure Follow-up  Are you experiencing any shortness of breath? Yes, at night or when lying flat  How would you describe your shortness of breath?  Same as usual        Are you experiencing any swelling in your legs or feet?  Worse than usual    Are you using more pillows than usual? No        Are you having any of the following side effects from your medications? (Select all that apply)  Other:  excessive urination    Since your last visit, how many times have you gone to the cardiologist, urgent care, emergency room, or hospital because of your heart failure?   1 time  He had his bumex increased again as he regained weight.  Has a continual balance issue with renal and cardiac function.  However more recently his creatinine was normal on the increased bumex dose.  Agreement was to tolerate an increased creatinine to keep the fluid down.  Had limited echo that was stable with normal EF and mildly reduced RVSF and mild LVH                            Patient Active Problem List   Diagnosis     Arthritis     Pulmonary embolism (H)     BPH (benign prostatic hyperplasia)     Urinary frequency     Essential hypertension     Osteoporosis, senile     Hypothyroidism     Left foot drop     Lip tremor     Hand paresthesia     Thrombocytopenia (H)     Fatigue     Memory loss     Hypercalcemia     Advanced directives, counseling/discussion     Reticulocytosis     Hypovitaminosis D     Epiphora     Cerebral infarction (H)     History of dacryocystorhinostomy, od; os?     Brain dysfunction     Hyperlipidemia LDL goal <100     Peripheral edema     Diverticulosis of colon     H/O: CVA (cerebrovascular accident)     Hemorrhoids     Spinal stenosis, unspecified region other than cervical     Heart failure with preserved ejection fraction (H)     Obesity (BMI 35.0-39.9) with comorbidity (H)     Driving safety issue      Ulcer of lower extremity, limited to breakdown of skin, unspecified laterality (H)     Past Surgical History:   Procedure Laterality Date     APPENDECTOMY       BACK SURGERY       CATARACT IOL, RT/LT      s/p cataract surgery both eyes     Cystoscopy, left ureteroscopy, holium laser lithotripsy, stent exchange  1/23/17     Cystoscopy, left urethral stone removed, stent placement  1/5/17     DACRYOCYSTORHINOSTOMY BILATERAL  2013    right eye; left eye also?     EXTRACORPOREAL SHOCK WAVE LITHOTRIPSY, CYSTOSCOPY, INSERT STENT URETER(S), COMBINED       HEAD & NECK SURGERY       ORTHOPEDIC SURGERY       PARATHYROIDECTOMY N/A 2/14/2017    Procedure: PARATHYROIDECTOMY;  Surgeon: Genesis Dixon MD;  Location:  OR       Social History     Tobacco Use     Smoking status: Never Smoker     Smokeless tobacco: Never Used   Substance Use Topics     Alcohol use: Yes     Comment: occ.     Family History   Problem Relation Age of Onset     Cerebrovascular Disease Father      Heart Disease Sister          Current Outpatient Medications   Medication Sig Dispense Refill     bumetanide (BUMEX) 1 MG tablet Take 3 tablets (3 mg) by mouth daily       cholecalciferol (VITAMIN D3) 5000 UNITS CAPS capsule Take 1 capsule by mouth daily       clopidogrel (PLAVIX) 75 MG tablet TAKE 1 TABLET BY MOUTH EVERY DAY 90 tablet 3     Cyanocobalamin (VITAMIN B 12 PO) Take 2,500 mcg by mouth daily.       levothyroxine (SYNTHROID/LEVOTHROID) 112 MCG tablet Take 1 tablet (112 mcg) by mouth daily 90 tablet 5     loratadine (CLARITIN) 10 MG tablet Take 1 tablet (10 mg) by mouth daily       metoprolol succinate ER (TOPROL-XL) 50 MG 24 hr tablet Take 1 tablet (50 mg) by mouth daily 90 tablet 1     mirtazapine (REMERON) 7.5 MG tablet Take 1 tablet (7.5 mg) by mouth At Bedtime 30 tablet 3     olopatadine (PATANOL) 0.1 % ophthalmic solution Place 1 drop into both eyes 2 times daily 5 mL 3     order for DME Equipment being ordered: 10-20 mm HG knee high  "compression stockings 1 Device 0     polyethylene glycol (CLEARLAX) Packet Take 17 g by mouth daily as needed for constipation       potassium chloride ER (MICRO-K) 10 MEQ CR capsule Take 20 meq(2 tablets) daily and take an additional 1 meq(1 tablet) if patient has leg swelling or weight is 3 lbs above baseline.       sennosides (SENOKOT) 8.6 MG tablet Take 1 tablet by mouth 2 times daily (Patient taking differently: Take 1 tablet by mouth as needed ) 60 tablet 0     simvastatin (ZOCOR) 40 MG tablet Take 1 tablet (40 mg) by mouth daily 90 tablet 3     tamsulosin (FLOMAX) 0.4 MG 24 hr capsule Take 1 capsule (0.4 mg) by mouth daily (Patient taking differently: Take 0.4 mg by mouth every evening ) 90 capsule 4     zinc oxide (DESITIN) 40 % external ointment Apply topically 3 times daily as needed for dry skin or irritation 56 g 1     Allergies   Allergen Reactions     Zolpidem Other (See Comments)     Pt was found wandering halls during last hospitalization and was confused.   Pt and wife request that pt not receive Ambien.       Reviewed and updated as needed this visit by Provider  Tobacco  Allergies  Meds  Problems  Med Hx  Surg Hx  Fam Hx         Review of Systems    ROS: 10 point ROS neg other than the symptoms noted above in the HPI.       Objective    /58 (BP Location: Left arm, Cuff Size: Adult Regular)   Pulse 75   Temp 97.5  F (36.4  C) (Oral)   Resp 14   Ht 1.626 m (5' 4\")   Wt 93 kg (205 lb)   SpO2 96%   BMI 35.19 kg/m    Body mass index is 35.19 kg/m .  Physical Exam   GENERAL APPEARANCE: healthy, alert and no distress  NECK: no adenopathy, no asymmetry, masses, or scars and thyroid normal to palpation  RESP: lungs clear to auscultation - no rales, rhonchi or wheezes  CV: regular rates and rhythm and normal S1 S2, no S3 or S4    SKIN: no suspicious lesions or rashes  PSYCH: mentation appears normal. and affect full and perhaps some memory difficulties   2+ edema to the mid calf " bilateral with some mild erythema from venous stasis.  He has open ulcers on the bilateral lower extremities - see photos above.  No exudate.   Gluteal cleft with dime sized lesion, non tender - no exudate.       Diagnostic Test Results:  Labs reviewed in Epic  Results for orders placed or performed in visit on 05/07/19   **Basic metabolic panel FUTURE anytime   Result Value Ref Range    Sodium 140 133 - 144 mmol/L    Potassium 3.8 3.4 - 5.3 mmol/L    Chloride 103 94 - 109 mmol/L    Carbon Dioxide 28 20 - 32 mmol/L    Anion Gap 9 3 - 14 mmol/L    Glucose 92 70 - 99 mg/dL    Urea Nitrogen 25 7 - 30 mg/dL    Creatinine 1.37 (H) 0.66 - 1.25 mg/dL    GFR Estimate 46 (L) >60 mL/min/[1.73_m2]    GFR Estimate If Black 53 (L) >60 mL/min/[1.73_m2]    Calcium 9.3 8.5 - 10.1 mg/dL           Assessment & Plan     1. Heart failure with preserved ejection fraction (H)  Is hypervolemic.  Sees cardiology.  Is on high dose diuretics.  Has had renal function abnormalities in the past but recent normal.      2. Essential hypertension  Well controlled with medications without side effects.     3. Ulcer of lower extremity, limited to breakdown of skin, unspecified laterality (H)  Worsening.  Unna boots placed X2 by the MA.  Will be getting home wound care soon.  Need is to control the stasis and fluid to get a head of the wounds .  No infection noted.  Stop keflex  - UNNA BOOT APPLICATION    4. Memory loss  Worsening.  Wife is assisting in cares     5. Peripheral edema  Per patient instructions.     6. Insomnia, unspecified type  Per patient instructions.   - mirtazapine (REMERON) 7.5 MG tablet; Take 1 tablet (7.5 mg) by mouth At Bedtime  Dispense: 30 tablet; Refill: 3          Patient Instructions   Stop Keflex.  I agree with the wound care nurse.   Remeron at bedtime for sleep.  Leave your leg wraps on until at least tomorrow.     Return in about 1 month (around 7/9/2019) for Medication check.    Sadaf Fox MD  [facility]  [place]     unable to assess / AMS

## (undated) DEVICE — ESU GROUND PAD ADULT W/CORD E7507

## (undated) DEVICE — SUCTION SLEEVE NEPTUNE 2 125MM 0703-005-125

## (undated) DEVICE — GLOVE PROTEXIS W/NEU-THERA 6.5  2D73TE65

## (undated) DEVICE — SOL WATER IRRIG 500ML BOTTLE 2F7113

## (undated) DEVICE — SU CHROMIC 3-0 SH 27" G122H

## (undated) DEVICE — SPECIMEN CONTAINER URINE 90ML STERILE 75.1435.002

## (undated) DEVICE — CLIP HORIZON SM RED WIDE SLOT 001201

## (undated) DEVICE — SUCTION MANIFOLD NEPTUNE 2 SYS 1 PORT 702-025-000

## (undated) DEVICE — LINEN TOWEL PACK X5 5464

## (undated) DEVICE — NIM PROBE PRASS INCREMENTING TIP 8225825

## (undated) DEVICE — PREP CHLORAPREP 26ML TINTED ORANGE  260815

## (undated) DEVICE — PACK ENT MINOR CUSTOM ASC

## (undated) DEVICE — ADHESIVE SWIFTSET 0.8ML OCTYL SS6

## (undated) DEVICE — DRAPE U SPLIT 74X120" 29440

## (undated) DEVICE — SOL NACL 0.9% IRRIG 500ML BOTTLE 2F7123

## (undated) DEVICE — SU MONOCRYL 5-0 P-3 18" UND Y493G

## (undated) DEVICE — SURGICEL FIBRILLAR HEMOSTAT 1"X2" 1961

## (undated) DEVICE — ESU ELEC BLADE 2.75" COATED/INSULATED E1455

## (undated) DEVICE — ESU PENCIL SMOKE EVAC W/ROCKER SWITCH 0703-047-000

## (undated) DEVICE — CLIP HORIZON MED BLUE 002200

## (undated) DEVICE — NDL BUTTERFLY 21GA .75" 367296

## (undated) RX ORDER — PROPOFOL 10 MG/ML
INJECTION, EMULSION INTRAVENOUS
Status: DISPENSED
Start: 2017-02-14

## (undated) RX ORDER — OXYCODONE AND ACETAMINOPHEN 5; 325 MG/1; MG/1
TABLET ORAL
Status: DISPENSED
Start: 2017-02-14

## (undated) RX ORDER — DEXAMETHASONE SODIUM PHOSPHATE 10 MG/ML
INJECTION, SOLUTION INTRAMUSCULAR; INTRAVENOUS
Status: DISPENSED
Start: 2017-02-14

## (undated) RX ORDER — EPHEDRINE SULFATE 50 MG/ML
INJECTION, SOLUTION INTRAMUSCULAR; INTRAVENOUS; SUBCUTANEOUS
Status: DISPENSED
Start: 2017-02-14

## (undated) RX ORDER — PHENYLEPHRINE HCL IN 0.9% NACL 1 MG/10 ML
SYRINGE (ML) INTRAVENOUS
Status: DISPENSED
Start: 2017-02-14

## (undated) RX ORDER — ACETAMINOPHEN 325 MG/1
TABLET ORAL
Status: DISPENSED
Start: 2017-02-14

## (undated) RX ORDER — ONDANSETRON 2 MG/ML
INJECTION INTRAMUSCULAR; INTRAVENOUS
Status: DISPENSED
Start: 2017-02-14

## (undated) RX ORDER — GLYCOPYRROLATE 0.2 MG/ML
INJECTION INTRAMUSCULAR; INTRAVENOUS
Status: DISPENSED
Start: 2017-02-14

## (undated) RX ORDER — REMIFENTANIL HYDROCHLORIDE 1 MG/ML
INJECTION, POWDER, LYOPHILIZED, FOR SOLUTION INTRAVENOUS
Status: DISPENSED
Start: 2017-02-14

## (undated) RX ORDER — GABAPENTIN 300 MG/1
CAPSULE ORAL
Status: DISPENSED
Start: 2017-02-14

## (undated) RX ORDER — FENTANYL CITRATE 50 UG/ML
INJECTION, SOLUTION INTRAMUSCULAR; INTRAVENOUS
Status: DISPENSED
Start: 2017-02-14